# Patient Record
Sex: FEMALE | Race: BLACK OR AFRICAN AMERICAN | NOT HISPANIC OR LATINO | Employment: FULL TIME | URBAN - METROPOLITAN AREA
[De-identification: names, ages, dates, MRNs, and addresses within clinical notes are randomized per-mention and may not be internally consistent; named-entity substitution may affect disease eponyms.]

---

## 2017-01-10 ENCOUNTER — TELEPHONE (OUTPATIENT)
Dept: OBSTETRICS AND GYNECOLOGY | Facility: CLINIC | Age: 32
End: 2017-01-10

## 2017-01-10 NOTE — TELEPHONE ENCOUNTER
----- Message from Shavon Veronica sent at 1/10/2017  3:38 PM CST -----  Contact: pt  x_  1st Request  _  2nd Request  _  3rd Request      Who:PORTER MCCURDY [[id number]]    Why: returning call back     What Number to Call Back: [phone]    When to Expect a call back: (Before the end of the day)   -- if call after 3:00 call back will be tomorrow.

## 2017-01-10 NOTE — TELEPHONE ENCOUNTER
Patient was informed that I will start the benefit verification process the Mirena IUD and call her back once I receive a process with the coverage,verbilazed understanding.

## 2017-01-10 NOTE — TELEPHONE ENCOUNTER
----- Message from Fidel Aguilera sent at 1/10/2017 12:35 PM CST -----  Contact: Patient  x_ 1st Request  _ 2nd Request  _ 3rd Request    Who: PORTER MCCURDY [[id number]]    Why: Patient is calling in regards to confirming if her Merana (birth control) is still available in the office and if so, she would like to be schedule for the procedure. Patient is needing a call back from staff.    What Number to Call Back: Patient can be reached at [phone].    When to Expect a call back: (Before the end of the day)  -- if call after 3:00 call back will be tomorrow.

## 2017-03-02 ENCOUNTER — TELEPHONE (OUTPATIENT)
Dept: OBSTETRICS AND GYNECOLOGY | Facility: CLINIC | Age: 32
End: 2017-03-02

## 2017-03-02 NOTE — TELEPHONE ENCOUNTER
----- Message from Fidel Aguilera sent at 3/1/2017  1:14 PM CST -----  Contact: Patient  x_ 1st Request  _ 2nd Request  _ 3rd Request    Who: PORTER MCCURDY [[id number]]    Why: Patient is calling in regards to being scheduled for her Mirena. Patient is needing a call back from staff.    What Number to Call Back: Patient can be reached at [phone].    When to Expect a call back: (Before the end of the day)  -- if call after 3:00 call back will be tomorrow.

## 2017-03-15 ENCOUNTER — OFFICE VISIT (OUTPATIENT)
Dept: OBSTETRICS AND GYNECOLOGY | Facility: CLINIC | Age: 32
End: 2017-03-15
Attending: OBSTETRICS & GYNECOLOGY
Payer: COMMERCIAL

## 2017-03-15 VITALS
HEIGHT: 67 IN | SYSTOLIC BLOOD PRESSURE: 120 MMHG | DIASTOLIC BLOOD PRESSURE: 80 MMHG | WEIGHT: 232.38 LBS | BODY MASS INDEX: 36.47 KG/M2

## 2017-03-15 DIAGNOSIS — N89.8 VAGINAL DISCHARGE: Primary | ICD-10-CM

## 2017-03-15 PROCEDURE — 99213 OFFICE O/P EST LOW 20 MIN: CPT | Mod: S$GLB,,, | Performed by: OBSTETRICS & GYNECOLOGY

## 2017-03-15 PROCEDURE — 1160F RVW MEDS BY RX/DR IN RCRD: CPT | Mod: S$GLB,,, | Performed by: OBSTETRICS & GYNECOLOGY

## 2017-03-15 PROCEDURE — 99999 PR PBB SHADOW E&M-EST. PATIENT-LVL III: CPT | Mod: PBBFAC,,, | Performed by: OBSTETRICS & GYNECOLOGY

## 2017-03-15 PROCEDURE — 87480 CANDIDA DNA DIR PROBE: CPT

## 2017-03-15 RX ORDER — BUPROPION HYDROCHLORIDE 150 MG/1
TABLET ORAL
Refills: 1 | COMMUNITY
Start: 2017-01-18 | End: 2017-03-15

## 2017-03-15 RX ORDER — FLUCONAZOLE 100 MG/1
100 TABLET ORAL DAILY
Qty: 1 TABLET | Refills: 0 | Status: SHIPPED | OUTPATIENT
Start: 2017-03-15 | End: 2017-03-17 | Stop reason: SDUPTHER

## 2017-03-15 NOTE — MR AVS SNAPSHOT
"    [facility] [address]  [address] [facility] [address]  Phone: [phone]                  Johanna Bullock   3/15/2017 4:00 PM   Office Visit    Description:  Female : 1985   Provider:  Louis Zhao MD   Department:  [facility] [address]           Reason for Visit     Vaginal Discharge                To Do List           Goals (5 Years of Data)     None      [facility] On Call     [facility] On Call Nurse Care Line -  Assistance  Registered nurses in the [facility] On Call Center provide clinical advisement, health education, appointment booking, and other advisory services.  Call for this free service at [phone].             Medications           Message regarding Medications     Verify the changes and/or additions to your medication regime listed below are the same as discussed with your clinician today.  If any of these changes or additions are incorrect, please notify your healthcare provider.        STOP taking these medications     buPROPion (WELLBUTRIN XL) 150 MG TB24 tablet     albuterol 90 mcg/actuation inhaler Inhale 1-2 puffs into the lungs every 6 (six) hours as needed for Wheezing.           Verify that the below list of medications is an accurate representation of the medications you are currently taking.  If none reported, the list may be blank. If incorrect, please contact your healthcare provider. Carry this list with you in case of emergency.           Current Medications     b complex vitamins (B COMPLEX 1) tablet Take 1 tablet by mouth once daily.    cyanocobalamin 1,000 mcg/mL injection ONE INJECTION AS DIRECTED EVERY 28 DAYS    levonorgestrel (MIRENA) 20 mcg/24 hour (5 years) IUD 1 each by Intrauterine route once.           Clinical Reference Information           Your Vitals Were     BP Height Weight Last Period BMI    120/80 5' 7" (1.702 m) 105.4 kg (232 lb 5.8 oz) 02/15/2017 (Approximate) 36.39 kg/m2      Blood Pressure          Most Recent " Value    BP  120/80      Allergies as of 3/15/2017     Zofran [Ondansetron Hcl (Pf)]      Immunizations Administered on Date of Encounter - 3/15/2017     None      Language Assistance Services     ATTENTION: Language assistance services are available, free of charge. Please call [phone].      ATENCIÓN: Si habla jatin, tiene a boogie disposición servicios gratuitos de asistencia lingüística. Llame al [phone].     CHÚ Ý: N?u b?n nói Ti?ng Vi?t, có các d?ch v? h? tr? ngôn ng? mi?n phí dành cho b?n. G?i s? [phone].         [facility] [address] complies with applicable Federal civil rights laws and does not discriminate on the basis of race, color, national origin, age, disability, or sex.

## 2017-03-16 LAB
CANDIDA RRNA VAG QL PROBE: NEGATIVE
G VAGINALIS RRNA GENITAL QL PROBE: POSITIVE
T VAGINALIS RRNA GENITAL QL PROBE: NEGATIVE

## 2017-03-17 DIAGNOSIS — N89.8 VAGINAL DISCHARGE: ICD-10-CM

## 2017-03-17 RX ORDER — FLUCONAZOLE 100 MG/1
100 TABLET ORAL DAILY
Qty: 1 TABLET | Refills: 0 | Status: SHIPPED | OUTPATIENT
Start: 2017-03-17 | End: 2017-04-16

## 2017-03-17 RX ORDER — METRONIDAZOLE 500 MG/1
500 TABLET ORAL 2 TIMES DAILY
Qty: 14 TABLET | Refills: 0 | Status: SHIPPED | OUTPATIENT
Start: 2017-03-17 | End: 2017-03-24

## 2017-03-17 NOTE — TELEPHONE ENCOUNTER
Called pt and informed pt that vaginal cultural showed that she have BV and will be treated with Flagyl. Pt verbalized understanding.

## 2017-05-10 ENCOUNTER — HOSPITAL ENCOUNTER (EMERGENCY)
Facility: OTHER | Age: 32
Discharge: HOME | End: 2017-05-10
Attending: EMERGENCY MEDICINE
Payer: COMMERCIAL

## 2017-05-10 VITALS
DIASTOLIC BLOOD PRESSURE: 56 MMHG | TEMPERATURE: 98 F | BODY MASS INDEX: 34.53 KG/M2 | OXYGEN SATURATION: 100 % | HEART RATE: 64 BPM | RESPIRATION RATE: 16 BRPM | SYSTOLIC BLOOD PRESSURE: 104 MMHG | HEIGHT: 67 IN | WEIGHT: 220 LBS

## 2017-05-10 DIAGNOSIS — M25.539 WRIST PAIN: ICD-10-CM
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
## 2017-05-10 LAB
B-HCG UR QL: NEGATIVE
CTP QC/QA: YES

## 2017-05-10 PROCEDURE — 25000003 PHARM REV CODE 250: Performed by: EMERGENCY MEDICINE

## 2017-05-10 PROCEDURE — 81025 URINE PREGNANCY TEST: CPT | Performed by: EMERGENCY MEDICINE

## 2017-05-10 PROCEDURE — 99284 EMERGENCY DEPT VISIT MOD MDM: CPT | Mod: 25

## 2017-05-10 RX ORDER — TRAMADOL HYDROCHLORIDE 50 MG/1
50 TABLET ORAL EVERY 6 HOURS PRN
Qty: 12 TABLET | Refills: 0 | Status: SHIPPED | OUTPATIENT
Start: 2017-05-10 | End: 2017-05-20

## 2017-05-10 RX ORDER — ACETAMINOPHEN 325 MG/1
650 TABLET ORAL
Status: COMPLETED | OUTPATIENT
Start: 2017-05-10 | End: 2017-05-10

## 2017-05-10 RX ORDER — DIAZEPAM 5 MG/1
5 TABLET ORAL EVERY 6 HOURS PRN
Qty: 6 TABLET | Refills: 0 | Status: SHIPPED | OUTPATIENT
Start: 2017-05-10 | End: 2017-07-06

## 2017-05-10 RX ADMIN — ACETAMINOPHEN 650 MG: 325 TABLET ORAL at 10:05
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
## 2017-05-12 ENCOUNTER — LAB VISIT (OUTPATIENT)
Dept: LAB | Facility: HOSPITAL | Age: 32
End: 2017-05-12
Attending: INTERNAL MEDICINE
Payer: COMMERCIAL

## 2017-05-12 ENCOUNTER — OFFICE VISIT (OUTPATIENT)
Dept: FAMILY MEDICINE | Facility: CLINIC | Age: 32
End: 2017-05-12
Payer: COMMERCIAL

## 2017-05-12 VITALS
DIASTOLIC BLOOD PRESSURE: 60 MMHG | WEIGHT: 234.56 LBS | TEMPERATURE: 99 F | RESPIRATION RATE: 18 BRPM | SYSTOLIC BLOOD PRESSURE: 110 MMHG | OXYGEN SATURATION: 98 % | BODY MASS INDEX: 36.74 KG/M2 | HEART RATE: 76 BPM

## 2017-05-12 DIAGNOSIS — E61.1 IRON DEFICIENCY: ICD-10-CM

## 2017-05-12 DIAGNOSIS — E53.8 VITAMIN B12 DEFICIENCY: ICD-10-CM

## 2017-05-12 DIAGNOSIS — S39.012A LUMBAR STRAIN, INITIAL ENCOUNTER: ICD-10-CM

## 2017-05-12 DIAGNOSIS — Z98.84 HISTORY OF GASTRIC BYPASS: ICD-10-CM

## 2017-05-12 DIAGNOSIS — E66.01 SEVERE OBESITY (BMI 35.0-39.9): ICD-10-CM

## 2017-05-12 DIAGNOSIS — M25.531 RIGHT WRIST PAIN: Primary | ICD-10-CM

## 2017-05-12 DIAGNOSIS — M62.830 MUSCLE SPASM OF BACK: ICD-10-CM

## 2017-05-12 LAB
ALBUMIN SERPL BCP-MCNC: 3.5 G/DL
ALP SERPL-CCNC: 60 U/L
ALT SERPL W/O P-5'-P-CCNC: 20 U/L
ANION GAP SERPL CALC-SCNC: 8 MMOL/L
AST SERPL-CCNC: 21 U/L
BASOPHILS # BLD AUTO: 0.04 K/UL
BASOPHILS NFR BLD: 0.8 %
BILIRUB SERPL-MCNC: 0.4 MG/DL
BUN SERPL-MCNC: 11 MG/DL
CALCIUM SERPL-MCNC: 8.9 MG/DL
CHLORIDE SERPL-SCNC: 108 MMOL/L
CO2 SERPL-SCNC: 23 MMOL/L
CREAT SERPL-MCNC: 0.7 MG/DL
DIFFERENTIAL METHOD: ABNORMAL
EOSINOPHIL # BLD AUTO: 0.2 K/UL
EOSINOPHIL NFR BLD: 3.7 %
ERYTHROCYTE [DISTWIDTH] IN BLOOD BY AUTOMATED COUNT: 14.5 %
EST. GFR  (AFRICAN AMERICAN): >60 ML/MIN/1.73 M^2
EST. GFR  (NON AFRICAN AMERICAN): >60 ML/MIN/1.73 M^2
FERRITIN SERPL-MCNC: 11 NG/ML
FOLATE SERPL-MCNC: 9.7 NG/ML
GLUCOSE SERPL-MCNC: 89 MG/DL
HCT VFR BLD AUTO: 36.3 %
HGB BLD-MCNC: 12 G/DL
IRON SERPL-MCNC: 74 UG/DL
LYMPHOCYTES # BLD AUTO: 1.9 K/UL
LYMPHOCYTES NFR BLD: 39 %
MCH RBC QN AUTO: 27.4 PG
MCHC RBC AUTO-ENTMCNC: 33.1 %
MCV RBC AUTO: 83 FL
MONOCYTES # BLD AUTO: 0.5 K/UL
MONOCYTES NFR BLD: 9.7 %
NEUTROPHILS # BLD AUTO: 2.3 K/UL
NEUTROPHILS NFR BLD: 46.6 %
PLATELET # BLD AUTO: 329 K/UL
PMV BLD AUTO: 9.7 FL
POTASSIUM SERPL-SCNC: 4.4 MMOL/L
PROT SERPL-MCNC: 7.2 G/DL
RBC # BLD AUTO: 4.38 M/UL
SATURATED IRON: 15 %
SODIUM SERPL-SCNC: 139 MMOL/L
TOTAL IRON BINDING CAPACITY: 481 UG/DL
TRANSFERRIN SERPL-MCNC: 325 MG/DL
TSH SERPL DL<=0.005 MIU/L-ACNC: 1.68 UIU/ML
VIT B12 SERPL-MCNC: 680 PG/ML
WBC # BLD AUTO: 4.85 K/UL

## 2017-05-12 PROCEDURE — 84466 ASSAY OF TRANSFERRIN: CPT

## 2017-05-12 PROCEDURE — 99999 PR PBB SHADOW E&M-EST. PATIENT-LVL III: CPT | Mod: PBBFAC,,, | Performed by: INTERNAL MEDICINE

## 2017-05-12 PROCEDURE — 80053 COMPREHEN METABOLIC PANEL: CPT

## 2017-05-12 PROCEDURE — 82746 ASSAY OF FOLIC ACID SERUM: CPT

## 2017-05-12 PROCEDURE — 82728 ASSAY OF FERRITIN: CPT

## 2017-05-12 PROCEDURE — 84443 ASSAY THYROID STIM HORMONE: CPT

## 2017-05-12 PROCEDURE — 83540 ASSAY OF IRON: CPT

## 2017-05-12 PROCEDURE — 83036 HEMOGLOBIN GLYCOSYLATED A1C: CPT

## 2017-05-12 PROCEDURE — 85025 COMPLETE CBC W/AUTO DIFF WBC: CPT

## 2017-05-12 PROCEDURE — 1160F RVW MEDS BY RX/DR IN RCRD: CPT | Mod: S$GLB,,, | Performed by: INTERNAL MEDICINE

## 2017-05-12 PROCEDURE — 96372 THER/PROPH/DIAG INJ SC/IM: CPT | Mod: S$GLB,,, | Performed by: INTERNAL MEDICINE

## 2017-05-12 PROCEDURE — 82607 VITAMIN B-12: CPT

## 2017-05-12 PROCEDURE — 36415 COLL VENOUS BLD VENIPUNCTURE: CPT | Mod: PO

## 2017-05-12 PROCEDURE — 99214 OFFICE O/P EST MOD 30 MIN: CPT | Mod: 25,S$GLB,, | Performed by: INTERNAL MEDICINE

## 2017-05-12 RX ORDER — KETOROLAC TROMETHAMINE 30 MG/ML
30 INJECTION, SOLUTION INTRAMUSCULAR; INTRAVENOUS
Status: COMPLETED | OUTPATIENT
Start: 2017-05-12 | End: 2017-05-12

## 2017-05-12 RX ORDER — IBUPROFEN 800 MG/1
800 TABLET ORAL 3 TIMES DAILY
Qty: 90 TABLET | Refills: 0 | Status: SHIPPED | OUTPATIENT
Start: 2017-05-12 | End: 2017-07-06

## 2017-05-12 RX ORDER — METHOCARBAMOL 500 MG/1
500 TABLET, FILM COATED ORAL 4 TIMES DAILY PRN
Qty: 40 TABLET | Refills: 0 | Status: SHIPPED | OUTPATIENT
Start: 2017-05-12 | End: 2017-05-22

## 2017-05-12 RX ADMIN — KETOROLAC TROMETHAMINE 30 MG: 30 INJECTION, SOLUTION INTRAMUSCULAR; INTRAVENOUS at 03:05

## 2017-05-12 NOTE — LETTER
May 12, 2017      [facility]  [address]  [address]  [place] [address]  Phone: [phone]  Fax: [phone]       Patient: Johanna Bullock   YOB: 1985  Date of Visit: 05/12/2017    To Whom It May Concern:    Johanna Bullock was at [facility] on 05/12/2017. She may return to work/school on 5/15/17 with no restrictions. If you have any questions or concerns, or if I can be of further assistance, please do not hesitate to contact me.    Sincerely,    Kim Robins MD
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
## 2017-05-13 LAB
ESTIMATED AVG GLUCOSE: 111 MG/DL
HBA1C MFR BLD HPLC: 5.5 %

## 2017-05-15 ENCOUNTER — TELEPHONE (OUTPATIENT)
Dept: FAMILY MEDICINE | Facility: CLINIC | Age: 32
End: 2017-05-15

## 2017-05-15 NOTE — TELEPHONE ENCOUNTER
Patient advised of results that were sent via my [employer].  Patient states that she can't take the iron pills, she don't think that her body breaks it down properly.  Would like another form of iron supplement.  Also advised that you don't prescribe adipex, would like to know if there is any other diet pill you can give her.  Please advise.

## 2017-05-15 NOTE — TELEPHONE ENCOUNTER
----- Message from Clemencia Gould sent at 5/15/2017 10:23 AM CDT -----  Contact: self  Pt calling to see if she can be prescribed Adipex. Please call [phone]. Pt would also like lab results.

## 2017-05-16 ENCOUNTER — TELEPHONE (OUTPATIENT)
Dept: FAMILY MEDICINE | Facility: CLINIC | Age: 32
End: 2017-05-16

## 2017-05-16 NOTE — TELEPHONE ENCOUNTER
----- Message from Galina Leung sent at 5/16/2017 12:36 PM CDT -----  Contact: SELF  Patient is requesting a note for employer to state she can only work an 8 hour shift instead of 12 .  She is taking muscle relaxers and has a long commute . She is scheduled to return to work tomorrow . She is still having a lot of pain .      [phone]    Please fax to - # [phone]   ATTN -- [employer]    LL

## 2017-05-16 NOTE — TELEPHONE ENCOUNTER
Pt's company physician is uncomfortable with her being on a muscle relaxant and driving, so pt is calling to have a letter sent to them stating she can only work an 8 hour shift instead of a 12 hour shift. I informed the pt that I need to speak/communicate directly with the physician or nurse. Will not provide the letter above as requested. Pt voiced understanding.

## 2017-05-17 NOTE — TELEPHONE ENCOUNTER
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  Thank you.

## 2017-05-31 ENCOUNTER — TELEPHONE (OUTPATIENT)
Dept: OBSTETRICS AND GYNECOLOGY | Facility: CLINIC | Age: 32
End: 2017-05-31

## 2017-05-31 NOTE — TELEPHONE ENCOUNTER
----- Message from Keira Montalvo sent at 5/31/2017 12:16 PM CDT -----  Contact: PORTER MCCURDY [[id number]]  x_  1st Request  _  2nd Request  _  3rd Request        Who: PORTER MCCURDY [[id number]]    Why: Patient states she would like to schedule a procedure for the Mirena IUD.     What Number to Call Back: [phone]    When to Expect a call back: (Before the end of the day)   -- if call after 3:00 call back will be tomorrow.

## 2017-05-31 NOTE — TELEPHONE ENCOUNTER
Patient was informed that I will start the verification process for Mirena IUD,verbikazed understanding.

## 2017-06-14 ENCOUNTER — TELEPHONE (OUTPATIENT)
Dept: OBSTETRICS AND GYNECOLOGY | Facility: CLINIC | Age: 32
End: 2017-06-14

## 2017-06-30 ENCOUNTER — TELEPHONE (OUTPATIENT)
Dept: OBSTETRICS AND GYNECOLOGY | Facility: CLINIC | Age: 32
End: 2017-06-30

## 2017-06-30 NOTE — TELEPHONE ENCOUNTER
----- Message from Fidel Aguilera sent at 6/30/2017  2:00 PM CDT -----  Contact: Patient  x_ 1st Request  _ 2nd Request  _ 3rd Request    Who: Pt    Why: Patient is calling in regards to check the status of her Mirena arriving in the office. Patient is needing a call back.    What Number to Call Back: [phone]    When to Expect a call back: (Before the end of the day)  -- if call after 3:00 call back will be tomorrow.

## 2017-06-30 NOTE — TELEPHONE ENCOUNTER
Patient was informed that Mirena IUD is covered at 100% and she is responsible for $30 co-pay,patient was scheduled for insertion on Thurs 7/6 at 9:00,verbilazed understanding.

## 2017-07-06 ENCOUNTER — PROCEDURE VISIT (OUTPATIENT)
Dept: OBSTETRICS AND GYNECOLOGY | Facility: CLINIC | Age: 32
End: 2017-07-06
Attending: OBSTETRICS & GYNECOLOGY
Payer: COMMERCIAL

## 2017-07-06 VITALS
BODY MASS INDEX: 37.34 KG/M2 | WEIGHT: 237.88 LBS | DIASTOLIC BLOOD PRESSURE: 70 MMHG | SYSTOLIC BLOOD PRESSURE: 104 MMHG | HEIGHT: 67 IN

## 2017-07-06 DIAGNOSIS — N89.8 VAGINAL DISCHARGE: ICD-10-CM

## 2017-07-06 DIAGNOSIS — Z30.433 ENCOUNTER FOR REMOVAL AND REINSERTION OF INTRAUTERINE CONTRACEPTIVE DEVICE (IUD): Primary | ICD-10-CM

## 2017-07-06 DIAGNOSIS — Z11.3 SCREEN FOR STD (SEXUALLY TRANSMITTED DISEASE): ICD-10-CM

## 2017-07-06 DIAGNOSIS — Z30.430 ENCOUNTER FOR INSERTION OF MIRENA IUD: ICD-10-CM

## 2017-07-06 LAB
B-HCG UR QL: NEGATIVE
C TRACH DNA SPEC QL NAA+PROBE: NOT DETECTED
CTP QC/QA: YES
N GONORRHOEA DNA SPEC QL NAA+PROBE: NOT DETECTED

## 2017-07-06 PROCEDURE — 58301 REMOVE INTRAUTERINE DEVICE: CPT | Mod: 51,S$GLB,, | Performed by: OBSTETRICS & GYNECOLOGY

## 2017-07-06 PROCEDURE — 58300 INSERT INTRAUTERINE DEVICE: CPT | Mod: S$GLB,,, | Performed by: OBSTETRICS & GYNECOLOGY

## 2017-07-06 PROCEDURE — 87660 TRICHOMONAS VAGIN DIR PROBE: CPT

## 2017-07-06 PROCEDURE — 81025 URINE PREGNANCY TEST: CPT | Mod: QW,S$GLB,, | Performed by: OBSTETRICS & GYNECOLOGY

## 2017-07-06 PROCEDURE — 87480 CANDIDA DNA DIR PROBE: CPT

## 2017-07-06 PROCEDURE — 87591 N.GONORRHOEAE DNA AMP PROB: CPT

## 2017-07-06 NOTE — PROCEDURES
INSERTION OF INTRAUTERINE DEVICE  Date/Time: 2017 9:28 AM  Performed by: COLTON DOMINGO.  Authorized by: COLTON DOMINGO   Preparation: Patient was prepped and draped in the usual sterile fashion.  Local anesthesia used: no    Anesthesia:  Local anesthesia used: no    Sedation:  Patient sedated: no  Patient tolerance: Patient tolerated the procedure well with no immediate complications  Comments: Johanna Bullock is a 32 y.o. female  presents for IUD placement.  Patient's last menstrual period was 2017 (approximate)..  She desires Mirena.  UPT is negative.      She was counseled on the risks, benefits, indications, and alternatives to IUD use.  She understands that with insertion there is a risk of bleeding, infection, and uterine perforation.  All questions are answered.  Consents signed.  Cervical cultures were performed.    Procedure:  Time out performed.  The cervix was visualized with a speculum.  A single tooth tenaculum was placed on the anterior lip of the cervix.  The uterus sounds to 8cm using sterile technique.  A Mirena was loaded and placed high in the uterine fundus without difficulty using sterile technique.  The string was was then cut.  The tenaculum and speculum were removed.  The patient tolerated the procedure well.    Assessment:  1.  Contraceptive management/IUD insertion    Post IUD placement counseling:  Manage post IUD placement pain with NSAIDS, Tylenol or Rx per Medcard.  IUD danger signs and how to check for strings were discussed.  The IUD needs to be removed in 5 years for Mirena and 10 years for Copper IUD.    Counseling lasted approximately 15 minutes and all her questions were answered.    Follow up:  2 weeks.

## 2017-07-06 NOTE — PROCEDURES
Removal of Intrauterine Device-Today  Date/Time: 7/6/2017 9:28 AM  Performed by: COLTON DOMINGO.  Authorized by: COLTON DOMINGO   Preparation: Patient was prepped and draped in the usual sterile fashion.  Local anesthesia used: no    Anesthesia:  Local anesthesia used: no    Sedation:  Patient sedated: no  Patient tolerance: Patient tolerated the procedure well with no immediate complications  Comments: PROCEDURE:     PRE-IUD REMOVAL COUNSELING:  The patient was advised of minimal risks of bleeding and pain and she agrees to proceed.    PROCEDURE:  TIME OUT PERFORMED.  IUD strings were visualized at the os and grasped. IUD removed with gentle traction.  The patient tolerated the procedure well      POST IUD REMOVAL COUNSELING:  Expect period-like flow to occur after Mirena IUD removal and periods to return to pre-IUD pattern.  Manage post IUD removal cramping with NSAIDs, Tylenol or Rx per [facility].    POST IUD REMOVAL CONTRACEPTION:(Post IUD removal contraception)    Counseling lasted approximately 15 minutes and all her questions were answered.    FOLLOW-UP: With me for annual gyn exam or prn.

## 2017-07-07 ENCOUNTER — PATIENT MESSAGE (OUTPATIENT)
Dept: OBSTETRICS AND GYNECOLOGY | Facility: CLINIC | Age: 32
End: 2017-07-07

## 2017-07-07 DIAGNOSIS — B96.89 BV (BACTERIAL VAGINOSIS): Primary | ICD-10-CM

## 2017-07-07 DIAGNOSIS — N76.0 BV (BACTERIAL VAGINOSIS): Primary | ICD-10-CM

## 2017-07-07 RX ORDER — METRONIDAZOLE 7.5 MG/G
1 GEL VAGINAL DAILY
Qty: 70 G | Refills: 0 | Status: SHIPPED | OUTPATIENT
Start: 2017-07-07 | End: 2017-07-14

## 2017-07-27 ENCOUNTER — LAB VISIT (OUTPATIENT)
Dept: LAB | Facility: OTHER | Age: 32
End: 2017-07-27
Attending: OBSTETRICS & GYNECOLOGY
Payer: COMMERCIAL

## 2017-07-27 ENCOUNTER — OFFICE VISIT (OUTPATIENT)
Dept: OBSTETRICS AND GYNECOLOGY | Facility: CLINIC | Age: 32
End: 2017-07-27
Attending: OBSTETRICS & GYNECOLOGY
Payer: COMMERCIAL

## 2017-07-27 VITALS
WEIGHT: 239.19 LBS | BODY MASS INDEX: 37.54 KG/M2 | SYSTOLIC BLOOD PRESSURE: 120 MMHG | DIASTOLIC BLOOD PRESSURE: 80 MMHG | HEIGHT: 67 IN

## 2017-07-27 DIAGNOSIS — Z01.411 ENCOUNTER FOR GYNECOLOGICAL EXAMINATION WITH ABNORMAL FINDING: Primary | ICD-10-CM

## 2017-07-27 DIAGNOSIS — Z11.3 SCREEN FOR STD (SEXUALLY TRANSMITTED DISEASE): ICD-10-CM

## 2017-07-27 DIAGNOSIS — Z30.431 ENCOUNTER FOR ROUTINE CHECKING OF INTRAUTERINE CONTRACEPTIVE DEVICE (IUD): ICD-10-CM

## 2017-07-27 DIAGNOSIS — Z30.430 ENCOUNTER FOR INSERTION OF MIRENA IUD: ICD-10-CM

## 2017-07-27 PROCEDURE — 86703 HIV-1/HIV-2 1 RESULT ANTBDY: CPT

## 2017-07-27 PROCEDURE — 36415 COLL VENOUS BLD VENIPUNCTURE: CPT

## 2017-07-27 PROCEDURE — 86592 SYPHILIS TEST NON-TREP QUAL: CPT

## 2017-07-27 PROCEDURE — 99395 PREV VISIT EST AGE 18-39: CPT | Mod: S$GLB,,, | Performed by: OBSTETRICS & GYNECOLOGY

## 2017-07-27 PROCEDURE — 99999 PR PBB SHADOW E&M-EST. PATIENT-LVL III: CPT | Mod: PBBFAC,,, | Performed by: OBSTETRICS & GYNECOLOGY

## 2017-07-27 NOTE — PROGRESS NOTES
Subjective:       Patient ID: Johanna Bullock is a 32 y.o. female.    Chief Complaint:  Well Woman and IUD Check      History of Present Illness  HPI  Annual Exam-Premenopausal  Patient presents for annual exam. The patient has no complaints today. The patient is sexually active. GYN screening history: last pap: was normal. The patient wears seatbelts: yes. The patient participates in regular exercise: yes. Has the patient ever been transfused or tattooed?: no. The patient reports that there is not domestic violence in her life.    Here for IUD check as well.  Happy with IUD.    GYN & OB HistoryPatient's last menstrual period was 2017 (exact date).   Date of Last Pap: 2015    OB History    Para Term  AB Living   3 1 1   2 1   SAB TAB Ectopic Multiple Live Births           1      # Outcome Date GA Lbr Brandon/2nd Weight Sex Delivery Anes PTL Lv   3 Term 12 38w0d  2.892 kg (6 lb 6 oz) M Vag-Spont None N RIAZ   2 AB            1 AB                   Review of Systems  Review of Systems   Constitutional: Negative for activity change, appetite change and fatigue.   HENT: Negative.  Negative for tinnitus.    Eyes: Negative.    Respiratory: Negative for cough and shortness of breath.    Cardiovascular: Negative for chest pain and palpitations.   Gastrointestinal: Negative.  Negative for abdominal pain, blood in stool, constipation, diarrhea and nausea.   Endocrine: Negative.  Negative for hot flashes.   Genitourinary: Negative for dyspareunia, dysuria, frequency, menstrual problem, pelvic pain, vaginal discharge, dysmenorrhea, urinary incontinence and postcoital bleeding.   Musculoskeletal: Negative for back pain and joint swelling.   Skin:  Negative for rash.   Neurological: Negative.  Negative for headaches.   Hematological: Negative.  Does not bruise/bleed easily.   Psychiatric/Behavioral: The patient is not nervous/anxious.    Breast: negative.  Negative for breast mass, nipple  discharge and skin changes          Objective:    Physical Exam:   Constitutional: Vital signs are normal. She appears well-developed and well-nourished. She is active and cooperative. She does not appear ill. No distress.    HENT:   Head: Normocephalic and atraumatic.   Nose: Nose normal.   Mouth/Throat: Oropharynx is clear and moist and mucous membranes are normal.    Eyes: Conjunctivae, EOM and lids are normal. Pupils are equal, round, and reactive to light.    Neck: Full passive range of motion without pain. No muscular tenderness present. No neck rigidity. No thyroid mass and no thyromegaly present.    Cardiovascular: Normal rate, regular rhythm, S1 normal, S2 normal, normal heart sounds and normal pulses.     Pulmonary/Chest: Effort normal and breath sounds normal. No accessory muscle usage. Right breast exhibits no inverted nipple, no mass, no nipple discharge, no skin change, no tenderness and no swelling. Left breast exhibits no inverted nipple, no mass, no nipple discharge, no skin change, no tenderness and no swelling.        Abdominal: Soft. Normal appearance and bowel sounds are normal. She exhibits no distension, no ascites and no mass. There is no hepatosplenomegaly. There is no tenderness. There is no rigidity, no rebound and no guarding.     Genitourinary: Vagina normal and uterus normal. Pelvic exam was performed with patient supine. There is no tenderness or injury on the right labia. There is no tenderness or injury on the left labia. Uterus is not deviated, not hosting fibroids and not experiencing uterine prolapse. Cervix is normal. Right adnexum displays no mass and no fullness. Left adnexum displays no mass and no fullness. No erythema, rectocele or cystocele in the vagina. No vaginal discharge found. Labial bartholins normal.Cervix exhibits no motion tenderness and no discharge. Additional cervical findings: IUD strings visualized             Lymphadenopathy:        Right: No inguinal  adenopathy present.        Left: No inguinal adenopathy present.    Neurological: She is alert. She has normal strength.    Skin: Skin is warm, dry and intact.    Psychiatric: She has a normal mood and affect. Her behavior is normal. Thought content normal. Her mood appears not anxious. Her affect is not inappropriate. Her speech is not slurred. She is not agitated and not aggressive. Thought content is not paranoid. Cognition and memory are normal. She expresses no suicidal plans and no homicidal plans.          Assessment:        1. Encounter for gynecological examination with abnormal finding    2. Encounter for routine checking of intrauterine contraceptive device (IUD)    3. Screen for STD (sexually transmitted disease)                Plan:      Johanna PEREZ was seen today for well woman and iud check.    Diagnoses and all orders for this visit:    Encounter for gynecological examination with abnormal finding  Health Maintenance   Topic Date Due    Influenza Vaccine  08/01/2017    Pap Smear with HPV Cotest  06/09/2018    TETANUS VACCINE  12/29/2019    Lipid Panel  Completed       Encounter for routine checking of intrauterine contraceptive device (IUD)    Screen for STD (sexually transmitted disease)  -     RPR; Future  -     HIV-1 and HIV-2 antibodies; Future

## 2017-07-28 LAB
HIV 1+2 AB+HIV1 P24 AG SERPL QL IA: NEGATIVE
HIV 1+2 AB+HIV1 P24 AG SERPL QL IA: NEGATIVE
RPR SER QL: NORMAL
RPR SER QL: NORMAL

## 2017-08-03 ENCOUNTER — OFFICE VISIT (OUTPATIENT)
Dept: INTERNAL MEDICINE | Facility: CLINIC | Age: 32
End: 2017-08-03
Payer: COMMERCIAL

## 2017-08-03 VITALS
SYSTOLIC BLOOD PRESSURE: 126 MMHG | DIASTOLIC BLOOD PRESSURE: 79 MMHG | HEIGHT: 67 IN | WEIGHT: 230 LBS | BODY MASS INDEX: 36.1 KG/M2 | TEMPERATURE: 98 F | HEART RATE: 80 BPM | OXYGEN SATURATION: 98 %

## 2017-08-03 DIAGNOSIS — J02.9 PHARYNGITIS, UNSPECIFIED ETIOLOGY: Primary | ICD-10-CM

## 2017-08-03 PROCEDURE — 99213 OFFICE O/P EST LOW 20 MIN: CPT | Mod: S$GLB,,, | Performed by: INTERNAL MEDICINE

## 2017-08-03 PROCEDURE — 99999 PR PBB SHADOW E&M-EST. PATIENT-LVL III: CPT | Mod: PBBFAC,,, | Performed by: INTERNAL MEDICINE

## 2017-08-03 PROCEDURE — 3008F BODY MASS INDEX DOCD: CPT | Mod: S$GLB,,, | Performed by: INTERNAL MEDICINE

## 2017-08-03 NOTE — PROGRESS NOTES
Subjective:       Patient ID: Johanna Bullock is a 32 y.o. female.    Chief Complaint: Sore Throat    Sore Throat    This is a new problem. The current episode started in the past 7 days. The problem has been unchanged. The pain is worse on the right side. There has been no fever. The pain is at a severity of 3/10. The pain is mild. Associated symptoms include congestion. Pertinent negatives include no abdominal pain, coughing, diarrhea, ear discharge, ear pain, headaches, shortness of breath, trouble swallowing or vomiting. She has had no exposure to strep or mono. She has tried nothing for the symptoms. The treatment provided no relief.     Review of Systems   Constitutional: Negative for activity change, chills, fatigue and fever.   HENT: Positive for congestion and sore throat. Negative for ear discharge, ear pain, nosebleeds, postnasal drip, sinus pressure and trouble swallowing.    Eyes: Negative.  Negative for visual disturbance.   Respiratory: Negative for cough, chest tightness, shortness of breath and wheezing.    Cardiovascular: Negative for chest pain.   Gastrointestinal: Negative for abdominal pain, diarrhea, nausea and vomiting.   Genitourinary: Negative for difficulty urinating, dysuria, frequency and urgency.   Musculoskeletal: Negative for arthralgias and neck stiffness.   Skin: Negative for rash.   Neurological: Negative for dizziness, weakness and headaches.   Psychiatric/Behavioral: Negative for sleep disturbance. The patient is not nervous/anxious.        Objective:      Physical Exam   Constitutional: She is oriented to person, place, and time. She appears well-developed and well-nourished.  Non-toxic appearance. No distress.   HENT:   Head: Normocephalic and atraumatic.   Right Ear: Tympanic membrane, external ear and ear canal normal.   Left Ear: Tympanic membrane, external ear and ear canal normal.   Eyes: EOM are normal. Pupils are equal, round, and reactive to light. No scleral  icterus.   Neck: Normal range of motion. Neck supple. No thyromegaly present.   Cardiovascular: Normal rate, regular rhythm and normal heart sounds.    Pulmonary/Chest: Effort normal and breath sounds normal.   Abdominal: Soft. Bowel sounds are normal. She exhibits no mass. There is no tenderness. There is no rebound.   Musculoskeletal: Normal range of motion.   Lymphadenopathy:     She has no cervical adenopathy.   Neurological: She is alert and oriented to person, place, and time. She has normal reflexes. She displays normal reflexes. No cranial nerve deficit. She exhibits normal muscle tone. Coordination normal.   Skin: Skin is warm and dry.   Psychiatric: She has a normal mood and affect. Her behavior is normal.       Assessment:       1. Pharyngitis, unspecified etiology        Plan:   Johanna PEREZ was seen today for sore throat.    Diagnoses and all orders for this visit:    Pharyngitis, unspecified etiology

## 2017-09-05 ENCOUNTER — TELEPHONE (OUTPATIENT)
Dept: INTERNAL MEDICINE | Facility: CLINIC | Age: 32
End: 2017-09-05

## 2017-09-05 ENCOUNTER — LAB VISIT (OUTPATIENT)
Dept: LAB | Facility: HOSPITAL | Age: 32
End: 2017-09-05
Attending: INTERNAL MEDICINE

## 2017-09-05 ENCOUNTER — OFFICE VISIT (OUTPATIENT)
Dept: INTERNAL MEDICINE | Facility: CLINIC | Age: 32
End: 2017-09-05
Payer: COMMERCIAL

## 2017-09-05 VITALS
WEIGHT: 237 LBS | BODY MASS INDEX: 37.2 KG/M2 | OXYGEN SATURATION: 98 % | SYSTOLIC BLOOD PRESSURE: 122 MMHG | TEMPERATURE: 99 F | HEART RATE: 79 BPM | HEIGHT: 67 IN | DIASTOLIC BLOOD PRESSURE: 80 MMHG

## 2017-09-05 DIAGNOSIS — R53.83 FATIGUE, UNSPECIFIED TYPE: ICD-10-CM

## 2017-09-05 DIAGNOSIS — E86.0 DEHYDRATION: Primary | ICD-10-CM

## 2017-09-05 DIAGNOSIS — E86.0 DEHYDRATION: ICD-10-CM

## 2017-09-05 LAB
ALBUMIN SERPL BCP-MCNC: 3.8 G/DL
ALP SERPL-CCNC: 69 U/L
ALT SERPL W/O P-5'-P-CCNC: 18 U/L
ANION GAP SERPL CALC-SCNC: 10 MMOL/L
AST SERPL-CCNC: 13 U/L
BASOPHILS # BLD AUTO: 0.05 K/UL
BASOPHILS NFR BLD: 0.9 %
BILIRUB SERPL-MCNC: 0.4 MG/DL
BILIRUB SERPL-MCNC: NORMAL MG/DL
BLOOD URINE, POC: NORMAL
BUN SERPL-MCNC: 8 MG/DL
CALCIUM SERPL-MCNC: 9 MG/DL
CHLORIDE SERPL-SCNC: 108 MMOL/L
CO2 SERPL-SCNC: 24 MMOL/L
COLOR, POC UA: YELLOW
CREAT SERPL-MCNC: 0.7 MG/DL
DIFFERENTIAL METHOD: ABNORMAL
EOSINOPHIL # BLD AUTO: 0.2 K/UL
EOSINOPHIL NFR BLD: 2.9 %
ERYTHROCYTE [DISTWIDTH] IN BLOOD BY AUTOMATED COUNT: 14.8 %
EST. GFR  (AFRICAN AMERICAN): >60 ML/MIN/1.73 M^2
EST. GFR  (NON AFRICAN AMERICAN): >60 ML/MIN/1.73 M^2
GLUCOSE SERPL-MCNC: 85 MG/DL
GLUCOSE UR QL STRIP: NORMAL
HCT VFR BLD AUTO: 39.9 %
HGB BLD-MCNC: 13.1 G/DL
KETONES UR QL STRIP: NORMAL
LEUKOCYTE ESTERASE URINE, POC: NORMAL
LYMPHOCYTES # BLD AUTO: 2.3 K/UL
LYMPHOCYTES NFR BLD: 41.6 %
MCH RBC QN AUTO: 27.3 PG
MCHC RBC AUTO-ENTMCNC: 32.8 G/DL
MCV RBC AUTO: 83 FL
MONOCYTES # BLD AUTO: 0.5 K/UL
MONOCYTES NFR BLD: 8.3 %
NEUTROPHILS # BLD AUTO: 2.6 K/UL
NEUTROPHILS NFR BLD: 46.1 %
NITRITE, POC UA: NORMAL
PH, POC UA: 6
PLATELET # BLD AUTO: 362 K/UL
PMV BLD AUTO: 9.7 FL
POTASSIUM SERPL-SCNC: 3.8 MMOL/L
PROT SERPL-MCNC: 7.7 G/DL
PROTEIN, POC: NORMAL
RBC # BLD AUTO: 4.8 M/UL
SODIUM SERPL-SCNC: 142 MMOL/L
SPECIFIC GRAVITY, POC UA: 1.01
UROBILINOGEN, POC UA: NORMAL
WBC # BLD AUTO: 5.53 K/UL

## 2017-09-05 PROCEDURE — 36415 COLL VENOUS BLD VENIPUNCTURE: CPT

## 2017-09-05 PROCEDURE — 81002 URINALYSIS NONAUTO W/O SCOPE: CPT | Mod: S$GLB,,, | Performed by: INTERNAL MEDICINE

## 2017-09-05 PROCEDURE — 85025 COMPLETE CBC W/AUTO DIFF WBC: CPT

## 2017-09-05 PROCEDURE — 3008F BODY MASS INDEX DOCD: CPT | Mod: S$GLB,,, | Performed by: INTERNAL MEDICINE

## 2017-09-05 PROCEDURE — 99213 OFFICE O/P EST LOW 20 MIN: CPT | Mod: 25,S$GLB,, | Performed by: INTERNAL MEDICINE

## 2017-09-05 PROCEDURE — 80053 COMPREHEN METABOLIC PANEL: CPT

## 2017-09-05 PROCEDURE — 99999 PR PBB SHADOW E&M-EST. PATIENT-LVL III: CPT | Mod: PBBFAC,,, | Performed by: INTERNAL MEDICINE

## 2017-09-05 RX ORDER — FERROUS GLUCONATE 324(38)MG
324 TABLET ORAL
COMMUNITY
End: 2018-07-17 | Stop reason: SDUPTHER

## 2017-09-05 NOTE — LETTER
September 5, 2017      [facility]  [address]  [facility] [address]  Phone: [phone]  Fax: [phone]       Patient: Johanna Bullock   YOB: 1985  Date of Visit: 09/05/2017    To Whom It May Concern:    Johanna Bullock  was at [facility] on 09/05/2017. She may return to work on 09-. Mrs Bullock  needs to take breaks every 2 Hours for 15 minutes to hydrate. Mrs. Bullock is severely dehydrated. If you have any questions or concerns, or if I can be of further assistance, please do not hesitate to contact me.    Sincerely,          Eliane Cabezas

## 2017-09-05 NOTE — TELEPHONE ENCOUNTER
----- Message from Jana Wolf sent at 9/5/2017  8:38 AM CDT -----  Contact: Self/[phone] cell   Pt said that she is calling in regards to needing to know if she was seen in [facility] if the doctor could give her Iv fluids because she is dehydrated , she also said that she is not absorbing her Iron or B-12 and is not feeling well. Please call and advise              Thanks!!!

## 2017-09-05 NOTE — PROGRESS NOTES
Subjective:       Patient ID: Johanna Bullock is a 32 y.o. female.    Chief Complaint: Dizziness and Fatigue    Patient feels she is very dehydrated and is always fatigued.  She has pressured job and works 12 hour shifts, some days and some nights.  Not sleeping well and has difficulty drinking water..  No N, V, D, fever or chills.  Has history of gastric bypass      Review of Systems   Constitutional: Negative for activity change, chills, fatigue and fever.   HENT: Negative for congestion, ear pain, nosebleeds, postnasal drip, sinus pressure and sore throat.    Eyes: Negative.  Negative for visual disturbance.   Respiratory: Negative for cough, chest tightness, shortness of breath and wheezing.    Cardiovascular: Negative for chest pain.   Gastrointestinal: Negative for abdominal pain, diarrhea, nausea and vomiting.   Genitourinary: Negative for difficulty urinating, dysuria, frequency and urgency.   Musculoskeletal: Negative for arthralgias and neck stiffness.   Skin: Negative for rash.   Neurological: Negative for dizziness, weakness and headaches.   Psychiatric/Behavioral: Negative for sleep disturbance. The patient is not nervous/anxious.        Objective:      Physical Exam   Constitutional: She is oriented to person, place, and time. She appears well-developed and well-nourished.  Non-toxic appearance. No distress.   HENT:   Head: Normocephalic and atraumatic.   Right Ear: Tympanic membrane, external ear and ear canal normal.   Left Ear: Tympanic membrane, external ear and ear canal normal.   Eyes: EOM are normal. Pupils are equal, round, and reactive to light. No scleral icterus.   Neck: Normal range of motion. Neck supple. No thyromegaly present.   Cardiovascular: Normal rate, regular rhythm and normal heart sounds.    Pulmonary/Chest: Effort normal and breath sounds normal.   Abdominal: Soft. Bowel sounds are normal. She exhibits no mass. There is no tenderness. There is no rebound.   Musculoskeletal:  Normal range of motion.   Lymphadenopathy:     She has no cervical adenopathy.   Neurological: She is alert and oriented to person, place, and time. She has normal reflexes. She displays normal reflexes. No cranial nerve deficit. She exhibits normal muscle tone. Coordination normal.   Skin: Skin is warm and dry.   Psychiatric: She has a normal mood and affect. Her behavior is normal.       Assessment:       1. Dehydration        Plan:   Johanna PEREZ was seen today for dizziness and fatigue.    Diagnoses and all orders for this visit:    Dehydration  -     CBC auto differential; Future  -     Comprehensive metabolic panel; Future  -     POCT urine dipstick without microscope    Other orders  -     sodium chloride 0.9% bolus 1,000 mL; Inject 1,000 mLs into the vein once.

## 2018-03-19 ENCOUNTER — OFFICE VISIT (OUTPATIENT)
Dept: FAMILY MEDICINE | Facility: CLINIC | Age: 33
End: 2018-03-19
Payer: COMMERCIAL

## 2018-03-19 VITALS
HEART RATE: 62 BPM | HEIGHT: 66 IN | SYSTOLIC BLOOD PRESSURE: 100 MMHG | WEIGHT: 247.38 LBS | BODY MASS INDEX: 39.76 KG/M2 | OXYGEN SATURATION: 97 % | TEMPERATURE: 98 F | DIASTOLIC BLOOD PRESSURE: 60 MMHG

## 2018-03-19 DIAGNOSIS — G44.209 TENSION HEADACHE: Primary | ICD-10-CM

## 2018-03-19 DIAGNOSIS — J30.89 CHRONIC NON-SEASONAL ALLERGIC RHINITIS, UNSPECIFIED TRIGGER: ICD-10-CM

## 2018-03-19 PROCEDURE — 99214 OFFICE O/P EST MOD 30 MIN: CPT | Mod: S$GLB,,, | Performed by: INTERNAL MEDICINE

## 2018-03-19 PROCEDURE — 99999 PR PBB SHADOW E&M-EST. PATIENT-LVL III: CPT | Mod: PBBFAC,,, | Performed by: INTERNAL MEDICINE

## 2018-03-19 RX ORDER — MONTELUKAST SODIUM 10 MG/1
10 TABLET ORAL NIGHTLY
Qty: 90 TABLET | Refills: 1 | Status: SHIPPED | OUTPATIENT
Start: 2018-03-19 | End: 2018-04-18

## 2018-03-19 RX ORDER — METHOCARBAMOL 500 MG/1
500 TABLET, FILM COATED ORAL 4 TIMES DAILY PRN
Qty: 40 TABLET | Refills: 0 | Status: SHIPPED | OUTPATIENT
Start: 2018-03-19 | End: 2018-03-29

## 2018-03-19 RX ORDER — IBUPROFEN 800 MG/1
800 TABLET ORAL
Qty: 30 TABLET | Refills: 0 | Status: SHIPPED | OUTPATIENT
Start: 2018-03-19 | End: 2018-07-17

## 2018-03-19 NOTE — PROGRESS NOTES
SUBJECTIVE     Chief Complaint   Patient presents with    Migraine     nausea. Been taking Excedrin    Neck Pain     stiffness and shoulder stiffness    Allergies       HPI  Johanna Bullock is a 32 y.o. female with multiple medical diagnoses as listed in the medical history and problem list that presents for evaluation of headaches x 2-3 days. Pt reports the pain is band-like around her head and throbbing/pounding at an 8-9/10 and constant in nature. Pt has been taking Excedrin with improvement of pain to a 5/10. Pt has also been having some neck pain with the headache that started after a kick boxing. Pain is achy in the B/L neck radiating down the B/L shoulder area at a 6-7/10. She also has been fighting with her allergies with cough, sneeze, and itchy/runny watery eyes. +subjective fever, chills, and night sweats. Of note, pt has been taking Flonase, Tylenol, and Nyquil. Denies any trauma, falls, or heavy lifting.    PAST MEDICAL HISTORY:  Past Medical History:   Diagnosis Date    Adjustment disorder with mixed anxiety and depressed mood 2/6/2013    Anemia, B12 deficiency     Anxiety     Major depression, single episode 2/6/2013    Major depression, single episode 2/6/2013    Post partum depression        PAST SURGICAL HISTORY:  Past Surgical History:   Procedure Laterality Date    CHOLECYSTECTOMY  10/2012    DILATION AND CURETTAGE OF UTERUS      GASTRIC BYPASS      Liver scope  10/2012       SOCIAL HISTORY:  Social History     Social History    Marital status: Single     Spouse name: N/A    Number of children: N/A    Years of education: N/A     Occupational History    works as  [employer]     Tech 2000     Social History Main Topics    Smoking status: Never Smoker    Smokeless tobacco: Never Used    Alcohol use Yes      Comment: Alcohol use rarely    Drug use: No      Comment: Mirena    Sexual activity: Yes     Partners: Male     Birth control/ protection: IUD      Other Topics Concern    Not on file     Social History Narrative    Born and raised in [facility] [facility]    Went to [employer] and got BS in bio with minor in chem    1 child    Has boyfriend    A fewnot close friends    Likes to read and go to movies       FAMILY HISTORY:  Family History   Problem Relation Age of Onset    Breast cancer Neg Hx     Colon cancer Neg Hx     Ovarian cancer Neg Hx        ALLERGIES AND MEDICATIONS: updated and reviewed.  Review of patient's allergies indicates:   Allergen Reactions    Zofran [ondansetron hcl (pf)] Rash     Current Outpatient Prescriptions   Medication Sig Dispense Refill    b complex vitamins (B COMPLEX 1) tablet Take 1 tablet by mouth once daily. 30 tablet 5    cyanocobalamin 1,000 mcg/mL injection ONE INJECTION AS DIRECTED EVERY 28 DAYS 4 mL 11    ferrous gluconate (FERGON) 324 MG tablet Take 324 mg by mouth daily with breakfast.      ibuprofen (ADVIL,MOTRIN) 800 MG tablet Take 1 tablet (800 mg total) by mouth before meals as needed for Pain (TID WITH MEALS PRN PAIN). 30 tablet 0    methocarbamol (ROBAXIN) 500 MG Tab Take 1 tablet (500 mg total) by mouth 4 (four) times daily as needed (MAY CAUSE DROWSINESS). 40 tablet 0    montelukast (SINGULAIR) 10 mg tablet Take 1 tablet (10 mg total) by mouth every evening. 90 tablet 1     No current facility-administered medications for this visit.        ROS  Review of Systems   Constitutional: Positive for chills and fever (subjective).   HENT: Positive for sneezing. Negative for hearing loss and sore throat.    Eyes: Positive for photophobia, discharge and itching. Negative for visual disturbance.   Respiratory: Positive for cough. Negative for shortness of breath.    Cardiovascular: Negative for chest pain, palpitations and leg swelling.   Gastrointestinal: Negative for abdominal pain, constipation, diarrhea, nausea and vomiting.   Genitourinary: Negative for dysuria, frequency and urgency.   Musculoskeletal: Positive for neck  "pain and neck stiffness. Negative for arthralgias, joint swelling and myalgias.   Skin: Negative for rash and wound.   Neurological: Positive for headaches.   Psychiatric/Behavioral: Negative for agitation and confusion. The patient is not nervous/anxious.          OBJECTIVE     Physical Exam  Vitals:    03/19/18 1043   BP: 100/60   Pulse: 62   Temp: 98.3 °F (36.8 °C)    Body mass index is 39.92 kg/m².  Weight: 112.2 kg (247 lb 5.7 oz)   Height: 5' 6" (167.6 cm)     Physical Exam   Constitutional: She is oriented to person, place, and time. She appears well-developed and well-nourished. No distress.   HENT:   Head: Normocephalic and atraumatic.   Right Ear: External ear normal.   Left Ear: External ear normal.   Nose: Nose normal.   Mouth/Throat: Oropharynx is clear and moist.   Eyes: Conjunctivae and EOM are normal. Right eye exhibits no discharge. Left eye exhibits no discharge. No scleral icterus.   Neck: Normal range of motion. Neck supple. No JVD present. No tracheal deviation present.   Cardiovascular: Normal rate, regular rhythm and intact distal pulses.  Exam reveals no gallop and no friction rub.    No murmur heard.  Pulmonary/Chest: Effort normal and breath sounds normal. No respiratory distress. She has no wheezes.   Abdominal: Soft. Bowel sounds are normal. She exhibits no distension and no mass. There is no tenderness. There is no rebound and no guarding.   Musculoskeletal: Normal range of motion. She exhibits no edema, tenderness or deformity.   Neurological: She is alert and oriented to person, place, and time. She exhibits normal muscle tone. Coordination normal.   CN III, IV, VI- EOMI, CN V intact with muscles of mastication, CN VII intact with symmetric smile, CN XI intact with good shoulder shrug to resistance, CN XII intact with midline tongue on protrusion    Cerebellum intact with no dysdiadochokinesia; good heel-shin testing    Muscle strength 5/5 in 4/4 ext    Sensation intact throughout to " light touch    Gait Normal       Skin: Skin is warm and dry. No rash noted. No erythema.   Psychiatric: She has a normal mood and affect. Her behavior is normal. Judgment and thought content normal.         Health Maintenance       Date Due Completion Date    Pap Smear with HPV Cotest 06/09/2018 6/9/2015    TETANUS VACCINE 12/29/2019 12/29/2009            ASSESSMENT     32 y.o. female with     1. Tension headache    2. Chronic non-seasonal allergic rhinitis, unspecified trigger        PLAN:     1. Tension headache  - Discussed importance of de-stressing techniques  - ibuprofen (ADVIL,MOTRIN) 800 MG tablet; Take 1 tablet (800 mg total) by mouth before meals as needed for Pain (TID WITH MEALS PRN PAIN).  Dispense: 30 tablet; Refill: 0  - methocarbamol (ROBAXIN) 500 MG Tab; Take 1 tablet (500 mg total) by mouth 4 (four) times daily as needed (MAY CAUSE DROWSINESS).  Dispense: 40 tablet; Refill: 0    2. Chronic non-seasonal allergic rhinitis, unspecified trigger  - Pt has failed therapy with antihistamines, so will start Singulair as below  - montelukast (SINGULAIR) 10 mg tablet; Take 1 tablet (10 mg total) by mouth every evening.  Dispense: 90 tablet; Refill: 1        RTC in 1-2 weeks as needed for any acute worsening of current condition or failure to improve     Kim Robins MD  03/19/2018 11:09 AM        No Follow-up on file.

## 2018-05-25 ENCOUNTER — TELEPHONE (OUTPATIENT)
Dept: FAMILY MEDICINE | Facility: CLINIC | Age: 33
End: 2018-05-25

## 2018-05-25 DIAGNOSIS — R51.9 FREQUENT HEADACHES: Primary | ICD-10-CM

## 2018-05-25 NOTE — TELEPHONE ENCOUNTER
Patient called in requesting an order for a referral CT Scan of the Head due to headaches. LOV 03/19/2018

## 2018-05-25 NOTE — TELEPHONE ENCOUNTER
----- Message from Donald Walker sent at 5/25/2018  8:53 AM CDT -----  Contact: Self  Pt states she's still having sever headaches at least 5 X a week. Would felicity to get referral for CT scan. Pt can reached @ [phone].

## 2018-05-29 ENCOUNTER — TELEPHONE (OUTPATIENT)
Dept: OBSTETRICS AND GYNECOLOGY | Facility: CLINIC | Age: 33
End: 2018-05-29

## 2018-05-29 NOTE — TELEPHONE ENCOUNTER
Called pt and LVM:    Pt is requesting an appt for a IUD removal, but her account is on a financial hold.  I left pt billing telephone number [phone] to contact to see what her options are.

## 2018-05-29 NOTE — TELEPHONE ENCOUNTER
----- Message from Efrain Robins sent at 5/29/2018  1:32 PM CDT -----  Contact: patient            Name of Who is Calling: Johanna      What is the request in detail: patient is requesting a call back in reference to discussing options to have her Mirena removed.  Patient stated she can not schedule due to outstanding balance placed by .  Please call the patient and see how you can help her.      Can the clinic reply by [facility]: no      What Number to Call Back if not in [facility]: [phone]

## 2018-05-31 ENCOUNTER — PROCEDURE VISIT (OUTPATIENT)
Dept: OBSTETRICS AND GYNECOLOGY | Facility: CLINIC | Age: 33
End: 2018-05-31
Payer: COMMERCIAL

## 2018-05-31 VITALS
SYSTOLIC BLOOD PRESSURE: 102 MMHG | WEIGHT: 246.94 LBS | HEIGHT: 66 IN | BODY MASS INDEX: 39.69 KG/M2 | DIASTOLIC BLOOD PRESSURE: 60 MMHG

## 2018-05-31 DIAGNOSIS — Z30.432 ENCOUNTER FOR REMOVAL OF INTRAUTERINE CONTRACEPTIVE DEVICE (IUD): Primary | ICD-10-CM

## 2018-05-31 DIAGNOSIS — G43.009 ATYPICAL MIGRAINE: ICD-10-CM

## 2018-05-31 PROCEDURE — 58301 REMOVE INTRAUTERINE DEVICE: CPT | Mod: S$GLB,,, | Performed by: OBSTETRICS & GYNECOLOGY

## 2018-06-04 NOTE — PROCEDURES
Removal of Intrauterine Device  Date/Time: 5/31/2018 4:40 PM  Performed by: COLTON DOMINGO.  Authorized by: COLTON DOMINGO   Preparation: Patient was prepped and draped in the usual sterile fashion.  Local anesthesia used: no    Anesthesia:  Local anesthesia used: no    Sedation:  Patient sedated: no  Patient tolerance: Patient tolerated the procedure well with no immediate complications  Comments: PROCEDURE:     PRE-IUD REMOVAL COUNSELING:  The patient was advised of minimal risks of bleeding and pain and she agrees to proceed.    PROCEDURE:  TIME OUT PERFORMED.  IUD strings were visualized at the os and grasped. IUD removed with gentle traction.  The patient tolerated the procedure well      POST IUD REMOVAL COUNSELING:  Expect period-like flow to occur after Mirena IUD removal and periods to return to pre-IUD pattern.  Manage post IUD removal cramping with NSAIDs, Tylenol or Rx per [employer].    POST IUD REMOVAL CONTRACEPTION:(Post IUD removal contraception)    Counseling lasted approximately 15 minutes and all her questions were answered.    FOLLOW-UP: With me for annual gyn exam or prn.

## 2018-07-16 ENCOUNTER — TELEPHONE (OUTPATIENT)
Dept: BARIATRICS | Facility: CLINIC | Age: 33
End: 2018-07-16

## 2018-07-16 NOTE — TELEPHONE ENCOUNTER
S/w pt c/o LLQ pain after eating. Denies any n&v. Pain has been continuous for months. Normal BM. appt made

## 2018-07-16 NOTE — TELEPHONE ENCOUNTER
----- Message from Gina Carias sent at 7/16/2018  2:00 PM CDT -----  Contact: self  Pt stated that Clemencia jennifer for years.  She has had bariatric surgery in [place] with another facility several years ago and needs to follow up with someone here now that she is living in [place].  Pt is experiencing fatigue, digestive problems, and sharp pains on her left side.    Pt would like to be seen this week. She can be reached at [phone]

## 2018-07-17 ENCOUNTER — OFFICE VISIT (OUTPATIENT)
Dept: BARIATRICS | Facility: CLINIC | Age: 33
End: 2018-07-17
Payer: COMMERCIAL

## 2018-07-17 ENCOUNTER — LAB VISIT (OUTPATIENT)
Dept: LAB | Facility: HOSPITAL | Age: 33
End: 2018-07-17
Attending: INTERNAL MEDICINE
Payer: COMMERCIAL

## 2018-07-17 VITALS
HEART RATE: 68 BPM | WEIGHT: 249.81 LBS | SYSTOLIC BLOOD PRESSURE: 110 MMHG | BODY MASS INDEX: 40.15 KG/M2 | HEIGHT: 66 IN | DIASTOLIC BLOOD PRESSURE: 60 MMHG

## 2018-07-17 DIAGNOSIS — R53.83 FATIGUE, UNSPECIFIED TYPE: ICD-10-CM

## 2018-07-17 DIAGNOSIS — D50.8 OTHER IRON DEFICIENCY ANEMIA: ICD-10-CM

## 2018-07-17 DIAGNOSIS — E66.01 OBESITY, CLASS III, BMI 40-49.9 (MORBID OBESITY): ICD-10-CM

## 2018-07-17 DIAGNOSIS — K27.9 PEPTIC ULCER: Primary | ICD-10-CM

## 2018-07-17 DIAGNOSIS — Z98.84 HISTORY OF GASTRIC BYPASS: ICD-10-CM

## 2018-07-17 DIAGNOSIS — E53.8 VITAMIN B12 DEFICIENCY: ICD-10-CM

## 2018-07-17 LAB
25(OH)D3+25(OH)D2 SERPL-MCNC: 17 NG/ML
ALBUMIN SERPL BCP-MCNC: 3.5 G/DL
ALP SERPL-CCNC: 94 U/L
ALT SERPL W/O P-5'-P-CCNC: 59 U/L
ANION GAP SERPL CALC-SCNC: 11 MMOL/L
AST SERPL-CCNC: 18 U/L
BASOPHILS # BLD AUTO: 0.04 K/UL
BASOPHILS NFR BLD: 0.9 %
BILIRUB SERPL-MCNC: 0.6 MG/DL
BUN SERPL-MCNC: 6 MG/DL
CALCIUM SERPL-MCNC: 9.6 MG/DL
CHLORIDE SERPL-SCNC: 105 MMOL/L
CO2 SERPL-SCNC: 23 MMOL/L
CREAT SERPL-MCNC: 0.8 MG/DL
DIFFERENTIAL METHOD: ABNORMAL
EOSINOPHIL # BLD AUTO: 0.1 K/UL
EOSINOPHIL NFR BLD: 1.7 %
ERYTHROCYTE [DISTWIDTH] IN BLOOD BY AUTOMATED COUNT: 13.9 %
EST. GFR  (AFRICAN AMERICAN): >60 ML/MIN/1.73 M^2
EST. GFR  (NON AFRICAN AMERICAN): >60 ML/MIN/1.73 M^2
GLUCOSE SERPL-MCNC: 76 MG/DL
HCT VFR BLD AUTO: 41.3 %
HGB BLD-MCNC: 13.2 G/DL
IMM GRANULOCYTES # BLD AUTO: 0.03 K/UL
IMM GRANULOCYTES NFR BLD AUTO: 0.7 %
IRON SERPL-MCNC: 83 UG/DL
LYMPHOCYTES # BLD AUTO: 2 K/UL
LYMPHOCYTES NFR BLD: 42.5 %
MCH RBC QN AUTO: 27.2 PG
MCHC RBC AUTO-ENTMCNC: 32 G/DL
MCV RBC AUTO: 85 FL
MONOCYTES # BLD AUTO: 0.4 K/UL
MONOCYTES NFR BLD: 8.1 %
NEUTROPHILS # BLD AUTO: 2.1 K/UL
NEUTROPHILS NFR BLD: 46.1 %
NRBC BLD-RTO: 0 /100 WBC
PLATELET # BLD AUTO: 487 K/UL
PMV BLD AUTO: 9.8 FL
POTASSIUM SERPL-SCNC: 3.9 MMOL/L
PROT SERPL-MCNC: 8 G/DL
RBC # BLD AUTO: 4.86 M/UL
SATURATED IRON: 18 %
SODIUM SERPL-SCNC: 139 MMOL/L
T4 FREE SERPL-MCNC: 0.76 NG/DL
TOTAL IRON BINDING CAPACITY: 460 UG/DL
TRANSFERRIN SERPL-MCNC: 311 MG/DL
TSH SERPL DL<=0.005 MIU/L-ACNC: 1.41 UIU/ML
VIT B12 SERPL-MCNC: 312 PG/ML
WBC # BLD AUTO: 4.59 K/UL

## 2018-07-17 PROCEDURE — 80053 COMPREHEN METABOLIC PANEL: CPT

## 2018-07-17 PROCEDURE — 3008F BODY MASS INDEX DOCD: CPT | Mod: CPTII,S$GLB,, | Performed by: INTERNAL MEDICINE

## 2018-07-17 PROCEDURE — 84425 ASSAY OF VITAMIN B-1: CPT

## 2018-07-17 PROCEDURE — 84207 ASSAY OF VITAMIN B-6: CPT

## 2018-07-17 PROCEDURE — 82607 VITAMIN B-12: CPT

## 2018-07-17 PROCEDURE — 84443 ASSAY THYROID STIM HORMONE: CPT

## 2018-07-17 PROCEDURE — 85025 COMPLETE CBC W/AUTO DIFF WBC: CPT

## 2018-07-17 PROCEDURE — 84439 ASSAY OF FREE THYROXINE: CPT

## 2018-07-17 PROCEDURE — 82306 VITAMIN D 25 HYDROXY: CPT

## 2018-07-17 PROCEDURE — 99214 OFFICE O/P EST MOD 30 MIN: CPT | Mod: S$GLB,,, | Performed by: INTERNAL MEDICINE

## 2018-07-17 PROCEDURE — 36415 COLL VENOUS BLD VENIPUNCTURE: CPT

## 2018-07-17 PROCEDURE — 99999 PR PBB SHADOW E&M-EST. PATIENT-LVL III: CPT | Mod: PBBFAC,,, | Performed by: INTERNAL MEDICINE

## 2018-07-17 PROCEDURE — 83540 ASSAY OF IRON: CPT

## 2018-07-17 RX ORDER — PANTOPRAZOLE SODIUM 40 MG/1
40 TABLET, DELAYED RELEASE ORAL DAILY
Qty: 90 TABLET | Refills: 1 | Status: SHIPPED | OUTPATIENT
Start: 2018-07-17 | End: 2018-10-24

## 2018-07-17 RX ORDER — CYANOCOBALAMIN 1000 UG/ML
INJECTION, SOLUTION INTRAMUSCULAR; SUBCUTANEOUS
Qty: 4 ML | Refills: 11 | Status: SHIPPED | OUTPATIENT
Start: 2018-07-17 | End: 2019-09-26 | Stop reason: SDUPTHER

## 2018-07-17 RX ORDER — SUCRALFATE 1 G/1
1 TABLET ORAL 4 TIMES DAILY
Qty: 120 TABLET | Refills: 1 | Status: SHIPPED | OUTPATIENT
Start: 2018-07-17 | End: 2018-08-16

## 2018-07-17 RX ORDER — MULTIVIT WITH MINERALS/HERBS
1 TABLET ORAL DAILY
Qty: 100 TABLET | Refills: 4 | Status: SHIPPED | OUTPATIENT
Start: 2018-07-17 | End: 2020-02-28

## 2018-07-17 RX ORDER — FERROUS GLUCONATE 324(38)MG
324 TABLET ORAL
Qty: 100 TABLET | Refills: 4 | Status: SHIPPED | OUTPATIENT
Start: 2018-07-17 | End: 2020-04-28

## 2018-07-17 NOTE — PROGRESS NOTES
"Subjective:       Patient ID: Johanna Bullock is a 33 y.o. female.    Chief Complaint: Follow-up    Pt presents today c/o LUQ pain for 8-9 months. She states does take ibuprofen up to 800 mg daily. She was not aware she was not take it. She was taking it for HA initially, or alternating it with Execedrin for migraines. She does say the HAs are better. She has been taking the ibuprofen daily for the past 4 weeks for abd pain as well. Also takes in advil cold and sinus for sinus pressure. Has also been on IM and po steroids and ABx. BM are hard, light colored. Sometimes more frequent. Denies black or bloody stools recently. Did have some blood mixed in stool last month. Out of iron. Taking gummy multivitamin. Rx running out for B12 inj.     I have not seen her since 2016. Due for labs.       Review of Systems    Objective:     /60   Pulse 68   Ht 5' 6" (1.676 m)   Wt 113.3 kg (249 lb 12.5 oz)   LMP 06/28/2018 (LMP Unknown)   BMI 40.32 kg/m²     Physical Exam   Constitutional: She is oriented to person, place, and time. She appears well-developed. No distress.   Morbidly obese     HENT:   Head: Normocephalic and atraumatic.   Eyes: EOM are normal. Pupils are equal, round, and reactive to light. No scleral icterus.   Neck: Normal range of motion. Neck supple. No thyromegaly present.   Cardiovascular: Normal rate and normal heart sounds.  Exam reveals no gallop and no friction rub.    No murmur heard.  Pulmonary/Chest: Effort normal and breath sounds normal. No respiratory distress. She has no wheezes.   Abdominal: Soft. Bowel sounds are normal. She exhibits no distension. There is no splenomegaly or hepatomegaly. There is tenderness in the epigastric area and left upper quadrant.   Musculoskeletal: Normal range of motion. She exhibits no edema.   Neurological: She is alert and oriented to person, place, and time. No cranial nerve deficit.   Skin: Skin is warm and dry. No erythema.   Psychiatric: She has " a normal mood and affect. Her behavior is normal. Judgment normal.   Vitals reviewed.      Assessment:       1. Peptic ulcer    2. History of gastric bypass    3. Vitamin B12 deficiency    4. Fatigue, unspecified type    5. Other iron deficiency anemia    6. Obesity, Class III, BMI 40-49.9 (morbid obesity)        Plan:         1. Peptic ulcer  Pt advised that she is not to take NSAIDs for life 2/2 gasric bypass status. Additional risks with recent steroid use. Triple therapy and schedule EGD.   For pain or headache over the counter you can use tylenol 500 mg 1 tablet every 8 hours as needed.     For Allergies/ sinus pain you can take Allegra, zyrtec or xyzal and nasonex or flonase.     - pantoprazole (PROTONIX) 40 MG tablet; Take 1 tablet (40 mg total) by mouth once daily. On empty stomach, wait 20 min to eat  Dispense: 90 tablet; Refill: 1  - sucralfate (CARAFATE) 1 gram tablet; Take 1 tablet (1 g total) by mouth 4 (four) times daily.  Dispense: 120 tablet; Refill: 1  - ranitidine (ZANTAC) 150 MG tablet; Take 1 tablet (150 mg total) by mouth 2 (two) times daily.  Dispense: 180 tablet; Refill: 1  - Case request GI: ESOPHAGOGASTRODUODENOSCOPY (EGD)    2. History of gastric bypass  Vitamin list given. Will get measurements on EGD as she has had signifigant regain.   - Comprehensive metabolic panel; Future  - Vitamin B1; Future  - Vitamin D; Future  - Vitamin B6; Future  - b complex vitamins (B COMPLEX 1) tablet; Take 1 tablet by mouth once daily.  Dispense: 100 tablet; Refill: 4  - Case request GI: ESOPHAGOGASTRODUODENOSCOPY (EGD)    3. Vitamin B12 deficiency  Resume monthly B12 inj.   - Vitamin B12; Future  - cyanocobalamin 1,000 mcg/mL injection; ONE INJECTION AS DIRECTED EVERY 28 DAYS  Dispense: 4 mL; Refill: 11    4. Fatigue, unspecified type     - CBC auto differential; Future  - Iron and TIBC; Future  - TSH; Future  - T4, free; Future    5. Other iron deficiency anemia  Resume iron.   - CBC auto differential;  Future  - Iron and TIBC; Future  - ferrous gluconate (FERGON) 324 MG tablet; Take 1 tablet (324 mg total) by mouth daily with breakfast.  Dispense: 100 tablet; Refill: 4    6. Obesity, Class III, BMI 40-49.9 (morbid obesity)  Pt did inquire about resuming phentermine. I would like to get all of her labs back first. Continue bariatric diet for life.  1000 ayaka menu and meal ideas given.

## 2018-07-17 NOTE — PATIENT INSTRUCTIONS
For pain or headache over the counter you can use tylenol 500 mg 1 tablet every 8 hours as needed.     For Allergies/ sinus pain you can take Allegra, zyrtec or xyzal and nasonex or flonase.         Required Vitamin/Mineral Supplements:  You will need to take the following vitamin/mineral supplements for the rest of your life:    1. Multivitamins   2. Iron  3. B-complex with at least 15 mg Thiamine (Vitamin B1)  4. Calcium Citrate + Vitamin D  5. Vitamin B12     Sleeve Gastrectomy: No swallowing pills for 3 months after surgery  Gastric Bypass: No swallowing pills for 6 months after surgery  Gastric Band: No swallowing pills lifelong    **No GUMMY Multivitamins    Suggested Vitamin/Mineral Regimens for crushed/chewables forms:    [facility]:   o Flintstones Complete or Centrum Adult chewables  o  B-Complex tablet  (crush tablet w/ at least 15 mg Thiamine)  o Calcium Citrate tablets to crush (NOT CALTRATE brand)  o Sublingual Vitamin B12    Order from www.bariatricadvantage.RippleFunction  o Chewables-Complete (orange and berry flavored)  o Iron chewable  o Calcium Citrate Chewy bites-500 (chocolate, peanut butter and caramel flavored)  o Sublingual Vitamin B12 (black cherry or peppermint flavored)    Order from www.celebratevitamins.com  o Multi-complete chewable  o Calcium citrate: Celebrate soft chews OR Calcet creamy bites  o B-12 sublingual quick melt    Order from [employer]:  o Twinlabs animal friends chewable wafers (orange or berry flavored)  o KELLY coenzyme b-complex chewable tablets (cocoa mint flavored)  o Calcium Citrate: Liquid Wellesse OR Calcet Creamy Bites OR Bariatric Advantage chewy bites OR Celebrate soft chews  o Sublingual B12    [facility]:  o Flintstones complete   o Wellesse Liquid calcium Citrate  o Wellesse Liquid B-complex  o Sublingual B12      *Vitamin/Mineral checklist for if/when you can NOT swallow whole pills:    Vitamin/minerals Name Dose ?    Multivitamin          Iron   18 mg         Thiamine (Vit B1)   15 mg      Calcium Citrate + Vit D  1500 mg total calcium citrate        Vitamin B12   500 mcg daily            *Do NOT take calcium and iron within 2 hours of each other!            - To lose weight you want to cut 100% starchy carbohydrates out of your diet (bread, rice, pasta, potatoes, granola, flour, corn, peas, oatmeal, grits, tortillas, crackers, chips) and get  grams of protein.  Aim for 100 grams of protein daily.    - No soda, sweet tea, juices, sports drinks or lemonade     -Exercise daily    - Premier Protein (Chocolate, Bananas & Cream, Strawberries & Cream, Vanilla) [employer] or [employer]    - Syntrax Tingley from [employer], www.bariatricadvantage.com, www.bariatricchoice.com. (LACTOSE FREE)    - GT Solar - Amazon.com (LACTOSE FREE)    - Veggetti Pro from [employer], [employer], [employer]    - www.pinterest.com (cauliflower, cloud bread, quest bar cookies, eggplant, zucchini, zucchini noodles, crustless quiche, no carb meals, taco lettuce boats)    - http://themiriam.Altocom/\  5-Day Menu Plan: 800-1000 Calories    DAY 1     Breakfast  4 slices deli turkey  Small apple    Snack  ¼ cup almonds    Lunch  Lean hamburger gagandeep  1 cup green beans    Dinner  2 skinless chicken thighs  1 cup cooked carrots    Snack  SF jello    DAY 2    Breakfast  2 eggs with 2 tbsp salsa    Snack  2 slices deli turkey  ½ cup Peaches canned (in its own juice)    Lunch  [place]: Deli chicken and mustard   Pear cup (no sugar added)    Dinner  Baked fish  1 cup cooked yellow squash    Snack  SF popsicle            DAY 3    Breakfast   Protein drink: 1 scoop whey powder + 6oz soy milk    Snack  ¼ cup almonds    Lunch  Tuna Salad: 3oz canned tuna in water, 1 egg, and 1 tsp light oliva)  Pineapple cup (no sugar added)    Dinner  Baked chicken breast  1 cup grilled eggplant    Snack  8oz Chocolate Soy Milk      DAY 4    Breakfast  2 eggs, turkey sausage gagandeep    Snack  4 slices deli  turkey    Lunch    Snack  1/4 cup almonds  Peach cup (no sugar added)    Dinner  3oz baked pork chop  1 cup cooked green beans                    DAY 5    Breakfast  Protein drink: 1 scoop whey powder + 6oz soy milk    Snack   ¼ cup almonds  1 apple    Lunch  1 cup Chicken salad: (3oz canned chicken in water, 1 egg, 1 tsp light oliva)    Snack  4 slices deli turkey  Fruit cup (no sugar added)    Dinner  Omelet: 2 eggs, grilled shrimp, bell pepper and onion    Protein Content of Foods     Food Name Portion Calories Protein (gms)   Almonds (unsalted) 1/4 cup 160 6   Custar milk, unsweetened 1 cup 30  1   Beef, Roast 1 oz 46 8   Beef, Steak, sirloin, trimmed 1 oz 55 9   Catfish, broiled or baked 1 oz 30 5   Cheese, American FF 1 oz 40 6   Cheese, Cottage 1% fat ¼ cup 41 7   Cheese, Parmesan, grated ¼ cup 128 12   Cheese, Mozzarella, part skim 1 oz 78 8   Cheese, part skim Ricotta ¼ cup 90 8   Chicken, white breast w/o skin 1 oz 46 9   Chicken, leg w/o skin 1 oz 54 7   Crab, steamed ¼ cup  40 9   Crawfish tails, boiled ¼ cup 35 8   [place], shelled ¼ cup 50 4   Egg 1 78 6   Ham, lean 5% 1 oz 44 7   Hamburger, lean 1 oz 56 7   Hummus ¼ cup 100 5   Lobster, steamed 1 oz 26 5   Milk, skim or 1%, soy  1 cup 90 8   Pork Tenderloin 1 oz 46 7   Pudding, SF 1 serv 60 2   Red beans ¼ cup 56 4   Refried beans, fat free ¼ cup 65 4   [place], baked 1 oz 52 7   Shrimp, steamed 1 oz 28 6   Soymilk, plain ½ cup 40 3   Tilapia, white fish, cooked 1 oz 36 8   Tofu ¼ cup 47 5   Trout 1 oz 48 7   Tuna, canned in water 1 oz 37 8   Turkey, white meat 1 oz 35 7   Veal Loin 1 oz 50 7   Yogurt, SF, frozen vanilla 3 oz 72 3.5   Yogurt, Fruit, FF, light 3 oz 40 2.5   Yogurt, Greek 3 oz 70 8     *Abbreviations: SF=sugar free, LF=low fat, FF= fat free, gms=grams  *3oz of cooked meat/protein = size of deck of cards or ladies palm   *1oz cheese = 1inch cube or 1 slice American cheese            Eating well to be healthy and lose weight does not have  to be hard. It also does not have to be time consuming or expensive. There a lots of ways you can work in healthy choices into your day. Many of these are easy, quick and even family friendly!    Homemade hazelnut au lait  Brew your favorite brand of hazelnut flavored coffee (Community makes a good one). Microwave 1/2 cup of milk that fits your eating plan (whole, skim or sugar-free almond milk can all work). Add half to 1 oz sugar free hazelnut syrup.     Quick and easy breakfast  1-2 boiled eggs or mini-frittatas with a tangerine. The boiled eggs and mini-frittatas can both be made ahead and last for up to 4 days in the refrigerator. Bonus if you portion them out in ready to go containers or zipper bags.     Breakfast Egg Muffins with Long and Spinach  Makes 12 muffins  Ingredients    6 eggs  ¼ cup milk  ¼ teaspoon salt  2 cups grated cheddar cheese  3/4 cup spinach, cooked and drained (about 8 oz fresh spinach)  6 long slices, cooked, drained of fat, and chopped  1/2 cup grated Parmesan cheese (optional)    Instructions      Preheat oven to 350 degrees. Use a regular 12-cup muffin pan. Spray the muffin pan with non-stick cooking spray.  In a large bowl, beat eggs until smooth. Add milk, salt, Cheddar cheese and mix. Stir spinach, cooked long into the egg mixture. Ladle the egg mixture into greased muffin cups ¾ full.  Top each muffin cup with grated Parmesan cheese.  Bake for 25 minutes. Remove from the oven, let the muffins cool for 30 minutes before removing them from the pan.      Be a brown bagger! When you make dinner, plan for an extra helping. When you serve your plate for dinner, serve an additional helping into a container that you can take with you the next day. If you don't have a refrigerator available during the day, an insulated lunch bag and ice packs will help you safely store you lunch.     Cold Brewed Iced tea. Fill a pitcher with 64 oz filtered water. Add either 4 regular tea bags of your  choice or a large iced tea bag. Refrigerate over night then remove the tea bags. The tea will not be bitter and is super flavorful. Get creative! Try combinations like green tea and hibiscus tea or black tea with lemon zinger. Add orange or lemon slices for even more flavor.     Snack wisely. Protein filled snacks will fill you up, allowing you to get by with fewer calories. String cheese, pork skins (chicharrones), turkey pepperoni, or celery with cream cheese will all fit the bill.       Ditch the take out. Turkey tacos (with or without a low carb tortilla), burgers (without the bun), or fun stir fries are all quick and easy. The whole family will be happy, and you can save calories and money.      Orange Chicken Stir barrios with asparagus   Makes 6 servings  Ingredients:    1.5 lbs boneless skinless chicken breast/tenders, diced into 1-inch pieces  1 Tbsp extra virgin olive or avocado oil, divided  2 lb asparagus, end portions trimmed and remainder diced into 1 1/2-inch pieces  1 small yellow onion, sliced into thin strips  8 oz button mushrooms, sliced  1 Tbsp peeled and finely grated fresh zoraida  4 cloves garlic, minced  1/2 cup low-sodium chicken broth  Juice of 2 fresh oranges  2 Tbsp low sodium soy sauce  2 Tbsp cornstarch  Sea salt and freshly ground black pepper    Directions:    In a 12-inch non-stick wok, heat 1/2 oil over moderately high heat. Once oil is hot, add diced chicken and season lightly with salt and pepper. Sauté until cooked through, tossing occasionally, about 5-6 minutes.  Place chicken on a large plate and set aside. Return wok, reduce to medium-high heat, add remaining oil.  Once oil is hot, add asparagus, yellow onion and mushrooms, and sauté until tender-crisp, about 4 - 5 minutes, adding in garlic and zoraida during the last 1 minute of sautéing.  Meanwhile, in a mixing bowl whisk together chicken broth, orange juice, soy sauce and cornstarch until well blended.  Pour chicken broth  mixture into skillet with veggies, season with salt and pepper to taste, and bring mixture to a light boil, stirring constantly. Allow mixture to gently boil, stirring constantly, until thickened, about 1 minute.  Toss chicken into mixture and serve immediately over cauliflower rice or Shirataki noodles.      Skinny Chicken Tortilla Soup  Makes 7 servings    2 teaspoons olive oil  1 cup onion, chopped (about 1 small)  2 cups celery, sliced (about 4 medium stalks)  4 garlic cloves, minced  4 medium tomatoes, chopped  2 cups water  4 cups low-sodium organic chicken broth  3 cups chopped and/or shredded rotisserie chicken, skinless  2 cups sliced carrots (about 3 medium)  1 teaspoon dried oregano leaves  2 teaspoons chili powder  1 teaspoon garlic powder  2 teaspoons cumin  ½ teaspoon cayenne pepper (add less or omit, if you don't want a spicy soup)  ½ teaspoon sea salt + more to taste  ½ teaspoon pepper + more to taste    Directions:   Put all ingredients into a large crock pot. Cook on low for 5-6 hours.     Optional garnish with chopped avocado, chopped fresh cilantro, crumbled Cotija cheese, sour cream, Greek yogurt, your favorite hot sauce.           Vegan Avocado Banana Chocolate Pudding  Makes 4 servings  Ingredients    1 1/2 ripe avocados  2 ripe bananas  6 tbsp raw cacao powder or unsweetened cocoa powder  2-3 tbsp maple syrup (or calorie free sweetener)  1/4 cup almond milk  Instructions    Blend everything together in a  until the consistency is smooth and velvety. Taste and see if more sweetener is needed and stir to make sure everything is evenly mixed. Blend a second time if needed.  Top with banana slices, raw cacao nibs, almond butter, or any other toppings and enjoy!

## 2018-07-19 ENCOUNTER — TELEPHONE (OUTPATIENT)
Dept: BARIATRICS | Facility: CLINIC | Age: 33
End: 2018-07-19

## 2018-07-19 ENCOUNTER — TELEPHONE (OUTPATIENT)
Dept: ENDOSCOPY | Facility: HOSPITAL | Age: 33
End: 2018-07-19

## 2018-07-19 ENCOUNTER — PATIENT MESSAGE (OUTPATIENT)
Dept: BARIATRICS | Facility: CLINIC | Age: 33
End: 2018-07-19

## 2018-07-19 DIAGNOSIS — E55.9 VITAMIN D DEFICIENCY: Primary | ICD-10-CM

## 2018-07-19 LAB
PYRIDOXAL SERPL-MCNC: 16 UG/L (ref 5–50)
VIT B1 SERPL-MCNC: 29 UG/L (ref 38–122)

## 2018-07-19 RX ORDER — PHENTERMINE HYDROCHLORIDE 37.5 MG/1
TABLET ORAL
Qty: 30 TABLET | Refills: 2 | Status: SHIPPED | OUTPATIENT
Start: 2018-07-19 | End: 2020-03-31 | Stop reason: SDUPTHER

## 2018-07-19 RX ORDER — ERGOCALCIFEROL 1.25 MG/1
50000 CAPSULE ORAL WEEKLY
Qty: 12 CAPSULE | Refills: 3 | Status: SHIPPED | OUTPATIENT
Start: 2018-07-19 | End: 2018-10-17

## 2018-07-19 NOTE — TELEPHONE ENCOUNTER
----- Message from Gonzalez Mcdonald sent at 7/19/2018 12:00 PM CDT -----  Pt called stating she need to speak with nurse in regards to EGD Scope.     Please call [phone] regarding this.     Thanks

## 2018-07-20 ENCOUNTER — ANESTHESIA EVENT (OUTPATIENT)
Dept: ENDOSCOPY | Facility: HOSPITAL | Age: 33
End: 2018-07-20
Payer: COMMERCIAL

## 2018-07-20 ENCOUNTER — HOSPITAL ENCOUNTER (OUTPATIENT)
Facility: HOSPITAL | Age: 33
Discharge: HOME | End: 2018-07-20
Attending: INTERNAL MEDICINE | Admitting: INTERNAL MEDICINE
Payer: COMMERCIAL

## 2018-07-20 ENCOUNTER — ANESTHESIA (OUTPATIENT)
Dept: ENDOSCOPY | Facility: HOSPITAL | Age: 33
End: 2018-07-20
Payer: COMMERCIAL

## 2018-07-20 ENCOUNTER — SURGERY (OUTPATIENT)
Age: 33
End: 2018-07-20

## 2018-07-20 VITALS
SYSTOLIC BLOOD PRESSURE: 105 MMHG | TEMPERATURE: 98 F | RESPIRATION RATE: 18 BRPM | HEIGHT: 67 IN | DIASTOLIC BLOOD PRESSURE: 61 MMHG | OXYGEN SATURATION: 100 % | WEIGHT: 250 LBS | HEART RATE: 63 BPM | BODY MASS INDEX: 39.24 KG/M2

## 2018-07-20 DIAGNOSIS — Z87.11 HISTORY OF PEPTIC ULCER: Primary | ICD-10-CM

## 2018-07-20 DIAGNOSIS — Z98.84 HISTORY OF GASTRIC BYPASS: ICD-10-CM

## 2018-07-20 LAB
B-HCG UR QL: NEGATIVE
CTP QC/QA: YES

## 2018-07-20 PROCEDURE — 25000003 PHARM REV CODE 250: Performed by: INTERNAL MEDICINE

## 2018-07-20 PROCEDURE — 81025 URINE PREGNANCY TEST: CPT | Performed by: INTERNAL MEDICINE

## 2018-07-20 PROCEDURE — 43235 EGD DIAGNOSTIC BRUSH WASH: CPT | Mod: ,,, | Performed by: INTERNAL MEDICINE

## 2018-07-20 PROCEDURE — 43235 EGD DIAGNOSTIC BRUSH WASH: CPT | Performed by: INTERNAL MEDICINE

## 2018-07-20 PROCEDURE — 37000009 HC ANESTHESIA EA ADD 15 MINS: Performed by: INTERNAL MEDICINE

## 2018-07-20 PROCEDURE — 37000008 HC ANESTHESIA 1ST 15 MINUTES: Performed by: INTERNAL MEDICINE

## 2018-07-20 PROCEDURE — E9220 PRA ENDO ANESTHESIA: HCPCS | Mod: ,,, | Performed by: NURSE ANESTHETIST, CERTIFIED REGISTERED

## 2018-07-20 PROCEDURE — 63600175 PHARM REV CODE 636 W HCPCS: Performed by: NURSE ANESTHETIST, CERTIFIED REGISTERED

## 2018-07-20 RX ORDER — SODIUM CHLORIDE 0.9 % (FLUSH) 0.9 %
3 SYRINGE (ML) INJECTION
Status: DISCONTINUED | OUTPATIENT
Start: 2018-07-20 | End: 2018-07-20 | Stop reason: HOSPADM

## 2018-07-20 RX ORDER — PROPOFOL 10 MG/ML
VIAL (ML) INTRAVENOUS
Status: DISCONTINUED | OUTPATIENT
Start: 2018-07-20 | End: 2018-07-20

## 2018-07-20 RX ORDER — LIDOCAINE HCL/PF 100 MG/5ML
SYRINGE (ML) INTRAVENOUS
Status: DISCONTINUED | OUTPATIENT
Start: 2018-07-20 | End: 2018-07-20

## 2018-07-20 RX ORDER — SODIUM CHLORIDE 9 MG/ML
INJECTION, SOLUTION INTRAVENOUS CONTINUOUS
Status: DISCONTINUED | OUTPATIENT
Start: 2018-07-20 | End: 2018-07-20 | Stop reason: HOSPADM

## 2018-07-20 RX ORDER — PROPOFOL 10 MG/ML
VIAL (ML) INTRAVENOUS CONTINUOUS PRN
Status: DISCONTINUED | OUTPATIENT
Start: 2018-07-20 | End: 2018-07-20

## 2018-07-20 RX ADMIN — PROPOFOL 50 MG: 10 INJECTION, EMULSION INTRAVENOUS at 03:07

## 2018-07-20 RX ADMIN — LIDOCAINE HYDROCHLORIDE 40 MG: 20 INJECTION, SOLUTION INTRAVENOUS at 03:07

## 2018-07-20 RX ADMIN — PROPOFOL 200 MCG/KG/MIN: 10 INJECTION, EMULSION INTRAVENOUS at 03:07

## 2018-07-20 RX ADMIN — SODIUM CHLORIDE: 0.9 INJECTION, SOLUTION INTRAVENOUS at 02:07

## 2018-07-20 RX ADMIN — PROPOFOL 100 MG: 10 INJECTION, EMULSION INTRAVENOUS at 03:07

## 2018-07-20 NOTE — ANESTHESIA PREPROCEDURE EVALUATION
07/20/2018  Johanna Bullock is a 33 y.o., female     Patient Active Problem List   Diagnosis    History of gastric bypass    Vitamin B12 deficiency    Iron deficiency    Severe obesity (BMI 35.0-39.9)    History of peptic ulcer         Anesthesia Evaluation    I have reviewed the Patient Summary Reports.    I have reviewed the Nursing Notes.   I have reviewed the Medications.     Review of Systems  Anesthesia Hx:  No problems with previous Anesthesia    Cardiovascular:  Cardiovascular Normal     Pulmonary:  Pulmonary Normal    Renal/:  Renal/ Normal     Hepatic/GI:  Hepatic/GI Normal    Endocrine:  Endocrine Normal        Physical Exam  General:  Morbid Obesity    Airway/Jaw/Neck:  Airway Findings: Mouth Opening: Normal Tongue: Normal  General Airway Assessment: Adult  Mallampati: II  TM Distance: Normal, at least 6 cm       Chest/Lungs:  Chest/Lungs Findings: Clear to auscultation, Normal Respiratory Rate     Heart/Vascular:  Heart Findings: Rate: Normal  Rhythm: Regular Rhythm             Anesthesia Plan  Type of Anesthesia, risks & benefits discussed:  Anesthesia Type:  general, MAC  Patient's Preference:   Intra-op Monitoring Plan:   Intra-op Monitoring Plan Comments:   Post Op Pain Control Plan:   Post Op Pain Control Plan Comments:   Induction:   IV  Beta Blocker:  Patient is not currently on a Beta-Blocker (No further documentation required).       Informed Consent: Patient understands risks and agrees with Anesthesia plan.  Questions answered. Anesthesia consent signed with patient.  ASA Score: 2     Day of Surgery Review of History & Physical:            Ready For Surgery From Anesthesia Perspective.

## 2018-07-20 NOTE — ANESTHESIA POSTPROCEDURE EVALUATION
"Anesthesia Post Evaluation    Patient: Johanna Bullock    Procedure(s) Performed: Procedure(s) (LRB):  ESOPHAGOGASTRODUODENOSCOPY (EGD) (N/A)    Final Anesthesia Type: general  Patient location during evaluation: PACU  Patient participation: Yes- Able to Participate  Level of consciousness: awake and alert  Post-procedure vital signs: reviewed and stable  Pain management: adequate  Airway patency: patent  PONV status at discharge: No PONV  Anesthetic complications: no      Cardiovascular status: blood pressure returned to baseline  Respiratory status: unassisted  Hydration status: euvolemic  Follow-up not needed.        Visit Vitals  BP (!) 112/58 (BP Location: Left arm, Patient Position: Sitting)   Pulse 90   Temp 36.8 °C (98.2 °F) (Temporal)   Resp 18   Ht 5' 7" (1.702 m)   Wt 113.4 kg (250 lb)   LMP 06/28/2018 (LMP Unknown)   SpO2 96%   Breastfeeding? No   BMI 39.16 kg/m²       Pain/Vickie Score: Pain Assessment Performed: Yes (7/20/2018  3:28 PM)  Presence of Pain: non-verbal indicators absent (7/20/2018  3:28 PM)  Vickie Score: 9 (7/20/2018  3:28 PM)      "

## 2018-07-20 NOTE — DISCHARGE INSTRUCTIONS

## 2018-07-20 NOTE — [employer]OVATION PATIENT INSTRUCTIONS
Discharge Summary/Instructions after an Endoscopic Procedure  Patient Name: Johanna Bullock  Patient MRN: [id number]  Patient YOB: 1985 Friday, July 20, 2018  Darwin Hansen MD  RESTRICTIONS:  During your procedure today, you received medications for sedation.  These   medications may affect your judgment, balance and coordination.  Therefore,   for 24 hours, you have the following restrictions:   - DO NOT drive a car, operate machinery, make legal/financial decisions,   sign important papers or drink alcohol.    ACTIVITY:  Today: no heavy lifting, straining or running due to procedural   sedation/anesthesia.  The following day: return to full activity including work.  DIET:  Eat and drink normally unless instructed otherwise.     TREATMENT FOR COMMON SIDE EFFECTS:  - Mild abdominal pain, nausea, belching, bloating or excessive gas:  rest,   eat lightly and use a heating pad.  - Sore Throat: treat with throat lozenges and/or gargle with warm salt   water.  - Because air was used during the procedure, expelling large amounts of air   from your rectum or belching is normal.  - If a bowel prep was taken, you may not have a bowel movement for 1-3 days.    This is normal.  SYMPTOMS TO WATCH FOR AND REPORT TO YOUR PHYSICIAN:  1. Abdominal pain or bloating, other than gas cramps.  2. Chest pain.  3. Back pain.  4. Signs of infection such as: chills or fever occurring within 24 hours   after the procedure.  5. Rectal bleeding, which would show as bright red, maroon, or black stools.   (A tablespoon of blood from the rectum is not serious, especially if   hemorrhoids are present.)  6. Vomiting.  7. Weakness or dizziness.  GO DIRECTLY TO THE NEAREST EMERGENCY ROOM IF YOU HAVE ANY OF THE FOLLOWING:      Difficulty breathing              Chills and/or fever over 101 F   Persistent vomiting and/or vomiting blood   Severe abdominal pain   Severe chest pain   Black, tarry stools   Bleeding- more than one  tablespoon   Any other symptom or condition that you feel may need urgent attention  Your doctor recommends these additional instructions:  If any biopsies were taken, your doctors clinic will contact you in 1 to 2   weeks with any results.  - Discharge patient to home.   - Patient has a contact number available for emergencies.  The signs and   symptoms of potential delayed complications were discussed with the   patient.  Return to normal activities tomorrow.  Written discharge   instructions were provided to the patient.   - Resume previous diet.   - Continue present medications.   - Return to referring physician.   For questions, problems or results please call your physician - Darwin Hansen MD at Work:  [phone].  [facility], EMERGENCY ROOM PHONE NUMBER: [phone]  IF A COMPLICATION OR EMERGENCY SITUATION ARISES AND YOU ARE UNABLE TO REACH   YOUR PHYSICIAN - GO DIRECTLY TO THE EMERGENCY ROOM.  Darwin Hansen MD  7/20/2018 3:40:08 PM  This report has been verified and signed electronically.  [employer]

## 2018-07-20 NOTE — INTERVAL H&P NOTE
Pre-Procedure H and P Addendum    Patient seen and examined.  History and exam unchanged from prior history and physical.      Procedure: EGD  Indication: LUQ abdominal pain  ASA Class: per anesthesiology  Airway: normal  Neck Mobility: full range of motion  Mallampatti score: per anesthesia  History of anesthesia problems: no  Family history of anesthesia problems: no  Anesthesia Plan: MAC    Anesthesia/Surgery risks, benefits and alternative options discussed and understood by patient/family.          Active Hospital Problems    Diagnosis  POA    History of peptic ulcer [Z87.11]  Not Applicable      Resolved Hospital Problems    Diagnosis Date Resolved POA   No resolved problems to display.

## 2018-07-20 NOTE — TRANSFER OF CARE
"Anesthesia Transfer of Care Note    Patient: Johanna Bullock    Procedure(s) Performed: Procedure(s) (LRB):  ESOPHAGOGASTRODUODENOSCOPY (EGD) (N/A)    Patient location: [facility]    Anesthesia Type: general    Transport from OR: Transported from OR on room air with adequate spontaneous ventilation    Post pain: adequate analgesia    Post assessment: no apparent anesthetic complications and tolerated procedure well    Post vital signs: stable    Level of consciousness: sedated and responds to stimulation    Nausea/Vomiting: no nausea/vomiting    Complications: none    Transfer of care protocol was followed      Last vitals:   Visit Vitals  BP (!) 110/59 (BP Location: Left arm, Patient Position: Lying)   Pulse 63   Temp 37.2 °C (99 °F) (Temporal)   Resp 18   Ht 5' 7" (1.702 m)   Wt 113.4 kg (250 lb)   LMP 06/28/2018 (LMP Unknown)   SpO2 99%   Breastfeeding? No   BMI 39.16 kg/m²     "

## 2018-07-27 ENCOUNTER — TELEPHONE (OUTPATIENT)
Dept: ENDOSCOPY | Facility: HOSPITAL | Age: 33
End: 2018-07-27

## 2018-09-04 ENCOUNTER — TELEPHONE (OUTPATIENT)
Dept: BARIATRICS | Facility: CLINIC | Age: 33
End: 2018-09-04

## 2018-09-04 NOTE — TELEPHONE ENCOUNTER
----- Message from Raghav Holden sent at 9/4/2018 10:03 AM CDT -----  Pt said she didn't fill her prescription when she got it.  Pt said she had it at [facility] and they placed it on hold. Pt said she is trying to have it filled at [facility] [employer] [facility] [phone] but they won't accept the transfer. Please call pt regarding this at [phone]

## 2018-10-22 ENCOUNTER — NURSE TRIAGE (OUTPATIENT)
Dept: ADMINISTRATIVE | Facility: CLINIC | Age: 33
End: 2018-10-22

## 2018-10-22 ENCOUNTER — OFFICE VISIT (OUTPATIENT)
Dept: URGENT CARE | Facility: CLINIC | Age: 33
End: 2018-10-22
Payer: COMMERCIAL

## 2018-10-22 VITALS
BODY MASS INDEX: 37.67 KG/M2 | DIASTOLIC BLOOD PRESSURE: 68 MMHG | OXYGEN SATURATION: 99 % | RESPIRATION RATE: 16 BRPM | SYSTOLIC BLOOD PRESSURE: 108 MMHG | HEART RATE: 99 BPM | WEIGHT: 240 LBS | HEIGHT: 67 IN | TEMPERATURE: 98 F

## 2018-10-22 DIAGNOSIS — R42 DIZZINESS: Primary | ICD-10-CM

## 2018-10-22 LAB
B-HCG UR QL: NEGATIVE
CTP QC/QA: YES

## 2018-10-22 PROCEDURE — 99214 OFFICE O/P EST MOD 30 MIN: CPT | Mod: S$GLB,,, | Performed by: NURSE PRACTITIONER

## 2018-10-22 PROCEDURE — 81025 URINE PREGNANCY TEST: CPT | Mod: S$GLB,,, | Performed by: NURSE PRACTITIONER

## 2018-10-22 PROCEDURE — 3008F BODY MASS INDEX DOCD: CPT | Mod: CPTII,S$GLB,, | Performed by: NURSE PRACTITIONER

## 2018-10-22 NOTE — PROGRESS NOTES
"Subjective:       Patient ID: Johanna Bullock is a 33 y.o. female.    Vitals:  height is 5' 7" (1.702 m) and weight is 108.9 kg (240 lb). Her temperature is 97.7 °F (36.5 °C). Her blood pressure is 108/68 and her pulse is 99. Her respiration is 16 and oxygen saturation is 99%.     Chief Complaint: Dizziness    Patient here for dizziness/lightheaded, fatigue since Wednesday. Patient has vomited 1x, has normal bowel movements. Has been drinking fluids regularly. Has taken ibuprofen for symptoms, last dose taken last night. No LOC. Sensativity to light      Dizziness:   Chronicity:  New  Onset:  In the past 7 days  Progression since onset:  Gradually worsening  Frequency:  Constantly (dizziness intermitten)  Severity:  Mild  Duration:  Off/on all day  Dizziness characteristics:  Sensation of movement, spinning inside head only and trouble focusing eyes  Frequency of Spells:  Daily   Associated symptoms: headaches, weakness and light-headedness.no hearing loss, no ear congestion, no ear pain, no fever, no tinnitus, no nausea, no vomiting, no diaphoresis, no aural fullness, no visual disturbances, no syncope, no palpitations, no panic, no facial weakness, no slurred speech, no numbness in extremities and no chest pain.  Exacerbated by: movement.  Treatments tried: ibuprofen.  Improvements on treatment:  No relief    Review of Systems   Constitution: Positive for weakness. Negative for chills, diaphoresis and fever.   HENT: Negative for ear pain, hearing loss, sore throat and tinnitus.    Eyes: Positive for visual disturbance. Negative for blurred vision.   Cardiovascular: Negative for chest pain, palpitations and syncope.   Respiratory: Negative for shortness of breath.    Skin: Negative for rash.   Musculoskeletal: Negative for back pain and joint pain.   Gastrointestinal: Negative for abdominal pain, diarrhea, nausea and vomiting.   Neurological: Positive for dizziness, headaches and light-headedness. "   Psychiatric/Behavioral: The patient is not nervous/anxious.    All other systems reviewed and are negative.      Objective:      Physical Exam   Constitutional: She is oriented to person, place, and time. Vital signs are normal. She appears well-developed and well-nourished. She is cooperative.  Non-toxic appearance. She does not have a sickly appearance. She does not appear ill. No distress.   HENT:   Head: Normocephalic and atraumatic.   Right Ear: Hearing, tympanic membrane, external ear and ear canal normal.   Left Ear: Hearing, tympanic membrane, external ear and ear canal normal.   Nose: Nose normal. No mucosal edema, rhinorrhea or nasal deformity. No epistaxis. Right sinus exhibits no maxillary sinus tenderness and no frontal sinus tenderness. Left sinus exhibits no maxillary sinus tenderness and no frontal sinus tenderness.   Mouth/Throat: Uvula is midline, oropharynx is clear and moist and mucous membranes are normal. No trismus in the jaw. Normal dentition. No uvula swelling. No posterior oropharyngeal erythema.   Eyes: Conjunctivae, EOM and lids are normal. Pupils are equal, round, and reactive to light. Right eye exhibits no discharge. Left eye exhibits no discharge. No scleral icterus.   Sclera clear bilat   Neck: Trachea normal, normal range of motion, full passive range of motion without pain and phonation normal. Neck supple.   Cardiovascular: Normal rate, regular rhythm, S1 normal, S2 normal, normal heart sounds, intact distal pulses and normal pulses.   Pulmonary/Chest: Effort normal and breath sounds normal. No respiratory distress. She has no decreased breath sounds. She has no wheezes. She has no rhonchi. She has no rales.   Abdominal: Soft. Normal appearance and bowel sounds are normal. She exhibits no distension, no pulsatile midline mass and no mass. There is no hepatosplenomegaly. There is no tenderness. There is no rigidity, no rebound, no guarding, no CVA tenderness, no tenderness at  McBurney's point and negative Woodward's sign.   Musculoskeletal: Normal range of motion. She exhibits no edema or deformity.   Lymphadenopathy:     She has no cervical adenopathy.   Neurological: She is alert and oriented to person, place, and time. She has normal strength. No cranial nerve deficit or sensory deficit. She exhibits normal muscle tone. She displays a negative Romberg sign. Coordination normal.   Skin: Skin is warm, dry and intact. No rash noted. She is not diaphoretic. No pallor.   Psychiatric: She has a normal mood and affect. Her speech is normal and behavior is normal. Judgment and thought content normal. Cognition and memory are normal.   Nursing note and vitals reviewed.      Results for orders placed or performed in visit on 10/22/18   POCT urine pregnancy   Result Value Ref Range    POC Preg Test, Ur Negative Negative     Acceptable Yes       Assessment:       1. Dizziness        Plan:       33 year old with recent complaint of dizziness. Denies any recent alcohol or drug use.  Does report recent medication add of Adipex two weeks ago by Dr. Reynoso.   States she has been having intermittent bleeding and also noticed blood in stool about 3 weeks ago with no follow-up. Patient states she does not have a PCP but sees Dr. Reynoso for weight management.   Discussed with patient that she needs to have a Primary care provider to follow-up on this present concern and other symptoms from the past several weeks.  Patient verbalizes understanding and agrees with plan of care.  Exits urgent care in [place].    Dizziness  -     Ambulatory referral to Internal Medicine  -     POCT urine pregnancy  -     Cancel: POCT Urinalysis, Dipstick, Automated, W/O Scope  -     Orthostatic vital signs          Patient Instructions     General Referral to [facility]  You were referred to [facility] to Establish Care.  Please call [phone] to schedule your appointment.    Please  return here or go to the Emergency Department for any concerns or worsening of condition.  Please follow up with your primary care doctor or specialist in the next 48-72hrs as needed.    If you  smoke, please stop smoking.

## 2018-10-22 NOTE — TELEPHONE ENCOUNTER
Reason for Disposition   Lightheadedness (dizziness) present now, after 2 hours of rest and fluids    Protocols used: ST DIZZINESS-A-OH

## 2018-10-22 NOTE — PATIENT INSTRUCTIONS
General Referral to [facility]  You were referred to [facility] to Establish Care.  Please call [phone] to schedule your appointment.    Please return here or go to the Emergency Department for any concerns or worsening of condition.  Please follow up with your primary care doctor or specialist in the next 48-72hrs as needed.    If you  smoke, please stop smoking.

## 2018-10-22 NOTE — LETTER
October 22, 2018      [facility] [place]  [facility] [address]  Phone: [phone]  Fax: [phone]       Patient: Johanna Bullock   YOB: 1985  Date of Visit: 10/22/2018    To Whom It May Concern:    Christian Bullock  was at [facility] on 10/22/2018. She may return to work/school on 10/24/18 with no restrictions. If you have any questions or concerns, or if I can be of further assistance, please do not hesitate to contact me.    Sincerely,    Valerie Lobato NP

## 2018-10-23 ENCOUNTER — OFFICE VISIT (OUTPATIENT)
Dept: INTERNAL MEDICINE | Facility: CLINIC | Age: 33
End: 2018-10-23
Payer: COMMERCIAL

## 2018-10-23 ENCOUNTER — LAB VISIT (OUTPATIENT)
Dept: LAB | Facility: HOSPITAL | Age: 33
End: 2018-10-23
Attending: INTERNAL MEDICINE
Payer: COMMERCIAL

## 2018-10-23 DIAGNOSIS — R53.83 FATIGUE, UNSPECIFIED TYPE: ICD-10-CM

## 2018-10-23 DIAGNOSIS — K62.5 RECTAL BLEED: ICD-10-CM

## 2018-10-23 DIAGNOSIS — R53.83 FATIGUE, UNSPECIFIED TYPE: Primary | ICD-10-CM

## 2018-10-23 LAB
ALBUMIN SERPL BCP-MCNC: 3.6 G/DL
ALP SERPL-CCNC: 74 U/L
ALT SERPL W/O P-5'-P-CCNC: 15 U/L
ANION GAP SERPL CALC-SCNC: 8 MMOL/L
AST SERPL-CCNC: 15 U/L
BASOPHILS # BLD AUTO: 0.05 K/UL
BASOPHILS NFR BLD: 1.1 %
BILIRUB SERPL-MCNC: 0.5 MG/DL
BUN SERPL-MCNC: 6 MG/DL
CALCIUM SERPL-MCNC: 9.1 MG/DL
CHLORIDE SERPL-SCNC: 109 MMOL/L
CO2 SERPL-SCNC: 24 MMOL/L
CREAT SERPL-MCNC: 0.7 MG/DL
DIFFERENTIAL METHOD: ABNORMAL
EOSINOPHIL # BLD AUTO: 0.1 K/UL
EOSINOPHIL NFR BLD: 2.5 %
ERYTHROCYTE [DISTWIDTH] IN BLOOD BY AUTOMATED COUNT: 15.4 %
EST. GFR  (AFRICAN AMERICAN): >60 ML/MIN/1.73 M^2
EST. GFR  (NON AFRICAN AMERICAN): >60 ML/MIN/1.73 M^2
FOLATE SERPL-MCNC: 11.6 NG/ML
GLUCOSE SERPL-MCNC: 75 MG/DL
HCT VFR BLD AUTO: 39.7 %
HGB BLD-MCNC: 12.5 G/DL
LYMPHOCYTES # BLD AUTO: 1.9 K/UL
LYMPHOCYTES NFR BLD: 44.4 %
MCH RBC QN AUTO: 26.9 PG
MCHC RBC AUTO-ENTMCNC: 31.5 G/DL
MCV RBC AUTO: 85 FL
MONOCYTES # BLD AUTO: 0.4 K/UL
MONOCYTES NFR BLD: 8.5 %
NEUTROPHILS # BLD AUTO: 1.9 K/UL
NEUTROPHILS NFR BLD: 43.3 %
PLATELET # BLD AUTO: 348 K/UL
PMV BLD AUTO: 9.7 FL
POTASSIUM SERPL-SCNC: 3.6 MMOL/L
PROT SERPL-MCNC: 7.3 G/DL
RBC # BLD AUTO: 4.65 M/UL
SODIUM SERPL-SCNC: 141 MMOL/L
TSH SERPL DL<=0.005 MIU/L-ACNC: 1.7 UIU/ML
VIT B12 SERPL-MCNC: 401 PG/ML
WBC # BLD AUTO: 4.37 K/UL

## 2018-10-23 PROCEDURE — 3008F BODY MASS INDEX DOCD: CPT | Mod: CPTII,S$GLB,, | Performed by: INTERNAL MEDICINE

## 2018-10-23 PROCEDURE — 80053 COMPREHEN METABOLIC PANEL: CPT

## 2018-10-23 PROCEDURE — 99999 PR PBB SHADOW E&M-EST. PATIENT-LVL IV: CPT | Mod: PBBFAC,,, | Performed by: INTERNAL MEDICINE

## 2018-10-23 PROCEDURE — 84443 ASSAY THYROID STIM HORMONE: CPT

## 2018-10-23 PROCEDURE — 82746 ASSAY OF FOLIC ACID SERUM: CPT

## 2018-10-23 PROCEDURE — 86038 ANTINUCLEAR ANTIBODIES: CPT

## 2018-10-23 PROCEDURE — 99213 OFFICE O/P EST LOW 20 MIN: CPT | Mod: S$GLB,,, | Performed by: INTERNAL MEDICINE

## 2018-10-23 PROCEDURE — 85025 COMPLETE CBC W/AUTO DIFF WBC: CPT

## 2018-10-23 PROCEDURE — 82607 VITAMIN B-12: CPT

## 2018-10-23 PROCEDURE — 36415 COLL VENOUS BLD VENIPUNCTURE: CPT

## 2018-10-23 NOTE — LETTER
October 28, 2018      Valerie Lobato, NP  [address] [facility] [address]           [facility]  [address]  [place] [address]  Phone: [phone]  Fax: [phone]          Patient: Johanna Bullock   MR Number: [id number]   YOB: 1985   Date of Visit: 10/23/2018       Dear Valerie Lobato:    Thank you for referring Johanna Bullock to me for evaluation. Attached you will find relevant portions of my assessment and plan of care.    If you have questions, please do not hesitate to call me. I look forward to following Johanna Bullock along with you.    Sincerely,    Annabel Phan MD    Enclosure  CC:  No Recipients    If you would like to receive this communication electronically, please contact [email or web address] or [phone] to request more information on [employer] Link access.    For providers and/or their staff who would like to refer a patient to [facility], please contact us through our one-stop-shop provider referral line, [facility], at [phone].    If you feel you have received this communication in error or would no longer like to receive these types of communications, please e-mail [email or web address]

## 2018-10-24 ENCOUNTER — HOSPITAL ENCOUNTER (OUTPATIENT)
Dept: CARDIOLOGY | Facility: CLINIC | Age: 33
Discharge: HOME | End: 2018-10-24
Attending: INTERNAL MEDICINE
Payer: COMMERCIAL

## 2018-10-24 ENCOUNTER — TELEPHONE (OUTPATIENT)
Dept: ENDOSCOPY | Facility: HOSPITAL | Age: 33
End: 2018-10-24

## 2018-10-24 ENCOUNTER — TELEPHONE (OUTPATIENT)
Dept: INTERNAL MEDICINE | Facility: CLINIC | Age: 33
End: 2018-10-24

## 2018-10-24 ENCOUNTER — OFFICE VISIT (OUTPATIENT)
Dept: INTERNAL MEDICINE | Facility: CLINIC | Age: 33
End: 2018-10-24
Payer: COMMERCIAL

## 2018-10-24 ENCOUNTER — OFFICE VISIT (OUTPATIENT)
Dept: SURGERY | Facility: CLINIC | Age: 33
End: 2018-10-24
Payer: COMMERCIAL

## 2018-10-24 VITALS
DIASTOLIC BLOOD PRESSURE: 70 MMHG | HEART RATE: 54 BPM | SYSTOLIC BLOOD PRESSURE: 122 MMHG | BODY MASS INDEX: 38.75 KG/M2 | HEIGHT: 67 IN | WEIGHT: 246.88 LBS

## 2018-10-24 VITALS
BODY MASS INDEX: 38.92 KG/M2 | WEIGHT: 248 LBS | OXYGEN SATURATION: 99 % | TEMPERATURE: 99 F | SYSTOLIC BLOOD PRESSURE: 100 MMHG | HEIGHT: 67 IN | HEART RATE: 72 BPM | DIASTOLIC BLOOD PRESSURE: 60 MMHG

## 2018-10-24 DIAGNOSIS — R42 VERTIGO: Primary | ICD-10-CM

## 2018-10-24 DIAGNOSIS — R53.83 FATIGUE, UNSPECIFIED TYPE: ICD-10-CM

## 2018-10-24 DIAGNOSIS — N39.0 URINARY TRACT INFECTION WITHOUT HEMATURIA, SITE UNSPECIFIED: ICD-10-CM

## 2018-10-24 DIAGNOSIS — D64.9 ANEMIA, UNSPECIFIED TYPE: ICD-10-CM

## 2018-10-24 DIAGNOSIS — K62.5 RECTAL BLEEDING: Primary | ICD-10-CM

## 2018-10-24 LAB
ANA SER QL IF: NORMAL
DIASTOLIC DYSFUNCTION: NO
RETIRED EF AND QEF - SEE NOTES: 55 (ref 55–65)

## 2018-10-24 PROCEDURE — 93321 DOPPLER ECHO F-UP/LMTD STD: CPT | Mod: S$GLB,,, | Performed by: INTERNAL MEDICINE

## 2018-10-24 PROCEDURE — 3008F BODY MASS INDEX DOCD: CPT | Mod: CPTII,S$GLB,, | Performed by: COLON & RECTAL SURGERY

## 2018-10-24 PROCEDURE — 93351 STRESS TTE COMPLETE: CPT | Mod: S$GLB,,, | Performed by: INTERNAL MEDICINE

## 2018-10-24 PROCEDURE — 99214 OFFICE O/P EST MOD 30 MIN: CPT | Mod: S$GLB,,, | Performed by: NURSE PRACTITIONER

## 2018-10-24 PROCEDURE — 99999 PR PBB SHADOW E&M-EST. PATIENT-LVL III: CPT | Mod: PBBFAC,,, | Performed by: NURSE PRACTITIONER

## 2018-10-24 PROCEDURE — 3008F BODY MASS INDEX DOCD: CPT | Mod: CPTII,S$GLB,, | Performed by: NURSE PRACTITIONER

## 2018-10-24 PROCEDURE — 99999 PR PBB SHADOW E&M-EST. PATIENT-LVL III: CPT | Mod: PBBFAC,,, | Performed by: COLON & RECTAL SURGERY

## 2018-10-24 PROCEDURE — 99203 OFFICE O/P NEW LOW 30 MIN: CPT | Mod: S$GLB,,, | Performed by: COLON & RECTAL SURGERY

## 2018-10-24 RX ORDER — MECLIZINE HYDROCHLORIDE 25 MG/1
25 TABLET ORAL 3 TIMES DAILY PRN
Qty: 30 TABLET | Refills: 0 | Status: SHIPPED | OUTPATIENT
Start: 2018-10-24 | End: 2019-12-19

## 2018-10-24 RX ORDER — SULFAMETHOXAZOLE AND TRIMETHOPRIM 800; 160 MG/1; MG/1
1 TABLET ORAL 2 TIMES DAILY
Qty: 14 TABLET | Refills: 0 | Status: SHIPPED | OUTPATIENT
Start: 2018-10-24 | End: 2018-10-31

## 2018-10-24 NOTE — LETTER
October 24, 2018      Annabel Phan MD  [address]  [facility] [address]           [facility]  [address]  [facility] [address]  Phone: [phone]          Patient: Johanna Bullock   MR Number: [id number]   YOB: 1985   Date of Visit: 10/24/2018       Dear Dr. Annabel Phan:    Thank you for referring Johanna Bullock to me for evaluation. Attached you will find relevant portions of my assessment and plan of care.    If you have questions, please do not hesitate to call me. I look forward to following Johanna Bullock along with you.    Sincerely,    STEVE Valadez MD    Enclosure  CC:  No Recipients    If you would like to receive this communication electronically, please contact [email or web address] or [phone] to request more information on [employer] Link access.    For providers and/or their staff who would like to refer a patient to [facility], please contact us through our one-stop-shop provider referral line, [facility], at [phone].    If you feel you have received this communication in error or would no longer like to receive these types of communications, please e-mail [email or web address]

## 2018-10-24 NOTE — TELEPHONE ENCOUNTER
Spoke with pt. She refused to go to the ER. Stated she sitting at the check out desk by gurpreet. Offered pt appt with Geronimo Alves NP to be evaluated. Pt accepted.

## 2018-10-24 NOTE — LETTER
October 24, 2018      [facility]  [address]  [facility] [address]  Phone: [phone]  Fax: [phone]       Patient: Johanna Bullock   YOB: 1985  Date of Visit: 10/24/2018    To Whom It May Concern:    Christian Bullock  was at [facility] on 10/24/2018.  She may return to work on 10/29/18 with no restrictions. If you have any questions or concerns, or if I can be of further assistance, please do not hesitate to contact me.    Sincerely,        Geronimo Alves NP

## 2018-10-24 NOTE — PROGRESS NOTES
CRS Office Visit History and Physical    Referring Md:   Annabel Phan Md  [address] [place] [address]    SUBJECTIVE:     Chief Complaint:  Rectal bleeding    History of Present Illness:  Patient is a 33 y.o. female presents with rectal bleeding on 1 occasion approximately 2 weeks ago.  She denied any anal pain or problems moving her bowels.  She generally moves her bowels 1-2 times per day.  They are formed.  She denies straining.  She denies any anal pain. She denies any prolapse.  She has had a bright red blood into the toilet bowl perhaps on 1 other occasion.  She denies any abdominal pain, nausea or vomiting or hematemesis.      She recently has experienced since last Thursday vertigo and lightheadedness.  She denies any loss of consciousness.  She has seen her primary care doctor for this.          Past Medical History:   Diagnosis Date    Adjustment disorder with mixed anxiety and depressed mood 2/6/2013    Anemia, B12 deficiency     Anxiety     Major depression, single episode 2/6/2013    Major depression, single episode 2/6/2013    Post partum depression        Past Surgical History:   Procedure Laterality Date    CHOLECYSTECTOMY  10/2012    DILATION AND CURETTAGE OF UTERUS      ESOPHAGOGASTRODUODENOSCOPY N/A 7/20/2018    Procedure: ESOPHAGOGASTRODUODENOSCOPY (EGD);  Surgeon: Darwin Hansen MD;  Location: [facility] ([address] [place] [address]);  Service: Endoscopy;  Laterality: N/A;  Please measure pouch and limbs    ESOPHAGOGASTRODUODENOSCOPY (EGD) N/A 7/20/2018    Performed by Darwin Hansen MD at [facility] ([address] [place] [address])    GASTRIC BYPASS      Liver scope  10/2012       Review of patient's allergies indicates:   Allergen Reactions    Zofran [ondansetron hcl (pf)] Rash       Current Outpatient Medications on File Prior to Visit   Medication Sig Dispense Refill    b complex vitamins (B COMPLEX 1) tablet Take 1 tablet by mouth once daily. 100 tablet 4    cyanocobalamin 1,000 mcg/mL injection  "ONE INJECTION AS DIRECTED EVERY 28 DAYS 4 mL 11    ferrous gluconate (FERGON) 324 MG tablet Take 1 tablet (324 mg total) by mouth daily with breakfast. 100 tablet 4    phentermine (ADIPEX-P) 37.5 mg tablet 1/2 tab po bid prn appetite supression 30 tablet 2    [DISCONTINUED] pantoprazole (PROTONIX) 40 MG tablet Take 1 tablet (40 mg total) by mouth once daily. On empty stomach, wait 20 min to eat 90 tablet 1    [DISCONTINUED] ranitidine (ZANTAC) 150 MG tablet Take 1 tablet (150 mg total) by mouth 2 (two) times daily. 180 tablet 1     No current facility-administered medications on file prior to visit.        Family History   Problem Relation Age of Onset    Breast cancer Neg Hx     Colon cancer Neg Hx     Ovarian cancer Neg Hx        Social History     Tobacco Use    Smoking status: Never Smoker    Smokeless tobacco: Never Used   Substance Use Topics    Alcohol use: Yes     Comment: Alcohol use rarely    Drug use: No     Comment: Mirena        Review of Systems:  ROS:  GENERAL: No fever, chills, fatigability or weight loss.  Integument:  No rashes, redness, icterus  CHEST: Denies NOGUERA, cyanosis, wheezing, cough and sputum production.  CARDIOVASCULAR: Denies chest pain, PND, orthopnea or reduced exercise tolerance.  GI:  Denies abd pain, dysphagia, nausea, vomiting, no hematemesis, no rectal pain  : Denies burning on urination, no hematuria, no bacteriuria  MSK:  No deformities, swelling, joint pain swelling  Neurologic:  No HAs, seizures, weakness, paresthesias, gait problems      OBJECTIVE:     Vital Signs (Most Recent)  /70 (BP Location: Left arm, Patient Position: Sitting, BP Method: Large (Automatic))   Pulse (!) 54   Ht 5' 7" (1.702 m)   Wt 112 kg (246 lb 14.4 oz)   BMI 38.67 kg/m²     Physical Exam:  General: Black or  female in no distress   Skin/ Sclera anicteric  HEENT: anicteric, normocephalic, extraocular movements intact   Neck trachea midline  Chest symmetric, nl " excursions, no retractions  Respiratory: respirations are even and unlabored     Anorectal:   Inspection   KELVIN:  Normal tone, no masses, good squeeze, normal relaxation with Valsalva  Extremities: Warm dry and intact.  NO CCE  Neuro: alert and oriented x 4.  Moves all extremities.         ASSESSMENT/PLAN:   History of rectal bleeding      Recommend  High fiber diet  Anoscopy  Colonoscopy      Anoscopy Procedure Note:   Verbal consent obtained    Indications:  Rectal bleeding    Post procedure diagnosis:  Same, mild internal hemorrhoids    Procedure: anoscopy    Surgeon GAVIOTA    Assistant: Ramirez    Specimen none    Findings:  See below    Right posterior hemorrhoid grade 1  Right anterior hemorrhoid grade 2  Left lateral hemorrhoid grade 2    Pt tolerated procedure well.      No complications.  NO

## 2018-10-24 NOTE — TELEPHONE ENCOUNTER
Pt notified of results, verbalized understanding. Pt stated she is still feeling dizzy and lightheaded. Stated her BP was 102/51 30 mins ago and HR has been 50-51.    Please Advise

## 2018-10-24 NOTE — TELEPHONE ENCOUNTER
Patient in office to schedule colonoscopy.  While walking to office, patient stated she was dizzy and light-headed.  I asked her if she would like a wheelchair, she declined.  She informed me that she has an appointment to have a stress test today and follow up with her doctor.  Explained to her that she should contact me after she has had those tests to be sure she is cleared from her physician to proceed with procedure.  Stated understanding.  Direct line phone number provided to return call.

## 2018-10-24 NOTE — TELEPHONE ENCOUNTER
Please contact patient and inform her that her urine specimen reveals she has a UTI. I sent a prescription for an antibiotic to her pharmacy

## 2018-10-25 NOTE — PROGRESS NOTES
Subjective:       Patient ID: Johanna Bullock is a 33 y.o. female.    Chief Complaint: Dizziness    HPI:  34 yo female that presents to clinic today with complaint of dizziness.    States that symptoms started about six days ago.  States that she felt it might be food poisoning as she was nauseated at first and having diarrhea.  States that the symptoms improved but she has continued to feel light headed.  Was seen in  on 10/22/18 and was referred to IM.      Was seen in clinic yesterday for dizziness and blood work and echocardiogram were done.  Blood work showed some mild anemia but was otherwise unremarkable.  Stress echocardiogram done today was unremarkable.    Urine sample given in clinic yesterday was suggestive of UTI.  Culture is still pending but prescription for bactrim was sent in which patient has not yet started.    States that she was taking adipex for weight loss and is followed by bariatric surgery.  Reported that she was having intermittent episodes of bleeding with bowel movements.  Was seen bu Colon and rectal surgery who told her that she has hemorrhoids but wanted to go ahead and do colonoscopy.    States that she still feels dizzy today in clinic.  States that she has increased her water intake and is still eating.  States that nothing seems to make the dizziness better or worse.    Denies any fever, SOB or chest pain associated with the dizziness.    Review of Systems   Constitutional: Positive for fatigue. Negative for activity change, appetite change and fever.   HENT: Negative for congestion, ear pain, postnasal drip, rhinorrhea, sinus pain and sore throat.    Eyes: Negative for photophobia and visual disturbance.   Respiratory: Negative for apnea, cough, shortness of breath and wheezing.    Cardiovascular: Negative for chest pain, palpitations and leg swelling.   Gastrointestinal: Negative for abdominal distention, abdominal pain, constipation, diarrhea, nausea and vomiting.    Genitourinary: Negative for difficulty urinating, dysuria, flank pain, frequency, hematuria and urgency.   Skin: Negative for color change and rash.   Neurological: Negative for dizziness, facial asymmetry, weakness, light-headedness, numbness and headaches.   Psychiatric/Behavioral: Negative for behavioral problems.       Objective:      Physical Exam   Constitutional: She is oriented to person, place, and time. She appears well-developed and well-nourished. No distress.   HENT:   Head: Normocephalic and atraumatic.   Right Ear: External ear normal.   Left Ear: External ear normal.   Mouth/Throat: Oropharynx is clear and moist.   Eyes: Conjunctivae and EOM are normal. Pupils are equal, round, and reactive to light.   Neck: Normal range of motion. Neck supple. No thyromegaly present.   Cardiovascular: Normal rate, regular rhythm, normal heart sounds and intact distal pulses.   No murmur heard.  Pulmonary/Chest: Effort normal and breath sounds normal. No stridor. No respiratory distress. She has no wheezes.   Abdominal: Soft. Bowel sounds are normal. She exhibits no distension and no mass. There is no tenderness.   Lymphadenopathy:     She has no cervical adenopathy.   Neurological: She is alert and oriented to person, place, and time. She has normal strength. No cranial nerve deficit or sensory deficit. GCS eye subscore is 4. GCS verbal subscore is 5. GCS motor subscore is 6.   Skin: Skin is warm and dry. No erythema.   Psychiatric: Her behavior is normal.       Assessment:       1. Vertigo    2. Anemia, unspecified type    3. Urinary tract infection without hematuria, site unspecified        Plan:     1.  Vertigo  -Vitals are stable in clinic.  -Will prescribe course of meclizine tid prn for dizziness.  -Neurological exam is unremarkable in clinic today.  -Normal cardiac stress echo.  -Blood work suggestive of mild anemia but otherwise unremarkable.  -Encouraged to increase water intake.  -Reviewed patient blood  work and echo results in clinic.    2.  Anemia  -Suggested by recent cbc.  -Will have colonoscopy done by colon rectal surgery.  -May need to start oral iron therapy.    3.  UTI  -Culture results pending but urinalysis is suggestive of UTI  -Bactrim already ordered.  Encouraged patient to start bactrim.

## 2018-10-28 VITALS
DIASTOLIC BLOOD PRESSURE: 66 MMHG | TEMPERATURE: 99 F | OXYGEN SATURATION: 99 % | SYSTOLIC BLOOD PRESSURE: 104 MMHG | HEART RATE: 62 BPM | WEIGHT: 248.44 LBS | HEIGHT: 67 IN | BODY MASS INDEX: 38.99 KG/M2

## 2018-10-28 NOTE — PROGRESS NOTES
Subjective:       Patient ID: Johanna Bullock is a 33 y.o. female.    Chief Complaint: Fatigue    HPI  She complains of fatigue.  She denies chest pain, no shortness of breath.  She does report an episode of rectal bleeding over a week ago.  None since.    Past medical history:  B12 deficiency, anemia, peptic ulcer disease, status post cholecystectomy, status post gastric bypass    Medications:  B12 injection once a month    Allergies:  Zofran      Review of Systems   Constitutional: Negative for chills, fatigue, fever and unexpected weight change.   Respiratory: Negative for chest tightness and shortness of breath.    Cardiovascular: Negative for chest pain and palpitations.   Gastrointestinal: Negative for abdominal pain and blood in stool.   Neurological: Negative for dizziness, syncope, numbness and headaches.       Objective:      Physical Exam   HENT:   Right Ear: External ear normal.   Left Ear: External ear normal.   Nose: Nose normal.   Mouth/Throat: Oropharynx is clear and moist.   Eyes: Pupils are equal, round, and reactive to light.   Neck: Normal range of motion.   Cardiovascular: Normal rate and regular rhythm.   No murmur heard.  Pulmonary/Chest: Breath sounds normal.   Abdominal: She exhibits no distension. There is no hepatosplenomegaly. There is no tenderness.   Lymphadenopathy:     She has no cervical adenopathy.     She has no axillary adenopathy.   Neurological: She has normal strength and normal reflexes. No cranial nerve deficit or sensory deficit.       Assessment/Plan     assessment and plan:  1.  Fatigue:  Check CMP, CBC, TSH, B12, folate, GERA.  Schedule stress echo.  Urine sent for urinalysis and culture  2.  Rectal bleed:  Schedule appointment with:  Rectal.  Advised her to call immediately if symptom recurs

## 2019-06-25 ENCOUNTER — TELEPHONE (OUTPATIENT)
Dept: GASTROENTEROLOGY | Facility: CLINIC | Age: 34
End: 2019-06-25

## 2019-06-25 NOTE — TELEPHONE ENCOUNTER
----- Message from Gina Carias sent at 6/25/2019  3:48 PM CDT -----  Contact: self  Pt would like to schedule to be seen asap.  She has been vomiting and in pain.  She is also unable to eat.      Pt can be reached at [phone]

## 2019-06-26 ENCOUNTER — LAB VISIT (OUTPATIENT)
Dept: LAB | Facility: HOSPITAL | Age: 34
End: 2019-06-26
Attending: INTERNAL MEDICINE
Payer: COMMERCIAL

## 2019-06-26 ENCOUNTER — TELEPHONE (OUTPATIENT)
Dept: BARIATRICS | Facility: CLINIC | Age: 34
End: 2019-06-26

## 2019-06-26 ENCOUNTER — OFFICE VISIT (OUTPATIENT)
Dept: GASTROENTEROLOGY | Facility: CLINIC | Age: 34
End: 2019-06-26
Payer: COMMERCIAL

## 2019-06-26 VITALS
DIASTOLIC BLOOD PRESSURE: 70 MMHG | HEIGHT: 67 IN | SYSTOLIC BLOOD PRESSURE: 113 MMHG | HEART RATE: 71 BPM | WEIGHT: 253.31 LBS | BODY MASS INDEX: 39.76 KG/M2

## 2019-06-26 DIAGNOSIS — R10.84 GENERALIZED ABDOMINAL PAIN: Primary | ICD-10-CM

## 2019-06-26 DIAGNOSIS — Z98.84 HISTORY OF GASTRIC BYPASS: ICD-10-CM

## 2019-06-26 DIAGNOSIS — R06.00 DYSPNEA, UNSPECIFIED TYPE: ICD-10-CM

## 2019-06-26 DIAGNOSIS — R10.84 GENERALIZED ABDOMINAL PAIN: ICD-10-CM

## 2019-06-26 PROBLEM — K62.5 RECTAL BLEEDING: Status: RESOLVED | Noted: 2018-10-24 | Resolved: 2019-06-26

## 2019-06-26 LAB
ALBUMIN SERPL BCP-MCNC: 3.6 G/DL (ref 3.5–5.2)
ALP SERPL-CCNC: 73 U/L (ref 55–135)
ALT SERPL W/O P-5'-P-CCNC: 31 U/L (ref 10–44)
ANION GAP SERPL CALC-SCNC: 5 MMOL/L (ref 8–16)
AST SERPL-CCNC: 18 U/L (ref 10–40)
BASOPHILS # BLD AUTO: 0.06 K/UL (ref 0–0.2)
BASOPHILS NFR BLD: 1.2 % (ref 0–1.9)
BILIRUB SERPL-MCNC: 0.5 MG/DL (ref 0.1–1)
BUN SERPL-MCNC: 9 MG/DL (ref 6–20)
CALCIUM SERPL-MCNC: 9.3 MG/DL (ref 8.7–10.5)
CHLORIDE SERPL-SCNC: 106 MMOL/L (ref 95–110)
CO2 SERPL-SCNC: 27 MMOL/L (ref 23–29)
CREAT SERPL-MCNC: 0.8 MG/DL (ref 0.5–1.4)
DIFFERENTIAL METHOD: ABNORMAL
EOSINOPHIL # BLD AUTO: 0.2 K/UL (ref 0–0.5)
EOSINOPHIL NFR BLD: 2.9 % (ref 0–8)
ERYTHROCYTE [DISTWIDTH] IN BLOOD BY AUTOMATED COUNT: 15.1 % (ref 11.5–14.5)
EST. GFR  (AFRICAN AMERICAN): >60 ML/MIN/1.73 M^2
EST. GFR  (NON AFRICAN AMERICAN): >60 ML/MIN/1.73 M^2
GLUCOSE SERPL-MCNC: 66 MG/DL (ref 70–110)
HCT VFR BLD AUTO: 39.4 % (ref 37–48.5)
HGB BLD-MCNC: 12.6 G/DL (ref 12–16)
IMM GRANULOCYTES # BLD AUTO: 0.01 K/UL (ref 0–0.04)
IMM GRANULOCYTES NFR BLD AUTO: 0.2 % (ref 0–0.5)
LIPASE SERPL-CCNC: 36 U/L (ref 4–60)
LYMPHOCYTES # BLD AUTO: 2 K/UL (ref 1–4.8)
LYMPHOCYTES NFR BLD: 39 % (ref 18–48)
MCH RBC QN AUTO: 27.5 PG (ref 27–31)
MCHC RBC AUTO-ENTMCNC: 32 G/DL (ref 32–36)
MCV RBC AUTO: 86 FL (ref 82–98)
MONOCYTES # BLD AUTO: 0.6 K/UL (ref 0.3–1)
MONOCYTES NFR BLD: 11.3 % (ref 4–15)
NEUTROPHILS # BLD AUTO: 2.3 K/UL (ref 1.8–7.7)
NEUTROPHILS NFR BLD: 45.4 % (ref 38–73)
NRBC BLD-RTO: 0 /100 WBC
PLATELET # BLD AUTO: 365 K/UL (ref 150–350)
PMV BLD AUTO: 9.2 FL (ref 9.2–12.9)
POTASSIUM SERPL-SCNC: 4.1 MMOL/L (ref 3.5–5.1)
PROT SERPL-MCNC: 7.5 G/DL (ref 6–8.4)
RBC # BLD AUTO: 4.59 M/UL (ref 4–5.4)
SODIUM SERPL-SCNC: 138 MMOL/L (ref 136–145)
WBC # BLD AUTO: 5.15 K/UL (ref 3.9–12.7)

## 2019-06-26 PROCEDURE — 99999 PR PBB SHADOW E&M-EST. PATIENT-LVL V: CPT | Mod: PBBFAC,,, | Performed by: INTERNAL MEDICINE

## 2019-06-26 PROCEDURE — 99999 PR PBB SHADOW E&M-EST. PATIENT-LVL V: ICD-10-PCS | Mod: PBBFAC,,, | Performed by: INTERNAL MEDICINE

## 2019-06-26 PROCEDURE — 83690 ASSAY OF LIPASE: CPT

## 2019-06-26 PROCEDURE — 3008F BODY MASS INDEX DOCD: CPT | Mod: CPTII,S$GLB,, | Performed by: INTERNAL MEDICINE

## 2019-06-26 PROCEDURE — 80053 COMPREHEN METABOLIC PANEL: CPT

## 2019-06-26 PROCEDURE — 99215 PR OFFICE/OUTPT VISIT, EST, LEVL V, 40-54 MIN: ICD-10-PCS | Mod: S$GLB,,, | Performed by: INTERNAL MEDICINE

## 2019-06-26 PROCEDURE — 85025 COMPLETE CBC W/AUTO DIFF WBC: CPT

## 2019-06-26 PROCEDURE — 36415 COLL VENOUS BLD VENIPUNCTURE: CPT

## 2019-06-26 PROCEDURE — 3008F PR BODY MASS INDEX (BMI) DOCUMENTED: ICD-10-PCS | Mod: CPTII,S$GLB,, | Performed by: INTERNAL MEDICINE

## 2019-06-26 PROCEDURE — 99215 OFFICE O/P EST HI 40 MIN: CPT | Mod: S$GLB,,, | Performed by: INTERNAL MEDICINE

## 2019-06-26 NOTE — TELEPHONE ENCOUNTER
Called Ms. Bullock about a her coming to the office wanting a refill on her rx phentermine(Adipex)37.5mg     informed patient that she will need to schedule a F/U appointment with Dr. Reynoso before she can refill any prescriptions.     Patient stated once she has seen her GI Doctor she will give me a call back to schedule her F/U appointment with Dr. Reynoso

## 2019-06-26 NOTE — PROGRESS NOTES
[facility] Established Patient Visit    Reason for Visit:    Chief Complaint   Patient presents with    Abdominal Pain     burning sensation, lower abdomen x 1 1/2 week    Emesis    Fatigue       PCP: Primary Doctor No    HPI:  Johanna Bullock is a 34 y.o. female here for evaluation of abdominal pain. This is her 1st visit with me in the clinic, but I am familiar with her from a prior upper endoscopy done a year ago.  The endoscopy was done at that time to evaluate for left upper quadrant abdominal pain. The EGD was done 07/20/2018 with findings of a 4 cm gastric pouch, normal gastro jejunal anastomosis, and no other significant findings.  The jejuno jejunal anastomosis was not reached.  Her left upper quadrant pain eventually resolved.  Today she reports a new pain in the lower abdomen that has been present for about 2 weeks.  This is different than her prior pain. It is associated with decreased appetite and p.o. intake.  She has no troubles with swallowing.  After eating, she feels that when the food gets further into her intestinal tract that she starts to have a burning sensation that radiates up the abdomen.  This burning sensation lasts for several hours.  She has tried Tums and acid reducers which have not really helped very much.  She did start taking Prilosec a few days ago and believes that this may be helping some.  She denies heartburn or reflux, but did have an episode of vomiting 2 days ago that felt like fire in her chest.  Her stomach has been making a lot of noise is and she has belching when this happens.  She denies changes in bowel habits.  She is having regular bowel movements without blood.  Prior to the pain developing 2 weeks ago she had 3 days of diarrhea.  She thought it might have been a food poisoning.  The symptoms resolved.  If she does not eat she generally will have symptoms.  She has been using anti-inflammatory medications formed pains related to her  menstrual cycles.  She is feeling more weak and fatigued and has been a little out of breath lately as well.          ROS:  Constitutional: No fevers, chills, No weight loss, positive for decreased appetite  ENT: No congestion, rhinorrhea, or chronic sinus problems  CV: No chest pain, but positive for palpitations.  Pulm: No cough, but positive for dyspnea and shortness of breath.  Ophtho: No vision changes or pain  GI: see HPI  Derm: No rash or lesions  MSK: No arthritis or joint swelling  : No dysuria, No frequent urination  Endo: No hot or cold intolerance            PMHX:  has a past medical history of Adjustment disorder with mixed anxiety and depressed mood (2/6/2013), Anemia, B12 deficiency, Anxiety, Major depression, single episode (2/6/2013), Major depression, single episode (2/6/2013), and Post partum depression.    PSHX:  has a past surgical history that includes Dilation and curettage of uterus; Gastric bypass; Cholecystectomy (10/2012); Liver scope (10/2012); and Esophagogastroduodenoscopy (N/A, 7/20/2018).    The patient's social and family histories were reviewed by me and updated in the appropriate section of the electronic medical record.    Review of patient's allergies indicates:   Allergen Reactions    Zofran [ondansetron hcl (pf)] Rash       Prior to Admission medications    Medication Sig Start Date End Date Taking? Authorizing Provider   b complex vitamins (B COMPLEX 1) tablet Take 1 tablet by mouth once daily. 7/17/18  Yes Clemencia Reynoso MD   cyanocobalamin 1,000 mcg/mL injection ONE INJECTION AS DIRECTED EVERY 28 DAYS 7/17/18   Clemencia Reynoso MD   ferrous gluconate (FERGON) 324 MG tablet Take 1 tablet (324 mg total) by mouth daily with breakfast. 7/17/18   Clemencia Reynoso MD   meclizine (ANTIVERT) 25 mg tablet Take 1 tablet (25 mg total) by mouth 3 (three) times daily as needed for Dizziness. 10/24/18 11/3/18  Geronimo Alves, ARSENIO   phentermine (ADIPEX-P) 37.5 mg tablet 1/2  "tab po bid prn appetite supression 7/19/18   Clemencia Reynoso MD         Objective Findings:  Vital Signs:  /70   Pulse 71   Ht 5' 7" (1.702 m)   Wt 114.9 kg (253 lb 4.9 oz)   BMI 39.67 kg/m²  Body mass index is 39.67 kg/m².    Physical Exam:  General Appearance:  Well appearing in no acute distress, appears stated age  Head:  Normocephalic, atraumatic  Eyes:  No scleral icterus or pallor, EOMI  ENT:  Neck supple, moist mucosa; OP clear  Lungs:  CTA bilaterally in anterior and posterior fields, no wheezes, no crackles; no dullness  Heart:  Regular rate and rhythm, S1, S2 normal, no murmurs heard  Abdomen:  Soft, mild tenderness in the mid abdomen, non distended with positive bowel sounds in all four quadrants. No hepatosplenomegaly, ascites, or mass  Extremities:  No clubbing, cyanosis, or edema  Skin:  No rash        Labs:  Lab Results   Component Value Date    WBC 4.37 10/23/2018    HGB 12.5 10/23/2018    HCT 39.7 10/23/2018    MCV 85 10/23/2018    RDW 15.4 (H) 10/23/2018     10/23/2018    GRAN 1.9 10/23/2018    GRAN 43.3 10/23/2018    LYMPH 1.9 10/23/2018    LYMPH 44.4 10/23/2018    MONO 0.4 10/23/2018    MONO 8.5 10/23/2018    EOS 0.1 10/23/2018    BASO 0.05 10/23/2018     Lab Results   Component Value Date     10/23/2018    K 3.6 10/23/2018     10/23/2018    CO2 24 10/23/2018    GLU 75 10/23/2018    BUN 6 10/23/2018    CREATININE 0.7 10/23/2018    CALCIUM 9.1 10/23/2018    PROT 7.3 10/23/2018    ALBUMIN 3.6 10/23/2018    BILITOT 0.5 10/23/2018    ALKPHOS 74 10/23/2018    AST 15 10/23/2018    ALT 15 10/23/2018                 Assessment:  Johanna Bullock is a 34 y.o. female here with:  1. Generalized abdominal pain    2. Dyspnea, unspecified type    3. History of gastric bypass      Postprandial generalized abdominal pain. She has recent NSAID use.  With her prior surgical history, she is at risk for gastrointestinal ulcers especially in the setting of NSAID use.  No " significant reflux symptoms.  Consideration could be ulceration at anastomotic site or even within her remnant stomach. H pylori is also a consideration.  Mild improvement with PPI, but she has not been on this for very long.        Recommendations:  1.  I will get labs that include CBC, CMP, and lipase  2.  We will check an H pylori stool antigen test  3.  Continue Prilosec daily  4.  Avoid NSAIDs  5.  Schedule for CT of the abdomen and pelvis with contrast  6.  I will arrange for her to have a upper balloon assisted enteroscopy to evaluate the jejuno jejunal anastomosis as well as the duodenum and remnant stomach    Follow-up pending the above    Order summary:  Orders Placed This Encounter   Procedures    CT Abdomen Pelvis With Contrast    CBC auto differential    Comprehensive metabolic panel    Lipase    H. pylori antigen, stool         Darwin Hansen MD

## 2019-06-27 ENCOUNTER — LAB VISIT (OUTPATIENT)
Dept: LAB | Facility: HOSPITAL | Age: 34
End: 2019-06-27
Attending: INTERNAL MEDICINE
Payer: COMMERCIAL

## 2019-06-27 DIAGNOSIS — Z98.84 HISTORY OF GASTRIC BYPASS: ICD-10-CM

## 2019-06-27 DIAGNOSIS — R10.84 GENERALIZED ABDOMINAL PAIN: ICD-10-CM

## 2019-06-27 DIAGNOSIS — R06.00 DYSPNEA, UNSPECIFIED TYPE: ICD-10-CM

## 2019-06-27 PROCEDURE — 87338 HPYLORI STOOL AG IA: CPT

## 2019-06-28 ENCOUNTER — TELEPHONE (OUTPATIENT)
Dept: GASTROENTEROLOGY | Facility: CLINIC | Age: 34
End: 2019-06-28

## 2019-06-28 NOTE — TELEPHONE ENCOUNTER
----- Message from Elizabeth Rush sent at 6/28/2019  3:44 PM CDT -----  Contact: PT  Needs Advice    Reason for call: calling to cancel procedure on tomorrow due to no funds available (PT says it's $1,000 but she would like to get the scope done.        Communication Preference: [phone]    Additional Information:

## 2019-06-28 NOTE — TELEPHONE ENCOUNTER
Spoke with patient. Ms. Bullock is calling to cancel her CT scan due to copay being $1000.00.    Message routed to Dr. Hansen.

## 2019-07-01 ENCOUNTER — TELEPHONE (OUTPATIENT)
Dept: ENDOSCOPY | Facility: HOSPITAL | Age: 34
End: 2019-07-01

## 2019-07-02 ENCOUNTER — TELEPHONE (OUTPATIENT)
Dept: GASTROENTEROLOGY | Facility: CLINIC | Age: 34
End: 2019-07-02

## 2019-07-02 NOTE — TELEPHONE ENCOUNTER
----- Message from Darwin Hansen MD sent at 6/26/2019  4:48 PM CDT -----  The blood work looks okay.  Normal electrolytes, kidney function, and liver function.  Her blood counts are normal as well.    MA to call patient with results.

## 2019-07-02 NOTE — TELEPHONE ENCOUNTER
Results given to patient per Dr. Hansen. Patient verbalized understanding.     She would to have a barium swallow test done before scheduling a procedure.

## 2019-07-05 ENCOUNTER — TELEPHONE (OUTPATIENT)
Dept: ENDOSCOPY | Facility: HOSPITAL | Age: 34
End: 2019-07-05

## 2019-07-06 LAB — H PYLORI AG STL QL IA: NOT DETECTED

## 2019-07-09 ENCOUNTER — TELEPHONE (OUTPATIENT)
Dept: ENDOSCOPY | Facility: HOSPITAL | Age: 34
End: 2019-07-09

## 2019-07-09 NOTE — TELEPHONE ENCOUNTER
Spoke with patient. Balloon EGD scheduled for 7/18 at 9:30a. Reviewed prep instructions. Ms Bullock verbalized understanding

## 2019-07-09 NOTE — TELEPHONE ENCOUNTER
----- Message from Dana Bernard MA sent at 7/9/2019  1:17 PM CDT -----  Contact: Patient      ----- Message -----  From: Kiersten Judd  Sent: 7/9/2019  11:58 AM  To: Tyrone Granados Staff    Patient Returning Call from [facility]    Who Left Message for Patient: Haleigh    Communication Preference: [phone]    Additional Information: Any time after 12 is a good time to call

## 2019-07-10 ENCOUNTER — TELEPHONE (OUTPATIENT)
Dept: ENDOSCOPY | Facility: HOSPITAL | Age: 34
End: 2019-07-10

## 2019-07-10 NOTE — TELEPHONE ENCOUNTER
Contacted patient to confirm appointment for Enteroscopy on 7/18/19 at 930 am. Reviewed medical history, medications and instructed on procedure and prep. She voiced an understanding and is familiar with the process. Sent a copy of instructions to e-mail address listed in chart per her request.

## 2019-07-26 ENCOUNTER — TELEPHONE (OUTPATIENT)
Dept: ENDOSCOPY | Facility: HOSPITAL | Age: 34
End: 2019-07-26

## 2019-07-26 NOTE — TELEPHONE ENCOUNTER
I spoke with patient in regards to scheduling Balloon EGD. She consulted with a surgeon. She is scheduled for surgery to repair a hernia and adhesions.

## 2019-07-26 NOTE — TELEPHONE ENCOUNTER
----- Message from Alma Barreto MA sent at 7/26/2019 11:30 AM CDT -----  Contact: [phone]   Patient Returning Call from [facility]    Who Left Message for Patient: Haleigh    Communication Preference: [phone]     Additional Information: Returning a call to schedule procedure

## 2019-09-26 ENCOUNTER — OFFICE VISIT (OUTPATIENT)
Dept: FAMILY MEDICINE | Facility: CLINIC | Age: 34
End: 2019-09-26
Payer: COMMERCIAL

## 2019-09-26 VITALS
DIASTOLIC BLOOD PRESSURE: 81 MMHG | HEIGHT: 67 IN | SYSTOLIC BLOOD PRESSURE: 120 MMHG | WEIGHT: 229.25 LBS | OXYGEN SATURATION: 99 % | TEMPERATURE: 98 F | HEART RATE: 64 BPM | BODY MASS INDEX: 35.98 KG/M2

## 2019-09-26 DIAGNOSIS — E66.01 SEVERE OBESITY (BMI 35.0-35.9 WITH COMORBIDITY): ICD-10-CM

## 2019-09-26 DIAGNOSIS — Z00.00 ANNUAL PHYSICAL EXAM: Primary | ICD-10-CM

## 2019-09-26 DIAGNOSIS — F32.A DEPRESSION, UNSPECIFIED DEPRESSION TYPE: ICD-10-CM

## 2019-09-26 DIAGNOSIS — Z98.84 HISTORY OF BARIATRIC SURGERY: ICD-10-CM

## 2019-09-26 DIAGNOSIS — E53.8 VITAMIN B12 DEFICIENCY: ICD-10-CM

## 2019-09-26 PROCEDURE — 99395 PREV VISIT EST AGE 18-39: CPT | Mod: S$GLB,,, | Performed by: FAMILY MEDICINE

## 2019-09-26 PROCEDURE — 99999 PR PBB SHADOW E&M-EST. PATIENT-LVL III: ICD-10-PCS | Mod: PBBFAC,,, | Performed by: FAMILY MEDICINE

## 2019-09-26 PROCEDURE — 99395 PR PREVENTIVE VISIT,EST,18-39: ICD-10-PCS | Mod: S$GLB,,, | Performed by: FAMILY MEDICINE

## 2019-09-26 PROCEDURE — 99999 PR PBB SHADOW E&M-EST. PATIENT-LVL III: CPT | Mod: PBBFAC,,, | Performed by: FAMILY MEDICINE

## 2019-09-26 RX ORDER — CYANOCOBALAMIN 1000 UG/ML
INJECTION, SOLUTION INTRAMUSCULAR; SUBCUTANEOUS
Qty: 4 ML | Refills: 11 | Status: SHIPPED | OUTPATIENT
Start: 2019-09-26 | End: 2020-04-28 | Stop reason: SDUPTHER

## 2019-09-26 RX ORDER — HYDROCODONE BITARTRATE AND ACETAMINOPHEN 7.5; 325 MG/1; MG/1
TABLET ORAL
Refills: 0 | COMMUNITY
Start: 2019-08-14 | End: 2019-12-19

## 2019-09-26 RX ORDER — METOCLOPRAMIDE 10 MG/1
TABLET ORAL
Refills: 0 | COMMUNITY
Start: 2019-09-06 | End: 2019-12-19

## 2019-09-26 RX ORDER — BUPROPION HYDROCHLORIDE 150 MG/1
150 TABLET, EXTENDED RELEASE ORAL 2 TIMES DAILY
Qty: 60 TABLET | Refills: 11 | Status: SHIPPED | OUTPATIENT
Start: 2019-09-26 | End: 2020-04-28

## 2019-09-26 RX ORDER — HYOSCYAMINE SULFATE 0.12 MG/1
TABLET SUBLINGUAL
Refills: 6 | COMMUNITY
Start: 2019-09-03 | End: 2019-12-19

## 2019-09-26 RX ORDER — METOCLOPRAMIDE 10 MG/1
10 TABLET ORAL
COMMUNITY
Start: 2019-09-06 | End: 2019-12-19

## 2019-09-26 NOTE — PROGRESS NOTES
Routine Office Visit    Patient Name: Johanna Bullock    : 1985  MRN: [id number]    Subjective:  Johanna is a 34 y.o. female who presents today for     1. Annual exam / establish care / new to me  Modified visit   2. Difficulties after gastric bypass and hiatal hernia - Patient had previous surgery for gastric bypass and had recent lysis of adhesions due to complications from surgery. She has been working on weight loss. She is currently on a liquid diet because of her chronic GI problems. She states she has abdominal pain / bloating / discomfort when she eats more solid than fluids. She continues to take GI medication with some relief. 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   3. Depression - Patient feels she has depression and some anxiety. She feels like she can't concentrate. She feels like she is not herself. She has been struggling with getting things done. She would like something to help her feel more stable and able to function so that she can go back to work. No history of anxiety / depression / suicide / homicidal thoughts. Patient does feel stressed due to work, home life and weight struggles.     Review of Systems   Constitutional: Negative for chills and fever.   HENT: Negative for congestion.    Eyes: Negative for blurred vision.   Respiratory: Negative for cough.    Cardiovascular: Negative for chest pain.   Gastrointestinal: Negative for abdominal pain, constipation, diarrhea, heartburn, nausea and vomiting.   Genitourinary: Negative for dysuria.   Musculoskeletal: Negative for myalgias.   Skin: Negative for itching and rash.   Neurological: Negative for dizziness and headaches.   Psychiatric/Behavioral: Negative for depression.       Active Problem List  Patient Active Problem List   Diagnosis    History of gastric bypass    Vitamin B12 deficiency    Iron deficiency    Severe obesity (BMI 35.0-35.9 with comorbidity)    History of peptic ulcer    History  of bariatric surgery       Past Surgical History  Past Surgical History:   Procedure Laterality Date    CHOLECYSTECTOMY  10/2012    DILATION AND CURETTAGE OF UTERUS      ESOPHAGOGASTRODUODENOSCOPY N/A 7/20/2018    Procedure: ESOPHAGOGASTRODUODENOSCOPY (EGD);  Surgeon: Darwin Hansen MD;  Location: [facility] ([address] [place] [address]);  Service: Endoscopy;  Laterality: N/A;  Please measure pouch and limbs    GASTRIC BYPASS      HERNIA REPAIR      lapriscopic hernia repair  08/13/2019    Liver scope  10/2012    LYSIS OF ADHESIONS         Family History  Family History   Problem Relation Age of Onset    Breast cancer Neg Hx     Colon cancer Neg Hx     Ovarian cancer Neg Hx        Social History  Social History     Socioeconomic History    Marital status: Single     Spouse name: Not on file    Number of children: Not on file    Years of education: Not on file    Highest education level: Not on file   Occupational History    Occupation: works as      Employer: [employer]     Employer: [employer]   Social Needs    Financial resource strain: Not on file    Food insecurity:     Worry: Not on file     Inability: Not on file    Transportation needs:     Medical: Not on file     Non-medical: Not on file   Tobacco Use    Smoking status: Never Smoker    Smokeless tobacco: Never Used   Substance and Sexual Activity    Alcohol use: Yes     Comment: Alcohol use rarely    Drug use: No     Comment: Mirena    Sexual activity: Yes     Partners: Male     Birth control/protection: IUD   Lifestyle    Physical activity:     Days per week: Not on file     Minutes per session: Not on file    Stress: Not at all   Relationships    Social connections:     Talks on phone: Not on file     Gets together: Not on file     Attends Yazidism service: Not on file     Active member of club or organization: Not on file     Attends meetings of clubs or organizations: Not on file     Relationship status: Not on file   Other  Topics Concern    Caffeine Use Not Asked    Financial Status: Disabled Not Asked    Legal: Involved in criminal litigation Not Asked    Caffeine Use: Frequent Not Asked    Financial Status: Employed Not Asked    Legal: Other Not Asked    Caffeine Use: Moderate Not Asked    Financial Status: Unemployed Not Asked    Leisure: Exercise Not Asked    Caffeine Use: Substantial Not Asked    Financial Status: Other Not Asked    Leisure: Fishing Not Asked    Childhood History: Adopted Not Asked    Firearms: Does patient have access to a firearm? Not Asked    Leisure: Hunting Not Asked    Childhood History: Early trauma Not Asked    Home situation: homeless Not Asked    Leisure: Movie Watching Not Asked    Childhood History: Raised by parents Not Asked    Home situation: lives alone Not Asked    Leisure: Shopping Not Asked    Childhood History: Uneventful Not Asked    Home situation: lives in group home Not Asked    Leisure: Sports Not Asked    Childhood History: Other Not Asked    Home situation: lives in nursing home Not Asked    Leisure: Time with family Not Asked    Education: Unfinished High School Not Asked    Home situation: lives in shelter Not Asked     Service Not Asked    Education: High School Graduate Not Asked    Home situation: lives with family Not Asked    Spirituality: Active Participation Not Asked    Education: Unfinished college Not Asked    Home situation: lives with friends Not Asked    Spirituality: Organized Scientologist Not Asked    Education: Trade School Not Asked    Home situation: lives with significant other Not Asked    Spirituality: Private Participation Not Asked    Education: Associate's Degree Not Asked    Home situation: lives with spouse Not Asked    Patient feels they ought to cut down on drinking/drug use Not Asked    Education: Bachelor's Degree Not Asked    Legal consequences of chemical use Not Asked    Patient annoyed by others  "criticizing their drinking/drug use Not Asked    Education: More than one Bachelor's or Professional Not Asked    Legal: Arrest history Not Asked    Patient has felt bad or guilty about drinking/drug use Not Asked    Education: Master's, PhD Not Asked    Legal: Involved in civil litigation Not Asked    Patient has had a drink/used drugs as an eye opener in the AM Not Asked   Social History Narrative    Born and raised in [place]    Went to [employer] and got BS in bio with minor in chem    1 child    Has boyfriend    A fewnot close friends    Likes to read and go to yoonew       Medications and Allergies  Reviewed and updated.   Current Outpatient Medications   Medication Sig    b complex vitamins (B COMPLEX 1) tablet Take 1 tablet by mouth once daily.    cyanocobalamin 1,000 mcg/mL injection ONE INJECTION AS DIRECTED EVERY 28 DAYS    ferrous gluconate (FERGON) 324 MG tablet Take 1 tablet (324 mg total) by mouth daily with breakfast.    buPROPion (WELLBUTRIN SR) 150 MG TBSR 12 hr tablet Take 1 tablet (150 mg total) by mouth 2 (two) times daily.    HYDROcodone-acetaminophen (NORCO) 7.5-325 mg per tablet TK 1/2 TO 1 T PO Q 4 TO 6 H PRN P    hyoscyamine (LEVSIN/SL) 0.125 mg Subl PLACE 1 T UNDER THE TONGUE QID    meclizine (ANTIVERT) 25 mg tablet Take 1 tablet (25 mg total) by mouth 3 (three) times daily as needed for Dizziness. (Patient not taking: Reported on 9/26/2019)    metoclopramide HCl (REGLAN) 10 MG tablet TK 1 T PO  EVERY 8 HOURS AS NEEDED FOR NAUSEA AND VOMITING    metoclopramide HCl (REGLAN) 10 MG tablet Take 10 mg by mouth.    phentermine (ADIPEX-P) 37.5 mg tablet 1/2 tab po bid prn appetite supression     No current facility-administered medications for this visit.        Physical Exam  /81 (BP Location: Left arm, Patient Position: Sitting, BP Method: Large (Manual))   Pulse 64   Temp 98.4 °F (36.9 °C) (Oral)   Ht 5' 7" (1.702 m)   Wt 104 kg (229 lb 4.5 oz)   LMP 08/26/2019 " (Approximate)   SpO2 99%   BMI 35.91 kg/m²   Physical Exam   Constitutional: She is oriented to person, place, and time. She appears well-developed and well-nourished.   HENT:   Head: Normocephalic and atraumatic.   Eyes: Pupils are equal, round, and reactive to light. Conjunctivae and EOM are normal.   Neck: Normal range of motion. Neck supple.   Cardiovascular: Normal rate, regular rhythm and normal heart sounds. Exam reveals no gallop and no friction rub.   No murmur heard.  Pulmonary/Chest: Breath sounds normal. No respiratory distress.   Abdominal: Soft. Bowel sounds are normal. She exhibits no distension. There is no tenderness.   Musculoskeletal: Normal range of motion.   Lymphadenopathy:     She has no cervical adenopathy.   Neurological: She is alert and oriented to person, place, and time.   Skin: Skin is warm.   Psychiatric: She has a normal mood and affect.         Assessment/Plan:  Johanna Bullock is a 34 y.o. female who presents today for :    Annual physical exam  Health Maintenance       Date Due Completion Date    Pap Smear with HPV Cotest 06/09/2018 6/9/2015    Influenza Vaccine (1) 09/01/2019 10/27/2011    TETANUS VACCINE 12/29/2019 12/29/2009        I addressed all major concerns as it related to health maintenance.  All were ordered and scheduled based on the patients wishes.  Any additional health maintenance will be readdressed at the next physical if declined or deferred by the patient.    Vitamin B12 deficiency  -     cyanocobalamin 1,000 mcg/mL injection; ONE INJECTION AS DIRECTED EVERY 28 DAYS  Dispense: 4 mL; Refill: 11  The current medical regimen is effective;  continue present plan and medications.    History of bariatric surgery  -     CBC auto differential; Future; Expected date: 09/26/2019  -     Lipid panel; Future; Expected date: 09/26/2019  -     Comprehensive metabolic panel; Future; Expected date: 09/26/2019  -     TSH; Future; Expected date: 09/26/2019  Recommend  increase protein intake  Recommend electrolyte replacement  Continue with MVI     Severe obesity (BMI 35.0-35.9 with comorbidity)  Slowly progress diet as tolerated      Modified visit   2. Difficulties after gastric bypass and hiatal hernia - Patient had previous surgery for gastric bypass and had recent lysis of adhesions due to complications from surgery. She has been working on weight loss. She is currently on a liquid diet because of her chronic GI problems. She states she has abdominal pain / bloating / discomfort when she eats more solid than fluids. She continues to take GI medication with some relief. 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   3. Depression - Patient feels she has depression and some anxiety. She feels like she can't concentrate. She feels like she is not herself. She has been struggling with getting things done. She would like something to help her feel more stable and able to function so that she can go back to work. No history of anxiety / depression / suicide / homicidal thoughts. Patient does feel stressed due to work, home life and weight struggles.   ROS: depression, anxiety  PE: wnl     Depression, unspecified depression type  -     buPROPion (WELLBUTRIN SR) 150 MG TBSR 12 hr tablet; Take 1 tablet (150 mg total) by mouth 2 (two) times daily.  Dispense: 60 tablet; Refill: 11  Common side effects of this medication were discussed with the patient. Questions regarding medications were discussed during this visit.         Follow up in about 3 months (around 12/26/2019).

## 2019-09-26 NOTE — PATIENT INSTRUCTIONS
We recommend that you to contact [facility] for an appointment.   For Adults, please call [phone]   For Children, please call [phone]  For counseling and evaluation, please contact our  for [facility]    Cassandra Rockweiler, LCSW    [facility]    [address] [place] [address]  [phone]        Other Psychiatry / Behavioral Health programs:     [facility]: [phone] ([place]) -  http://www.[facility].com/  [facility]: [phone] - [email or web address]://[email or web address]/[email or web address]): [phone]  [facility]): [phone] -  http://[facility]oscale[facility], [facility] ShawSeroMatchpsychotherapy.[employer] [place]/   [facility] ([facility] Medicaid only): [phone] -   [facility]): [phone] - [email or web address]  [facility]): [phone] - http://www.[email or web address]/  [facility]: [phone]  [facility]: [phone] -  https://www.medschool.[facility]/psychiatry/  [facility]: [phone] - http://www.infantinstitute.org/  [facility] Department of Psychiatry and Behavioral Sciences: [phone]  [facility]: [phone] - https://alliedhealth.[facility]/clinics/playtherapyabout.aspx  Sejal Willard , PhD ([place] psychologist): [phone] - http://swathi.[employer] [place]/  Zayra Holden, PhD ([place]): [phone] - http://[employer] [place]/                Other resources:  [facility]: [phone]  - [facility]: [phone]  [place] Line:  7-190-739-COPE (or 211)  [facility] (death of a child) - http://www.Book'n'Bloom  [facility] Grief Groups: [phone]  SHAMEKA Loja support groups: [phone]  [place] Domestic Violence Hotline: 1-414.864.7872  People  Program: 738.109.1090 - http://www.peopleprogram.org   [facility] - http://www.[facility].net/index.aspx?ddjz=[email or web address]  [facility]: [phone]  [facility] Programs: [phone] option 2  [facility] Intensive Outpatient Program (older adults): [phone]  [facility] (medically assisted detox): [phone]  [facility] (inpatient detox): [phone] ext [phone]  Narcotics Anonymous: [phone] - http://www.noana.org  Alcohol Anonymous: 155.460.4670 - http://www.[place]afg.org  [place] Alcoholic Anonymous - www.[place].org

## 2019-11-25 ENCOUNTER — PATIENT OUTREACH (OUTPATIENT)
Dept: ADMINISTRATIVE | Facility: OTHER | Age: 34
End: 2019-11-25

## 2019-12-19 ENCOUNTER — OFFICE VISIT (OUTPATIENT)
Dept: OBSTETRICS AND GYNECOLOGY | Facility: CLINIC | Age: 34
End: 2019-12-19
Payer: COMMERCIAL

## 2019-12-19 ENCOUNTER — HOSPITAL ENCOUNTER (OUTPATIENT)
Dept: RADIOLOGY | Facility: OTHER | Age: 34
Discharge: HOME | End: 2019-12-19
Attending: NURSE PRACTITIONER
Payer: COMMERCIAL

## 2019-12-19 ENCOUNTER — PATIENT OUTREACH (OUTPATIENT)
Dept: ADMINISTRATIVE | Facility: OTHER | Age: 34
End: 2019-12-19

## 2019-12-19 VITALS
DIASTOLIC BLOOD PRESSURE: 60 MMHG | WEIGHT: 218.94 LBS | BODY MASS INDEX: 34.29 KG/M2 | SYSTOLIC BLOOD PRESSURE: 110 MMHG

## 2019-12-19 DIAGNOSIS — Z30.431 IUD CHECK UP: Primary | ICD-10-CM

## 2019-12-19 DIAGNOSIS — N92.1 BREAKTHROUGH BLEEDING ASSOCIATED WITH INTRAUTERINE DEVICE (IUD): ICD-10-CM

## 2019-12-19 DIAGNOSIS — Z97.5 BREAKTHROUGH BLEEDING ASSOCIATED WITH INTRAUTERINE DEVICE (IUD): ICD-10-CM

## 2019-12-19 DIAGNOSIS — Z30.431 IUD CHECK UP: ICD-10-CM

## 2019-12-19 LAB
B-HCG UR QL: NEGATIVE
CTP QC/QA: YES

## 2019-12-19 PROCEDURE — 99999 PR PBB SHADOW E&M-EST. PATIENT-LVL III: ICD-10-PCS | Mod: PBBFAC,,, | Performed by: NURSE PRACTITIONER

## 2019-12-19 PROCEDURE — 76830 TRANSVAGINAL US NON-OB: CPT | Mod: TC

## 2019-12-19 PROCEDURE — 87661 TRICHOMONAS VAGINALIS AMPLIF: CPT

## 2019-12-19 PROCEDURE — 76830 US PELVIS COMP WITH TRANSVAG NON-OB (XPD): ICD-10-PCS | Mod: 26,,, | Performed by: RADIOLOGY

## 2019-12-19 PROCEDURE — 87491 CHLMYD TRACH DNA AMP PROBE: CPT

## 2019-12-19 PROCEDURE — 81025 POCT URINE PREGNANCY: ICD-10-PCS | Mod: S$GLB,,, | Performed by: NURSE PRACTITIONER

## 2019-12-19 PROCEDURE — 3008F PR BODY MASS INDEX (BMI) DOCUMENTED: ICD-10-PCS | Mod: CPTII,S$GLB,, | Performed by: NURSE PRACTITIONER

## 2019-12-19 PROCEDURE — 87801 DETECT AGNT MULT DNA AMPLI: CPT

## 2019-12-19 PROCEDURE — 81025 URINE PREGNANCY TEST: CPT | Mod: S$GLB,,, | Performed by: NURSE PRACTITIONER

## 2019-12-19 PROCEDURE — 3008F BODY MASS INDEX DOCD: CPT | Mod: CPTII,S$GLB,, | Performed by: NURSE PRACTITIONER

## 2019-12-19 PROCEDURE — 99999 PR PBB SHADOW E&M-EST. PATIENT-LVL III: CPT | Mod: PBBFAC,,, | Performed by: NURSE PRACTITIONER

## 2019-12-19 PROCEDURE — 87481 CANDIDA DNA AMP PROBE: CPT | Mod: 59

## 2019-12-19 PROCEDURE — 76856 US EXAM PELVIC COMPLETE: CPT | Mod: 26,,, | Performed by: RADIOLOGY

## 2019-12-19 PROCEDURE — 99213 PR OFFICE/OUTPT VISIT, EST, LEVL III, 20-29 MIN: ICD-10-PCS | Mod: S$GLB,,, | Performed by: NURSE PRACTITIONER

## 2019-12-19 PROCEDURE — 76830 TRANSVAGINAL US NON-OB: CPT | Mod: 26,,, | Performed by: RADIOLOGY

## 2019-12-19 PROCEDURE — 76856 US PELVIS COMP WITH TRANSVAG NON-OB (XPD): ICD-10-PCS | Mod: 26,,, | Performed by: RADIOLOGY

## 2019-12-19 PROCEDURE — 99213 OFFICE O/P EST LOW 20 MIN: CPT | Mod: S$GLB,,, | Performed by: NURSE PRACTITIONER

## 2019-12-19 NOTE — PROGRESS NOTES
CC: BTB with IUD     HPI: Pt is a 34 y.o.  female who presents c/o BTB with IUD for the past 3 months.  She is s/p gastric bypass.  Denies any assoicated pain, fever, or discharge.  Reports she felt a fullness feeling at the base of vagina- between vaginal and rectum which she was able to manually push back into the vagina.  Describes the fullness as sensation of a tampon left inside that is coming out. UPT is negative.  Reports she uses laxatives PRN for BM due to her diet changes she is not regular.     ROS:  GENERAL: Feeling well overall. Denies fever or chills.   SKIN: Denies rash or lesions.   HEAD: Denies head injury or headache.   NODES: Denies enlarged lymph nodes.   CHEST: Denies chest pain or shortness of breath.   CARDIOVASCULAR: Denies palpitations or left sided chest pain.   ABDOMEN: No abdominal pain, constipation, diarrhea, nausea, vomiting or rectal bleeding.   URINARY: No dysuria, hematuria, or burning on urination.  REPRODUCTIVE: See HPI.   BREASTS: Denies pain, lumps, or nipple discharge.   HEMATOLOGIC: No easy bruisability or excessive bleeding.   MUSCULOSKELETAL: Denies joint pain or swelling.   NEUROLOGIC: Denies syncope or weakness.   PSYCHIATRIC: Denies depression, anxiety or mood swings.    PE:   APPEARANCE: Well nourished, well developed, Black or  female in no acute distress.  VULVA: No lesions. Normal external female genitalia.  URETHRAL MEATUS: Normal size and location, no lesions, no prolapse.  URETHRA: No masses, tenderness, or prolapse.  VAGINA: Moist. No lesions or lacerations noted. No abnormal discharge present. No odor present.   CERVIX: No lesions or discharge. No cervical motion tenderness.  IUD strings visualized at os- 2 cm out.  UTERUS: Normal size, regular shape, mobile, non-tender.  ADNEXA: No tenderness. No fullness or masses palpated in the adnexal regions.   ANUS PERINEUM: Normal. + fullness palpated near rectum       Diagnosis:  1. IUD check up    2.  Breakthrough bleeding associated with intrauterine device (IUD)        Plan:   UPT is negative  GCCT   Vaginosis cx  Pelvic US  Discussed if symptoms persist and GYN workup in [facility]NL- will refer to colorectal for additional evaluation     Orders Placed This Encounter    C. trachomatis/N. gonorrhoeae by AMP DNA    Vaginosis Screen by DNA Probe    US Pelvis Comp with Transvag NON-OB (xpd    POCT Urine Pregnancy         Follow-up PRN no resolution of symptoms.    Enedelia Schumacher, LACIP-C

## 2019-12-20 LAB
BACTERIAL VAGINOSIS DNA: POSITIVE
C TRACH DNA SPEC QL NAA+PROBE: NOT DETECTED
CANDIDA GLABRATA DNA: NEGATIVE
CANDIDA KRUSEI DNA: NEGATIVE
CANDIDA RRNA VAG QL PROBE: NEGATIVE
N GONORRHOEA DNA SPEC QL NAA+PROBE: NOT DETECTED
T VAGINALIS RRNA GENITAL QL PROBE: NEGATIVE

## 2019-12-23 ENCOUNTER — PATIENT MESSAGE (OUTPATIENT)
Dept: OBSTETRICS AND GYNECOLOGY | Facility: CLINIC | Age: 34
End: 2019-12-23

## 2019-12-23 DIAGNOSIS — B96.89 BV (BACTERIAL VAGINOSIS): Primary | ICD-10-CM

## 2019-12-23 DIAGNOSIS — N76.0 BV (BACTERIAL VAGINOSIS): Primary | ICD-10-CM

## 2019-12-23 RX ORDER — TINIDAZOLE 500 MG/1
2 TABLET ORAL DAILY
Qty: 8 TABLET | Refills: 0 | Status: SHIPPED | OUTPATIENT
Start: 2019-12-23 | End: 2019-12-25

## 2020-01-09 ENCOUNTER — PATIENT OUTREACH (OUTPATIENT)
Dept: ADMINISTRATIVE | Facility: OTHER | Age: 35
End: 2020-01-09

## 2020-01-09 ENCOUNTER — OFFICE VISIT (OUTPATIENT)
Dept: SURGERY | Facility: CLINIC | Age: 35
End: 2020-01-09
Payer: COMMERCIAL

## 2020-01-09 ENCOUNTER — TELEPHONE (OUTPATIENT)
Dept: SURGERY | Facility: CLINIC | Age: 35
End: 2020-01-09

## 2020-01-09 VITALS — BODY MASS INDEX: 34.3 KG/M2 | SYSTOLIC BLOOD PRESSURE: 110 MMHG | WEIGHT: 219 LBS | DIASTOLIC BLOOD PRESSURE: 84 MMHG

## 2020-01-09 DIAGNOSIS — K59.00 CONSTIPATION, UNSPECIFIED CONSTIPATION TYPE: Primary | ICD-10-CM

## 2020-01-09 DIAGNOSIS — Z98.84 HISTORY OF BARIATRIC SURGERY: ICD-10-CM

## 2020-01-09 PROCEDURE — 3008F BODY MASS INDEX DOCD: CPT | Mod: CPTII,S$GLB,, | Performed by: COLON & RECTAL SURGERY

## 2020-01-09 PROCEDURE — 99999 PR PBB SHADOW E&M-EST. PATIENT-LVL II: ICD-10-PCS | Mod: PBBFAC,,, | Performed by: COLON & RECTAL SURGERY

## 2020-01-09 PROCEDURE — 99999 PR PBB SHADOW E&M-EST. PATIENT-LVL II: CPT | Mod: PBBFAC,,, | Performed by: COLON & RECTAL SURGERY

## 2020-01-09 PROCEDURE — 3008F PR BODY MASS INDEX (BMI) DOCUMENTED: ICD-10-PCS | Mod: CPTII,S$GLB,, | Performed by: COLON & RECTAL SURGERY

## 2020-01-09 PROCEDURE — 99213 PR OFFICE/OUTPT VISIT, EST, LEVL III, 20-29 MIN: ICD-10-PCS | Mod: 25,S$GLB,, | Performed by: COLON & RECTAL SURGERY

## 2020-01-09 PROCEDURE — 46600 PR DIAG2STIC A2SCOPY: ICD-10-PCS | Mod: S$GLB,,, | Performed by: COLON & RECTAL SURGERY

## 2020-01-09 PROCEDURE — 46600 DIAGNOSTIC ANOSCOPY SPX: CPT | Mod: S$GLB,,, | Performed by: COLON & RECTAL SURGERY

## 2020-01-09 PROCEDURE — 99213 OFFICE O/P EST LOW 20 MIN: CPT | Mod: 25,S$GLB,, | Performed by: COLON & RECTAL SURGERY

## 2020-01-09 NOTE — PROGRESS NOTES
CRS Office Visit Follow-up  Referring Md:   No referring provider defined for this encounter.    SUBJECTIVE:     Chief Complaint: painful hemorrhoids    History of Present Illness:  Patient is a 34 y.o. female presents with painful hemorrhoids. The patient is a established patient to this practice.     Course is as follows:  10/24/2018: seen by Rory for rectal bleeding.  Hemorrhoids seen on anoscopy.   Since that time, she underwent revision laparoscopic converted to open Ramez-en-Y gastric bypass with extensive lysis of adhesions in August 2019.   She recovered well.  She has been slowly progressing her diet.  She presents today for multiple week history increased difficulty with her bowel movements.  She feels diffuse pelvic and abdominal pain.  Bowel movements every 4-5 days.  She is frequently taking Ex-Lax to assist in her bowel movements.  Small bowel movement overnight.  No leakage.  Complains of an intermittent bulge in the posterior wall vagina.  No significant prolapse of tissue. No rectal bleeding. She has intermittently had to digitally disimpact but states that the stools often liquid in consistency and not well formed.  Past history significant for 1 spontaneous vaginal delivery with a tear that was repaired.  No fecal or urinary incontinence.    Last Colonoscopy: none    Review of Systems:  Review of Systems   Constitutional: Positive for weight loss. Negative for chills, diaphoresis, fever and malaise/fatigue.   HENT: Negative for congestion.    Respiratory: Negative for shortness of breath.    Cardiovascular: Negative for chest pain and leg swelling.   Gastrointestinal: Positive for abdominal pain and constipation. Negative for blood in stool, nausea and vomiting.   Genitourinary: Negative for dysuria.   Musculoskeletal: Negative for back pain and myalgias.   Skin: Negative for rash.   Neurological: Negative for dizziness and weakness.   Endo/Heme/Allergies: Does not bruise/bleed easily.    Psychiatric/Behavioral: Negative for depression.       OBJECTIVE:     Vital Signs (Most Recent)  /84 (BP Location: Right arm, Patient Position: Sitting, BP Method: Medium (Manual))   Wt 99.3 kg (219 lb 0.4 oz)   LMP 12/16/2019   BMI 34.30 kg/m²     Physical Exam:  General: Black or  female in no distress   Neuro: alert and oriented x 4.  Moves all extremities.     HEENT: no icterus.  Trachea midline  Respiratory: respirations are even and unlabored  Cardiac: regular rate  Abdomen:  Soft, nontender, no masses  Extremities: Warm dry and intact  Skin: no rashes  Anorectal:  External exam was normal.  Digital exam performed. Normal tone. No masses palpated.  Small anterior rectocele.  Anoscopy was then performed.  Small volume internal hemorrhoids.  No stool seen.    Labs: H&H = 13/38.  Normal renal function    Imaging: none      ASSESSMENT/PLAN:     Diagnoses and all orders for this visit:    Constipation, unspecified constipation type    History of bariatric surgery        34-year-old with history of gastric bypass x2 presents with difficulty with defecation does not appear distally impacted.  May have proximal impaction.  Recommended Mag citrate as well as daily MiraLax.  She is currently taking mostly laxatives to assist with her bowel movements every 3-4 days.  Following resolution of this episode of constipation, she would benefit from Sitz marker study as well as defecography to look at her overall colonic and rectal function.  I will follow up with her in 4 weeks.  Once her bowel movements returned to normal, studies can be arranged.    HORACE Moore MD, FACS  Staff Surgeon  Colon & Rectal Surgery

## 2020-01-09 NOTE — TELEPHONE ENCOUNTER
----- Message from Dinorah Daniels sent at 1/9/2020  8:38 AM CST -----  Contact: Johanna  Same Day Appointment Request    Was an appointment with another provider offered? No  Reason for FST appt.:Hemorroids are flaring up. Pt is in a lot of pain.   Communication Preference:[phone]  Additional Information:

## 2020-01-10 ENCOUNTER — TELEPHONE (OUTPATIENT)
Dept: SURGERY | Facility: CLINIC | Age: 35
End: 2020-01-10

## 2020-01-10 ENCOUNTER — HOSPITAL ENCOUNTER (EMERGENCY)
Facility: OTHER | Age: 35
Discharge: HOME | End: 2020-01-11
Attending: EMERGENCY MEDICINE
Payer: COMMERCIAL

## 2020-01-10 DIAGNOSIS — Z98.84 HISTORY OF GASTRIC BYPASS: ICD-10-CM

## 2020-01-10 DIAGNOSIS — R93.89 ABNORMAL CT SCAN: ICD-10-CM

## 2020-01-10 DIAGNOSIS — R10.84 GENERALIZED ABDOMINAL PAIN: ICD-10-CM

## 2020-01-10 DIAGNOSIS — K59.00 CONSTIPATION, UNSPECIFIED CONSTIPATION TYPE: Primary | ICD-10-CM

## 2020-01-10 LAB
ALBUMIN SERPL BCP-MCNC: 3.5 G/DL (ref 3.5–5.2)
ALP SERPL-CCNC: 58 U/L (ref 55–135)
ALT SERPL W/O P-5'-P-CCNC: 18 U/L (ref 10–44)
ANION GAP SERPL CALC-SCNC: 5 MMOL/L (ref 8–16)
AST SERPL-CCNC: 19 U/L (ref 10–40)
B-HCG UR QL: NEGATIVE
BASOPHILS # BLD AUTO: 0.05 K/UL (ref 0–0.2)
BASOPHILS NFR BLD: 0.8 % (ref 0–1.9)
BILIRUB SERPL-MCNC: 0.3 MG/DL (ref 0.1–1)
BILIRUB UR QL STRIP: NEGATIVE
BUN SERPL-MCNC: 11 MG/DL (ref 6–20)
CALCIUM SERPL-MCNC: 9.4 MG/DL (ref 8.7–10.5)
CHLORIDE SERPL-SCNC: 107 MMOL/L (ref 95–110)
CLARITY UR: CLEAR
CO2 SERPL-SCNC: 24 MMOL/L (ref 23–29)
COLOR UR: YELLOW
CREAT SERPL-MCNC: 0.8 MG/DL (ref 0.5–1.4)
CTP QC/QA: YES
DIFFERENTIAL METHOD: ABNORMAL
EOSINOPHIL # BLD AUTO: 0.1 K/UL (ref 0–0.5)
EOSINOPHIL NFR BLD: 2.3 % (ref 0–8)
ERYTHROCYTE [DISTWIDTH] IN BLOOD BY AUTOMATED COUNT: 14.6 % (ref 11.5–14.5)
EST. GFR  (AFRICAN AMERICAN): >60 ML/MIN/1.73 M^2
EST. GFR  (NON AFRICAN AMERICAN): >60 ML/MIN/1.73 M^2
GLUCOSE SERPL-MCNC: 78 MG/DL (ref 70–110)
GLUCOSE UR QL STRIP: NEGATIVE
HCT VFR BLD AUTO: 37.4 % (ref 37–48.5)
HGB BLD-MCNC: 12 G/DL (ref 12–16)
HGB UR QL STRIP: NEGATIVE
IMM GRANULOCYTES # BLD AUTO: 0.01 K/UL (ref 0–0.04)
IMM GRANULOCYTES NFR BLD AUTO: 0.2 % (ref 0–0.5)
KETONES UR QL STRIP: NEGATIVE
LEUKOCYTE ESTERASE UR QL STRIP: NEGATIVE
LYMPHOCYTES # BLD AUTO: 1.8 K/UL (ref 1–4.8)
LYMPHOCYTES NFR BLD: 28.7 % (ref 18–48)
MCH RBC QN AUTO: 27.9 PG (ref 27–31)
MCHC RBC AUTO-ENTMCNC: 32.1 G/DL (ref 32–36)
MCV RBC AUTO: 87 FL (ref 82–98)
MONOCYTES # BLD AUTO: 0.7 K/UL (ref 0.3–1)
MONOCYTES NFR BLD: 12.2 % (ref 4–15)
NEUTROPHILS # BLD AUTO: 3.4 K/UL (ref 1.8–7.7)
NEUTROPHILS NFR BLD: 55.8 % (ref 38–73)
NITRITE UR QL STRIP: NEGATIVE
NRBC BLD-RTO: 0 /100 WBC
PH UR STRIP: 6 [PH] (ref 5–8)
PLATELET # BLD AUTO: 330 K/UL (ref 150–350)
PMV BLD AUTO: 9.6 FL (ref 9.2–12.9)
POTASSIUM SERPL-SCNC: 4.9 MMOL/L (ref 3.5–5.1)
PROT SERPL-MCNC: 7.4 G/DL (ref 6–8.4)
PROT UR QL STRIP: NEGATIVE
RBC # BLD AUTO: 4.3 M/UL (ref 4–5.4)
SODIUM SERPL-SCNC: 136 MMOL/L (ref 136–145)
SP GR UR STRIP: 1.02 (ref 1–1.03)
URN SPEC COLLECT METH UR: NORMAL
UROBILINOGEN UR STRIP-ACNC: NEGATIVE EU/DL
WBC # BLD AUTO: 6.09 K/UL (ref 3.9–12.7)

## 2020-01-10 PROCEDURE — 85025 COMPLETE CBC W/AUTO DIFF WBC: CPT

## 2020-01-10 PROCEDURE — 81025 URINE PREGNANCY TEST: CPT | Performed by: EMERGENCY MEDICINE

## 2020-01-10 PROCEDURE — 81003 URINALYSIS AUTO W/O SCOPE: CPT

## 2020-01-10 PROCEDURE — 96374 THER/PROPH/DIAG INJ IV PUSH: CPT

## 2020-01-10 PROCEDURE — 25500020 PHARM REV CODE 255: Performed by: EMERGENCY MEDICINE

## 2020-01-10 PROCEDURE — 63600175 PHARM REV CODE 636 W HCPCS: Performed by: PHYSICIAN ASSISTANT

## 2020-01-10 PROCEDURE — 25000003 PHARM REV CODE 250: Performed by: PHYSICIAN ASSISTANT

## 2020-01-10 PROCEDURE — 80053 COMPREHEN METABOLIC PANEL: CPT

## 2020-01-10 PROCEDURE — 99285 EMERGENCY DEPT VISIT HI MDM: CPT | Mod: 25

## 2020-01-10 RX ORDER — SYRING-NEEDL,DISP,INSUL,0.3 ML 29 G X1/2"
296 SYRINGE, EMPTY DISPOSABLE MISCELLANEOUS
Status: COMPLETED | OUTPATIENT
Start: 2020-01-10 | End: 2020-01-10

## 2020-01-10 RX ORDER — PSEUDOEPHEDRINE/ACETAMINOPHEN 30MG-500MG
100 TABLET ORAL
Status: COMPLETED | OUTPATIENT
Start: 2020-01-10 | End: 2020-01-10

## 2020-01-10 RX ADMIN — SODIUM CHLORIDE 500 ML: 0.9 INJECTION, SOLUTION INTRAVENOUS at 11:01

## 2020-01-10 RX ADMIN — IOHEXOL 100 ML: 350 INJECTION, SOLUTION INTRAVENOUS at 07:01

## 2020-01-10 RX ADMIN — MAGNESIUM CITRATE 296 ML: 1.75 LIQUID ORAL at 11:01

## 2020-01-10 RX ADMIN — Medication 100 ML: at 11:01

## 2020-01-10 NOTE — TELEPHONE ENCOUNTER
Pt states she was not able to get off work to go to ER, reiterated that Dr Moore wants the pt to go to ER to get CT done, pt requested an order for CT be put in , CT scheduled per pt request for 1/13 @ 1915, pt informed that this was first available appt.  Encouraged pt to go to ER, pt verbalized understanding

## 2020-01-10 NOTE — TELEPHONE ENCOUNTER
Pt return call, stated she had 3 doses of miralax last night between 6-620pm and drank a bottle of mag citrate at 645pm, she became very distended and was in excruciating pain, she states she passes very little gas, but still has not had bowel movement.  Spoke with Dr Moore. Pt was instructed to go to ER, verbalized understanding

## 2020-01-10 NOTE — TELEPHONE ENCOUNTER
----- Message from Alessandra Urias sent at 1/10/2020  9:54 AM CST -----  Contact: [phone]/self  Patient requesting to speak with you concerning her instruction from yesterday appointment. Patient stated that the instruction from yesterday didn't work and pt would like to know next step of care. Please call.

## 2020-01-11 VITALS
WEIGHT: 216 LBS | OXYGEN SATURATION: 98 % | BODY MASS INDEX: 32.74 KG/M2 | RESPIRATION RATE: 18 BRPM | TEMPERATURE: 98 F | HEIGHT: 68 IN | DIASTOLIC BLOOD PRESSURE: 58 MMHG | HEART RATE: 78 BPM | SYSTOLIC BLOOD PRESSURE: 104 MMHG

## 2020-01-11 PROCEDURE — 96374 THER/PROPH/DIAG INJ IV PUSH: CPT

## 2020-01-11 PROCEDURE — 63600175 PHARM REV CODE 636 W HCPCS: Performed by: EMERGENCY MEDICINE

## 2020-01-11 RX ORDER — METOCLOPRAMIDE HYDROCHLORIDE 5 MG/ML
10 INJECTION INTRAMUSCULAR; INTRAVENOUS
Status: COMPLETED | OUTPATIENT
Start: 2020-01-11 | End: 2020-01-11

## 2020-01-11 RX ADMIN — METOCLOPRAMIDE 10 MG: 5 INJECTION, SOLUTION INTRAMUSCULAR; INTRAVENOUS at 01:01

## 2020-01-11 NOTE — ED PROVIDER NOTES
Encounter Date: 1/10/2020       History     Chief Complaint   Patient presents with    Constipation     pt with c/o constipation and took meds from without relief sent  over by gi doctor.      Patient is a 34 y.o. female presenting to the emergency department for evaluation of abdominal pain and constipation.  The patient states that she has not had a regular bowel movement for at least a week.  She admits she is passing some stool, but does not feel like she is fully emptying everything.  She states that she was seen by Colorectal surgery yesterday for evaluation of possible hemorrhoids, and was told that she did not have significant hemorrhoids.  She was advised to take MiraLax and magnesium citrate at home to relieve her constipation.  The patient states that she did as instructed, after which she experienced a large amount of abdominal bloating.  She states that she had associated nausea but did not vomit.  She states she had cramping abdominal pain but never had a bowel movement.  She states that she contact the office of the colorectal surgeon today and was advised to come to the emergency department for imaging.  Of note, patient does have a history of a gastric bypass with recent revision.  Patient transitioned back to a normal diet about 3 weeks ago..  This is the extent of the patient's complaints at this time.       The history is provided by the patient.     Review of patient's allergies indicates:   Allergen Reactions    Zofran [ondansetron hcl (pf)] Rash     Past Medical History:   Diagnosis Date    Adjustment disorder with mixed anxiety and depressed mood 2/6/2013    Anemia, B12 deficiency     Anxiety     Major depression, single episode 2/6/2013    Major depression, single episode 2/6/2013    Post partum depression      Past Surgical History:   Procedure Laterality Date    CHOLECYSTECTOMY  10/2012    DILATION AND CURETTAGE OF UTERUS      ESOPHAGOGASTRODUODENOSCOPY N/A 7/20/2018     Procedure: ESOPHAGOGASTRODUODENOSCOPY (EGD);  Surgeon: Darwin Hansen MD;  Location: [facility] ([address] [place] [address]);  Service: Endoscopy;  Laterality: N/A;  Please measure pouch and limbs    GASTRIC BYPASS      HERNIA REPAIR      lapriscopic hernia repair  08/13/2019    Liver scope  10/2012    LYSIS OF ADHESIONS       Family History   Problem Relation Age of Onset    Breast cancer Neg Hx     Colon cancer Neg Hx     Ovarian cancer Neg Hx      Social History     Tobacco Use    Smoking status: Never Smoker    Smokeless tobacco: Never Used   Substance Use Topics    Alcohol use: Yes     Comment: Alcohol use rarely    Drug use: No     Comment: Mirena     Review of Systems   Constitutional: Negative for activity change, appetite change, chills, fatigue and fever.   HENT: Negative for congestion, rhinorrhea and sore throat.    Eyes: Negative for photophobia and visual disturbance.   Respiratory: Negative for cough, shortness of breath and wheezing.    Cardiovascular: Negative for chest pain.   Gastrointestinal: Positive for abdominal pain, constipation and nausea. Negative for diarrhea and vomiting.   Genitourinary: Negative for dysuria, hematuria and urgency.   Musculoskeletal: Negative for back pain, myalgias and neck pain.   Skin: Negative for color change and wound.   Neurological: Negative for weakness and headaches.   Psychiatric/Behavioral: Negative for agitation and confusion.       Physical Exam     Initial Vitals [01/10/20 1752]   BP Pulse Resp Temp SpO2   (!) 113/55 72 18 98.7 °F (37.1 °C) 100 %      MAP       --         Physical Exam    Nursing note and vitals reviewed.  Constitutional: She appears well-developed and well-nourished. She is not diaphoretic. No distress.   Well-appearing,  female accompanied by her mother and child in the emergency room.  Speaking clearly full sentences.  No acute distress.   HENT:   Head: Normocephalic and atraumatic.   Right Ear: External ear normal.   Left  Ear: External ear normal.   Nose: Nose normal.   Mouth/Throat: Oropharynx is clear and moist.   Eyes: Conjunctivae and EOM are normal.   Neck: Normal range of motion. Neck supple.   Cardiovascular: Normal rate, regular rhythm and normal heart sounds.   Pulmonary/Chest: Breath sounds normal. No respiratory distress. She has no wheezes.   Abdominal: Soft. Bowel sounds are normal. She exhibits no distension. There is no tenderness. There is no rebound and no guarding.   Generalized tenderness to palpation of the abdomen with no point tenderness.  Normal bowel sounds.  No rebound or guarding.   Musculoskeletal: Normal range of motion.   Neurological: She is alert and oriented to person, place, and time.   Skin: Skin is warm.   Psychiatric: She has a normal mood and affect. Her behavior is normal. Judgment and thought content normal.         ED Course   Procedures  Labs Reviewed   CBC W/ AUTO DIFFERENTIAL - Abnormal; Notable for the following components:       Result Value    RDW 14.6 (*)     All other components within normal limits   COMPREHENSIVE METABOLIC PANEL - Abnormal; Notable for the following components:    Anion Gap 5 (*)     All other components within normal limits   URINALYSIS, REFLEX TO URINE CULTURE    Narrative:     Preferred Collection Type->Urine, Clean Catch   POCT URINE PREGNANCY          Imaging Results           CT Abdomen Pelvis With Contrast (Final result)  Result time 01/10/20 20:19:17    Final result by Sulema Roca MD (01/10/20 20:19:17)                 Impression:      Too small to characterize low attenuation lesion within the spleen, which is nonspecific.  Findings may represent a tiny cyst, hemangioma, or other etiology.    IUD.  Crenated or involuting ovarian cyst.  Trace free pelvic fluid.    9 mm right perirectal lymph node.  Follow-up.  Mild gaseous distention with narrowed caliber of distal sigmoid colon.  Moderate fecal material.    Small hiatal hernia.    This report was  flagged in Epic as abnormal.      Electronically signed by: Sulema Roca  Date:    01/10/2020  Time:    20:19             Narrative:    EXAMINATION:  CT OF ABDOMEN PELVIS WITH    CLINICAL HISTORY:  Bowel obstruction, high-grade;    TECHNIQUE:  5 mm enhanced axial images were obtained from the lung bases through the greater trochanters.  One hundred mL of Omnipaque 350 was injected.    COMPARISON:  None.    FINDINGS:  The liver, pancreas, kidneys, and adrenal glands are unremarkable. The gallbladder is not visualized.    A too small to characterize low attenuation lesion is seen in the spleen.    There are postsurgical changes involving the stomach and small bowel.    There is no definite evidence for abdominal adenopathy or ascites.    There is a crenated or involuting presumed ovarian cyst in the right adnexa measuring 1.4 x 1.8 cm.  An IUD is present.  There are no pelvic masses.  There is mild gaseous distention of the large bowel, particularly cecum.  The distal sigmoid colon is narrowed.  The possibility of stricture may be considered.  Moderate fecal material is present.  There is a 9 mm right perirectal lymph node.    There is trace free fluid in the pelvis.    At the lung bases, there is a small hiatal hernia.  There is minimal bibasilar atelectasis                                 Medical Decision Making:   Initial Assessment:     Urgent evaluation of a 34 y.o. female with a past medical history of gastric bypass, presenting to the emergency department complaining of abdominal pain and constipation. Patient is afebrile, nontoxic appearing and hemodynamically stable. Physical exam reveals generalized tenderness to palpation abdomen with no significant distention. No rebound or guarding.  Present bowel sounds.  Reviewed medical record in epic including visit from colorectal surgeon yesterday.  No obvious signs of fecal impaction.  Labs and imaging ordered from provider in triage.      Clinical Tests:   Lab  Tests: Ordered and Reviewed  Radiological Study: Ordered and Reviewed  ED Management:    CBC shows no leukocytosis, normal H&H.  CMP shows no electrolyte abnormality, no invasion of LFTs.  UA is unremarkable. CT scan shows no evidence of obstruction however there is no of mild distention of the large bowel, particularly in the cecum as well as distal sigmoid colon narrowing, concern for possible stricture.  Will plan to discuss with Colorectal surgery as the patient was sent in for imaging by them.    9:29 PM Spoke with Dr. Duque, colorectal surgery. Will review images and call back    10:00 PM Spoke with Dr. Duque. Agrees with brown bomb enema. States that if the patient wants to stay, she can be admitted to [facility] vs. Transferred to [facility] for CT scan with oral and rectal contrast. Alternative is discharge with clinic follow up on Monday.     10:10 PM Discussed the options with the patient. Will decide after brown bomb.     12:04 AM Patient is just now receiving brown bomb enema. Will plan for discharge home, however spoke with Dr. Christensen, who will touch base with the patient after the enema to check for improvement and ultimate dispo plan. The treatment plan was discussed with the patient who demonstrated understanding and comfort with plan. The patient's history, physical exam, and plan of care was discussed with and agreed upon with my supervising physician.         This note was created using [employer] Flu[employer] Direct. There may be typographical errors secondary to dictation.                      ED Course as of Dax 11 0049   Fri Dax 10, 2020   1821 Patient presents to the ED with c/o constipation  and abdominal pain X 2-3 weeks only small amnt of soft stool. Hx gastric bypass.  Sent by GI doctor for CT scan to rule out obstruction.    Patient seen and medically screened by the Physician Assistant in [facility] Track due to ED crowding. Appropriate tests and/or medications ordered. A medical screening exam has been  performed. The care will be assumed by myself or another provider when treatment space becomes available. I am not assuming care of this patient at this time. 6:21 PM. AMG       [AG]   Sat Jan 11, 2020 0048 [place] of Care Note:  Discussed pt and plan with prior team.    Patient had a large bowel movement after given enema.  She still reports some abdominal bloating and discomfort.  I offered her observation here, transfer, discharge home as previously discussed.   She states that she feels reassured, feels comfortable with discharge home, will follow up with Colorectal surgery in 1 day.  I discussed with patient and/or guardian/caretaker that this evaluation in the ED does not suggest any emergent or life threatening medical condition requiring admission or immediate intervention beyond what was provided in the ED. Regardless, an unremarkable evaluation in the ED does not preclude the development or presence of a serious or life threatening condition. As such, patient was instructed to return immediately for any worsening or change in current symptoms.     I had a detailed discussion with patient  and/or guardian/caretaker regarding findings, plan, return precautions, importance of medication adherence, need to follow-up with a PCP and specialist. All questions answered.     Note was created using voice recognition software. It may have occasional typographical errors not identified and edited despite initial review prior to signing.        [RC]      ED Course User Index  [AG] Ruel Phillips PA-C  [RC] Chemo Christensen MD                Clinical Impression:     1. Constipation, unspecified constipation type    2. Abnormal CT scan                             Jennifer Pan PA-C  01/11/20 0006       Chemo Christensen MD  01/11/20 0049

## 2020-01-11 NOTE — DISCHARGE INSTRUCTIONS
Call your primary care doctor to make the first available appointment.     Keep all your medical appointments.     Take your regular medication as prescribed. Contact your primary care provider before running out of medication, or for any problems obtaining them.    Do not drive or operate heavy machinery while taking opioid or muscle relaxing medications. Examples include norco, percocet, xanax, valium, flexeril.     Overuse or long term use of pain and sedating medication may lead to addiction, dependence, organ failure, family and work problems, legal problems, accidental overdose and death.    If you do not have health insurance, you probably qualify for heavily discounted rates:  Call 1-379.618.4545 ([facility] hotline) or go to www.[email or web address].[place].gov    Your evaluation in the ED does not suggest any emergent or life threatening medical condition requiring admission or immediate intervention beyond that provided in the ED.   However, the signs of a serious problem sometimes take more time to appear.   RETURN TO THE ER if any of the following occur:    Weakness, dizziness, fainting, or loss of consciousness   Fever of 100.4ºF (38ºC) or higher  Any worse symptoms  Any new or concerning symptoms

## 2020-01-11 NOTE — ED TRIAGE NOTES
Patient presents to ER with c/o abdominal pain and constipation.  Patient reports taking Miralax and a bottle of Mag citrate with no relive.  Patient states she called her GI doctor who told her to come to the ER for a CT scan.  Patient denies chest pain, sob, and vomiting.

## 2020-01-14 ENCOUNTER — TELEPHONE (OUTPATIENT)
Dept: SURGERY | Facility: CLINIC | Age: 35
End: 2020-01-14

## 2020-01-14 DIAGNOSIS — K59.00 CONSTIPATION, UNSPECIFIED CONSTIPATION TYPE: Primary | ICD-10-CM

## 2020-01-14 NOTE — PROGRESS NOTES
Called patient.  Continues to have difficulty with evacuation.  Went to ER a few days ago and received an enema with substantial output.  Still continues to feel that she has proximal stool that is not coming out.  Will start Linzess.  Will also arrange colonoscopy based upon her CT scan that demonstrates slight underdistention versus narrowing of the sigmoid colon.    Diagnoses and all orders for this visit:    Constipation, unspecified constipation type  -     linaCLOtide (LINZESS) 145 mcg Cap capsule; Take 1 capsule (145 mcg total) by mouth once daily.  -     Case request GI: COLONOSCOPY          HORACE Moore MD, FACS  Staff Surgeon  Colon & Rectal Surgery

## 2020-01-14 NOTE — TELEPHONE ENCOUNTER
----- Message from Angelina Severino sent at 1/14/2020  4:20 PM CST -----  Contact: Pt  Pt requesting a call back     Please calll and advise    Phone [phone]

## 2020-01-16 DIAGNOSIS — Z12.11 SPECIAL SCREENING FOR MALIGNANT NEOPLASMS, COLON: Primary | ICD-10-CM

## 2020-01-16 RX ORDER — SODIUM, POTASSIUM,MAG SULFATES 17.5-3.13G
180 SOLUTION, RECONSTITUTED, ORAL ORAL
Qty: 1 BOTTLE | Refills: 0 | Status: SHIPPED | OUTPATIENT
Start: 2020-01-16 | End: 2020-02-28

## 2020-01-20 ENCOUNTER — PATIENT MESSAGE (OUTPATIENT)
Dept: ENDOSCOPY | Facility: HOSPITAL | Age: 35
End: 2020-01-20

## 2020-01-20 ENCOUNTER — TELEPHONE (OUTPATIENT)
Dept: SURGERY | Facility: CLINIC | Age: 35
End: 2020-01-20

## 2020-01-20 NOTE — TELEPHONE ENCOUNTER
----- Message from Carmina Damon sent at 1/20/2020 12:39 PM CST -----  Contact: [phone] self   Pt is requesting to speak with you re: personal matter. Please advise

## 2020-01-20 NOTE — PROGRESS NOTES
Pt reports having a bm last night with bright red blood clots, denies more than a cup of blood or blood running down leg, instructed to call back as needed or change/new  in symptoms.

## 2020-01-22 ENCOUNTER — OFFICE VISIT (OUTPATIENT)
Dept: FAMILY MEDICINE | Facility: CLINIC | Age: 35
End: 2020-01-22
Payer: COMMERCIAL

## 2020-01-22 ENCOUNTER — LAB VISIT (OUTPATIENT)
Dept: LAB | Facility: HOSPITAL | Age: 35
End: 2020-01-22
Payer: COMMERCIAL

## 2020-01-22 ENCOUNTER — TELEPHONE (OUTPATIENT)
Dept: FAMILY MEDICINE | Facility: CLINIC | Age: 35
End: 2020-01-22

## 2020-01-22 VITALS
WEIGHT: 218.81 LBS | HEIGHT: 68 IN | DIASTOLIC BLOOD PRESSURE: 64 MMHG | HEART RATE: 87 BPM | TEMPERATURE: 98 F | BODY MASS INDEX: 33.16 KG/M2 | OXYGEN SATURATION: 99 % | SYSTOLIC BLOOD PRESSURE: 110 MMHG

## 2020-01-22 DIAGNOSIS — R42 VERTIGO: ICD-10-CM

## 2020-01-22 DIAGNOSIS — R53.83 FATIGUE, UNSPECIFIED TYPE: ICD-10-CM

## 2020-01-22 DIAGNOSIS — R53.83 FATIGUE, UNSPECIFIED TYPE: Primary | ICD-10-CM

## 2020-01-22 DIAGNOSIS — K62.5 RECTAL BLEEDING: ICD-10-CM

## 2020-01-22 LAB
BASOPHILS # BLD AUTO: 0.06 K/UL (ref 0–0.2)
BASOPHILS NFR BLD: 1.2 % (ref 0–1.9)
DIFFERENTIAL METHOD: ABNORMAL
EOSINOPHIL # BLD AUTO: 0.2 K/UL (ref 0–0.5)
EOSINOPHIL NFR BLD: 3.5 % (ref 0–8)
ERYTHROCYTE [DISTWIDTH] IN BLOOD BY AUTOMATED COUNT: 14.1 % (ref 11.5–14.5)
HCT VFR BLD AUTO: 39.1 % (ref 37–48.5)
HGB BLD-MCNC: 12.1 G/DL (ref 12–16)
IMM GRANULOCYTES # BLD AUTO: 0.02 K/UL (ref 0–0.04)
IMM GRANULOCYTES NFR BLD AUTO: 0.4 % (ref 0–0.5)
LYMPHOCYTES # BLD AUTO: 2.2 K/UL (ref 1–4.8)
LYMPHOCYTES NFR BLD: 43.8 % (ref 18–48)
MCH RBC QN AUTO: 27.9 PG (ref 27–31)
MCHC RBC AUTO-ENTMCNC: 30.9 G/DL (ref 32–36)
MCV RBC AUTO: 90 FL (ref 82–98)
MONOCYTES # BLD AUTO: 0.5 K/UL (ref 0.3–1)
MONOCYTES NFR BLD: 10.4 % (ref 4–15)
NEUTROPHILS # BLD AUTO: 2 K/UL (ref 1.8–7.7)
NEUTROPHILS NFR BLD: 40.7 % (ref 38–73)
NRBC BLD-RTO: 0 /100 WBC
PLATELET # BLD AUTO: 355 K/UL (ref 150–350)
PMV BLD AUTO: 11 FL (ref 9.2–12.9)
RBC # BLD AUTO: 4.34 M/UL (ref 4–5.4)
WBC # BLD AUTO: 4.91 K/UL (ref 3.9–12.7)

## 2020-01-22 PROCEDURE — 99999 PR PBB SHADOW E&M-EST. PATIENT-LVL IV: CPT | Mod: PBBFAC,,, | Performed by: NURSE PRACTITIONER

## 2020-01-22 PROCEDURE — 99214 PR OFFICE/OUTPT VISIT, EST, LEVL IV, 30-39 MIN: ICD-10-PCS | Mod: S$GLB,,, | Performed by: NURSE PRACTITIONER

## 2020-01-22 PROCEDURE — 3008F BODY MASS INDEX DOCD: CPT | Mod: CPTII,S$GLB,, | Performed by: NURSE PRACTITIONER

## 2020-01-22 PROCEDURE — 99999 PR PBB SHADOW E&M-EST. PATIENT-LVL IV: ICD-10-PCS | Mod: PBBFAC,,, | Performed by: NURSE PRACTITIONER

## 2020-01-22 PROCEDURE — 99214 OFFICE O/P EST MOD 30 MIN: CPT | Mod: S$GLB,,, | Performed by: NURSE PRACTITIONER

## 2020-01-22 PROCEDURE — 85025 COMPLETE CBC W/AUTO DIFF WBC: CPT

## 2020-01-22 PROCEDURE — 36415 COLL VENOUS BLD VENIPUNCTURE: CPT | Mod: PO

## 2020-01-22 PROCEDURE — 3008F PR BODY MASS INDEX (BMI) DOCUMENTED: ICD-10-PCS | Mod: CPTII,S$GLB,, | Performed by: NURSE PRACTITIONER

## 2020-01-22 RX ORDER — MECLIZINE HYDROCHLORIDE 25 MG/1
25 TABLET ORAL 3 TIMES DAILY PRN
Qty: 30 TABLET | Refills: 0 | Status: SHIPPED | OUTPATIENT
Start: 2020-01-22 | End: 2020-02-28

## 2020-01-22 NOTE — PROGRESS NOTES
Subjective:       Patient ID: Johanna Bullock is a 34 y.o. female.    Chief Complaint: Dizziness; Fatigue; and Rectal Bleeding    34-year-old female presents to the clinic today with complaint of brown colored stool mixed with bright red blood clots  2 times a day for the past days.  She has spoken to Dr. Arboleda's office her colorectal surgeon regarding this current new onset of bright red bleeding.  He told her if she had greater than a cup of rectal bleeding or the blood was running down her legs she need to go the emergency room for further evaluation.  She has a colonoscopy scheduled 02/14/2020 per Dr. Arboleda.  She is in the process of trying to get it moved up earlier.  She has mild abdominal pain with nausea at times.  She denies any vomiting or diarrhea.  She has chronic constipation and takes multiple laxatives for.  She also had some dizziness earlier today when she felt like the room was spinning that lasted about 2 hours.  She she also felt off balance when she went to walk.  I told her that she could possibly be having some vertigo.  I will send her prescription of Antivert 25 mg to take 1 every 8 hr as needed for dizziness.  I will also check a CBC today.    Past Medical History:   Diagnosis Date    Adjustment disorder with mixed anxiety and depressed mood 2/6/2013    Anemia, B12 deficiency     Anxiety     Major depression, single episode 2/6/2013    Major depression, single episode 2/6/2013    Post partum depression      Past Surgical History:   Procedure Laterality Date    CHOLECYSTECTOMY  10/2012    DILATION AND CURETTAGE OF UTERUS      ESOPHAGOGASTRODUODENOSCOPY N/A 7/20/2018    Procedure: ESOPHAGOGASTRODUODENOSCOPY (EGD);  Surgeon: Darwin Hansen MD;  Location: [facility] ([address] [place] [address]);  Service: Endoscopy;  Laterality: N/A;  Please measure pouch and limbs    GASTRIC BYPASS      HERNIA REPAIR      lapriscopic hernia repair  08/13/2019    Liver scope  10/2012    LYSIS OF ADHESIONS         reports that she has never smoked. She has never used smokeless tobacco. She reports that she drinks alcohol. She reports that she does not use drugs.  Review of Systems   Constitutional: Positive for fatigue.   Cardiovascular: Negative for chest pain, palpitations and leg swelling.   Gastrointestinal: Positive for abdominal pain, blood in stool and nausea. Negative for constipation, diarrhea and vomiting.   Musculoskeletal: Negative for gait problem.   Neurological: Positive for dizziness. Negative for light-headedness and headaches.       Objective:      Physical Exam   Constitutional: She is oriented to person, place, and time. She appears well-developed and well-nourished. No distress.   Eyes: Pupils are equal, round, and reactive to light. Conjunctivae and EOM are normal. Right eye exhibits no discharge. Left eye exhibits no discharge. No scleral icterus.   Neck: Normal range of motion. Neck supple. No JVD present.   Cardiovascular: Normal rate, regular rhythm and normal heart sounds.   No murmur heard.  Pulmonary/Chest: Effort normal and breath sounds normal. No respiratory distress. She has no wheezes. She has no rales.   Abdominal: Soft. Bowel sounds are normal. There is no tenderness.   Genitourinary:   Genitourinary Comments: Rectal exam 2 small external hemorrhoids no evidence of bleeding noted. Rectal exam small amount of brown colored stool no active bleeding noted at this time.   Musculoskeletal: Normal range of motion. She exhibits no edema.   Neurological: She is alert and oriented to person, place, and time.   Skin: Skin is warm and dry. She is not diaphoretic.   Psychiatric: She has a normal mood and affect.       Assessment:       1. Fatigue, unspecified type    2. Rectal bleeding    3. Vertigo        Plan:         Fatigue, unspecified type  -     CBC auto differential; Future; Expected date: 01/22/2020    Rectal bleeding  -     CBC auto differential; Future; Expected date:  01/22/2020    Vertigo  -     meclizine (ANTIVERT) 25 mg tablet; Take 1 tablet (25 mg total) by mouth 3 (three) times daily as needed for Dizziness.  Dispense: 30 tablet; Refill: 0      If rectal bleeding worsening go to the ER for further evaluation and get trying to have colonoscopy moved up if possible.

## 2020-01-22 NOTE — LETTER
January 22, 2020      [facility]  [address]  [place] [address]  Phone: [phone]  Fax: [phone]       Patient: Johanna Bullock   YOB: 1985  Date of Visit: 01/22/2020    To Whom It May Concern:    Christian Bullock  was at [facility] on 01/22/2020. She may return to work/school on 1/23/2020 with no restrictions. If you have any questions or concerns, or if I can be of further assistance, please do not hesitate to contact me.    Sincerely,      Bobbi Jaramillo, ESTELITA-C

## 2020-01-22 NOTE — PATIENT INSTRUCTIONS
Check CBC today  Call and try to schedule colonoscopy sooner  Antivert 25 mg one every 8 hours as needed for dizziness like when the room is spinning   Notify Dr. Moore if any increase in rectal bleeding

## 2020-01-23 NOTE — TELEPHONE ENCOUNTER
I left a message on the patient's voice mail that she was not anemic. If she had any questions to feel free to call me back at [phone].

## 2020-02-14 ENCOUNTER — ANESTHESIA EVENT (OUTPATIENT)
Dept: ENDOSCOPY | Facility: HOSPITAL | Age: 35
End: 2020-02-14
Payer: COMMERCIAL

## 2020-02-14 ENCOUNTER — HOSPITAL ENCOUNTER (OUTPATIENT)
Facility: HOSPITAL | Age: 35
Discharge: HOME | End: 2020-02-14
Attending: COLON & RECTAL SURGERY | Admitting: COLON & RECTAL SURGERY
Payer: COMMERCIAL

## 2020-02-14 ENCOUNTER — ANESTHESIA (OUTPATIENT)
Dept: ENDOSCOPY | Facility: HOSPITAL | Age: 35
End: 2020-02-14
Payer: COMMERCIAL

## 2020-02-14 VITALS
SYSTOLIC BLOOD PRESSURE: 126 MMHG | HEIGHT: 67 IN | OXYGEN SATURATION: 96 % | BODY MASS INDEX: 33.59 KG/M2 | HEART RATE: 59 BPM | DIASTOLIC BLOOD PRESSURE: 80 MMHG | TEMPERATURE: 98 F | WEIGHT: 214 LBS | RESPIRATION RATE: 17 BRPM

## 2020-02-14 DIAGNOSIS — Z12.11 SCREENING FOR MALIGNANT NEOPLASM OF COLON: ICD-10-CM

## 2020-02-14 DIAGNOSIS — K59.01 SLOW TRANSIT CONSTIPATION: Primary | ICD-10-CM

## 2020-02-14 LAB
B-HCG UR QL: NEGATIVE
CTP QC/QA: YES

## 2020-02-14 PROCEDURE — 45385 COLONOSCOPY W/LESION REMOVAL: CPT | Mod: ,,, | Performed by: COLON & RECTAL SURGERY

## 2020-02-14 PROCEDURE — 37000009 HC ANESTHESIA EA ADD 15 MINS: Performed by: COLON & RECTAL SURGERY

## 2020-02-14 PROCEDURE — 88305 TISSUE EXAM BY PATHOLOGIST: ICD-10-PCS | Mod: 26,,, | Performed by: PATHOLOGY

## 2020-02-14 PROCEDURE — 63600175 PHARM REV CODE 636 W HCPCS: Performed by: COLON & RECTAL SURGERY

## 2020-02-14 PROCEDURE — 81025 URINE PREGNANCY TEST: CPT | Performed by: COLON & RECTAL SURGERY

## 2020-02-14 PROCEDURE — E9220 PRA ENDO ANESTHESIA: HCPCS | Mod: ,,, | Performed by: NURSE ANESTHETIST, CERTIFIED REGISTERED

## 2020-02-14 PROCEDURE — 63600175 PHARM REV CODE 636 W HCPCS: Performed by: NURSE ANESTHETIST, CERTIFIED REGISTERED

## 2020-02-14 PROCEDURE — 37000008 HC ANESTHESIA 1ST 15 MINUTES: Performed by: COLON & RECTAL SURGERY

## 2020-02-14 PROCEDURE — E9220 PRA ENDO ANESTHESIA: ICD-10-PCS | Mod: ,,, | Performed by: NURSE ANESTHETIST, CERTIFIED REGISTERED

## 2020-02-14 PROCEDURE — 45385 COLONOSCOPY W/LESION REMOVAL: CPT | Performed by: COLON & RECTAL SURGERY

## 2020-02-14 PROCEDURE — 88305 TISSUE EXAM BY PATHOLOGIST: CPT | Mod: 26,,, | Performed by: PATHOLOGY

## 2020-02-14 PROCEDURE — 27201089 HC SNARE, DISP (ANY): Performed by: COLON & RECTAL SURGERY

## 2020-02-14 PROCEDURE — 45385 PR COLONOSCOPY,REMV LESN,SNARE: ICD-10-PCS | Mod: ,,, | Performed by: COLON & RECTAL SURGERY

## 2020-02-14 PROCEDURE — 88305 TISSUE EXAM BY PATHOLOGIST: CPT | Performed by: PATHOLOGY

## 2020-02-14 RX ORDER — SODIUM CHLORIDE 9 MG/ML
INJECTION, SOLUTION INTRAVENOUS CONTINUOUS
Status: DISCONTINUED | OUTPATIENT
Start: 2020-02-14 | End: 2020-02-14 | Stop reason: HOSPADM

## 2020-02-14 RX ORDER — PROPOFOL 10 MG/ML
VIAL (ML) INTRAVENOUS
Status: DISCONTINUED | OUTPATIENT
Start: 2020-02-14 | End: 2020-02-14

## 2020-02-14 RX ORDER — LIDOCAINE HYDROCHLORIDE 20 MG/ML
INJECTION INTRAVENOUS
Status: DISCONTINUED | OUTPATIENT
Start: 2020-02-14 | End: 2020-02-14

## 2020-02-14 RX ADMIN — PROPOFOL 50 MG: 10 INJECTION, EMULSION INTRAVENOUS at 07:02

## 2020-02-14 RX ADMIN — PROPOFOL 50 MG: 10 INJECTION, EMULSION INTRAVENOUS at 08:02

## 2020-02-14 RX ADMIN — LIDOCAINE HYDROCHLORIDE 100 MG: 20 INJECTION, SOLUTION INTRAVENOUS at 07:02

## 2020-02-14 RX ADMIN — PROPOFOL 100 MG: 10 INJECTION, EMULSION INTRAVENOUS at 07:02

## 2020-02-14 RX ADMIN — SODIUM CHLORIDE: 0.9 INJECTION, SOLUTION INTRAVENOUS at 07:02

## 2020-02-14 RX ADMIN — SODIUM CHLORIDE: 0.9 INJECTION, SOLUTION INTRAVENOUS at 08:02

## 2020-02-14 NOTE — ANESTHESIA POSTPROCEDURE EVALUATION
Anesthesia Post Evaluation    Patient: Johanna Bullock    Procedure(s) Performed: Procedure(s) (LRB):  COLONOSCOPY (N/A)    Final Anesthesia Type: general    Patient location during evaluation: PACU  Patient participation: Yes- Able to Participate  Level of consciousness: awake and alert  Post-procedure vital signs: reviewed and stable  Pain management: adequate  Airway patency: patent    PONV status at discharge: No PONV  Anesthetic complications: no      Cardiovascular status: stable  Respiratory status: unassisted and spontaneous ventilation  Hydration status: euvolemic  Follow-up not needed.          Vitals Value Taken Time   /80 2/14/2020  9:13 AM   Temp 36.7 °C (98.1 °F) 2/14/2020  8:35 AM   Pulse 59 2/14/2020  9:13 AM   Resp 17 2/14/2020  9:13 AM   SpO2 96 % 2/14/2020  8:52 AM         No case tracking events are documented in the log.      Pain/Vickie Score: Vickie Score: 10 (2/14/2020  8:50 AM)

## 2020-02-14 NOTE — DISCHARGE INSTRUCTIONS
Colonoscopy     A camera attached to a flexible tube with a viewing lens is used to take video pictures.     Colonoscopy is a test to view the inside of your lower digestive tract (colon and rectum). Sometimes it can show the last part of the small intestine (ileum). During the test, small pieces of tissue may be removed for testing. This is called a biopsy. Small growths, such as polyps, may also be removed.   Why is colonoscopy done?  The test is done to help look for colon cancer. And it can help find the source of abdominal pain, bleeding, and changes in bowel habits. It may be needed once a year, depending on factors such as your:  · Age  · Health history  · Family health history  · Symptoms  · Results from any prior colonoscopy  Risks and possible complications  These include:  · Bleeding               · A puncture or tear in the colon   · Risks of anesthesia  · A cancer lesion not being seen  Getting ready   To prepare for the test:  · Talk with your healthcare provider about the risks of the test (see below). Also ask your healthcare provider about alternatives to the test.  · Tell your healthcare provider about any medicines you take. Also tell him or her about any health conditions you may have.  · Make sure your rectum and colon are empty for the test. Follow the diet and bowel prep instructions exactly. If you dont, the test may need to be rescheduled.  · Plan for a friend or family member to drive you home after the test.     Colonoscopy provides an inside view of the entire colon.     You may discuss the results with your doctor right away or at a future visit.  During the test   The test is usually done in the hospital on an outpatient basis. This means you go home the same day. The procedure takes about 30 minutes. During that time:  · You are given relaxing (sedating) medicine through an IV line. You may be drowsy, or fully asleep.  · The healthcare provider will first give you a physical exam to  check for anal and rectal problems.  · Then the anus is lubricated and the scope inserted.  · If you are awake, you may have a feeling similar to needing to have a bowel movement. You may also feel pressure as air is pumped into the colon. Its OK to pass gas during the procedure.  · Biopsy, polyp removal, or other treatments may be done during the test.  After the test   You may have gas right after the test. It can help to try to pass it to help prevent later bloating. Your healthcare provider may discuss the results with you right away. Or you may need to schedule a follow-up visit to talk about the results. After the test, you can go back to your normal eating and other activities. You may be tired from the sedation and need to rest for a few hours.  Date Last Reviewed: 11/1/2016 © 2000-2017 [email or web address]. [address] [place] [address], [place] [address]. All rights reserved. This information is not intended as a substitute for professional medical care. Always follow your healthcare professional's instructions.

## 2020-02-14 NOTE — [employer] PATIENT INSTRUCTIONS
Discharge Summary/Instructions after an Endoscopic Procedure  Patient Name: Johanna Bullock  Patient MRN: [id number]  Patient YOB: 1985 Friday, February 14, 2020  Jas Moore MD  RESTRICTIONS:  During your procedure today, you received medications for sedation.  These   medications may affect your judgment, balance and coordination.  Therefore,   for 24 hours, you have the following restrictions:   - DO NOT drive a car, operate machinery, make legal/financial decisions,   sign important papers or drink alcohol.    ACTIVITY:  Today: no heavy lifting, straining or running due to procedural   sedation/anesthesia.  The following day: return to full activity including work.  DIET:  Eat and drink normally unless instructed otherwise.     TREATMENT FOR COMMON SIDE EFFECTS:  - Mild abdominal pain, nausea, belching, bloating or excessive gas:  rest,   eat lightly and use a heating pad.  - Sore Throat: treat with throat lozenges and/or gargle with warm salt   water.  - Because air was used during the procedure, expelling large amounts of air   from your rectum or belching is normal.  - If a bowel prep was taken, you may not have a bowel movement for 1-3 days.    This is normal.  SYMPTOMS TO WATCH FOR AND REPORT TO YOUR PHYSICIAN:  1. Abdominal pain or bloating, other than gas cramps.  2. Chest pain.  3. Back pain.  4. Signs of infection such as: chills or fever occurring within 24 hours   after the procedure.  5. Rectal bleeding, which would show as bright red, maroon, or black stools.   (A tablespoon of blood from the rectum is not serious, especially if   hemorrhoids are present.)  6. Vomiting.  7. Weakness or dizziness.  GO DIRECTLY TO THE NEAREST EMERGENCY ROOM IF YOU HAVE ANY OF THE FOLLOWING:      Difficulty breathing              Chills and/or fever over 101 F   Persistent vomiting and/or vomiting blood   Severe abdominal pain   Severe chest pain   Black, tarry stools   Bleeding- more than one  tablespoon   Any other symptom or condition that you feel may need urgent attention  Your doctor recommends these additional instructions:  If any biopsies were taken, your doctors clinic will contact you in 1 to 2   weeks with any results.  - Discharge patient to home.   - Resume previous diet.   - Continue present medications.   - Await pathology results.   - Perform defecography exam at appointment to be scheduled. Also, will plan   for sitz marker study to be performed to look at colon motility.    Increase linzess to 290mcg in the morning.    - Repeat colonoscopy after studies are complete for retreatment.   - Return to my office after studies are complete.  For questions, problems or results please call your physician - Jas Moore MD at Work:  [phone].  [facility], EMERGENCY ROOM PHONE NUMBER: [phone]  IF A COMPLICATION OR EMERGENCY SITUATION ARISES AND YOU ARE UNABLE TO REACH   YOUR PHYSICIAN - GO DIRECTLY TO THE EMERGENCY ROOM.  Jas Moore MD  2/14/2020 8:35:49 AM  This report has been verified and signed electronically.  [employer]

## 2020-02-14 NOTE — ANESTHESIA PREPROCEDURE EVALUATION
02/14/2020  Johanna Bullock is a 34 y.o., female.  Past Medical History:   Diagnosis Date    Adjustment disorder with mixed anxiety and depressed mood 2/6/2013    Anemia, B12 deficiency     Anxiety     Major depression, single episode 2/6/2013    Major depression, single episode 2/6/2013    Post partum depression      Past Surgical History:   Procedure Laterality Date    CHOLECYSTECTOMY  10/2012    DILATION AND CURETTAGE OF UTERUS      ESOPHAGOGASTRODUODENOSCOPY N/A 7/20/2018    Procedure: ESOPHAGOGASTRODUODENOSCOPY (EGD);  Surgeon: Darwin Hansen MD;  Location: [facility] ([address] [place] [address]);  Service: Endoscopy;  Laterality: N/A;  Please measure pouch and limbs    GASTRIC BYPASS      HERNIA REPAIR      lapriscopic hernia repair  08/13/2019    Liver scope  10/2012    LYSIS OF ADHESIONS           Anesthesia Evaluation    I have reviewed the Patient Summary Reports.     I have reviewed the Medications.     Review of Systems  Anesthesia Hx:  Neg history of prior surgery. Denies Family Hx of Anesthesia complications.   Denies Personal Hx of Anesthesia complications.   Hematology/Oncology:  Hematology Normal   Oncology Normal    -- Anemia: chronic    EENT/Dental:EENT/Dental Normal   Cardiovascular:  Cardiovascular Normal Exercise tolerance: good     Pulmonary:  Pulmonary Normal    Renal/:  Renal/ Normal     Hepatic/GI:  Hepatic/GI Normal    Musculoskeletal:  Musculoskeletal Normal    Neurological:  Neurology Normal    Endocrine:  Endocrine Normal    Dermatological:  Skin Normal    Psych:  Psychiatric Normal   Anxiety Disorder and Chronic Anxiety Disorder.  Depression and Chronic Depression, Treated.          Physical Exam  General:  Well nourished    Airway/Jaw/Neck:  Airway Findings: Mouth Opening: Normal Tongue: Normal  General Airway Assessment: Adult  Mallampati: I  TM Distance: Normal, at least  6 cm  Jaw/Neck Findings:     Eyes/Ears/Nose:  EYES/EARS/NOSE FINDINGS: Normal    Chest/Lungs:  Chest/Lungs Findings: Clear to auscultation     Heart/Vascular:  Heart Findings: Rate: Normal  Rhythm: Regular Rhythm  Sounds: Normal  Heart murmur: negative Vascular Findings: Normal    Abdomen:  Abdomen Findings:  Normal     Musculoskeletal:  Musculoskeletal Findings:     Mental Status:  Mental Status Findings:  Cooperative, Alert and Oriented         Anesthesia Plan  Type of Anesthesia, risks & benefits discussed:  Anesthesia Type:  general  Patient's Preference: GENERAL  Intra-op Monitoring Plan: standard ASA monitors  Intra-op Monitoring Plan Comments:   Post Op Pain Control Plan:   Post Op Pain Control Plan Comments:   Induction:   IV  Beta Blocker:  Patient is not currently on a Beta-Blocker (No further documentation required).       Informed Consent: Patient understands risks and agrees with Anesthesia plan.  Questions answered. Anesthesia consent signed with patient.  ASA Score: 2     Day of Surgery Review of History & Physical: I have interviewed and examined the patient. I have reviewed the patient's H&P dated:  There are no significant changes.  H&P update referred to the surgeon.         Ready For Surgery From Anesthesia Perspective.

## 2020-02-14 NOTE — TRANSFER OF CARE
"Anesthesia Transfer of Care Note    Patient: Johanna Bullock    Procedure(s) Performed: Procedure(s) (LRB):  COLONOSCOPY (N/A)    Patient location: GI    Transport from OR: Transported from OR on room air with adequate spontaneous ventilation    Post pain: adequate analgesia    Post assessment: no apparent anesthetic complications    Post vital signs: stable    Level of consciousness: sedated and responds to stimulation    Nausea/Vomiting: no nausea/vomiting    Complications: none    Transfer of care protocol was followed      Last vitals:   Visit Vitals  /62 (BP Location: Left arm, Patient Position: Lying)   Pulse 64   Temp 36.8 °C (98.2 °F)   Resp 16   Ht 5' 7" (1.702 m)   Wt 97.1 kg (214 lb)   LMP 02/08/2020   SpO2 100%   Breastfeeding? No   BMI 33.52 kg/m²     "

## 2020-02-14 NOTE — H&P
COLONOSCOPY HISTORY AND PHYSICAL EXAM    Procedure : Colonoscopy      INDICATIONS: rectal bleeding and constipation    Family Hx of CRC: None    Last Colonoscopy:  Never  Findings: N/A       Past Medical History:   Diagnosis Date    Adjustment disorder with mixed anxiety and depressed mood 2/6/2013    Anemia, B12 deficiency     Anxiety     Major depression, single episode 2/6/2013    Major depression, single episode 2/6/2013    Post partum depression      Sedation Problems: NO  Family History   Problem Relation Age of Onset    Breast cancer Neg Hx     Colon cancer Neg Hx     Ovarian cancer Neg Hx      Fam Hx of Sedation Problems: NO  Social History     Socioeconomic History    Marital status: Single     Spouse name: Not on file    Number of children: Not on file    Years of education: Not on file    Highest education level: Not on file   Occupational History    Occupation: works as      Employer: [employer]     Employer: [employer]   Social Needs    Financial resource strain: Not on file    Food insecurity:     Worry: Not on file     Inability: Not on file    Transportation needs:     Medical: Not on file     Non-medical: Not on file   Tobacco Use    Smoking status: Never Smoker    Smokeless tobacco: Never Used   Substance and Sexual Activity    Alcohol use: Never     Frequency: Never     Comment: Alcohol use rarely    Drug use: No     Comment: Mirena    Sexual activity: Yes     Partners: Male     Birth control/protection: IUD   Lifestyle    Physical activity:     Days per week: Not on file     Minutes per session: Not on file    Stress: Not at all   Relationships    Social connections:     Talks on phone: Not on file     Gets together: Not on file     Attends Pentecostalism service: Not on file     Active member of club or organization: Not on file     Attends meetings of clubs or organizations: Not on file     Relationship status: Not on file   Other Topics Concern    Caffeine  Use Not Asked    Financial Status: Disabled Not Asked    Legal: Involved in criminal litigation Not Asked    Caffeine Use: Frequent Not Asked    Financial Status: Employed Not Asked    Legal: Other Not Asked    Caffeine Use: Moderate Not Asked    Financial Status: Unemployed Not Asked    Leisure: Exercise Not Asked    Caffeine Use: Substantial Not Asked    Financial Status: Other Not Asked    Leisure: Fishing Not Asked    Childhood History: Adopted Not Asked    Firearms: Does patient have access to a firearm? Not Asked    Leisure: Hunting Not Asked    Childhood History: Early trauma Not Asked    Home situation: homeless Not Asked    Leisure: Movie Watching Not Asked    Childhood History: Raised by parents Not Asked    Home situation: lives alone Not Asked    Leisure: Shopping Not Asked    Childhood History: Uneventful Not Asked    Home situation: lives in group home Not Asked    Leisure: Sports Not Asked    Childhood History: Other Not Asked    Home situation: lives in nursing home Not Asked    Leisure: Time with family Not Asked    Education: Unfinished High School Not Asked    Home situation: lives in shelter Not Asked     Service Not Asked    Education: High School Graduate Not Asked    Home situation: lives with family Not Asked    Spirituality: Active Participation Not Asked    Education: Unfinished college Not Asked    Home situation: lives with friends Not Asked    Spirituality: Organized Scientologist Not Asked    Education: Trade School Not Asked    Home situation: lives with significant other Not Asked    Spirituality: Private Participation Not Asked    Education: Associate's Degree Not Asked    Home situation: lives with spouse Not Asked    Patient feels they ought to cut down on drinking/drug use Not Asked    Education: Bachelor's Degree Not Asked    Legal consequences of chemical use Not Asked    Patient annoyed by others criticizing their drinking/drug use  Not Asked    Education: More than one Bachelor's or Professional Not Asked    Legal: Arrest history Not Asked    Patient has felt bad or guilty about drinking/drug use Not Asked    Education: Master's, PhD Not Asked    Legal: Involved in civil litigation Not Asked    Patient has had a drink/used drugs as an eye opener in the AM Not Asked   Social History Narrative    Born and raised in [facility]    Went to [employer] and got BS in bio with minor in chem    1 child    Has boyfriend    A fewnot close friends    Likes to read and go to movies       Review of Systems - Negative except   Respiratory ROS: no dyspnea  Cardiovascular ROS: no exertional chest pain  Gastrointestinal ROS: NO abdominal discomfort,  NO rectal bleeding  Musculoskeletal ROS: no muscular pain  Neurological ROS: no recent stroke    Physical Exam:  LMP 02/08/2020   Breastfeeding? No   General: no distress  Head: normocephalic  Mallampati Score   Neck: supple, symmetrical, trachea midline  Lungs:  clear to auscultation bilaterally and normal respiratory effort  Heart: regular rate and rhythm and no murmur  Abdomen: soft, non-tender non-distented; bowel sounds normal; no masses,  no organomegaly  Extremities: no cyanosis or edema, or clubbing    ASA:  II    PLAN  COLONOSCOPY.    SedationPlan :MAC    The details of the procedure, the possible need for biopsy or polypectomy and the potential risks including bleeding, perforation, missed polyps were discussed in detail.

## 2020-02-14 NOTE — PLAN OF CARE
Pt had multiple episodes of loose stool in recovery; some complaints of 5/6 abd cramping. No other complaints. . Explained to pt about upcoming appointments. Verbalized understanding of d/c instructions

## 2020-02-19 LAB
FINAL PATHOLOGIC DIAGNOSIS: NORMAL
GROSS: NORMAL

## 2020-02-26 ENCOUNTER — TELEPHONE (OUTPATIENT)
Dept: RADIOLOGY | Facility: HOSPITAL | Age: 35
End: 2020-02-26

## 2020-02-27 ENCOUNTER — PATIENT OUTREACH (OUTPATIENT)
Dept: ADMINISTRATIVE | Facility: OTHER | Age: 35
End: 2020-02-27

## 2020-02-27 ENCOUNTER — HOSPITAL ENCOUNTER (OUTPATIENT)
Dept: RADIOLOGY | Facility: HOSPITAL | Age: 35
Discharge: HOME | End: 2020-02-27
Attending: COLON & RECTAL SURGERY
Payer: COMMERCIAL

## 2020-02-27 DIAGNOSIS — K59.01 SLOW TRANSIT CONSTIPATION: ICD-10-CM

## 2020-02-27 PROCEDURE — 74270 FL DEFECOGRAM: ICD-10-PCS | Mod: 26,,, | Performed by: RADIOLOGY

## 2020-02-27 PROCEDURE — A9698 NON-RAD CONTRAST MATERIALNOC: HCPCS | Performed by: COLON & RECTAL SURGERY

## 2020-02-27 PROCEDURE — 74270 X-RAY XM COLON 1CNTRST STD: CPT | Mod: TC

## 2020-02-27 PROCEDURE — 74270 X-RAY XM COLON 1CNTRST STD: CPT | Mod: 26,,, | Performed by: RADIOLOGY

## 2020-02-27 PROCEDURE — 25500020 PHARM REV CODE 255: Performed by: COLON & RECTAL SURGERY

## 2020-02-27 PROCEDURE — 25000003 PHARM REV CODE 250: Performed by: COLON & RECTAL SURGERY

## 2020-02-27 RX ORDER — CHLORHEXIDINE/GLYCERIN/HE-CELL
JELLY (GRAM) TOPICAL ONCE
Status: COMPLETED | OUTPATIENT
Start: 2020-02-27 | End: 2020-02-27

## 2020-02-27 RX ADMIN — BARIUM SULFATE 340 G: 0.6 CREAM ORAL at 11:02

## 2020-02-27 RX ADMIN — BARIUM SULFATE 30 ML: 0.6 SUSPENSION ORAL at 11:02

## 2020-02-27 RX ADMIN — Medication: at 11:02

## 2020-02-28 ENCOUNTER — TELEPHONE (OUTPATIENT)
Dept: SURGERY | Facility: CLINIC | Age: 35
End: 2020-02-28

## 2020-02-28 ENCOUNTER — OFFICE VISIT (OUTPATIENT)
Dept: OBSTETRICS AND GYNECOLOGY | Facility: CLINIC | Age: 35
End: 2020-02-28
Payer: COMMERCIAL

## 2020-02-28 VITALS
BODY MASS INDEX: 33.95 KG/M2 | HEIGHT: 67 IN | DIASTOLIC BLOOD PRESSURE: 70 MMHG | SYSTOLIC BLOOD PRESSURE: 103 MMHG | WEIGHT: 216.31 LBS

## 2020-02-28 DIAGNOSIS — Z01.419 ENCOUNTER FOR GYNECOLOGICAL EXAMINATION WITHOUT ABNORMAL FINDING: Primary | ICD-10-CM

## 2020-02-28 DIAGNOSIS — N89.8 VAGINAL DISCHARGE: ICD-10-CM

## 2020-02-28 DIAGNOSIS — Z12.4 PAP SMEAR FOR CERVICAL CANCER SCREENING: ICD-10-CM

## 2020-02-28 DIAGNOSIS — Z11.3 VENEREAL DISEASE SCREENING: ICD-10-CM

## 2020-02-28 DIAGNOSIS — Z30.431 INTRAUTERINE CONTRACEPTIVE DEVICE, CHECKING: ICD-10-CM

## 2020-02-28 PROCEDURE — 87624 HPV HI-RISK TYP POOLED RSLT: CPT

## 2020-02-28 PROCEDURE — 87481 CANDIDA DNA AMP PROBE: CPT | Mod: 59

## 2020-02-28 PROCEDURE — 87491 CHLMYD TRACH DNA AMP PROBE: CPT | Mod: 59

## 2020-02-28 PROCEDURE — 87661 TRICHOMONAS VAGINALIS AMPLIF: CPT

## 2020-02-28 PROCEDURE — 99999 PR PBB SHADOW E&M-EST. PATIENT-LVL III: CPT | Mod: PBBFAC,,, | Performed by: OBSTETRICS & GYNECOLOGY

## 2020-02-28 PROCEDURE — 99395 PR PREVENTIVE VISIT,EST,18-39: ICD-10-PCS | Mod: S$GLB,,, | Performed by: OBSTETRICS & GYNECOLOGY

## 2020-02-28 PROCEDURE — 99395 PREV VISIT EST AGE 18-39: CPT | Mod: S$GLB,,, | Performed by: OBSTETRICS & GYNECOLOGY

## 2020-02-28 PROCEDURE — 88175 CYTOPATH C/V AUTO FLUID REDO: CPT

## 2020-02-28 PROCEDURE — 99999 PR PBB SHADOW E&M-EST. PATIENT-LVL III: ICD-10-PCS | Mod: PBBFAC,,, | Performed by: OBSTETRICS & GYNECOLOGY

## 2020-02-28 NOTE — PROGRESS NOTES
Subjective:       Patient ID: Johanna Bullock is a 34 y.o. female.    Chief Complaint:  Well Woman and venereal disease screening      History of Present Illness  HPI    Johanna Bullock is a 34 y.o. female  NEW TO ME here for her annual GYN exam.  She requests STD testing.  She describes her periods as irregular, since most recent IUD placed, light flow, lasting 1-3 days.   reports break through bleeding.   denies vaginal itching or irritation.  Reports vaginal discharge.  She is sexually active. She is not in a mutually monogamous relationship and does not always use condoms.  She uses IUD for contraception.   History of abnormal pap: No  Last Pap: approximate date  and was normal  denies domestic violence. She does feel safe at home.     Past Medical History:   Diagnosis Date    Adjustment disorder with mixed anxiety and depressed mood 2013    Anemia, B12 deficiency     Anxiety     Major depression, single episode 2013    Major depression, single episode 2013    Post partum depression      Past Surgical History:   Procedure Laterality Date    CHOLECYSTECTOMY  10/2012    COLONOSCOPY N/A 2020    Procedure: COLONOSCOPY;  Surgeon: Jas Moore MD;  Location: [facility] ([address] [place] [address]);  Service: Endoscopy;  Laterality: N/A;  Pt procedure time changed to 0800 with 0715 - second half prep completed from [id number]- Pt verbalized understanding- ERW/@1022    DILATION AND CURETTAGE OF UTERUS      ESOPHAGOGASTRODUODENOSCOPY N/A 2018    Procedure: ESOPHAGOGASTRODUODENOSCOPY (EGD);  Surgeon: Darwin Hansen MD;  Location: [facility] ([address] [place] [address]);  Service: Endoscopy;  Laterality: N/A;  Please measure pouch and limbs    GASTRIC BYPASS      Revision 2019    LAPAROSCOPIC REPAIR OF HIATAL HERNIA  2019    Liver scope  10/2012    LYSIS OF ADHESIONS       Social History     Socioeconomic History    Marital status: Single     Spouse name: Not on file    Number  of children: Not on file    Years of education: Not on file    Highest education level: Not on file   Occupational History    Occupation: works as      Employer: [employer]     Employer: [id number]   Social Needs    Financial resource strain: Not on file    Food insecurity:     Worry: Not on file     Inability: Not on file    Transportation needs:     Medical: Not on file     Non-medical: Not on file   Tobacco Use    Smoking status: Never Smoker    Smokeless tobacco: Never Used   Substance and Sexual Activity    Alcohol use: Never     Frequency: Never     Comment: Alcohol use rarely    Drug use: No     Comment: Mirena    Sexual activity: Yes     Partners: Male     Birth control/protection: IUD   Lifestyle    Physical activity:     Days per week: Not on file     Minutes per session: Not on file    Stress: Not at all   Relationships    Social connections:     Talks on phone: Not on file     Gets together: Not on file     Attends Shinto service: Not on file     Active member of club or organization: Not on file     Attends meetings of clubs or organizations: Not on file     Relationship status: Not on file   Other Topics Concern    Caffeine Use Not Asked    Financial Status: Disabled Not Asked    Legal: Involved in criminal litigation Not Asked    Caffeine Use: Frequent Not Asked    Financial Status: Employed Not Asked    Legal: Other Not Asked    Caffeine Use: Moderate Not Asked    Financial Status: Unemployed Not Asked    Leisure: Exercise Not Asked    Caffeine Use: Substantial Not Asked    Financial Status: Other Not Asked    Leisure: Fishing Not Asked    Childhood History: Adopted Not Asked    Firearms: Does patient have access to a firearm? Not Asked    Leisure: Hunting Not Asked    Childhood History: Early trauma Not Asked    Home situation: homeless Not Asked    Leisure: Movie Watching Not Asked    Childhood History: Raised by parents Not Asked    Home  situation: lives alone Not Asked    Leisure: Shopping Not Asked    Childhood History: Uneventful Not Asked    Home situation: lives in group home Not Asked    Leisure: Sports Not Asked    Childhood History: Other Not Asked    Home situation: lives in nursing home Not Asked    Leisure: Time with family Not Asked    Education: Unfinished High School Not Asked    Home situation: lives in shelter Not Asked     Service Not Asked    Education: High School Graduate Not Asked    Home situation: lives with family Not Asked    Spirituality: Active Participation Not Asked    Education: Unfinished college Not Asked    Home situation: lives with friends Not Asked    Spirituality: Organized Orthodoxy Not Asked    Education: Trade School Not Asked    Home situation: lives with significant other Not Asked    Spirituality: Private Participation Not Asked    Education: Associate's Degree Not Asked    Home situation: lives with spouse Not Asked    Patient feels they ought to cut down on drinking/drug use Not Asked    Education: Bachelor's Degree Not Asked    Legal consequences of chemical use Not Asked    Patient annoyed by others criticizing their drinking/drug use Not Asked    Education: More than one Bachelor's or Professional Not Asked    Legal: Arrest history Not Asked    Patient has felt bad or guilty about drinking/drug use Not Asked    Education: Master's, PhD Not Asked    Legal: Involved in civil litigation Not Asked    Patient has had a drink/used drugs as an eye opener in the AM Not Asked   Social History Narrative    Born and raised in [place]    Went to [employer] and got BS in bio with minor in chem    1 child    Has boyfriend    A fewnot close friends    Likes to read and go to movies     Family History   Problem Relation Age of Onset    Breast cancer Neg Hx     Colon cancer Neg Hx     Ovarian cancer Neg Hx     Diabetes Neg Hx     Hypertension Neg Hx     Stroke Neg Hx      OB  "History        3    Para   1    Term   1            AB   2    Living   1       SAB        TAB        Ectopic        Multiple        Live Births   1                 /70   Ht 5' 7" (1.702 m)   Wt 98.1 kg (216 lb 4.8 oz)   LMP 2020   BMI 33.88 kg/m²         GYN & OB History  Patient's last menstrual period was 2020.   Date of Last Pap: 2015    OB History    Para Term  AB Living   3 1 1   2 1   SAB TAB Ectopic Multiple Live Births           1      # Outcome Date GA Lbr Brandon/2nd Weight Sex Delivery Anes PTL Lv   3 Term 12 38w0d  2.892 kg (6 lb 6 oz) M Vag-Spont None N RIAZ   2 AB            1 AB                Review of Systems  Review of Systems   Constitutional: Negative for activity change, appetite change, fatigue and unexpected weight change.   HENT: Negative.    Eyes: Negative for visual disturbance.   Respiratory: Negative for shortness of breath and wheezing.    Cardiovascular: Negative for chest pain, palpitations and leg swelling.   Gastrointestinal: Positive for bloating and constipation. Negative for abdominal pain and blood in stool.   Endocrine: Negative for diabetes and hair loss.   Genitourinary: Positive for vaginal discharge. Negative for decreased libido, dyspareunia, dysuria, genital sores and vaginal odor.   Musculoskeletal: Negative for back pain and joint swelling.   Integumentary:  Negative for acne, hair changes and nipple discharge.   Neurological: Negative for headaches.   Hematological: Does not bruise/bleed easily.   Psychiatric/Behavioral: Negative for depression and sleep disturbance. The patient is not nervous/anxious.    Breast: Negative for mastodynia and nipple discharge          Objective:      Physical Exam:   Constitutional: She is oriented to person, place, and time. She appears well-developed and well-nourished.    HENT:   Head: Normocephalic and atraumatic.    Eyes: Pupils are equal, round, and reactive to light. EOM are " normal.    Neck: Normal range of motion. Neck supple.    Cardiovascular: Normal rate and regular rhythm.     Pulmonary/Chest: Effort normal and breath sounds normal.   BREASTS:  no mass, no tenderness, no deformity and no retraction. Right breast exhibits no inverted nipple, no mass, no nipple discharge, no skin change, no tenderness, no bleeding and no swelling. Left breast exhibits no inverted nipple, no mass, no nipple discharge, no skin change, no tenderness, no bleeding and no swelling. Breasts are symmetrical.              Abdominal: Soft. Bowel sounds are normal.     Genitourinary: Pelvic exam was performed with patient supine.   Genitourinary Comments: PELVIC: Normal external genitalia without lesions.  Normal hair distribution.  Adequate perineal body, normal urethral meatus.  Vagina moist and well rugated without lesions or discharge.  Cervix pink, without lesions, discharge or tenderness. IUD STRINGS IN PLACE.  No significant cystocele or rectocele.  Bimanual exam shows uterus to be normal size, regular, mobile and nontender.  Adnexa without masses or tenderness.               Musculoskeletal: Normal range of motion and moves all extremeties.       Neurological: She is alert and oriented to person, place, and time.    Skin: Skin is warm and dry.    Psychiatric: She has a normal mood and affect.              Assessment:        1. Encounter for gynecological examination without abnormal finding    2. Pap smear for cervical cancer screening    3. Intrauterine contraceptive device, checking    4. Vaginal discharge    5. Venereal disease screening                Plan:        1. Encounter for gynecological examination without abnormal finding      2. Pap smear for cervical cancer screening    - Liquid-Based Pap Smear, Screening  - HPV High Risk Genotypes, PCR    3. Intrauterine contraceptive device, checking      4. Vaginal discharge    - Vaginosis Screen by DNA Probe  - C. trachomatis/N. gonorrhoeae by AMP  DNA    5. Venereal disease screening    - Hepatitis Panel, Acute; Future  - HIV 1/2 Ag/Ab (4th Gen); Future  - RPR; Future       Follow up in about 1 year (around 2/28/2021).

## 2020-02-28 NOTE — TELEPHONE ENCOUNTER
----- Message from Rachel Perez sent at 2/28/2020 11:50 AM CST -----  Contact: patient  Patient called to speak with a nurse concerning biopsy results.    She would like a callback at [phone]    Thanks  KB

## 2020-03-02 ENCOUNTER — HOSPITAL ENCOUNTER (OUTPATIENT)
Dept: RADIOLOGY | Facility: HOSPITAL | Age: 35
Discharge: HOME | End: 2020-03-02
Attending: COLON & RECTAL SURGERY
Payer: COMMERCIAL

## 2020-03-02 DIAGNOSIS — B96.89 BACTERIAL VAGINOSIS: Primary | ICD-10-CM

## 2020-03-02 DIAGNOSIS — N76.0 BACTERIAL VAGINOSIS: Primary | ICD-10-CM

## 2020-03-02 DIAGNOSIS — K59.01 SLOW TRANSIT CONSTIPATION: ICD-10-CM

## 2020-03-02 PROCEDURE — 74018 RADEX ABDOMEN 1 VIEW: CPT | Mod: 26,,, | Performed by: RADIOLOGY

## 2020-03-02 PROCEDURE — 74018 RADEX ABDOMEN 1 VIEW: CPT | Mod: TC

## 2020-03-02 PROCEDURE — 74018 XR ABDOMEN AP 1 VIEW: ICD-10-PCS | Mod: 26,,, | Performed by: RADIOLOGY

## 2020-03-02 RX ORDER — TINIDAZOLE 500 MG/1
2 TABLET ORAL
Qty: 8 TABLET | Refills: 0 | Status: SHIPPED | OUTPATIENT
Start: 2020-03-02 | End: 2020-03-04

## 2020-03-04 ENCOUNTER — PATIENT OUTREACH (OUTPATIENT)
Dept: ADMINISTRATIVE | Facility: OTHER | Age: 35
End: 2020-03-04

## 2020-03-04 ENCOUNTER — HOSPITAL ENCOUNTER (OUTPATIENT)
Dept: RADIOLOGY | Facility: HOSPITAL | Age: 35
Discharge: HOME | End: 2020-03-04
Attending: COLON & RECTAL SURGERY
Payer: COMMERCIAL

## 2020-03-04 DIAGNOSIS — K59.01 SLOW TRANSIT CONSTIPATION: ICD-10-CM

## 2020-03-04 PROCEDURE — 74018 XR ABDOMEN AP 1 VIEW: ICD-10-PCS | Mod: 26,,, | Performed by: RADIOLOGY

## 2020-03-04 PROCEDURE — 74018 RADEX ABDOMEN 1 VIEW: CPT | Mod: TC

## 2020-03-04 PROCEDURE — 74018 RADEX ABDOMEN 1 VIEW: CPT | Mod: 26,,, | Performed by: RADIOLOGY

## 2020-03-06 ENCOUNTER — TELEPHONE (OUTPATIENT)
Dept: SURGERY | Facility: CLINIC | Age: 35
End: 2020-03-06

## 2020-03-06 ENCOUNTER — OFFICE VISIT (OUTPATIENT)
Dept: SURGERY | Facility: CLINIC | Age: 35
End: 2020-03-06
Payer: COMMERCIAL

## 2020-03-06 ENCOUNTER — HOSPITAL ENCOUNTER (OUTPATIENT)
Dept: RADIOLOGY | Facility: HOSPITAL | Age: 35
Discharge: HOME | End: 2020-03-06
Attending: COLON & RECTAL SURGERY
Payer: COMMERCIAL

## 2020-03-06 VITALS
BODY MASS INDEX: 32.01 KG/M2 | DIASTOLIC BLOOD PRESSURE: 73 MMHG | WEIGHT: 211.19 LBS | HEIGHT: 68 IN | SYSTOLIC BLOOD PRESSURE: 100 MMHG | HEART RATE: 60 BPM

## 2020-03-06 DIAGNOSIS — N81.6 RECTOCELE: Primary | ICD-10-CM

## 2020-03-06 DIAGNOSIS — K59.01 SLOW TRANSIT CONSTIPATION: ICD-10-CM

## 2020-03-06 LAB
HPV HR 12 DNA SPEC QL NAA+PROBE: NEGATIVE
HPV16 AG SPEC QL: NEGATIVE
HPV18 DNA SPEC QL NAA+PROBE: NEGATIVE

## 2020-03-06 PROCEDURE — 74018 XR ABDOMEN AP 1 VIEW: ICD-10-PCS | Mod: 26,,, | Performed by: RADIOLOGY

## 2020-03-06 PROCEDURE — 74018 RADEX ABDOMEN 1 VIEW: CPT | Mod: 26,,, | Performed by: RADIOLOGY

## 2020-03-06 PROCEDURE — 99214 PR OFFICE/OUTPT VISIT, EST, LEVL IV, 30-39 MIN: ICD-10-PCS | Mod: S$GLB,,, | Performed by: COLON & RECTAL SURGERY

## 2020-03-06 PROCEDURE — 3008F BODY MASS INDEX DOCD: CPT | Mod: CPTII,S$GLB,, | Performed by: COLON & RECTAL SURGERY

## 2020-03-06 PROCEDURE — 3008F PR BODY MASS INDEX (BMI) DOCUMENTED: ICD-10-PCS | Mod: CPTII,S$GLB,, | Performed by: COLON & RECTAL SURGERY

## 2020-03-06 PROCEDURE — 99214 OFFICE O/P EST MOD 30 MIN: CPT | Mod: S$GLB,,, | Performed by: COLON & RECTAL SURGERY

## 2020-03-06 PROCEDURE — 74018 RADEX ABDOMEN 1 VIEW: CPT | Mod: TC

## 2020-03-06 PROCEDURE — 99999 PR PBB SHADOW E&M-EST. PATIENT-LVL III: CPT | Mod: PBBFAC,,, | Performed by: COLON & RECTAL SURGERY

## 2020-03-06 PROCEDURE — 99999 PR PBB SHADOW E&M-EST. PATIENT-LVL III: ICD-10-PCS | Mod: PBBFAC,,, | Performed by: COLON & RECTAL SURGERY

## 2020-03-06 NOTE — PROGRESS NOTES
CRS Office Visit Follow-up  Referring Md:   No referring provider defined for this encounter.    SUBJECTIVE:     Chief Complaint: painful hemorrhoids    History of Present Illness:  Patient is a 34 y.o. female presents with painful hemorrhoids. The patient is a established patient to this practice.     Course is as follows:  10/24/2018: seen by Rory for rectal bleeding.  Hemorrhoids seen on anoscopy.   Since that time, she underwent revision laparoscopic converted to open Ramez-en-Y gastric bypass with extensive lysis of adhesions in August 2019.   She recovered well.  She has been slowly progressing her diet.  She presents today for multiple week history increased difficulty with her bowel movements.  She feels diffuse pelvic and abdominal pain.  Bowel movements every 4-5 days.  She is frequently taking Ex-Lax to assist in her bowel movements.  Small bowel movement overnight.  No leakage.  Complains of an intermittent bulge in the posterior wall vagina.  No significant prolapse of tissue. No rectal bleeding. She has intermittently had to digitally disimpact but states that the stools often liquid in consistency and not well formed.  Past history significant for 1 spontaneous vaginal delivery with a tear that was repaired.  No fecal or urinary incontinence.    Workup:  Defecogram: There was small volume residual contrast in the rectocele after multiple repeated attempts of straining.  Sitz marker study:   - Day 1 (3/2/20): 15 markers in the cecum.  6 in the proximal transverse colon/hepatic flexure.     - Day 3 (3/4/20): 9 markers rectum, 3 markers cecum, 4 markers transverse colon, 4 markers splenic flexure   - Day 5 (3/6/20):  5 markers in the hepatic flexure.  2 in the rectum.  1 in descending colon   - Impression: slow transit constipation.  Colonoscopy 2/14/20:   Impression:           - Preparation of the colon was poor.                         - Stool in the entire examined colon.                        - One  "5 mm polyp in the sigmoid colon, removed with a cold snare. Resected and retrieved. --> sessile serrated polyp    Current status:  Presents today for evaluation.  Overall improved with the Linzess.  Having bowel movements.  Does not feel that she is able to evacuate completely.  Continues to feel a bulge in the posterior aspect of the vagina with significant straining.    Review of Systems:  Review of Systems   Constitutional: Positive for weight loss. Negative for chills, diaphoresis, fever and malaise/fatigue.   HENT: Negative for congestion.    Respiratory: Negative for shortness of breath.    Cardiovascular: Negative for chest pain and leg swelling.   Gastrointestinal: Positive for abdominal pain and constipation. Negative for blood in stool, nausea and vomiting.   Genitourinary: Negative for dysuria.   Musculoskeletal: Negative for back pain and myalgias.   Skin: Negative for rash.   Neurological: Negative for dizziness and weakness.   Endo/Heme/Allergies: Does not bruise/bleed easily.   Psychiatric/Behavioral: Negative for depression.       OBJECTIVE:     Vital Signs (Most Recent)  /73 (BP Location: Left arm, Patient Position: Sitting, BP Method: Large (Automatic))   Pulse 60   Ht 5' 7.5" (1.715 m)   Wt 95.8 kg (211 lb 3.2 oz)   LMP 02/08/2020   BMI 32.59 kg/m²     Physical Exam:  General: Black or  female in no distress   Neuro: alert and oriented x 4.  Moves all extremities.     HEENT: no icterus.  Trachea midline  Respiratory: respirations are even and unlabored  Cardiac: regular rate  Abdomen:  Soft, nontender, no masses  Extremities: Warm dry and intact  Skin: no rashes  Anorectal:  Deferred today.  From prior: "External exam was normal.  Digital exam performed. Normal tone. No masses palpated.  Small anterior rectocele.  Anoscopy was then performed.  Small volume internal hemorrhoids.  No stool seen."    Labs: H&H = 13/38.  Normal renal function    Imaging: " none      ASSESSMENT/PLAN:     Johanna was seen today for constipation and f/u defogram / sitz marker.    Diagnoses and all orders for this visit:    Rectocele  -     Ambulatory referral/consult to Urology; Future        34-year-old with history of gastric bypass x2 presents with colonic dysmotility and slow transit constipation.  This is coupled with a small anterior rectocele.  In discussion with her, it sounds like her rectocele has been a more prominent issue.  At 1 time, she had significant prolapse of the posterior wall of vagina from significant constipation.  With the Linzess, her constipation has improved and she continues to have more regular bowel movements.  However, she is most concerned regarding the difficulty with evacuation in the presence of the rectocele.  I will therefore refer her over to Dr. Mace for further evaluation and potential treatment.  She will continue on the Linzess.  If surgical correction is further warranted of her constipation, she would need to undergo balloon expulsion to demonstrate that the rectum function properly prior to any colonic resection.  Balloon expulsion was offered today but was declined.    HORACE Moore MD, FACS  Staff Surgeon  Colon & Rectal Surgery

## 2020-03-06 NOTE — TELEPHONE ENCOUNTER
----- Message from Angelina Severino sent at 3/6/2020  7:47 AM CST -----  Contact: pt  Py requesting a earlier appt time on today    Please call and advise    Phone [phone]

## 2020-03-06 NOTE — LETTER
March 6, 2020      [facility]  [address]  [facility] [address]  Phone: [phone]       Patient: Johanna Bullock   YOB: 1985  Date of Visit: 03/06/2020    To Whom It May Concern:    Christian Bullock  was at [facility] on 03/06/2020. If you have any questions or concerns, or if I can be of further assistance, please do not hesitate to contact me.    Sincerely,    TRIPP JETT

## 2020-03-09 ENCOUNTER — PATIENT MESSAGE (OUTPATIENT)
Dept: SURGERY | Facility: CLINIC | Age: 35
End: 2020-03-09

## 2020-03-09 DIAGNOSIS — K59.00 CONSTIPATION, UNSPECIFIED CONSTIPATION TYPE: Primary | ICD-10-CM

## 2020-03-12 ENCOUNTER — PATIENT OUTREACH (OUTPATIENT)
Dept: ADMINISTRATIVE | Facility: OTHER | Age: 35
End: 2020-03-12

## 2020-03-12 ENCOUNTER — OFFICE VISIT (OUTPATIENT)
Dept: URGENT CARE | Facility: CLINIC | Age: 35
End: 2020-03-12
Payer: COMMERCIAL

## 2020-03-12 VITALS
HEART RATE: 72 BPM | DIASTOLIC BLOOD PRESSURE: 78 MMHG | OXYGEN SATURATION: 100 % | BODY MASS INDEX: 33.12 KG/M2 | WEIGHT: 211 LBS | HEIGHT: 67 IN | SYSTOLIC BLOOD PRESSURE: 106 MMHG | TEMPERATURE: 98 F

## 2020-03-12 DIAGNOSIS — J06.9 VIRAL URI WITH COUGH: Primary | ICD-10-CM

## 2020-03-12 LAB
CTP QC/QA: YES
FLUAV AG NPH QL: NEGATIVE
FLUBV AG NPH QL: NEGATIVE

## 2020-03-12 PROCEDURE — 99213 PR OFFICE/OUTPT VISIT, EST, LEVL III, 20-29 MIN: ICD-10-PCS | Mod: 25,S$GLB,, | Performed by: PHYSICIAN ASSISTANT

## 2020-03-12 PROCEDURE — 87804 POCT INFLUENZA A/B: ICD-10-PCS | Mod: QW,S$GLB,, | Performed by: PHYSICIAN ASSISTANT

## 2020-03-12 PROCEDURE — 99213 OFFICE O/P EST LOW 20 MIN: CPT | Mod: 25,S$GLB,, | Performed by: PHYSICIAN ASSISTANT

## 2020-03-12 PROCEDURE — 87804 INFLUENZA ASSAY W/OPTIC: CPT | Mod: QW,S$GLB,, | Performed by: PHYSICIAN ASSISTANT

## 2020-03-12 NOTE — PATIENT INSTRUCTIONS
At this time you do not meet the criteria for covid19 testing.  If you develop new or worsening symptoms, I recommend doing tele health/[facility] care visit.  I recommend that as long dri of symptoms and or fever, staying home to prevent spread of infection.    - Rest.    - Drink plenty of fluids.      - Viral upper respiratory infections typically run their course in 10-14 days.     - Tylenol or Ibuprofen as directed as needed for fever/pain. Avoid tylenol if you have a history of liver disease. Do not take ibuprofen if you have a history of GI bleeding, kidney disease, or if you take blood thinners.     - You can take over-the-counter claritin, zyrtec, allegra, or xyzal as directed. These are antihistamines that can help with runny nose, nasal congestion, sneezing, and helps to dry up post-nasal drip, which usually causes sore throat and cough.   - If you do NOT have high blood pressure, you may use a decongestant form (D)  of this medication or if you do not take the D form, you can take sudafed  (pseudoephedrine) over the counter, which is a decongestant.    - You can use Flonase (fluticasone) nasal spray as directed for sinus congestion and postnasal drip. This is a steroid nasal spray that works locally over time to decrease the inflammation in your nose/sinuses and help with allergic symptoms. This is not an quick- relief spray like afrin, but it works well if used daily.  Discontinue if you develop nose bleed  - use nasal saline prior to Flonase.    - Use Ocean Spray Nasal Saline 1-3 puffs each nostril every 2-3 hours then blow out onto tissue. This is to irrigate the nasal passage way to clear the sinus openings. Use until sinus problem resolved.      - you can take plain Mucinex (guaifenesin) 1200 mg twice a day to help loosen mucous    -warm salt water gargles can help with sore throat    - warm tea with honey can help with cough. Honey is a natural cough suppressant.    - Dextromethorphan (DM) is  a cough suppressant over the counter (ie. mucinex DM, robitussin, delsym; dayquil/nyquil has DM as well.)    - Follow up with your PCP or specialty clinic as directed in the next 1-2 weeks if not improved or as needed.  You can call [phone] to schedule an appointment with the appropriate provider.      - Go to the ER if you develop new or worsening symptoms.     - You must understand that you have received an Urgent Care treatment only and that you may be released before all of your medical problems are known or treated.   - You, the patient, will arrange for follow up care as instructed.   - If your condition worsens or fails to improve we recommend that you receive another evaluation at the ER immediately or contact your PCP to discuss your concerns or return here.             Viral Upper Respiratory Illness (Adult)  You have a viral upper respiratory illness (URI), which is another term for the common cold. This illness is contagious during the first few days. It is spread through the air by coughing and sneezing. It may also be spread by direct contact (touching the sick person and then touching your own eyes, nose, or mouth). Frequent handwashing will decrease risk of spread. Most viral illnesses go away within 7 to 10 days with rest and simple home remedies. Sometimes the illness may last for several weeks. Antibiotics will not kill a virus, and they are generally not prescribed for this condition.    Home care  · If symptoms are severe, rest at home for the first 2 to 3 days. When you resume activity, don't let yourself get too tired.  · Avoid being exposed to cigarette smoke (yours or others).  · You may use acetaminophen or ibuprofen to control pain and fever, unless another medicine was prescribed. (Note: If you have chronic liver or kidney disease, have ever had a stomach ulcer or gastrointestinal bleeding, or are taking blood-thinning medicines, talk with your healthcare provider before using  these medicines.) Aspirin should never be given to anyone under 18 years of age who is ill with a viral infection or fever. It may cause severe liver or brain damage.  · Your appetite may be poor, so a light diet is fine. Avoid dehydration by drinking 6 to 8 glasses of fluids per day (water, soft drinks, juices, tea, or soup). Extra fluids will help loosen secretions in the nose and lungs.  · Over-the-counter cold medicines will not shorten the length of time youre sick, but they may be helpful for the following symptoms: cough, sore throat, and nasal and sinus congestion. (Note: Do not use decongestants if you have high blood pressure.)  Follow-up care  Follow up with your healthcare provider, or as advised.  When to seek medical advice  Call your healthcare provider right away if any of these occur:  · Cough with lots of colored sputum (mucus)  · Severe headache; face, neck, or ear pain  · Difficulty swallowing due to throat pain  · Fever of 100.4°F (38°C)  Call 911, or get immediate medical care  Call emergency services right away if any of these occur:  · Chest pain, shortness of breath, wheezing, or difficulty breathing  · Coughing up blood  · Inability to swallow due to throat pain  Date Last Reviewed: 9/13/2015 © 2000-2017 [employer]. [address] [place] [address], [place] [address]. All rights reserved. This information is not intended as a substitute for professional medical care. Always follow your healthcare professional's instructions.

## 2020-03-12 NOTE — PROGRESS NOTES
"Subjective:       Patient ID: Johanna Bullock is a 34 y.o. female.    Vitals:  height is 5' 7" (1.702 m) and weight is 95.7 kg (211 lb). Her temperature is 98.3 °F (36.8 °C). Her blood pressure is 106/78 and her pulse is 72. Her oxygen saturation is 100%.     Chief Complaint: URI    Patient presents with cough, congestion, sore throat, chills, low-grade fever (max temperature 100.4) for the past 3 days.  She states that it was an e-mail sent from her son school that there was 1 unconfirmed case of possible corona virus.  Patient states that she was in contact with someone who had recent travel outside of the [place] but with no symptoms and no diagnosis of corona virus.  Patient denies chest pain or shortness of breath.  Patient denies any personal recent travel.  No exposure to confirmed positive corona virus patients.     URI    This is a new problem. The current episode started in the past 7 days (4 days ). The problem has been unchanged. The maximum temperature recorded prior to her arrival was 100.4 - 100.9 F. Associated symptoms include congestion, coughing and a sore throat. Pertinent negatives include no chest pain, ear pain, nausea, neck pain, rash, sinus pain, vomiting or wheezing. She has tried nothing for the symptoms.       Constitution: Negative for chills, sweating, fatigue, fever, unexpected weight change and generalized weakness.   HENT: Positive for congestion, postnasal drip and sore throat. Negative for ear pain, ear discharge, dental problem, drooling, mouth sores, tongue pain, tongue lesion, facial swelling, facial trauma, nosebleeds, foreign body in nose, sinus pain, sinus pressure, trouble swallowing and voice change.    Neck: Negative for neck pain, neck stiffness, painful lymph nodes and degenerative disc disease.   Cardiovascular: Negative for chest pain, palpitations and sob on exertion.   Eyes: Negative for eye discharge, eye itching, eye pain, eye redness, photophobia and vision " loss.   Respiratory: Positive for cough. Negative for sleep apnea, chest tightness, sputum production, bloody sputum, COPD, shortness of breath, stridor, wheezing and asthma.    Gastrointestinal: Negative for nausea and vomiting.   Musculoskeletal: Negative for pain and muscle ache.   Skin: Negative for rash.   Allergic/Immunologic: Negative for seasonal allergies and asthma.   Hematologic/Lymphatic: Negative for swollen lymph nodes.       Objective:      Physical Exam   Constitutional: She is oriented to person, place, and time. She appears well-developed and well-nourished. She is cooperative.  Non-toxic appearance. She does not have a sickly appearance. She does not appear ill. No distress.   Patient very pleasant sitting comfortably in no acute distress.  Nontoxic appearing.  No respiratory distress.   HENT:   Head: Normocephalic and atraumatic.   Right Ear: Hearing, tympanic membrane, external ear and ear canal normal.   Left Ear: Hearing, tympanic membrane, external ear and ear canal normal.   Nose: Nose normal. No mucosal edema, rhinorrhea or nasal deformity. No epistaxis. Right sinus exhibits no maxillary sinus tenderness and no frontal sinus tenderness. Left sinus exhibits no maxillary sinus tenderness and no frontal sinus tenderness.   Mouth/Throat: Uvula is midline, oropharynx is clear and moist and mucous membranes are normal. No trismus in the jaw. Normal dentition. No uvula swelling. No oropharyngeal exudate, posterior oropharyngeal edema or posterior oropharyngeal erythema.   Eyes: Conjunctivae and lids are normal. No scleral icterus.   Neck: Trachea normal, full passive range of motion without pain and phonation normal. Neck supple. No neck rigidity. No edema and no erythema present.   Cardiovascular: Normal rate, regular rhythm, normal heart sounds, intact distal pulses and normal pulses.   Pulmonary/Chest: Effort normal and breath sounds normal. No accessory muscle usage or stridor. No tachypnea  and no bradypnea. No respiratory distress. She has no decreased breath sounds. She has no wheezes. She has no rhonchi. She has no rales.   Abdominal: Normal appearance.   Musculoskeletal: Normal range of motion. She exhibits no edema or deformity.   Lymphadenopathy:        Head (right side): No submandibular, no preauricular and no posterior auricular adenopathy present.        Head (left side): No submandibular, no preauricular and no posterior auricular adenopathy present.     She has no cervical adenopathy.   Neurological: She is alert and oriented to person, place, and time. She exhibits normal muscle tone. Coordination normal.   Skin: Skin is warm, dry, intact, not diaphoretic and not pale.   Psychiatric: She has a normal mood and affect. Her speech is normal and behavior is normal. Judgment and thought content normal. Cognition and memory are normal.   Nursing note and vitals reviewed.          Results for orders placed or performed in visit on 03/12/20   POCT Influenza A/B   Result Value Ref Range    Rapid Influenza A Ag Negative Negative    Rapid Influenza B Ag Negative Negative     Acceptable Yes      Called office of Public Health, awaiting a call back.  Discussed case with Dr. Mancini.  Patient does not meet criteria for testing for COVID 19.  Assessment:       1. Viral URI with cough        Plan:         Viral URI with cough  -     POCT Influenza A/B      Patient Instructions   At this time you do not meet the criteria for covid19 testing.  If you develop new or worsening symptoms, I recommend doing [facility] care visit.  I recommend that as long dri of symptoms and or fever, staying home to prevent spread of infection.    - Rest.    - Drink plenty of fluids.      - Viral upper respiratory infections typically run their course in 10-14 days.     - Tylenol or Ibuprofen as directed as needed for fever/pain. Avoid tylenol if you have a history of liver disease. Do not take  ibuprofen if you have a history of GI bleeding, kidney disease, or if you take blood thinners.     - You can take over-the-counter claritin, zyrtec, allegra, or xyzal as directed. These are antihistamines that can help with runny nose, nasal congestion, sneezing, and helps to dry up post-nasal drip, which usually causes sore throat and cough.   - If you do NOT have high blood pressure, you may use a decongestant form (D)  of this medication or if you do not take the D form, you can take sudafed  (pseudoephedrine) over the counter, which is a decongestant.    - You can use Flonase (fluticasone) nasal spray as directed for sinus congestion and postnasal drip. This is a steroid nasal spray that works locally over time to decrease the inflammation in your nose/sinuses and help with allergic symptoms. This is not an quick- relief spray like afrin, but it works well if used daily.  Discontinue if you develop nose bleed  - use nasal saline prior to Flonase.    - Use Ocean Spray Nasal Saline 1-3 puffs each nostril every 2-3 hours then blow out onto tissue. This is to irrigate the nasal passage way to clear the sinus openings. Use until sinus problem resolved.      - you can take plain Mucinex (guaifenesin) 1200 mg twice a day to help loosen mucous    -warm salt water gargles can help with sore throat    - warm tea with honey can help with cough. Honey is a natural cough suppressant.    - Dextromethorphan (DM) is a cough suppressant over the counter (ie. mucinex DM, robitussin, delsym; dayquil/nyquil has DM as well.)    - Follow up with your PCP or specialty clinic as directed in the next 1-2 weeks if not improved or as needed.  You can call [phone] to schedule an appointment with the appropriate provider.      - Go to the ER if you develop new or worsening symptoms.     - You must understand that you have received an Urgent Care treatment only and that you may be released before all of your medical problems are known  or treated.   - You, the patient, will arrange for follow up care as instructed.   - If your condition worsens or fails to improve we recommend that you receive another evaluation at the ER immediately or contact your PCP to discuss your concerns or return here.             Viral Upper Respiratory Illness (Adult)  You have a viral upper respiratory illness (URI), which is another term for the common cold. This illness is contagious during the first few days. It is spread through the air by coughing and sneezing. It may also be spread by direct contact (touching the sick person and then touching your own eyes, nose, or mouth). Frequent handwashing will decrease risk of spread. Most viral illnesses go away within 7 to 10 days with rest and simple home remedies. Sometimes the illness may last for several weeks. Antibiotics will not kill a virus, and they are generally not prescribed for this condition.    Home care  · If symptoms are severe, rest at home for the first 2 to 3 days. When you resume activity, don't let yourself get too tired.  · Avoid being exposed to cigarette smoke (yours or others).  · You may use acetaminophen or ibuprofen to control pain and fever, unless another medicine was prescribed. (Note: If you have chronic liver or kidney disease, have ever had a stomach ulcer or gastrointestinal bleeding, or are taking blood-thinning medicines, talk with your healthcare provider before using these medicines.) Aspirin should never be given to anyone under 18 years of age who is ill with a viral infection or fever. It may cause severe liver or brain damage.  · Your appetite may be poor, so a light diet is fine. Avoid dehydration by drinking 6 to 8 glasses of fluids per day (water, soft drinks, juices, tea, or soup). Extra fluids will help loosen secretions in the nose and lungs.  · Over-the-counter cold medicines will not shorten the length of time youre sick, but they may be helpful for the following  symptoms: cough, sore throat, and nasal and sinus congestion. (Note: Do not use decongestants if you have high blood pressure.)  Follow-up care  Follow up with your healthcare provider, or as advised.  When to seek medical advice  Call your healthcare provider right away if any of these occur:  · Cough with lots of colored sputum (mucus)  · Severe headache; face, neck, or ear pain  · Difficulty swallowing due to throat pain  · Fever of 100.4°F (38°C)  Call 911, or get immediate medical care  Call emergency services right away if any of these occur:  · Chest pain, shortness of breath, wheezing, or difficulty breathing  · Coughing up blood  · Inability to swallow due to throat pain  Date Last Reviewed: 9/13/2015  © 5080-4281 [employer] [address] [place] [address], [place] [address]. All rights reserved. This information is not intended as a substitute for professional medical care. Always follow your healthcare professional's instructions.

## 2020-03-13 ENCOUNTER — OFFICE VISIT (OUTPATIENT)
Dept: UROLOGY | Facility: CLINIC | Age: 35
End: 2020-03-13
Payer: COMMERCIAL

## 2020-03-13 VITALS
BODY MASS INDEX: 31.35 KG/M2 | SYSTOLIC BLOOD PRESSURE: 103 MMHG | DIASTOLIC BLOOD PRESSURE: 70 MMHG | HEART RATE: 97 BPM | TEMPERATURE: 99 F | WEIGHT: 199.75 LBS | HEIGHT: 67 IN

## 2020-03-13 DIAGNOSIS — N81.6 RECTOCELE: ICD-10-CM

## 2020-03-13 PROCEDURE — 99205 PR OFFICE/OUTPT VISIT, NEW, LEVL V, 60-74 MIN: ICD-10-PCS | Mod: 25,S$GLB,, | Performed by: UROLOGY

## 2020-03-13 PROCEDURE — 99999 PR PBB SHADOW E&M-EST. PATIENT-LVL III: ICD-10-PCS | Mod: PBBFAC,,, | Performed by: UROLOGY

## 2020-03-13 PROCEDURE — 81002 PR URINALYSIS NONAUTO W/O SCOPE: ICD-10-PCS | Mod: S$GLB,,, | Performed by: UROLOGY

## 2020-03-13 PROCEDURE — 99999 PR PBB SHADOW E&M-EST. PATIENT-LVL III: CPT | Mod: PBBFAC,,, | Performed by: UROLOGY

## 2020-03-13 PROCEDURE — 99205 OFFICE O/P NEW HI 60 MIN: CPT | Mod: 25,S$GLB,, | Performed by: UROLOGY

## 2020-03-13 PROCEDURE — 81002 URINALYSIS NONAUTO W/O SCOPE: CPT | Mod: S$GLB,,, | Performed by: UROLOGY

## 2020-03-13 PROCEDURE — 3008F PR BODY MASS INDEX (BMI) DOCUMENTED: ICD-10-PCS | Mod: CPTII,S$GLB,, | Performed by: UROLOGY

## 2020-03-13 PROCEDURE — 3008F BODY MASS INDEX DOCD: CPT | Mod: CPTII,S$GLB,, | Performed by: UROLOGY

## 2020-03-13 NOTE — LETTER
March 13, 2020      Jas Moore MD  [address] [facility] [address]           [facility] - Urology 4th Floor  [address]  [place] [address]  Phone: [phone]          Patient: Johanna Bullock   MR Number: [id number]   YOB: 1985   Date of Visit: 3/13/2020       Dear Dr. Jas Moore:    Thank you for referring Johanna Bullock to me for evaluation. Attached you will find relevant portions of my assessment and plan of care.    If you have questions, please do not hesitate to call me. I look forward to following Johanna Bullock along with you.    Sincerely,    Irlanda Mace MD    Enclosure  CC:  No Recipients    If you would like to receive this communication electronically, please contact [email or web address] or [phone] to request more information on [facility] access.    For providers and/or their staff who would like to refer a patient to [facility], please contact us through our one-stop-shop provider referral line, [facility], at [phone].    If you feel you have received this communication in error or would no longer like to receive these types of communications, please e-mail [email or web address]

## 2020-03-13 NOTE — PROGRESS NOTES
CHIEF COMPLAINT:    Mrs. Bullock is a 34 y.o. female presenting for a consultation at the request of Dr. Jas Moore. Patient presents with rectocele.    PRESENTING ILLNESS:    Johanna Bullock is a 34 y.o. female who states that she has a history of having a hiatal hernia repair in 2019.  This was performed by a general surgeon at [place] and she was noted to require a lysis of adhesions.  After that surgery she developed chronic constipation and went from having daily bowel movements to having a bm every 5-6 days.  She then started having stool trapping and hemorrhoids.  She saw Dr. Moore who did a defecography test which demonstrated and jamey left lateral rectocele.  She does not need to splint to have a bm.  She has no problems urinating.  She now has a bm every other day since she has taken Linzess.      , vaginal delivery, uterus in place, no pain with intercourse, has to take Linzess to have a bowel movement    REVIEW OF SYSTEMS:    Review of Systems   Constitutional: Negative.    HENT: Negative.    Eyes: Negative.    Respiratory: Negative.    Cardiovascular: Negative.    Gastrointestinal: Positive for constipation.        Stool trapping   Genitourinary: Negative.    Musculoskeletal: Negative.    Skin: Negative.    Neurological: Negative.    Endo/Heme/Allergies: Negative.    Psychiatric/Behavioral: Negative.        PATIENT HISTORY:    Past Medical History:   Diagnosis Date    Adjustment disorder with mixed anxiety and depressed mood 2013    Anemia, B12 deficiency     Anxiety     Major depression, single episode 2013    Major depression, single episode 2013    Post partum depression        Past Surgical History:   Procedure Laterality Date    CHOLECYSTECTOMY  10/2012    COLONOSCOPY N/A 2020    Procedure: COLONOSCOPY;  Surgeon: Jas Moore MD;  Location: [facility] ([address] [place] [address]);  Service: Endoscopy;  Laterality: N/A;  Pt procedure time changed to  0800 with 0715 - second half prep completed from 1476-1664- Pt verbalized understanding- ERW2/13/20@1022    DILATION AND CURETTAGE OF UTERUS      ESOPHAGOGASTRODUODENOSCOPY N/A 7/20/2018    Procedure: ESOPHAGOGASTRODUODENOSCOPY (EGD);  Surgeon: Darwin Hansen MD;  Location: [facility] ([address] [place] [address]);  Service: Endoscopy;  Laterality: N/A;  Please measure pouch and limbs    GASTRIC BYPASS  2008    Revision August 2019    LAPAROSCOPIC REPAIR OF HIATAL HERNIA  2019    Liver scope  10/2012    LYSIS OF ADHESIONS         Family History Negative   Problem Relation Age of Onset     Socioeconomic History    Marital status: Single   Occupational History    Occupation: works as      Employer: [employer]     Employer: [employer]   Tobacco Use    Smoking status: Never Smoker    Smokeless tobacco: Never Used   Substance and Sexual Activity    Alcohol use: Never     Frequency: Never     Comment: Alcohol use rarely    Drug use: No     Comment: Mirena    Sexual activity: Yes     Partners: Male     Birth control/protection: IUD   Lifestyle    Physical activity:     Days per week: Not on file     Minutes per session: Not on file    Stress: Not at all   Social History Narrative    Born and raised in [place]    Went to [employer] and got BS in bio with minor in chem    1 child    Has boyfriend    A fewnot close friends    Likes to read and go to movies       Allergies:  Zofran [ondansetron hcl (pf)]    Medications:  Outpatient Encounter Medications as of 3/13/2020   Medication Sig Dispense Refill    buPROPion (WELLBUTRIN SR) 150 MG TBSR 12 hr tablet Take 1 tablet (150 mg total) by mouth 2 (two) times daily. 60 tablet 11    cyanocobalamin 1,000 mcg/mL injection ONE INJECTION AS DIRECTED EVERY 28 DAYS 4 mL 11    ferrous gluconate (FERGON) 324 MG tablet Take 1 tablet (324 mg total) by mouth daily with breakfast. 100 tablet 4    linaCLOtide (LINZESS) 145 mcg Cap capsule Take 1 capsule (145 mcg total) by mouth once  daily. 30 capsule 11    linaCLOtide (LINZESS) 290 mcg Cap capsule Take 1 capsule (290 mcg total) by mouth once daily. 30 capsule 11    phentermine (ADIPEX-P) 37.5 mg tablet 1/2 tab po bid prn appetite supression 30 tablet 2     No facility-administered encounter medications on file as of 3/13/2020.          PHYSICAL EXAMINATION:    The patient generally appears in good health, is appropriately interactive, and is in no apparent distress.    Skin: No lesions.    Mental: Cooperative with normal affect.    Neuro: Grossly intact.    HEENT: Normal. No evidence of lymphadenopathy.    Chest:  normal inspiratory effort.    Abdomen:  Soft, non-tender. No masses or organomegaly. Bladder is not palpable. No evidence of flank discomfort. No evidence of inguinal hernia.    Extremities: No clubbing, cyanosis, or edema    Normal external female genitalia  Urethral meatus is normal  Urethra and bladder are nontender to bimanual exam  Well supported anteriorly   Posteriorly, she has a rectocele, best identified by rectal exam.  The defect is low and does veer to the left side  Uterus and cervix are normal  No adnexal masses    LABS:    Lab Results   Component Value Date    BUN 11 01/10/2020    CREATININE 0.8 01/10/2020     UA 1.015, pH 6, + leuk, otherwise, negative    IMPRESSION:    Encounter Diagnoses   Name Primary?    Rectocele        PLAN:    1.  She thinks she is done having children but may want another.  Not presently in a relationship.  Discussed that posterior repair would help with the stool trapping but would not affect the bowel motility.  It is a quality of life issue so we discussed how surgery is performed and the recovery.  2.  She has an anal manometry scheduled next week with Dr. Moore.  My 's card was given should she decide she would like to go forward with the posterior colporrhaphy    Copy to: Dr. Jas Moore

## 2020-03-14 ENCOUNTER — OFFICE VISIT (OUTPATIENT)
Dept: URGENT CARE | Facility: CLINIC | Age: 35
End: 2020-03-14
Payer: COMMERCIAL

## 2020-03-14 VITALS
HEART RATE: 88 BPM | BODY MASS INDEX: 31.49 KG/M2 | HEIGHT: 67 IN | SYSTOLIC BLOOD PRESSURE: 99 MMHG | OXYGEN SATURATION: 100 % | RESPIRATION RATE: 20 BRPM | WEIGHT: 200.63 LBS | TEMPERATURE: 100 F | DIASTOLIC BLOOD PRESSURE: 72 MMHG

## 2020-03-14 DIAGNOSIS — R05.9 COUGH: Primary | ICD-10-CM

## 2020-03-14 LAB
CTP QC/QA: YES
FLUAV AG NPH QL: NEGATIVE
FLUBV AG NPH QL: NEGATIVE

## 2020-03-14 PROCEDURE — 99214 PR OFFICE/OUTPT VISIT, EST, LEVL IV, 30-39 MIN: ICD-10-PCS | Mod: S$GLB,,, | Performed by: NURSE PRACTITIONER

## 2020-03-14 PROCEDURE — 87804 INFLUENZA ASSAY W/OPTIC: CPT | Mod: QW,S$GLB,, | Performed by: NURSE PRACTITIONER

## 2020-03-14 PROCEDURE — 87804 POCT INFLUENZA A/B: ICD-10-PCS | Mod: QW,S$GLB,, | Performed by: NURSE PRACTITIONER

## 2020-03-14 PROCEDURE — 99214 OFFICE O/P EST MOD 30 MIN: CPT | Mod: S$GLB,,, | Performed by: NURSE PRACTITIONER

## 2020-03-14 PROCEDURE — 71046 XR CHEST PA AND LATERAL: ICD-10-PCS | Mod: S$GLB,,, | Performed by: RADIOLOGY

## 2020-03-14 PROCEDURE — 71046 X-RAY EXAM CHEST 2 VIEWS: CPT | Mod: S$GLB,,, | Performed by: RADIOLOGY

## 2020-03-14 NOTE — PATIENT INSTRUCTIONS
At this time you do not meet the criteria for covid19 testing.  If you develop new or worsening symptoms, I recommend doing tele health/[facility] care visit.  I recommend that as long dri of symptoms and or fever, staying home to prevent spread of infection.     - Rest.    - Drink plenty of fluids.      - Viral upper respiratory infections typically run their course in 10-14 days.      - Tylenol or Ibuprofen as directed as needed for fever/pain. Avoid tylenol if you have a history of liver disease. Do not take ibuprofen if you have a history of GI bleeding, kidney disease, or if you take blood thinners.      - You can take over-the-counter claritin, zyrtec, allegra, or xyzal as directed. These are antihistamines that can help with runny nose, nasal congestion, sneezing, and helps to dry up post-nasal drip, which usually causes sore throat and cough.              - If you do NOT have high blood pressure, you may use a decongestant form (D)  of this medication or if you do not take the D form, you can take sudafed       (pseudoephedrine) over the counter, which is a decongestant.     - You can use Flonase (fluticasone) nasal spray as directed for sinus congestion and postnasal drip. This is a steroid nasal spray that works locally over time to decrease the inflammation in your nose/sinuses and help with allergic symptoms. This is not an quick- relief spray like afrin, but it works well if used daily.  Discontinue if you develop nose bleed  - use nasal saline prior to Flonase.     - Use Ocean Spray Nasal Saline 1-3 puffs each nostril every 2-3 hours then blow out onto tissue. This is to irrigate the nasal passage way to clear the sinus openings. Use until sinus problem resolved.        - you can take plain Mucinex (guaifenesin) 1200 mg twice a day to help loosen mucous     -warm salt water gargles can help with sore throat     - warm tea with honey can help with cough. Honey is a natural cough suppressant.     -  Dextromethorphan (DM) is a cough suppressant over the counter (ie. mucinex DM, robitussin, delsym; dayquil/nyquil has DM as well.)     - Follow up with your PCP or specialty clinic as directed in the next 1-2 weeks if not improved or as needed.  You can call [phone] to schedule an appointment with the appropriate provider.       - Go to the ER if you develop new or worsening symptoms.      - You must understand that you have received an Urgent Care treatment only and that you may be released before all of your medical problems are known or treated.   - You, the patient, will arrange for follow up care as instructed.   - If your condition worsens or fails to improve we recommend that you receive another evaluation at the ER immediately or contact your PCP to discuss your concerns or return here.

## 2020-03-14 NOTE — PROGRESS NOTES
"Subjective:       Patient ID: Johanna Bullock is a 34 y.o. female.    Vitals:  height is 5' 7" (1.702 m) and weight is 91 kg (200 lb 9.9 oz). Her temperature is 99.8 °F (37.7 °C). Her blood pressure is 99/72 and her pulse is 88. Her respiration is 20 and oxygen saturation is 100%.     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 She was seen here a couple days ago with similar symptoms.     URI    This is a new problem. The current episode started in the past 7 days. The problem has been unchanged. Associated symptoms include coughing. Pertinent negatives include no chest pain, congestion, diarrhea, dysuria, headaches, nausea, rash, sore throat or vomiting. She has tried nothing for the symptoms. The treatment provided no relief.       Constitution: Negative for chills, fatigue and fever.   HENT: Negative for congestion and sore throat.    Neck: Negative for painful lymph nodes.   Cardiovascular: Negative for chest pain and leg swelling.   Eyes: Negative for double vision and blurred vision.   Respiratory: Positive for chest tightness and cough. Negative for shortness of breath.    Gastrointestinal: Negative for nausea, vomiting and diarrhea.   Genitourinary: Negative for dysuria, frequency, urgency and history of kidney stones.   Musculoskeletal: Negative for joint pain, joint swelling, muscle cramps and muscle ache.   Skin: Negative for color change, pale, rash and bruising.   Allergic/Immunologic: Negative for seasonal allergies.   Neurological: Negative for dizziness, history of vertigo, light-headedness, passing out and headaches.   Hematologic/Lymphatic: Negative for swollen lymph nodes.   Psychiatric/Behavioral: Negative for nervous/anxious, sleep disturbance and depression. The patient is not nervous/anxious.        Objective:      Due to declared State of Emergency regarding COVID-19, this visit was performed remotely with patient's " permission.     Xr Chest Pa And Lateral    Result Date: 3/14/2020  EXAMINATION: XR CHEST PA AND LATERAL CLINICAL HISTORY: Cough TECHNIQUE: PA and lateral views of the chest were performed. COMPARISON: Chest radiograph 8/30/16 FINDINGS: No detrimental change.The lungs are clear, with normal appearance of pulmonary vasculature and no pleural effusion or pneumothorax. The cardiac silhouette is normal in size. The hilar and mediastinal contours are unremarkable. Bones are intact.     Normal radiographic appearance of the chest. Electronically signed by: Nate Richardson MD Date:    03/14/2020 Time:    15:08      02/27/2020 Time:    15:15        Assessment:       1. Cough        Plan:         Cough  -     POCT Influenza A/B  -     XR CHEST PA AND LATERAL; Future; Expected date: 03/14/2020

## 2020-03-16 ENCOUNTER — HOSPITAL ENCOUNTER (EMERGENCY)
Facility: OTHER | Age: 35
Discharge: HOME | End: 2020-03-16
Attending: EMERGENCY MEDICINE
Payer: COMMERCIAL

## 2020-03-16 ENCOUNTER — NURSE TRIAGE (OUTPATIENT)
Dept: ADMINISTRATIVE | Facility: CLINIC | Age: 35
End: 2020-03-16

## 2020-03-16 VITALS
BODY MASS INDEX: 31.08 KG/M2 | WEIGHT: 198 LBS | HEART RATE: 78 BPM | OXYGEN SATURATION: 98 % | SYSTOLIC BLOOD PRESSURE: 132 MMHG | HEIGHT: 67 IN | TEMPERATURE: 99 F | DIASTOLIC BLOOD PRESSURE: 74 MMHG | RESPIRATION RATE: 18 BRPM

## 2020-03-16 DIAGNOSIS — R05.9 COUGH: ICD-10-CM

## 2020-03-16 DIAGNOSIS — B34.9 ACUTE VIRAL SYNDROME: ICD-10-CM

## 2020-03-16 DIAGNOSIS — R06.02 SOB (SHORTNESS OF BREATH): Primary | ICD-10-CM

## 2020-03-16 LAB
B-HCG UR QL: NEGATIVE
CTP QC/QA: YES

## 2020-03-16 PROCEDURE — 99284 EMERGENCY DEPT VISIT MOD MDM: CPT | Mod: 25

## 2020-03-16 PROCEDURE — 63600175 PHARM REV CODE 636 W HCPCS: Performed by: EMERGENCY MEDICINE

## 2020-03-16 PROCEDURE — 96372 THER/PROPH/DIAG INJ SC/IM: CPT

## 2020-03-16 PROCEDURE — U0002 COVID-19 LAB TEST NON-CDC: HCPCS

## 2020-03-16 PROCEDURE — 25000242 PHARM REV CODE 250 ALT 637 W/ HCPCS: Performed by: EMERGENCY MEDICINE

## 2020-03-16 PROCEDURE — 81025 URINE PREGNANCY TEST: CPT | Performed by: EMERGENCY MEDICINE

## 2020-03-16 PROCEDURE — 25000003 PHARM REV CODE 250: Performed by: EMERGENCY MEDICINE

## 2020-03-16 RX ORDER — DEXAMETHASONE SODIUM PHOSPHATE 4 MG/ML
12 INJECTION, SOLUTION INTRA-ARTICULAR; INTRALESIONAL; INTRAMUSCULAR; INTRAVENOUS; SOFT TISSUE
Status: COMPLETED | OUTPATIENT
Start: 2020-03-16 | End: 2020-03-16

## 2020-03-16 RX ORDER — ALBUTEROL SULFATE 90 UG/1
2 AEROSOL, METERED RESPIRATORY (INHALATION) EVERY 8 HOURS
Status: DISCONTINUED | OUTPATIENT
Start: 2020-03-16 | End: 2020-03-16 | Stop reason: HOSPADM

## 2020-03-16 RX ORDER — ACETAMINOPHEN 500 MG
1000 TABLET ORAL
Status: COMPLETED | OUTPATIENT
Start: 2020-03-16 | End: 2020-03-16

## 2020-03-16 RX ORDER — ALBUTEROL SULFATE 90 UG/1
1-2 AEROSOL, METERED RESPIRATORY (INHALATION) EVERY 6 HOURS PRN
Qty: 6.7 G | Refills: 0 | Status: SHIPPED | OUTPATIENT
Start: 2020-03-16 | End: 2024-01-09

## 2020-03-16 RX ORDER — BENZONATATE 100 MG/1
100 CAPSULE ORAL 3 TIMES DAILY PRN
Qty: 20 CAPSULE | Refills: 0 | Status: SHIPPED | OUTPATIENT
Start: 2020-03-16 | End: 2020-03-26

## 2020-03-16 RX ORDER — ALBUTEROL SULFATE 90 UG/1
2 AEROSOL, METERED RESPIRATORY (INHALATION) EVERY 8 HOURS
Status: DISCONTINUED | OUTPATIENT
Start: 2020-03-16 | End: 2020-03-16

## 2020-03-16 RX ORDER — METHYLPREDNISOLONE 4 MG/1
TABLET ORAL
Qty: 1 PACKAGE | Refills: 0 | Status: SHIPPED | OUTPATIENT
Start: 2020-03-16 | End: 2020-04-06

## 2020-03-16 RX ADMIN — DEXAMETHASONE SODIUM PHOSPHATE 12 MG: 4 INJECTION, SOLUTION INTRAMUSCULAR; INTRAVENOUS at 12:03

## 2020-03-16 RX ADMIN — ALBUTEROL SULFATE 2 PUFF: 90 AEROSOL, METERED RESPIRATORY (INHALATION) at 12:03

## 2020-03-16 RX ADMIN — ACETAMINOPHEN 1000 MG: 500 TABLET ORAL at 12:03

## 2020-03-16 NOTE — ED PROVIDER NOTES
Encounter Date: 3/16/2020    SCRIBE #1 NOTE: I, Sharron Sanchez, am scribing for, and in the presence of, Dr. Fernando.       History     Chief Complaint   Patient presents with    Shortness of Breath     w/ flu like symptoms x 6 days. anterior and posterior chest pain w/ deep inspiration. reports negative flu test      This is a 34 y.o. female who presents with complaint of one week of cough. She has had increasing shortness of breath over the last three days. Positive sick contact at her son's school. She took her son to the clinic three days ago and now has shortness of breath. She went to Urgent Care two days ago and was diagnosed with URI. She did not have Covid testing performed and Influenza was negative at that time. This is the extent of the patient's complaints at this time.    The history is provided by the patient.     Review of patient's allergies indicates:   Allergen Reactions    Zofran [ondansetron hcl (pf)] Rash     Past Medical History:   Diagnosis Date    Adjustment disorder with mixed anxiety and depressed mood 2/6/2013    Anemia, B12 deficiency     Anxiety     Major depression, single episode 2/6/2013    Major depression, single episode 2/6/2013    Post partum depression      Past Surgical History:   Procedure Laterality Date    CHOLECYSTECTOMY  10/2012    COLONOSCOPY N/A 2/14/2020    Procedure: COLONOSCOPY;  Surgeon: Jas Moore MD;  Location: [facility] ([address] [place] [address]);  Service: Endoscopy;  Laterality: N/A;  Pt procedure time changed to 0800 with 0715 - second half prep completed from [id number]- Pt verbalized understanding- ERW2/13/20@1022    DILATION AND CURETTAGE OF UTERUS      ESOPHAGOGASTRODUODENOSCOPY N/A 7/20/2018    Procedure: ESOPHAGOGASTRODUODENOSCOPY (EGD);  Surgeon: Darwin Hansen MD;  Location: [facility] ([address] [place] [address]);  Service: Endoscopy;  Laterality: N/A;  Please measure pouch and limbs    GASTRIC BYPASS  2008    Revision August 2019    LAPAROSCOPIC REPAIR OF  HIATAL HERNIA  2019    Liver scope  10/2012    LYSIS OF ADHESIONS       Family History   Problem Relation Age of Onset    Breast cancer Neg Hx     Colon cancer Neg Hx     Ovarian cancer Neg Hx     Diabetes Neg Hx     Hypertension Neg Hx     Stroke Neg Hx      Social History     Tobacco Use    Smoking status: Never Smoker    Smokeless tobacco: Never Used   Substance Use Topics    Alcohol use: Never     Frequency: Never     Comment: Alcohol use rarely    Drug use: No     Comment: Mirena     Review of Systems   Constitutional: Negative for fever.   HENT: Negative for sore throat.    Respiratory: Positive for cough and shortness of breath.    Cardiovascular: Negative for chest pain.   Gastrointestinal: Negative for nausea.   Genitourinary: Negative for dysuria.   Musculoskeletal: Negative for back pain.   Skin: Negative for rash.   Neurological: Negative for weakness.   Hematological: Does not bruise/bleed easily.       Physical Exam     Initial Vitals [03/16/20 1043]   BP Pulse Resp Temp SpO2   109/70 88 18 98.9 °F (37.2 °C) 98 %      MAP       --         Physical Exam    Nursing note and vitals reviewed.  Constitutional: She appears well-developed and well-nourished. She is not diaphoretic. No distress.   HENT:   Head: Normocephalic and atraumatic.   Eyes: EOM are normal. Pupils are equal, round, and reactive to light.   Neck: Normal range of motion. Neck supple.   Cardiovascular: Normal rate, regular rhythm and normal heart sounds.   Pulmonary/Chest: No respiratory distress. She has no wheezes.   Mild conversational dyspnea. Mild increased work of breathing. Diminished breath sounds bilaterally. Prolonged expirations present.   Musculoskeletal: Normal range of motion. She exhibits no edema or tenderness.   Neurological: She is alert and oriented to person, place, and time.   Skin: Skin is warm and dry.   Psychiatric: She has a normal mood and affect.         ED Course   Procedures  Labs Reviewed    SARS-COV-2 (COVID-19) QUALITATIVE PCR   POCT URINE PREGNANCY          Imaging Results          X-Ray Chest 1 View (Final result)  Result time 03/16/20 12:10:00    Final result by Bob Ramachandran MD (03/16/20 12:10:00)                 Impression:      No acute chest disease identified.      Electronically signed by: Bob Ramachandran MD  Date:    03/16/2020  Time:    12:10             Narrative:    EXAMINATION:  XR CHEST 1 VIEW    CLINICAL HISTORY:  Cough    TECHNIQUE:  Single frontal view of the chest was performed.    COMPARISON:  03/14/2020.    FINDINGS:  The heart is not enlarged.  Superior mediastinal structures are unremarkable.  Pulmonary vasculature is within normal limits.  The lungs are free of focal consolidations.  There is no evidence for pneumothorax or large pleural effusions.  Bony structures are grossly intact.                              X-Rays:   Independently Interpreted Readings:   Chest X-Ray: Trachea midline. No cardiomegaly. No pleural effusion. No pneumothorax.     Medical Decision Making:   History:   Old Medical Records: I decided to obtain old medical records.  Initial Assessment:   33 yo F here with cough and sob x 5 days, + likely covid exposures. Pt seen at Urgent Care recently with neg Influenza. Pt denies sputum production. On exam pt has mild conversational dyspnea, VS afebrile.  Chest x-ray reassuring, and patient not hypoxic, though tested for covid given her symptoms and exposures, encouraged home isolation and supportive care education, strict return precautions discussed.  DC home with albuterolNellaon  Independently Interpreted Test(s):   I have ordered and independently interpreted X-rays - see prior notes.  Clinical Tests:   Lab Tests: Ordered and Reviewed  Radiological Study: Ordered and Reviewed            Scribe Attestation:   Scribe #1: I performed the above scribed service and the documentation accurately describes the services I performed. I attest to the accuracy of  the note.    Attending Attestation:           Physician Attestation for Scribe:  Physician Attestation Statement for Scribe #1: I, Dr. Fernando, reviewed documentation, as scribed by Sharron Sanchez in my presence, and it is both accurate and complete.                               Clinical Impression:     1. SOB (shortness of breath)    2. Cough    3. Acute viral syndrome          Disposition:   Disposition: Discharged  Condition: Fair     ED Disposition Condition    Discharge Stable        ED Prescriptions     Medication Sig Dispense Start Date End Date Auth. Provider    albuterol (PROVENTIL/VENTOLIN HFA) 90 mcg/actuation inhaler Inhale 1-2 puffs into the lungs every 6 (six) hours as needed for Wheezing. 6.7 g 3/16/2020 3/16/2021 Yael Fernando MD    benzonatate (TESSALON) 100 MG capsule Take 1 capsule (100 mg total) by mouth 3 (three) times daily as needed for Cough. 20 capsule 3/16/2020 3/26/2020 Yael Fernando MD    methylPREDNISolone (MEDROL DOSEPACK) 4 mg tablet Take as directed 1 Package 3/16/2020 4/6/2020 Yael Fernando MD        Follow-up Information     Follow up With Specialties Details Why Contact Info    Iman Mora MD Family Medicine, Wound Care Schedule an appointment as soon as possible for a visit   [address] [facility]  [place] [id number] [phone]                                       Yael Fernando MD  03/16/20 0091

## 2020-03-16 NOTE — TELEPHONE ENCOUNTER
Temp not > 100 has been taking tylenol  Went to [facility] on Saturday. That was her second time going. On Thursday was diagnosed with URI.     Reason for Disposition   [1] Difficulty breathing (shortness of breath) occurs AND [2] onset > 14 days after CORONAVIRUS EXPOSURE (Close Contact)   [1] Any difficulty breathing occurs AND [2] within 14 days of novel CORONAVIRUS exposure to confirmed case or PUI (person under investigation)    Additional Information   Negative: Severe difficulty breathing (e.g., struggling for each breath, can only speak in single words, bluish lips)   Negative: Sounds like a life-threatening emergency to the triager   Negative: [1] Difficulty breathing (or shortness of breath) occurs AND [2] > 14 days after novel CORONAVIRUS exposure (Close Contact)   Negative: [1] Cough occurs AND [2] > 14 days after novel CORONAVIRUS exposure   Negative: [1] Common cold symptoms AND [2] > 14 days after novel CORONAVIRUS exposure    Protocols used: CORONAVIRUS (2019-NCOV) EXPOSURE-A-, CORONAVIRUS (2019-NCOV) EXPOSURE-P-

## 2020-03-18 ENCOUNTER — NURSE TRIAGE (OUTPATIENT)
Dept: ADMINISTRATIVE | Facility: CLINIC | Age: 35
End: 2020-03-18

## 2020-03-18 NOTE — TELEPHONE ENCOUNTER
Monday-presumptive positive of COVID-19. Patient states that she is feeling worse with weakness, SOB, and patient states little by little she cannot eat- burns when she eats. Abdominal Pain. Advised that if she feels dehydrated she should try to drink plenty of fluids but if she feels SOB then she should go to the ER; inform them she is a presumptive positive and wear a mask. Patient unsure what she is going to do.     Reason for Disposition   Health Information question, no triage required and triager able to answer question    Protocols used: ST INFORMATION ONLY CALL-A-AH

## 2020-03-18 NOTE — TELEPHONE ENCOUNTER
I agree with advice below.  She needs to take tylenol for fever and body aches.   She needs oral hydration. It's really important that she hydrates herself.   She should self quarantine and try to stay at home and self treat.   However, if she becomes short of breath, go to ER and advise them that she has covid testing pending.

## 2020-03-18 NOTE — TELEPHONE ENCOUNTER
States she is presumptive positive for COVID-19, tested on Monday. Was also given a steroid shot and inhaler on Monday. States she started to feel better on Tuesday, but today feels  weak, have aches and pain. Questions if she should take the prescribed Steroids she was given on Monday. Advised per chart orders for Medrol Dose pack to start on 3/16/20. Understanding verbalized.    Reason for Disposition   Health Information question, no triage required and triager able to answer question    Protocols used: ST INFORMATION ONLY CALL-A-AH

## 2020-03-19 ENCOUNTER — TELEPHONE (OUTPATIENT)
Dept: SURGERY | Facility: CLINIC | Age: 35
End: 2020-03-19

## 2020-03-19 NOTE — TELEPHONE ENCOUNTER
Called patient and no answer a message was left for her to call us back at the clinic regarding previous message.     Doctors message:    I agree with advice below.  She needs to take tylenol for fever and body aches.   She needs oral hydration. It's really important that she hydrates herself.   She should self quarantine and try to stay at home and self treat.   However, if she becomes short of breath, go to ER and advise them that she has covid testing pending.

## 2020-03-19 NOTE — TELEPHONE ENCOUNTER
Pt reports presumptive positive for covid 19, experiencing GI issues diarrhea and constipation,reports burning sensation in stomach,  wants to know if there is anything she can take, reports starting linzess    Spoke with DR Moore, instructed pt to take pepto bismal.  Pt verbalized understanding

## 2020-03-19 NOTE — TELEPHONE ENCOUNTER
----- Message from Mckenzie Henriquez sent at 3/19/2020  1:35 PM CDT -----  Contact: [phone]  Calling to speak with nurse regarding plan of care during covid-19 crisis. Please call

## 2020-03-23 ENCOUNTER — NURSE TRIAGE (OUTPATIENT)
Dept: ADMINISTRATIVE | Facility: CLINIC | Age: 35
End: 2020-03-23

## 2020-03-23 NOTE — TELEPHONE ENCOUNTER
Her COVID-19 swab was done last Monday, hasn't gotten a result given to her yet, very frustrated with the situation.  She now has diarrhea and indigestion, nausea, burning sensation in her throat.  Has been taking mylanta.  Advised chloreseptic spray or painting the throat with mylanta to help with burning, bland starchy soft diet and loperamide for diarrhea, increase fluids or drink fluids with electrolytes to stave off dehydration.    Reason for Disposition   MILD-MODERATE diarrhea (e.g., 1-6 times / day more than normal)    Protocols used: DIARRHEA-A-OH

## 2020-03-26 ENCOUNTER — NURSE TRIAGE (OUTPATIENT)
Dept: ADMINISTRATIVE | Facility: CLINIC | Age: 35
End: 2020-03-26

## 2020-03-26 DIAGNOSIS — B34.9 ACUTE VIRAL SYNDROME: Primary | ICD-10-CM

## 2020-03-26 NOTE — TELEPHONE ENCOUNTER
"Pt states that she seen her provider on 03/12/20 and was diagnosed with bronchitis but wasn't tested for COVID-19 because she wasn't experiencing the cardinal symptoms at that time. Pt states that she prescribed steroids and that they were making her feel better and that everyday since then she "continues to decline." Pt states that she went [facility] on Monday and that her provider tested her for COVID-19. Pt states that she "really doesn't have a cough," has lost her sense of smell and taste and is having back pain upon inhalation. Pt denies active distress; negative for dyspnea, stridor, wheezing or chest pain. Pt is wondering if she might need more steroids; informed pt that would be a decision for her provider. Care advice given and instructed pt that the disposition is to call her PCP tomorrow or be seen by an [facility] provider tonight; pt states that she will call her PCP in the AM. Pt states that her toddler is in the home with her and instructed pt to consider self as infectious and to follow social distancing, vigorous handwashing, cover cough and sneezes, wipe down cell phone several times daily with an antibacterial wipe, and quarantine techniques; pt voiced verbal understanding and agrees with information. Instructed pt to call [facility] back for further assistance if needed or if symptoms worsened and call 911 or all emergenices; pt voiced verbal understanding and agrees with goals.  Reason for Disposition   [1] Fever or feeling feverish AND [2] within 14 Days of COVID-19 EXPOSURE (Close Contact)    Additional Information   Negative: Severe difficulty breathing (e.g., struggling for each breath, speak in single words, bluish lips)   Negative: Sounds like a life-threatening emergency to the triager   Negative: [1] Difficulty breathing (shortness of breath) occurs AND [2] onset > 14 days after COVID-19 EXPOSURE (Close Contact)   Negative: [1] Dry cough occurs AND [2] onset > 14 days after COVID-19 " EXPOSURE   Negative: [1] Wet cough (i.e., white-yellow, yellow, green, or nicki colored sputum) AND [2] onset > 14 days after COVID-19 EXPOSURE   Negative: [1] Common cold symptoms AND [2] onset > 14 days after COVID-19 EXPOSURE   Negative: [1] Difficulty breathing occurs AND [2] within 14 days of COVID-19 EXPOSURE (Close Contact)   Negative: Patient sounds very sick or weak to the triager    Protocols used: CORONAVIRUS (COVID-19) EXPOSURE-A-AH

## 2020-03-27 ENCOUNTER — NURSE TRIAGE (OUTPATIENT)
Dept: ADMINISTRATIVE | Facility: CLINIC | Age: 35
End: 2020-03-27

## 2020-03-27 LAB
FINAL PATHOLOGIC DIAGNOSIS: NORMAL
Lab: NORMAL

## 2020-03-27 NOTE — TELEPHONE ENCOUNTER
Spoke with patient and informed her that she was set up for the 14 day monitor system. I also informed her of the doctors message to continue self quarantine, hand hygiene and monitor symptoms.

## 2020-03-27 NOTE — TELEPHONE ENCOUNTER
Reviewed note from RN.   Please contact Patient   She is doing what she needs to do.   Please continue to self quarantine and continue hand hygiene   Continue to monitor for symptoms.   I will order nurse monitoring for covid symptoms.

## 2020-03-29 ENCOUNTER — NURSE TRIAGE (OUTPATIENT)
Dept: ADMINISTRATIVE | Facility: CLINIC | Age: 35
End: 2020-03-29

## 2020-03-29 NOTE — TELEPHONE ENCOUNTER
Tested march 16- still not with results  Tested [facility]   Would like to get retested     Had direct contact 3-   feeling better today   She feels that she has had it for >14 days   SOB still with activity. And from sitting to standing she has dizziness and standing up while she gets dizziness   Completed medrol pack 4 days ago    teacher at school.      Reason for Disposition   Cough occurs and onset > 14 days after COVID-19 EXPOSURE   Cough occurs within 14 days of COVID-19 EXPOSURE    Additional Information   Negative: Severe difficulty breathing (e.g., struggling for each breath, speak in single words, bluish lips)   Negative: Sounds like a life-threatening emergency to the triager    Protocols used: CORONAVIRUS (COVID-19) EXPOSURE-A-OH

## 2020-03-31 ENCOUNTER — NURSE TRIAGE (OUTPATIENT)
Dept: ADMINISTRATIVE | Facility: CLINIC | Age: 35
End: 2020-03-31

## 2020-03-31 ENCOUNTER — OFFICE VISIT (OUTPATIENT)
Dept: FAMILY MEDICINE | Facility: CLINIC | Age: 35
End: 2020-03-31
Payer: COMMERCIAL

## 2020-03-31 DIAGNOSIS — Z98.84 HISTORY OF BARIATRIC SURGERY: Primary | ICD-10-CM

## 2020-03-31 DIAGNOSIS — E66.01 SEVERE OBESITY (BMI 35.0-35.9 WITH COMORBIDITY): ICD-10-CM

## 2020-03-31 DIAGNOSIS — Z20.822 SUSPECTED COVID-19 VIRUS INFECTION: ICD-10-CM

## 2020-03-31 PROCEDURE — 99214 OFFICE O/P EST MOD 30 MIN: CPT | Mod: 95,,, | Performed by: FAMILY MEDICINE

## 2020-03-31 PROCEDURE — 99214 PR OFFICE/OUTPT VISIT, EST, LEVL IV, 30-39 MIN: ICD-10-PCS | Mod: 95,,, | Performed by: FAMILY MEDICINE

## 2020-03-31 RX ORDER — PHENTERMINE HYDROCHLORIDE 37.5 MG/1
TABLET ORAL
Qty: 30 TABLET | Refills: 0 | Status: SHIPPED | OUTPATIENT
Start: 2020-03-31 | End: 2020-04-28

## 2020-03-31 NOTE — PROGRESS NOTES
Telemedicine Office Visit    Patient Name: Johanna Bullock    : 1985  MRN: [id number]    Subjective:  Johanna is a 34 y.o. female who presents through telemedicine for:     The patient location is: [place], [facility]   The chief complaint leading to consultation is: covid 19  Visit type: Virtual visit with synchronous audio and video  Total time spent with patient: 18 minutes   Each patient to whom he or she provides medical services by telemedicine is:  (1) informed of the relationship between the physician and patient and the respective role of any other health care provider with respect to management of the patient; and (2) notified that he or she may decline to receive medical services by telemedicine and may withdraw from such care at any time.    HPI:     Started 1 week ago. Symptoms have been gradually worsening since that time. The cough is nonproductive and is aggravated by reclining position. Associated symptoms include: shortness of breath and body aches. Patient does not have a history of asthma. Patient does not have a history of smoking. Patient does have a history of environmental allergens. Patient has not traveled recently.   She did come in contact with someone with covid 19. 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 Patient has tried albuterol with some relief of symptoms. Sick contacts include: other teachers     She was tested 16th for covid 19. She has not received her results.       Review of Systems   Constitutional: Negative for chills and fever.   HENT: Positive for congestion.    Eyes: Negative for blurred vision.   Respiratory: Positive for cough.    Cardiovascular: Negative for chest pain.   Gastrointestinal: Negative for abdominal pain, constipation, diarrhea, heartburn, nausea and vomiting.   Genitourinary: Negative for dysuria.   Musculoskeletal: Positive for myalgias.   Skin: Negative for itching and rash.   Neurological: Negative for dizziness and  headaches.   Psychiatric/Behavioral: Negative for depression.       Active Problem List  Patient Active Problem List   Diagnosis    History of gastric bypass    Vitamin B12 deficiency    Iron deficiency    Severe obesity (BMI 35.0-35.9 with comorbidity)    History of peptic ulcer    History of bariatric surgery    Screening for malignant neoplasm of colon       Past Surgical History  Past Surgical History:   Procedure Laterality Date    CHOLECYSTECTOMY  10/2012    COLONOSCOPY N/A 2/14/2020    Procedure: COLONOSCOPY;  Surgeon: Jas Moore MD;  Location: [facility] ENDO (4TH FLR);  Service: Endoscopy;  Laterality: N/A;  Pt procedure time changed to 0800 with 0715 - second half prep completed from [id number]- Pt verbalized understanding- ERW2/13/20@1022    DILATION AND CURETTAGE OF UTERUS      ESOPHAGOGASTRODUODENOSCOPY N/A 7/20/2018    Procedure: ESOPHAGOGASTRODUODENOSCOPY (EGD);  Surgeon: Darwin Hansen MD;  Location: [facility] sickweather (4TH FLR);  Service: Endoscopy;  Laterality: N/A;  Please measure pouch and limbs    GASTRIC BYPASS  2008    Revision August 2019    LAPAROSCOPIC REPAIR OF HIATAL HERNIA  2019    Liver scope  10/2012    LYSIS OF ADHESIONS         Family History  Family History   Problem Relation Age of Onset    Breast cancer Neg Hx     Colon cancer Neg Hx     Ovarian cancer Neg Hx     Diabetes Neg Hx     Hypertension Neg Hx     Stroke Neg Hx        Social History  Social History     Socioeconomic History    Marital status: Single     Spouse name: Not on file    Number of children: Not on file    Years of education: Not on file    Highest education level: Not on file   Occupational History    Occupation: works as      Employer: [employer]     Employer: [employer]   Social Needs    Financial resource strain: Not on file    Food insecurity:     Worry: Not on file     Inability: Not on file    Transportation needs:     Medical: Not on file     Non-medical: Not on  file   Tobacco Use    Smoking status: Never Smoker    Smokeless tobacco: Never Used   Substance and Sexual Activity    Alcohol use: Never     Frequency: Never     Comment: Alcohol use rarely    Drug use: No     Comment: Mirena    Sexual activity: Yes     Partners: Male     Birth control/protection: IUD   Lifestyle    Physical activity:     Days per week: Not on file     Minutes per session: Not on file    Stress: Not at all   Relationships    Social connections:     Talks on phone: Not on file     Gets together: Not on file     Attends Mormonism service: Not on file     Active member of club or organization: Not on file     Attends meetings of clubs or organizations: Not on file     Relationship status: Not on file   Other Topics Concern    Caffeine Use Not Asked    Financial Status: Disabled Not Asked    Legal: Involved in criminal litigation Not Asked    Caffeine Use: Frequent Not Asked    Financial Status: Employed Not Asked    Legal: Other Not Asked    Caffeine Use: Moderate Not Asked    Financial Status: Unemployed Not Asked    Leisure: Exercise Not Asked    Caffeine Use: Substantial Not Asked    Financial Status: Other Not Asked    Leisure: Fishing Not Asked    Childhood History: Adopted Not Asked    Firearms: Does patient have access to a firearm? Not Asked    Leisure: Hunting Not Asked    Childhood History: Early trauma Not Asked    Home situation: homeless Not Asked    Leisure: Movie Watching Not Asked    Childhood History: Raised by parents Not Asked    Home situation: lives alone Not Asked    Leisure: Shopping Not Asked    Childhood History: Uneventful Not Asked    Home situation: lives in group home Not Asked    Leisure: Sports Not Asked    Childhood History: Other Not Asked    Home situation: lives in nursing home Not Asked    Leisure: Time with family Not Asked    Education: Unfinished High School Not Asked    Home situation: lives in shelter Not Asked      Service Not Asked    Education: High School Graduate Not Asked    Home situation: lives with family Not Asked    Spirituality: Active Participation Not Asked    Education: Unfinished college Not Asked    Home situation: lives with friends Not Asked    Spirituality: Organized Christian Not Asked    Education: Trade School Not Asked    Home situation: lives with significant other Not Asked    Spirituality: Private Participation Not Asked    Education: Associate's Degree Not Asked    Home situation: lives with spouse Not Asked    Patient feels they ought to cut down on drinking/drug use Not Asked    Education: Bachelor's Degree Not Asked    Legal consequences of chemical use Not Asked    Patient annoyed by others criticizing their drinking/drug use Not Asked    Education: More than one Bachelor's or Professional Not Asked    Legal: Arrest history Not Asked    Patient has felt bad or guilty about drinking/drug use Not Asked    Education: Master's, PhD Not Asked    Legal: Involved in civil litigation Not Asked    Patient has had a drink/used drugs as an eye opener in the AM Not Asked   Social History Narrative    Born [place] raised in [place] [facility]    Went to [employer] and got BS in bio with minor in chem    1 child    Has boyfriend    A fewnot close friends    Likes to read and go to movies       Medications and Allergies  Reviewed and updated.       Physical Exam  LMP 03/02/2020 (Exact Date)   Physical Exam   Constitutional: She is oriented to person, place, and time. She appears well-developed and well-nourished.   HENT:   Head: Normocephalic and atraumatic.   Eyes: Pupils are equal, round, and reactive to light. Conjunctivae and EOM are normal.   Neck: Normal range of motion. Neck supple.   Cardiovascular: Normal rate, regular rhythm and normal heart sounds. Exam reveals no gallop and no friction rub.   No murmur heard.  Pulmonary/Chest: Breath sounds normal. No respiratory distress.   Abdominal: Soft. Bowel  sounds are normal. She exhibits no distension. There is no tenderness.   Musculoskeletal: Normal range of motion.   Lymphadenopathy:     She has no cervical adenopathy.   Neurological: She is alert and oriented to person, place, and time.   Skin: Skin is warm.   Psychiatric: She has a normal mood and affect.         Assessment/Plan:  Johanna Bullock is a 34 y.o. female who presents today for :    History of bariatric surgery / Severe obesity (BMI 35.0-35.9 with comorbidity)  -     phentermine (ADIPEX-P) 37.5 mg tablet; 1/2 tab po bid prn appetite supression  Dispense: 30 tablet; Refill: 0    Suspected Covid-19 Virus Infection  Patient is being monitored for symptoms  Recommend to self quarantine   Recommend conservative care and supportive treatment  Continue tylenol for body aches   Reassurance   Do not visit older family members            Follow up in about 8 weeks (around 5/26/2020), or if symptoms worsen or fail to improve.

## 2020-03-31 NOTE — TELEPHONE ENCOUNTER
Reason for Disposition   Message left on unidentified voice mail.  Phone number verified.    Additional Information   Negative: Caller is angry or rude (e.g., hangs up, verbally abusive, yelling)   Negative: Caller hangs up   Negative: Caller has already spoken with the PCP and has no further questions.   Negative: Caller has already spoken with another triager and has no further questions.   Negative: Caller has already spoken with another triager or PCP AND has further questions AND triager able to answer questions.   Negative: Busy signal.  First attempt to contact caller.  Follow-up call scheduled within 15 minutes.   Negative: No answer.  First attempt to contact caller.  Follow-up call scheduled within 15 minutes.   Negative: Message left on identified voice mail    Protocols used: NO CONTACT OR DUPLICATE CONTACT CALL-A-

## 2020-04-02 ENCOUNTER — PATIENT MESSAGE (OUTPATIENT)
Dept: FAMILY MEDICINE | Facility: CLINIC | Age: 35
End: 2020-04-02

## 2020-04-02 ENCOUNTER — TELEPHONE (OUTPATIENT)
Dept: FAMILY MEDICINE | Facility: CLINIC | Age: 35
End: 2020-04-02

## 2020-04-02 LAB — SARS-COV-2 RNA RESP QL NAA+PROBE: NORMAL

## 2020-04-02 NOTE — TELEPHONE ENCOUNTER
Please contact patient   The lab just contacted us stating that the sample that was taken for her covid19 had leaked. They were unable to complete the test.   At this time, she does not meet criteria for testing. She should monitor her symptoms and continue social distancing.

## 2020-04-02 NOTE — TELEPHONE ENCOUNTER
Called patient and no answer a message was left for her to call us back at the clinic regarding previous message.   Doctors message:  Iman Moar MD 3 minutes ago (2:13 PM)         Please contact patient   The lab just contacted us stating that the sample that was taken for her covid19 had leaked. They were unable to complete the test.   At this time, she does not meet criteria for testing. She should monitor her symptoms and continue social distancing.          Documentation

## 2020-04-03 ENCOUNTER — TELEPHONE (OUTPATIENT)
Dept: EMERGENCY MEDICINE | Facility: OTHER | Age: 35
End: 2020-04-03

## 2020-04-03 ENCOUNTER — HOSPITAL ENCOUNTER (EMERGENCY)
Facility: OTHER | Age: 35
Discharge: HOME | End: 2020-04-03
Attending: EMERGENCY MEDICINE

## 2020-04-03 VITALS
OXYGEN SATURATION: 100 % | SYSTOLIC BLOOD PRESSURE: 106 MMHG | RESPIRATION RATE: 16 BRPM | DIASTOLIC BLOOD PRESSURE: 67 MMHG | TEMPERATURE: 99 F | HEART RATE: 84 BPM

## 2020-04-03 DIAGNOSIS — B34.9 ACUTE VIRAL SYNDROME: Primary | ICD-10-CM

## 2020-04-03 DIAGNOSIS — Z20.822 SUSPECTED COVID-19 VIRUS INFECTION: Primary | ICD-10-CM

## 2020-04-03 LAB — SARS-COV-2 RNA AMPLIFICATION, QUAL: NEGATIVE

## 2020-04-03 PROCEDURE — U0002 COVID-19 LAB TEST NON-CDC: HCPCS

## 2020-04-03 PROCEDURE — 99283 EMERGENCY DEPT VISIT LOW MDM: CPT

## 2020-04-03 NOTE — ED PROVIDER NOTES
CHIEF COMPLAINT:   Chief Complaint   Patient presents with    Shortness of Breath     was covid tested beg. march, requesting to be tested, did not get her results back, + reports int SOB. denies other symptoms.        HISTORY OF PRESENT ILLNESS: Johanna Bullock who is a 34 y.o. female with PMH of B12 deficiency, major depression, anxiety and adjustment disorder presents to the emergency department today with complaint of continued shortness of breath and fatigue.  She states symptoms have been present for approximately 2 weeks.  She was seen in this ED on 03/14 and had a COVID swab completed at that time.  Per chart this test was not completed as in transport the specimen spilt and hence they did not have a appropriate specimen to run the test.  Patient was notified of this by myself via phone and was instructed to come to the ED for testing.  She denies any additional complaints.    REVIEW OF SYSTEMS:  Constitutional: + fatigue, No fever, no chills  Eyes: No discharge. No pain.  HENT: no nasal congestion; No sore throat.   Cardiovascular: No chest pain, no palpitations.  Respiratory:  No cough, +shortness of breath.  Gastrointestinal:  No nausea, no diarrhea; No abdominal pain, no vomiting.   Genitourinary: No hematuria, dysuria, urgency.  Musculoskeletal: No back pain.  Skin: No rashes, no lesions.  Neurological: No headache, no focal weakness.    Otherwise remaining ROS negative     ALLERGIES REVIEWED  MEDICATIONS REVIEWED  PMH/PSH/SOC/FH REVIEWED     The history is provided by the patient.    Nursing/Ancillary staff note reviewed.        PHYSICAL EXAM:  VS reviewed  Vitals:    04/03/20 1243   BP: 106/67   Pulse: 84   Resp: 16   Temp: 99 °F (37.2 °C)       General Appearance: The patient is alert, has no immediate or signs of toxicity. No acute distress. .   HEENT: Eyes: Pupils equal; Extra ocular movements intact. No drainage.   Neck:Neck is supple non-tender. No lymphadenopathy. No stridor.    Respiratory: There are no retractions, lungs are clear to auscultation. No wheezing, no crackles. Chest wall nontender to palpation.   Cardiovascular: Regular rate and rhythm. No murmurs, rubs or gallops.  Gastrointestinal:  Abdomen is soft and non-tender, No guarding, no rebound.  No pulsatile mass.   Neurological: Alert and oriented x 4. No focal weakness. Strength intact 5/5 bilaterally in upper and lower extremities.   Skin: Warm and dry, no rashes.  Musculoskeletal: Extremities are non-tender, non-swollen and have full range of motion.      Past Medical History:   Diagnosis Date    Adjustment disorder with mixed anxiety and depressed mood 2/6/2013    Anemia, B12 deficiency     Anxiety     Major depression, single episode 2/6/2013    Major depression, single episode 2/6/2013    Post partum depression          Past Surgical History:   Procedure Laterality Date    CHOLECYSTECTOMY  10/2012    COLONOSCOPY N/A 2/14/2020    Procedure: COLONOSCOPY;  Surgeon: Jas Moore MD;  Location: [facility] ([address] [place] [address]);  Service: Endoscopy;  Laterality: N/A;  Pt procedure time changed to 0800 with 0715 - second half prep completed from [id number]- Pt verbalized understanding- ERW2/13/20@1022    DILATION AND CURETTAGE OF UTERUS      ESOPHAGOGASTRODUODENOSCOPY N/A 7/20/2018    Procedure: ESOPHAGOGASTRODUODENOSCOPY (EGD);  Surgeon: Darwin Hansen MD;  Location: [facility] ([address] [place] [address]);  Service: Endoscopy;  Laterality: N/A;  Please measure pouch and limbs    GASTRIC BYPASS  2008    Revision August 2019    LAPAROSCOPIC REPAIR OF HIATAL HERNIA  2019    Liver scope  10/2012    LYSIS OF ADHESIONS                  ED COURSE:     Patient presenting with general illness symptoms; appears well and nontoxic. Exam grossly unremarkable at this time.    DIFFERENTIAL DIAGNOSIS: After history and physical exam a differential diagnosis was considered, but was not limited to,   Sepsis, meningitis, otitis media/external, nasal  polyp, bacterial sinusitis, allergic rhinitis, influenza, COVID19, bacterial/viral pharyngitis, bacterial/viral pneumonia.    ED management: Patient seen for a viral-like illness, therefore due to the most recent recommendations from our hospital administrations/infectious disease at this time, the patient will be swabbed for COVID 19.  This test is noted to be negative.  Instructed patient on symptomatic treatment and increase oral hydration. Vital signs did not indicate sepsis and patient was welling appearing, okay for discharge home.    IMPRESSION  The encounter diagnosis was Acute viral syndrome.  Strict instructions to follow up with primary care physician or reference provided for further assessment and evaluation. Given instructions to return for any acute symptoms and verbalized understanding of this medical plan.                                   EULALIO Boyce  04/03/20 6400

## 2020-04-27 ENCOUNTER — PATIENT MESSAGE (OUTPATIENT)
Dept: BARIATRICS | Facility: CLINIC | Age: 35
End: 2020-04-27

## 2020-04-27 ENCOUNTER — PATIENT MESSAGE (OUTPATIENT)
Dept: FAMILY MEDICINE | Facility: CLINIC | Age: 35
End: 2020-04-27

## 2020-04-28 ENCOUNTER — OFFICE VISIT (OUTPATIENT)
Dept: BARIATRICS | Facility: CLINIC | Age: 35
End: 2020-04-28
Payer: COMMERCIAL

## 2020-04-28 DIAGNOSIS — K59.00 CONSTIPATION, UNSPECIFIED CONSTIPATION TYPE: ICD-10-CM

## 2020-04-28 DIAGNOSIS — E66.01 SEVERE OBESITY (BMI 35.0-35.9 WITH COMORBIDITY): Primary | ICD-10-CM

## 2020-04-28 DIAGNOSIS — E53.8 VITAMIN B12 DEFICIENCY: ICD-10-CM

## 2020-04-28 PROCEDURE — 99213 PR OFFICE/OUTPT VISIT, EST, LEVL III, 20-29 MIN: ICD-10-PCS | Mod: 95,,, | Performed by: INTERNAL MEDICINE

## 2020-04-28 PROCEDURE — 99213 OFFICE O/P EST LOW 20 MIN: CPT | Mod: 95,,, | Performed by: INTERNAL MEDICINE

## 2020-04-28 RX ORDER — DIETHYLPROPION HYDROCHLORIDE 75 MG/1
75 TABLET, EXTENDED RELEASE ORAL DAILY
Qty: 30 TABLET | Refills: 0 | Status: SHIPPED | OUTPATIENT
Start: 2020-04-28 | End: 2020-05-26

## 2020-04-28 RX ORDER — CYANOCOBALAMIN 1000 UG/ML
INJECTION, SOLUTION INTRAMUSCULAR; SUBCUTANEOUS
Qty: 4 ML | Refills: 3 | Status: SHIPPED | OUTPATIENT
Start: 2020-04-28 | End: 2022-04-07

## 2020-04-28 NOTE — PATIENT INSTRUCTIONS
Patient warned of common side effects of diethylpropion including anxiety, insomnia, palpitations and increased blood pressure. It was also explained that it is for short-term usage along with diet and exercise, and that stopping the medication without making lifestyle changes will result in regain of weight. Patient states understanding.    Weight loss medications are controlled substances.  They require routine follow up. Prescription or pills that are lost or destroyed will not be replaced.       Please message or call us in a month, which one you prefer, phentermine or diethylpropion.            [facility]  Tips to Stay on Track During COVID-19      DO DON'T   Keep up with your food log  Maintaining your caloric intake and diet quality is critical for achieving your health and weight loss goals.  Download the [employer] and use [facility] Code 63550 to track food and fluid intake Feel Discouraged - You can do this!  There are a lot of changes happening in our world but don't let them discourage you. Focus on the future and remind yourself of all the work and effort you've put in so far.     Keep protein-rich foods stocked up  buy chicken and turkey in bulk and freeze them, keep dry or canned beans on hand, get the largest quantity of eggs available. Make sure you are getting your required protein intake (between  grams EACH DAY).   Drink fluid during meals or 30 minutes before/after eating  Your regular routine may have changed which may have caused some of your meal or snack times to change.  keep track of when you consume any liquid and time your meals accordingly. This will also help you make sure you are staying hydrated throughout the day   Continue with your protein drinks or bars  Order online or use grocery delivery service. Always make sure you order more WELL BEFORE you are running low, especially now when deliveries may take longer to arrive.  Remember  to check to make sure your protein shakes or bars have 4 gms of sugar or less.     Keep table sugar around  You may be more tempted to add it to your drinks or food if it is visible and easily accessible.   Try new recipes  Use this time to experiment in the kitchen and find some different healthy dishes you enjoy - you can use the nutrition booklet to help guide you.  If you cannot find your copy, please download it from our website @ [email or web address]  Click here to download [facility]'s Nutrition Binder.   Add tough or crunchy foods back into your diet too quickly  Raw veggies are great snack foods but adding them in too quickly after surgery will cause pain and discomfort   Eat your meals slowly and intentionally  You shouldn't have to rush out to be anywhere so really take your time and aim for each meal to last around 20-30 minutes.   Drink sugary/bubbly drinks or alcohol    It may be tempting especially if you are surrounded by others without these limitations, but these beverages will prevent weight loss and cause gastric  pain/discomfort     Listen to your body - try to recognize when you feel full.  Learning your body's signals can be difficult but it is a key step in your weight loss journey. While you are is this process of working on this step, be sure portion out the recommended quantities of foods and meals/snacks to prevent overeating.   Eat your meals using electronics  This is especially difficult at home where your use of computers, phones, and TVs are pretty much unregulated. Designate a meal spot or spots where there is limited distractions and you can focus on your food - maybe your kitchen table or on an outside porch or deck.   Continue taking vitamins and minerals  Take them at the same time each day and keep a log of when you take your supplements to make sure you don't miss any. These supplements are essential for preventing  malnutrition and other health problems that will deter your progress.   'Save' your appetite   You may feel like holding out from food as long as possible so you can eat a large meal later on, but your body needs energy throughout the day in order to properly fuel itself and keep you alert.  Try eating small meals throughout the day - use these meals as mini breaks from work or projects you are doing at home.   Stay active!  It is so important for both your physical and mental health that you get regular physical activity. Even if you can no longer physically go to the gym or to workout classes there are tons of online resources to keep you moving. Incorporate a specific exercise time into your daily home routine and keep a journal of your activity.   Order food delivery or take out   Most places are now offering delivery services, but it can still be difficult to find and choose healthy options. Choose to do a grocery store  or delivery instead- cooking food at home is less expensive then eating out!   Review the resources you've been provided and keep in touch with your healthcare team.  Let them know if you are struggling or experiencing any problems - they are here to help!  Call us to schedule a telehealth visit at [phone] Isolate yourself   It may be called 'social distancing' but that does not mean we can't still connect with one another. Phone calls or group video chats are great ways to keep in touch while staying at home. Any method of getting regular social time with friends and family will help to remind you that you're not in this alone.     Grocery List    Items to Keep Stocked in Your Kitchen  PROTEINS (Lean)    Eggs  Beans (canned and/or dried)  Skinless chicken/turkey   Tuna/Sheridan (canned and/or pouch)  Tofu or Tempeh  Aviva Veggie Burgers  Fish or shrimp (fresh or frozen)  Ground Beef (90% lean)  Steaks  Chobani Greek Yogurt  Cabot Cottage Cheese  Hummus  Low-fat cheeses  (Laughing Cow, Baby Amato, mozzarella string cheese)  Fairlife Non-fat Milk    Vegetables (non-starchy)  *veggies can be fresh or frozen    Broccoli   Cauliflower   Carrots   Onions   Cabbage   Radishes   Zucchini   Okra   Greens   Peppers   Spinach   Turnips   Mushrooms   Tomatoes   Celery   Lettuce   Asparagus  Eggplant   Green Onions   Kale    Fruits  *Fruits can be fresh or frozen    Apples  Oranges  Pears  Kiwi  Melon  Berries  Peaches  Unsweetened Applesauce    *Avoid fruits canned In syrup  *Stick to 1-2 servings of fruit/day   Vitamins    Flintstones Complete  Chewables    Super B-Complex tablets with 50 mg Thiamine    Nature's Way liquid Calcium Citrate + Vit D     Sublingual Vitamin B12   Other    Sugar-free Popsicles    Sugar-free Jello    Crystal Lite    Low Fat Condiments     Decaf Coffee/Tea           Foods to Avoid Having Around the House  Butter/Margarine Cookies Candy Chips  Pretzels Grits  Granola Popcorn Chavira Corn  Bread Alcohol Soda  Pasta  Rice  Cake/Pie Sausage Potatoes Ice Cream                 Tricks to Prevent Emotional Eating During Quarantine      Keep 'trigger foods' out of the house   Keep yourself distracted with work, games, music, or whatever hobby you enjoy. This may be the time to try a new activity!   Try fighting stress with breathing techniques, yoga, meditation, or prayer   Mix up your meals with a variety of dishes   Keep up with your food diary   Call or video chat with a friend or family   Plan your meals for specific time and try for smaller meals throughout the day   Pre-portion all meals and snacks    Step outside for some fresh air or do a quick exercise activity to reset yourself    *Avoid negative thoughts about yourself - if you have a slip-up, you are not a failure. Forgive yourself and focus on learning from it so you can prevent it for the future              Ways to Stay Active While Staying Inside      Plays[employer] Videos - free exercise and gym classes at  any fitness level   Apps -tons of fitness apps are currently offering free trial periods and offer workouts that don't require equipment   Chores around the house, such as cleaning or gardening   Walk up and down the stairs   Video-chat with your regular workout christina and do an online class together   Make a playlist of your favorite fun songs and dance around - no need to worry about knowing any serious dance moves, just jump around get your heart rate up!    While you are limited to working out at home, you may find it easier to do short, mini workouts multiple times a day instead of all at once. Try to still get at least 30 minutes of physical activity in each day!   Physical Health = Mental Health    The health of both your mind and body are equally important -be sure you are taking the time to care for both. It is likely that the current health concerns and quarantine mandates caused significant changes to your normal routine. Although this can feel overwhelming and seem difficult to manage, there are ways you can take to manage these feelings and keep your mental and physical health journey on track. Here are some tips for self-care during quarantine:     Meditate, take deep breaths - find any practice that will help you center yourself.   Move around your house throughout the day. Avoid staying in the same seat or room for too long and try to work in an area of your house that get lots of sunlight if possible.   Get fresh air for a bit every day - being outside is a great way to improve your mood! You don't necessarily have to go far from your house. You could even just hang out on your porch for a while or take a walk around the block.    Get good sleep and maintain a regular sleep schedule   Connect with others. While we aren't able to physically be with others right now it is still so important to socialize and interact with other people, even if it is being done remotely.    Keep yourself busy.  Make a list of tasks that you want to complete around the house, start a new book, do some art projects, try journaling.

## 2020-04-28 NOTE — PROGRESS NOTES
Subjective:       Patient ID: Johanna Bullock is a 34 y.o. female.    Chief Complaint: No chief complaint on file.    The patient location is: [place], [place], [place]  The chief complaint leading to consultation is: follow up  Visit type: audiovisual  Total time spent with patient: 14 min  Each patient to whom he or she provides medical services by telemedicine is:  (1) informed of the relationship between the physician and patient and the respective role of any other health care provider with respect to management of the patient; and (2) notified that he or she may decline to receive medical services by telemedicine and may withdraw from such care at any time.    Notes:   Pt presents today for follow up. Had HH in August with Dr. Espinosa. States she has cut out red meat and more recently chicken 2/2 to constipation. Is eating fish and seafood. She is doing premiere.  Taking vitamins off and on. Has bariatric brand . Rx running out for B12 inj. Has been taking vitamins on and off. She is riding bike for exercise.     I have not seen her since 2016. Due for labs.     3.21.2020- Phentermine for 30 pills Dr. Mora. Dr. Espinosa in October. 10.31.2020    Prev 249#. Last chart weight 198#.Current home weight 202#    Review of Systems    Objective:     There were no vitals taken for this visit.    Physical Exam   Constitutional: She is oriented to person, place, and time. She appears well-developed. No distress.   Morbidly obese     HENT:   Head: Normocephalic and atraumatic.   Pulmonary/Chest: Effort normal.   Abdominal: There is no splenomegaly or hepatomegaly.   Neurological: She is alert and oriented to person, place, and time.   Psychiatric: She has a normal mood and affect. Her behavior is normal. Judgment normal.   Vitals reviewed.      Assessment:       1. Severe obesity (BMI 35.0-35.9 with comorbidity)    2. Vitamin B12 deficiency    3. Constipation, unspecified constipation type        Plan:             Diagnoses  and all orders for this visit:    Severe obesity (BMI 35.0-35.9 with comorbidity)  -     diethylpropion (TENUATE) 75 mg SR tablet; Take 1 tablet (75 mg total) by mouth once daily.    Vitamin B12 deficiency  -     cyanocobalamin 1,000 mcg/mL injection; ONE INJECTION AS DIRECTED EVERY 28 DAYS    Constipation, unspecified constipation type  -     linaCLOtide (LINZESS) 145 mcg Cap capsule; Take 1 capsule (145 mcg total) by mouth once daily.    Patient warned of common side effects of diethylpropion including anxiety, insomnia, palpitations and increased blood pressure. It was also explained that it is for short-term usage along with diet and exercise, and that stopping the medication without making lifestyle changes will result in regain of weight. Patient states understanding.    Weight loss medications are controlled substances.  They require routine follow up. Prescription or pills that are lost or destroyed will not be replaced.       Please message or call us in a month, which one you prefer, phentermine or diethylpropion.

## 2020-05-22 ENCOUNTER — PATIENT MESSAGE (OUTPATIENT)
Dept: BARIATRICS | Facility: CLINIC | Age: 35
End: 2020-05-22

## 2020-05-22 DIAGNOSIS — E66.01 SEVERE OBESITY (BMI 35.0-35.9 WITH COMORBIDITY): Primary | ICD-10-CM

## 2020-05-25 ENCOUNTER — PATIENT MESSAGE (OUTPATIENT)
Dept: FAMILY MEDICINE | Facility: CLINIC | Age: 35
End: 2020-05-25

## 2020-05-25 NOTE — TELEPHONE ENCOUNTER
Called patient and no answer a message was left for her to call us back at the clinic to schedule a appointment.

## 2020-05-26 ENCOUNTER — TELEPHONE (OUTPATIENT)
Dept: BARIATRICS | Facility: CLINIC | Age: 35
End: 2020-05-26

## 2020-05-26 ENCOUNTER — PATIENT MESSAGE (OUTPATIENT)
Dept: BARIATRICS | Facility: CLINIC | Age: 35
End: 2020-05-26

## 2020-05-26 ENCOUNTER — PATIENT MESSAGE (OUTPATIENT)
Dept: FAMILY MEDICINE | Facility: CLINIC | Age: 35
End: 2020-05-26

## 2020-05-26 RX ORDER — PHENTERMINE HYDROCHLORIDE 37.5 MG/1
37.5 TABLET ORAL
Qty: 30 TABLET | Refills: 1 | Status: SHIPPED | OUTPATIENT
Start: 2020-05-26 | End: 2020-06-26

## 2020-05-26 NOTE — TELEPHONE ENCOUNTER
Called pharmacy in regards to rx (Phentermine) status. Pharmacist confirmed that pharmacy has received rx phentermine and pt has not picked up RX.

## 2020-05-26 NOTE — TELEPHONE ENCOUNTER
Called [facility] in regards to pulling Rx. phentermine from [place] location. to patient preferred pharmacy [facility].

## 2020-06-20 ENCOUNTER — HOSPITAL ENCOUNTER (EMERGENCY)
Facility: OTHER | Age: 35
Discharge: HOME | End: 2020-06-20
Attending: EMERGENCY MEDICINE
Payer: COMMERCIAL

## 2020-06-20 VITALS
TEMPERATURE: 98 F | HEART RATE: 60 BPM | SYSTOLIC BLOOD PRESSURE: 108 MMHG | RESPIRATION RATE: 16 BRPM | OXYGEN SATURATION: 99 % | HEIGHT: 67 IN | BODY MASS INDEX: 31.39 KG/M2 | WEIGHT: 200 LBS | DIASTOLIC BLOOD PRESSURE: 55 MMHG

## 2020-06-20 DIAGNOSIS — R53.83 FATIGUE, UNSPECIFIED TYPE: Primary | ICD-10-CM

## 2020-06-20 DIAGNOSIS — R06.02 SOB (SHORTNESS OF BREATH): ICD-10-CM

## 2020-06-20 LAB
ALBUMIN SERPL BCP-MCNC: 4 G/DL (ref 3.5–5.2)
ALP SERPL-CCNC: 71 U/L (ref 55–135)
ALT SERPL W/O P-5'-P-CCNC: 15 U/L (ref 10–44)
ANION GAP SERPL CALC-SCNC: 11 MMOL/L (ref 8–16)
AST SERPL-CCNC: 14 U/L (ref 10–40)
B-HCG UR QL: NEGATIVE
BASOPHILS # BLD AUTO: 0.1 K/UL (ref 0–0.2)
BASOPHILS NFR BLD: 1.7 % (ref 0–1.9)
BILIRUB SERPL-MCNC: 0.5 MG/DL (ref 0.1–1)
BILIRUB UR QL STRIP: NEGATIVE
BUN SERPL-MCNC: 9 MG/DL (ref 6–20)
CALCIUM SERPL-MCNC: 9.5 MG/DL (ref 8.7–10.5)
CHLORIDE SERPL-SCNC: 107 MMOL/L (ref 95–110)
CLARITY UR: CLEAR
CO2 SERPL-SCNC: 22 MMOL/L (ref 23–29)
COLOR UR: YELLOW
CREAT SERPL-MCNC: 0.7 MG/DL (ref 0.5–1.4)
CTP QC/QA: YES
DIFFERENTIAL METHOD: ABNORMAL
EOSINOPHIL # BLD AUTO: 0.3 K/UL (ref 0–0.5)
EOSINOPHIL NFR BLD: 4.5 % (ref 0–8)
ERYTHROCYTE [DISTWIDTH] IN BLOOD BY AUTOMATED COUNT: 14.9 % (ref 11.5–14.5)
EST. GFR  (AFRICAN AMERICAN): >60 ML/MIN/1.73 M^2
EST. GFR  (NON AFRICAN AMERICAN): >60 ML/MIN/1.73 M^2
GLUCOSE SERPL-MCNC: 56 MG/DL (ref 70–110)
GLUCOSE UR QL STRIP: NEGATIVE
HCT VFR BLD AUTO: 38.8 % (ref 37–48.5)
HGB BLD-MCNC: 12.4 G/DL (ref 12–16)
HGB UR QL STRIP: NEGATIVE
IMM GRANULOCYTES # BLD AUTO: 0.01 K/UL (ref 0–0.04)
IMM GRANULOCYTES NFR BLD AUTO: 0.2 % (ref 0–0.5)
KETONES UR QL STRIP: NEGATIVE
LEUKOCYTE ESTERASE UR QL STRIP: NEGATIVE
LYMPHOCYTES # BLD AUTO: 2.2 K/UL (ref 1–4.8)
LYMPHOCYTES NFR BLD: 38.5 % (ref 18–48)
MCH RBC QN AUTO: 27.9 PG (ref 27–31)
MCHC RBC AUTO-ENTMCNC: 32 G/DL (ref 32–36)
MCV RBC AUTO: 87 FL (ref 82–98)
MONOCYTES # BLD AUTO: 0.6 K/UL (ref 0.3–1)
MONOCYTES NFR BLD: 10.5 % (ref 4–15)
NEUTROPHILS # BLD AUTO: 2.6 K/UL (ref 1.8–7.7)
NEUTROPHILS NFR BLD: 44.6 % (ref 38–73)
NITRITE UR QL STRIP: NEGATIVE
NRBC BLD-RTO: 0 /100 WBC
PH UR STRIP: 6 [PH] (ref 5–8)
PLATELET # BLD AUTO: 374 K/UL (ref 150–350)
PMV BLD AUTO: 9.5 FL (ref 9.2–12.9)
POTASSIUM SERPL-SCNC: 3.7 MMOL/L (ref 3.5–5.1)
PROT SERPL-MCNC: 7.8 G/DL (ref 6–8.4)
PROT UR QL STRIP: NEGATIVE
RBC # BLD AUTO: 4.45 M/UL (ref 4–5.4)
SARS-COV-2 RDRP RESP QL NAA+PROBE: NEGATIVE
SODIUM SERPL-SCNC: 140 MMOL/L (ref 136–145)
SP GR UR STRIP: >=1.03 (ref 1–1.03)
URN SPEC COLLECT METH UR: ABNORMAL
UROBILINOGEN UR STRIP-ACNC: NEGATIVE EU/DL
WBC # BLD AUTO: 5.79 K/UL (ref 3.9–12.7)

## 2020-06-20 PROCEDURE — 96360 HYDRATION IV INFUSION INIT: CPT

## 2020-06-20 PROCEDURE — 81025 URINE PREGNANCY TEST: CPT | Performed by: NURSE PRACTITIONER

## 2020-06-20 PROCEDURE — 25000003 PHARM REV CODE 250: Performed by: NURSE PRACTITIONER

## 2020-06-20 PROCEDURE — U0002 COVID-19 LAB TEST NON-CDC: HCPCS

## 2020-06-20 PROCEDURE — 80053 COMPREHEN METABOLIC PANEL: CPT

## 2020-06-20 PROCEDURE — 99284 EMERGENCY DEPT VISIT MOD MDM: CPT | Mod: 25

## 2020-06-20 PROCEDURE — 81003 URINALYSIS AUTO W/O SCOPE: CPT

## 2020-06-20 PROCEDURE — 85025 COMPLETE CBC W/AUTO DIFF WBC: CPT

## 2020-06-20 RX ADMIN — SODIUM CHLORIDE 1000 ML: 0.9 INJECTION, SOLUTION INTRAVENOUS at 07:06

## 2020-06-20 NOTE — ED PROVIDER NOTES
Encounter Date: 6/20/2020       History     Chief Complaint   Patient presents with    COVID-19 Concerns     patient had COVID in March and is beginning to feel the same way      Patient is a 35-year-old female with medical history of adjustment disorder, anxiety, depression, obesity presenting to the ED for generalized body aches, shortness of breath, fatigue for the past 2 weeks.  Patient states she had similar symptoms when she was diagnosed with COVID in March.  Patient also endorses mild diarrhea and decreased appetite for the past 2 days.  Patient denies any fever, cough or loss of taste or smell.    The history is provided by the patient and medical records.     Review of patient's allergies indicates:   Allergen Reactions    Zofran [ondansetron hcl (pf)] Rash     Past Medical History:   Diagnosis Date    Adjustment disorder with mixed anxiety and depressed mood 2/6/2013    Anemia, B12 deficiency     Anxiety     Major depression, single episode 2/6/2013    Major depression, single episode 2/6/2013    Post partum depression      Past Surgical History:   Procedure Laterality Date    CHOLECYSTECTOMY  10/2012    COLONOSCOPY N/A 2/14/2020    Procedure: COLONOSCOPY;  Surgeon: Jas Moore MD;  Location: [facility] ([address] [place] [address]);  Service: Endoscopy;  Laterality: N/A;  Pt procedure time changed to 0800 with 0715 - second half prep completed from [id number]- Pt verbalized understanding- ERW2/13/20@1022    DILATION AND CURETTAGE OF UTERUS      ESOPHAGOGASTRODUODENOSCOPY N/A 7/20/2018    Procedure: ESOPHAGOGASTRODUODENOSCOPY (EGD);  Surgeon: Darwin Hansen MD;  [facility] ([facility]  Service: Endoscopy;  Laterality: N/A;  Please measure pouch and limbs    GASTRIC BYPASS  2008    Revision August 2019    LAPAROSCOPIC REPAIR OF HIATAL HERNIA  2019    Liver scope  10/2012    LYSIS OF ADHESIONS       Family History   Problem Relation Age of Onset    Breast cancer Neg Hx     Colon cancer Neg Hx      Ovarian cancer Neg Hx     Diabetes Neg Hx     Hypertension Neg Hx     Stroke Neg Hx      Social History     Tobacco Use    Smoking status: Never Smoker    Smokeless tobacco: Never Used   Substance Use Topics    Alcohol use: Never     Frequency: Never     Comment: Alcohol use rarely    Drug use: No     Comment: Mirena     Review of Systems   Constitutional: Positive for activity change, appetite change and fatigue. Negative for chills and fever.   HENT: Negative for congestion, rhinorrhea, sinus pressure and sinus pain.    Respiratory: Positive for chest tightness and shortness of breath. Negative for cough and wheezing.    Cardiovascular: Negative for chest pain and palpitations.   Gastrointestinal: Positive for diarrhea and nausea. Negative for abdominal pain, constipation and vomiting.   Genitourinary: Negative for difficulty urinating, dysuria and urgency.   Musculoskeletal: Positive for myalgias ( generalized). Negative for arthralgias.   Allergic/Immunologic: Negative for immunocompromised state.   Neurological: Positive for weakness ( generalized). Negative for dizziness, syncope, numbness and headaches.   All other systems reviewed and are negative.      Physical Exam     Initial Vitals [06/20/20 1733]   BP Pulse Resp Temp SpO2   121/66 72 16 98.6 °F (37 °C) 98 %      MAP       --         Physical Exam    Nursing note and vitals reviewed.  Constitutional: Vital signs are normal. She appears well-developed and well-nourished. She is cooperative. Face mask in place.   Slightly febrile but niontoxic appearing.     HENT:   Head: Normocephalic and atraumatic.   Nose: Nose normal.   Eyes: Pupils are equal, round, and reactive to light.   Neck: Trachea normal, normal range of motion and phonation normal. Neck supple. Muscular tenderness present. No spinous process tenderness present. No JVD present.   Cardiovascular: Normal rate and regular rhythm.   Pulses:       Radial pulses are 2+ on the right side  and 2+ on the left side.   Pulmonary/Chest: Effort normal and breath sounds normal.   Abdominal: Soft. Normal appearance and bowel sounds are normal. There is no abdominal tenderness. There is no rigidity, no rebound and no guarding.   Neurological: She is alert and oriented to person, place, and time. She has normal strength. She displays a negative Romberg sign. Gait normal. GCS eye subscore is 4. GCS verbal subscore is 5. GCS motor subscore is 6.   Skin: Skin is warm, dry and intact. Capillary refill takes 2 to 3 seconds.         ED Course   Procedures  Labs Reviewed   CBC W/ AUTO DIFFERENTIAL - Abnormal; Notable for the following components:       Result Value    RDW 14.9 (*)     Platelets 374 (*)     All other components within normal limits   COMPREHENSIVE METABOLIC PANEL - Abnormal; Notable for the following components:    CO2 22 (*)     Glucose 56 (*)     All other components within normal limits   URINALYSIS, REFLEX TO URINE CULTURE - Abnormal; Notable for the following components:    Specific Gravity, UA >=1.030 (*)     All other components within normal limits    Narrative:     Preferred Collection Type->Urine, Clean Catch  Specimen Source->Urine   SARS-COV-2 RNA AMPLIFICATION, QUAL   POCT URINE PREGNANCY          Imaging Results          X-Ray Chest AP Portable (Final result)  Result time 06/20/20 18:54:49    Final result by Luis Fernando Anglin MD (06/20/20 18:54:49)                 Impression:      No acute abnormality.      Electronically signed by: Luis Fernando Anglin  Date:    06/20/2020  Time:    18:54             Narrative:    EXAMINATION:  XR CHEST AP PORTABLE    CLINICAL HISTORY:  Shortness of breath    TECHNIQUE:  Single frontal view of the chest was performed.    COMPARISON:  03/16/2020    FINDINGS:  The lungs are clear, with normal appearance of pulmonary vasculature and no pleural effusion or pneumothorax.    The cardiac silhouette is normal in size. The hilar and mediastinal contours are  unremarkable.    Bones are intact.                              X-Rays:   Independently Interpreted Readings:   Other Readings:  Reviewed by me, read by Radiology    Medical Decision Making:   Initial Assessment:   Emergent evaluation of a 35-year-old female presenting to the ED for increased fatigue, body aches and intermittent shortness of breath for the past 2 weeks.  Patient states symptoms are similar to when she had COVID in March.  Patient also endorses intermittent diarrhea and nausea for the past 2 days.  On exam patient is A&O x3.  Patient is speaking in full sentences.  Breath sounds clear bilaterally.  Abdomen soft and nontender.  Bowel sounds within normal limits.  Cap refill less than 3 sec. Strength 5/5 in all extremities.  Differential Diagnosis:   Differential diagnoses include but are not limited to viral URI including influenza type illness, viral illness, UTI, pyelonephritis, coronavirus, bronchitis, pneumonia,  or reactive airway disease.    Independently Interpreted Test(s):   I have ordered and independently interpreted X-rays - see prior notes.  Clinical Tests:   Lab Tests: Ordered and Reviewed  The following lab test(s) were unremarkable: CBC, CMP, Urinalysis and UPT  Radiological Study: Ordered and Reviewed  ED Management:  I will get labs, imaging, hydrate and reassess.                   ED Course as of Jun 20 1950   Sat Jun 20, 2020 1930 Patient's CBC unremarkable.  No leukocytosis noted.  H&H stable at 12.4 and 38.8.  CMP unremarkable.  Urine preg negative.  UA negative for any acute infectious process.  COVID negative.  Chest x-ray negative for any acute abnormalities.  Patient updated on labs and imaging results.      [RZ]   1945 Patient given 1 L of IV fluids.  Patient advised to follow up with her PCP in the next 7-10 days if symptoms not improving.  Patient verbalized understanding of this plan of care.  All questions and concerns addressed.    [RZ]   1949 Patient is  hemodynamically stable, vital signs are normal. Discharge instructions given. Return to ED precautions discussed. Follow up as directed. Pt verbalized understanding of this plan.  Pt is stable for discharge.     [RZ]      ED Course User Index  [RZ] Holley Delaney NP                Clinical Impression:       ICD-10-CM ICD-9-CM   1. Fatigue, unspecified type  R53.83 780.79   2. SOB (shortness of breath)  R06.02 786.05         Disposition:   Disposition: Discharged  Condition: Stable     ED Disposition Condition    Discharge Stable        ED Prescriptions     None        Follow-up Information     Follow up With Specialties Details Why Contact Info    Iman Mora MD Family Medicine, Wound Care Schedule an appointment as soon as possible for a visit in 1 week if not improving [id number] [facility] [id number]  [phone]                                       Holley Delaney NP  06/20/20 1950

## 2020-06-21 NOTE — DISCHARGE INSTRUCTIONS
Your labs and imaging are unremarkable.  We have given you IV fluids in the ED. Increase fluids and rest.  Please follow up with her PCP if symptoms not resolving in the next 7-10 days.      Our goal in the emergency department is to always give you outstanding care and exceptional service. You may receive a survey by mail or e-mail in the next week regarding your experience in our ED. We would greatly appreciate your completing and returning the survey. Your feedback provides us with a way to recognize our staff who give very good care and it helps us learn how to improve when your experience was below our aspiration of excellence.

## 2020-06-30 DIAGNOSIS — Z98.84 HISTORY OF BARIATRIC SURGERY: ICD-10-CM

## 2020-06-30 RX ORDER — PHENTERMINE HYDROCHLORIDE 37.5 MG/1
TABLET ORAL
Qty: 30 TABLET | Refills: 0 | OUTPATIENT
Start: 2020-06-30

## 2020-07-02 ENCOUNTER — TELEPHONE (OUTPATIENT)
Dept: FAMILY MEDICINE | Facility: CLINIC | Age: 35
End: 2020-07-02

## 2020-07-02 DIAGNOSIS — Z02.0 SCHOOL PHYSICAL EXAM: Primary | ICD-10-CM

## 2020-07-02 NOTE — TELEPHONE ENCOUNTER
Patient just had blood work done to check kidney function   She needs to follow-up with provider who checked it - Holley Delaney     TB skin test ordered

## 2020-07-02 NOTE — TELEPHONE ENCOUNTER
----- Message from Elisabeth Irene sent at 7/2/2020 12:33 PM CDT -----  Regarding: TB Test  Contact: PT  Patient is in need of a TB to test for work.   She also would like to have a kidney function test done.   Please reach out to patient.

## 2020-08-25 ENCOUNTER — TELEPHONE (OUTPATIENT)
Dept: FAMILY MEDICINE | Facility: CLINIC | Age: 35
End: 2020-08-25

## 2020-08-25 NOTE — TELEPHONE ENCOUNTER
----- Message from Denis Brush sent at 8/25/2020  4:03 PM CDT -----  Contact: Patient  Pt called and would like to schedule an appt but there is nothing available until 11/12/20    She has a large number of white blood cells in her urine    Pt can be reached at [phone]

## 2020-09-01 ENCOUNTER — OFFICE VISIT (OUTPATIENT)
Dept: INTERNAL MEDICINE | Facility: CLINIC | Age: 35
End: 2020-09-01
Payer: MEDICAID

## 2020-09-01 VITALS
WEIGHT: 211 LBS | SYSTOLIC BLOOD PRESSURE: 128 MMHG | BODY MASS INDEX: 33.12 KG/M2 | OXYGEN SATURATION: 99 % | DIASTOLIC BLOOD PRESSURE: 70 MMHG | HEIGHT: 67 IN | HEART RATE: 60 BPM

## 2020-09-01 DIAGNOSIS — R82.81 PYURIA: Primary | ICD-10-CM

## 2020-09-01 DIAGNOSIS — E86.0 DEHYDRATION, MILD: ICD-10-CM

## 2020-09-01 LAB
BILIRUB SERPL-MCNC: NORMAL MG/DL
BLOOD URINE, POC: NORMAL
CLARITY, POC UA: NORMAL
COLOR, POC UA: NORMAL
GLUCOSE UR QL STRIP: NORMAL
KETONES UR QL STRIP: NORMAL
LEUKOCYTE ESTERASE URINE, POC: NORMAL
NITRITE, POC UA: NORMAL
PH, POC UA: 5
PROTEIN, POC: NORMAL
SPECIFIC GRAVITY, POC UA: 1.02
UROBILINOGEN, POC UA: NORMAL

## 2020-09-01 PROCEDURE — 99213 OFFICE O/P EST LOW 20 MIN: CPT | Mod: PBBFAC | Performed by: STUDENT IN AN ORGANIZED HEALTH CARE EDUCATION/TRAINING PROGRAM

## 2020-09-01 PROCEDURE — 99999 PR PBB SHADOW E&M-EST. PATIENT-LVL III: ICD-10-PCS | Mod: PBBFAC,,, | Performed by: STUDENT IN AN ORGANIZED HEALTH CARE EDUCATION/TRAINING PROGRAM

## 2020-09-01 PROCEDURE — 99999 PR PBB SHADOW E&M-EST. PATIENT-LVL III: CPT | Mod: PBBFAC,,, | Performed by: STUDENT IN AN ORGANIZED HEALTH CARE EDUCATION/TRAINING PROGRAM

## 2020-09-01 PROCEDURE — 81002 URINALYSIS NONAUTO W/O SCOPE: CPT | Mod: PBBFAC | Performed by: STUDENT IN AN ORGANIZED HEALTH CARE EDUCATION/TRAINING PROGRAM

## 2020-09-01 PROCEDURE — 99213 PR OFFICE/OUTPT VISIT, EST, LEVL III, 20-29 MIN: ICD-10-PCS | Mod: S$PBB,,, | Performed by: STUDENT IN AN ORGANIZED HEALTH CARE EDUCATION/TRAINING PROGRAM

## 2020-09-01 PROCEDURE — 99213 OFFICE O/P EST LOW 20 MIN: CPT | Mod: S$PBB,,, | Performed by: STUDENT IN AN ORGANIZED HEALTH CARE EDUCATION/TRAINING PROGRAM

## 2020-09-01 NOTE — PROGRESS NOTES
"Clinic Note  09/01/2020      Subjective:       Patient ID: Johanna Bullock is a 35 y.o. female being seen for an established visit.    Chief Complaint: Urinary Tract Infection      35 year old F with history of Ramez en Y procedure (2018) with a known rectocele, slow gut mobility on PRN linzess presents after an occupational health screening performed a urine dipstick which she reports has "white blood cells, red blood cells, and epithelial cells", which concerned her. She reports that in 2008, she has a UTI without any symptoms and it progressed to a kidney infection. She was concerned about her kidney function, but it has been normal since 2018. Patient denies all urinary symptoms, flank pain, fevers, chills, myalgias.     Patient also reports chronic issues with dehydration related to her constipation/diarrhea related to her gastric procedures as well as slow gut motility. Patient reports that she only intakes approximately 2L of water daily. This is a chronic issue. She was told to increase her fluid intake, but she finds it difficult to increase her oral intake and has resorted to using [facility] for [facility] hydration, which she found to be very helpful.       Review of Systems   Constitutional: Negative for diaphoresis, fatigue, fever and unexpected weight change.   HENT: Negative.    Eyes: Negative.    Respiratory: Negative for cough, choking and shortness of breath.    Cardiovascular: Negative for chest pain, palpitations and leg swelling.   Gastrointestinal: Positive for constipation and diarrhea. Negative for abdominal pain and vomiting.   Endocrine: Negative.    Genitourinary: Negative.  Negative for difficulty urinating, dyspareunia, dysuria, enuresis and flank pain.   Musculoskeletal: Negative.  Negative for joint swelling.   Skin: Negative.  Negative for color change and pallor.   Allergic/Immunologic: Negative.    Neurological: Negative.  Negative for dizziness, facial asymmetry, numbness and " How Severe Is Your Skin Lesion?: moderate "headaches.   Psychiatric/Behavioral: Negative.        Patient's Medications   New Prescriptions    No medications on file   Previous Medications    ALBUTEROL (PROVENTIL/VENTOLIN HFA) 90 MCG/ACTUATION INHALER    Inhale 1-2 puffs into the lungs every 6 (six) hours as needed for Wheezing.    CYANOCOBALAMIN 1,000 MCG/ML INJECTION    ONE INJECTION AS DIRECTED EVERY 28 DAYS    LINACLOTIDE (LINZESS) 145 MCG CAP CAPSULE    Take 1 capsule (145 mcg total) by mouth once daily.   Modified Medications    No medications on file   Discontinued Medications    No medications on file       Patient Active Problem List    Diagnosis Date Noted    Screening for malignant neoplasm of colon 02/14/2020    History of bariatric surgery 09/26/2019    History of peptic ulcer 07/20/2018    Severe obesity (BMI 35.0-35.9 with comorbidity) 05/12/2017    Iron deficiency 03/12/2015    Vitamin B12 deficiency 10/22/2012    History of gastric bypass 08/23/2012           Objective:      /70 (BP Location: Right arm, Patient Position: Sitting, BP Method: Large (Manual))   Pulse 60   Ht 5' 7" (1.702 m)   Wt 95.7 kg (210 lb 15.7 oz)   SpO2 99%   BMI 33.04 kg/m²   Estimated body mass index is 33.04 kg/m² as calculated from the following:    Height as of this encounter: 5' 7" (1.702 m).    Weight as of this encounter: 95.7 kg (210 lb 15.7 oz).    Physical Exam  Vitals signs and nursing note reviewed.   Constitutional:       General: She is not in acute distress.     Appearance: She is well-developed.   HENT:      Head: Normocephalic and atraumatic.   Eyes:      Conjunctiva/sclera: Conjunctivae normal.      Pupils: Pupils are equal, round, and reactive to light.   Cardiovascular:      Rate and Rhythm: Normal rate and regular rhythm.      Heart sounds: Normal heart sounds.   Pulmonary:      Effort: Pulmonary effort is normal.      Breath sounds: Normal breath sounds.   Abdominal:      General: Bowel sounds are normal. There is no distension.    " Has Your Skin Lesion Been Treated?: not been treated   Palpations: Abdomen is soft. There is no mass.      Tenderness: There is no abdominal tenderness. There is no right CVA tenderness, left CVA tenderness, guarding or rebound.      Hernia: No hernia is present.   Musculoskeletal:         General: No swelling or tenderness (nil flank tenderness).      Right lower leg: No edema.      Left lower leg: No edema.   Neurological:      Mental Status: She is alert and oriented to person, place, and time.   Psychiatric:         Behavior: Behavior normal.         Thought Content: Thought content normal.         Judgment: Judgment normal.         Assessment and Plan:   Johanna was seen today for urinary tract infection.    Diagnoses and all orders for this visit:    Pyuria  -     POCT URINE DIPSTICK WITHOUT MICROSCOPE   - negative for WBC, RBC; low suspicion for UTI    Dehydration, mild   - patient instructed to keep a journal of water intake for good and bad days and to increase water intake using metered gallon bottle.    - return to colorectal clinic with Dr Moore for conservative management of GI/dehydration problems    - patient adamant about not proceeding to further procedures with rectocele    Patient seen and plan of care discussed with Dr. Coral Fletcher MD   Is This A New Presentation, Or A Follow-Up?: Skin Lesion

## 2020-09-08 NOTE — PROGRESS NOTES
I have reviewed the notes, assessments, and/or procedures performed by Dr. Fletcher, I concur with his documentation of Johanna Bullock.

## 2020-12-08 ENCOUNTER — HOSPITAL ENCOUNTER (EMERGENCY)
Facility: OTHER | Age: 35
Discharge: HOME | End: 2020-12-08
Attending: EMERGENCY MEDICINE
Payer: MEDICAID

## 2020-12-08 VITALS
RESPIRATION RATE: 18 BRPM | SYSTOLIC BLOOD PRESSURE: 126 MMHG | TEMPERATURE: 99 F | DIASTOLIC BLOOD PRESSURE: 69 MMHG | HEART RATE: 60 BPM | BODY MASS INDEX: 33.67 KG/M2 | WEIGHT: 215 LBS | OXYGEN SATURATION: 99 %
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
## 2020-12-08 DIAGNOSIS — S16.1XXA CERVICAL STRAIN, ACUTE, INITIAL ENCOUNTER: ICD-10-CM

## 2020-12-08 DIAGNOSIS — T14.8XXA TRAUMATIC MYALGIA: ICD-10-CM

## 2020-12-08 LAB
B-HCG UR QL: NEGATIVE
CTP QC/QA: YES
HCV AB SERPL QL IA: NEGATIVE
HIV 1+2 AB+HIV1 P24 AG SERPL QL IA: NEGATIVE

## 2020-12-08 PROCEDURE — 81025 URINE PREGNANCY TEST: CPT | Performed by: EMERGENCY MEDICINE

## 2020-12-08 PROCEDURE — 99284 EMERGENCY DEPT VISIT MOD MDM: CPT | Mod: 25

## 2020-12-08 PROCEDURE — 86803 HEPATITIS C AB TEST: CPT

## 2020-12-08 PROCEDURE — 86703 HIV-1/HIV-2 1 RESULT ANTBDY: CPT

## 2020-12-08 PROCEDURE — 25000003 PHARM REV CODE 250: Performed by: PHYSICIAN ASSISTANT

## 2020-12-08 RX ORDER — METHOCARBAMOL 500 MG/1
1000 TABLET, FILM COATED ORAL 3 TIMES DAILY
Qty: 30 TABLET | Refills: 0 | Status: SHIPPED | OUTPATIENT
Start: 2020-12-08 | End: 2020-12-13

## 2020-12-08 RX ORDER — METHOCARBAMOL 500 MG/1
500 TABLET, FILM COATED ORAL
Status: COMPLETED | OUTPATIENT
Start: 2020-12-08 | End: 2020-12-08

## 2020-12-08 RX ORDER — IBUPROFEN 600 MG/1
600 TABLET ORAL
Status: COMPLETED | OUTPATIENT
Start: 2020-12-08 | End: 2020-12-08

## 2020-12-08 RX ORDER — IBUPROFEN 600 MG/1
600 TABLET ORAL EVERY 6 HOURS PRN
Qty: 20 TABLET | Refills: 0 | Status: SHIPPED | OUTPATIENT
Start: 2020-12-08 | End: 2022-09-12

## 2020-12-08 RX ADMIN — METHOCARBAMOL TABLETS 500 MG: 500 TABLET, COATED ORAL at 01:12

## 2020-12-08 RX ADMIN — IBUPROFEN 600 MG: 600 TABLET, FILM COATED ORAL at 01:12
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
## 2021-05-26 ENCOUNTER — HOSPITAL ENCOUNTER (EMERGENCY)
Facility: HOSPITAL | Age: 36
Discharge: HOME | End: 2021-05-26
Attending: EMERGENCY MEDICINE
Payer: MEDICAID

## 2021-05-26 VITALS
DIASTOLIC BLOOD PRESSURE: 58 MMHG | RESPIRATION RATE: 18 BRPM | HEIGHT: 67 IN | BODY MASS INDEX: 35.47 KG/M2 | SYSTOLIC BLOOD PRESSURE: 104 MMHG | OXYGEN SATURATION: 100 % | WEIGHT: 226 LBS | HEART RATE: 72 BPM | TEMPERATURE: 99 F

## 2021-05-26 DIAGNOSIS — R30.0 DYSURIA: ICD-10-CM

## 2021-05-26 DIAGNOSIS — R31.9 HEMATURIA, UNSPECIFIED TYPE: Primary | ICD-10-CM

## 2021-05-26 LAB
ALBUMIN SERPL BCP-MCNC: 3.6 G/DL (ref 3.5–5.2)
ALP SERPL-CCNC: 53 U/L (ref 55–135)
ALT SERPL W/O P-5'-P-CCNC: 28 U/L (ref 10–44)
ANION GAP SERPL CALC-SCNC: 7 MMOL/L (ref 8–16)
AST SERPL-CCNC: 24 U/L (ref 10–40)
B-HCG UR QL: NEGATIVE
BACTERIA #/AREA URNS HPF: ABNORMAL /HPF
BASOPHILS # BLD AUTO: 0.06 K/UL (ref 0–0.2)
BASOPHILS NFR BLD: 0.7 % (ref 0–1.9)
BILIRUB SERPL-MCNC: 0.3 MG/DL (ref 0.1–1)
BILIRUB UR QL STRIP: NEGATIVE
BUN SERPL-MCNC: 14 MG/DL (ref 6–20)
CALCIUM SERPL-MCNC: 8.4 MG/DL (ref 8.7–10.5)
CHLORIDE SERPL-SCNC: 112 MMOL/L (ref 95–110)
CLARITY UR: CLEAR
CO2 SERPL-SCNC: 21 MMOL/L (ref 23–29)
COLOR UR: YELLOW
CREAT SERPL-MCNC: 0.7 MG/DL (ref 0.5–1.4)
DIFFERENTIAL METHOD: ABNORMAL
EOSINOPHIL # BLD AUTO: 0.2 K/UL (ref 0–0.5)
EOSINOPHIL NFR BLD: 1.9 % (ref 0–8)
ERYTHROCYTE [DISTWIDTH] IN BLOOD BY AUTOMATED COUNT: 14.4 % (ref 11.5–14.5)
EST. GFR  (AFRICAN AMERICAN): >60 ML/MIN/1.73 M^2
EST. GFR  (NON AFRICAN AMERICAN): >60 ML/MIN/1.73 M^2
GLUCOSE SERPL-MCNC: 88 MG/DL (ref 70–110)
GLUCOSE UR QL STRIP: NEGATIVE
HCT VFR BLD AUTO: 37.2 % (ref 37–48.5)
HGB BLD-MCNC: 12 G/DL (ref 12–16)
HGB UR QL STRIP: ABNORMAL
IMM GRANULOCYTES # BLD AUTO: 0.08 K/UL (ref 0–0.04)
IMM GRANULOCYTES NFR BLD AUTO: 1 % (ref 0–0.5)
KETONES UR QL STRIP: ABNORMAL
LEUKOCYTE ESTERASE UR QL STRIP: ABNORMAL
LYMPHOCYTES # BLD AUTO: 2.6 K/UL (ref 1–4.8)
LYMPHOCYTES NFR BLD: 31.8 % (ref 18–48)
MCH RBC QN AUTO: 27.3 PG (ref 27–31)
MCHC RBC AUTO-ENTMCNC: 32.3 G/DL (ref 32–36)
MCV RBC AUTO: 85 FL (ref 82–98)
MICROSCOPIC COMMENT: ABNORMAL
MONOCYTES # BLD AUTO: 0.9 K/UL (ref 0.3–1)
MONOCYTES NFR BLD: 11.5 % (ref 4–15)
NEUTROPHILS # BLD AUTO: 4.3 K/UL (ref 1.8–7.7)
NEUTROPHILS NFR BLD: 53.1 % (ref 38–73)
NITRITE UR QL STRIP: NEGATIVE
NRBC BLD-RTO: 0 /100 WBC
PH UR STRIP: 6 [PH] (ref 5–8)
PLATELET # BLD AUTO: 363 K/UL (ref 150–450)
PMV BLD AUTO: 9.5 FL (ref 9.2–12.9)
POTASSIUM SERPL-SCNC: 4.2 MMOL/L (ref 3.5–5.1)
PROT SERPL-MCNC: 7.2 G/DL (ref 6–8.4)
PROT UR QL STRIP: NEGATIVE
RBC # BLD AUTO: 4.39 M/UL (ref 4–5.4)
RBC #/AREA URNS HPF: 7 /HPF (ref 0–4)
SODIUM SERPL-SCNC: 140 MMOL/L (ref 136–145)
SP GR UR STRIP: >=1.03 (ref 1–1.03)
URN SPEC COLLECT METH UR: ABNORMAL
UROBILINOGEN UR STRIP-ACNC: NEGATIVE EU/DL
WBC # BLD AUTO: 8.08 K/UL (ref 3.9–12.7)
WBC #/AREA URNS HPF: 2 /HPF (ref 0–5)

## 2021-05-26 PROCEDURE — 80053 COMPREHEN METABOLIC PANEL: CPT | Performed by: PHYSICIAN ASSISTANT

## 2021-05-26 PROCEDURE — 99284 EMERGENCY DEPT VISIT MOD MDM: CPT | Mod: 25

## 2021-05-26 PROCEDURE — 81000 URINALYSIS NONAUTO W/SCOPE: CPT | Performed by: PHYSICIAN ASSISTANT

## 2021-05-26 PROCEDURE — 25000003 PHARM REV CODE 250: Performed by: PHYSICIAN ASSISTANT

## 2021-05-26 PROCEDURE — 85025 COMPLETE CBC W/AUTO DIFF WBC: CPT | Performed by: PHYSICIAN ASSISTANT

## 2021-05-26 PROCEDURE — 81025 URINE PREGNANCY TEST: CPT | Performed by: PHYSICIAN ASSISTANT

## 2021-05-26 PROCEDURE — 96361 HYDRATE IV INFUSION ADD-ON: CPT

## 2021-05-26 PROCEDURE — 63600175 PHARM REV CODE 636 W HCPCS: Performed by: EMERGENCY MEDICINE

## 2021-05-26 PROCEDURE — 96374 THER/PROPH/DIAG INJ IV PUSH: CPT

## 2021-05-26 RX ORDER — KETOROLAC TROMETHAMINE 30 MG/ML
30 INJECTION, SOLUTION INTRAMUSCULAR; INTRAVENOUS
Status: COMPLETED | OUTPATIENT
Start: 2021-05-26 | End: 2021-05-26

## 2021-05-26 RX ADMIN — SODIUM CHLORIDE 1000 ML: 0.9 INJECTION, SOLUTION INTRAVENOUS at 07:05

## 2021-05-26 RX ADMIN — KETOROLAC TROMETHAMINE 30 MG: 30 INJECTION, SOLUTION INTRAMUSCULAR; INTRAVENOUS at 07:05

## 2021-08-13 ENCOUNTER — TELEPHONE (OUTPATIENT)
Dept: SURGERY | Facility: CLINIC | Age: 36
End: 2021-08-13

## 2022-03-02 ENCOUNTER — TELEPHONE (OUTPATIENT)
Dept: ORTHOPEDICS | Facility: CLINIC | Age: 37
End: 2022-03-02
Payer: COMMERCIAL

## 2022-03-02 DIAGNOSIS — M25.511 RIGHT SHOULDER PAIN, UNSPECIFIED CHRONICITY: Primary | ICD-10-CM
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
## 2022-03-03 ENCOUNTER — TELEPHONE (OUTPATIENT)
Dept: PAIN MEDICINE | Facility: CLINIC | Age: 37
End: 2022-03-03
Payer: COMMERCIAL

## 2022-03-03 ENCOUNTER — PATIENT OUTREACH (OUTPATIENT)
Dept: ADMINISTRATIVE | Facility: OTHER | Age: 37
End: 2022-03-03
Payer: COMMERCIAL

## 2022-03-03 ENCOUNTER — HOSPITAL ENCOUNTER (OUTPATIENT)
Dept: RADIOLOGY | Facility: HOSPITAL | Age: 37
Discharge: HOME | End: 2022-03-03
Attending: ORTHOPAEDIC SURGERY
Payer: COMMERCIAL

## 2022-03-03 ENCOUNTER — OFFICE VISIT (OUTPATIENT)
Dept: ORTHOPEDICS | Facility: CLINIC | Age: 37
End: 2022-03-03
Payer: COMMERCIAL

## 2022-03-03 VITALS — WEIGHT: 226 LBS | HEIGHT: 67 IN | BODY MASS INDEX: 35.47 KG/M2

## 2022-03-03 DIAGNOSIS — M47.22 OSTEOARTHRITIS OF SPINE WITH RADICULOPATHY, CERVICAL REGION: ICD-10-CM

## 2022-03-03 DIAGNOSIS — M54.2 NECK PAIN: ICD-10-CM

## 2022-03-03 DIAGNOSIS — M75.41 IMPINGEMENT SYNDROME OF RIGHT SHOULDER: ICD-10-CM

## 2022-03-03 DIAGNOSIS — M54.2 NECK PAIN: Primary | ICD-10-CM

## 2022-03-03 DIAGNOSIS — M25.511 RIGHT SHOULDER PAIN, UNSPECIFIED CHRONICITY: ICD-10-CM

## 2022-03-03 PROCEDURE — 72050 X-RAY EXAM NECK SPINE 4/5VWS: CPT | Mod: 26,,, | Performed by: RADIOLOGY

## 2022-03-03 PROCEDURE — 99204 PR OFFICE/OUTPT VISIT, NEW, LEVL IV, 45-59 MIN: ICD-10-PCS | Mod: S$GLB,,, | Performed by: PHYSICIAN ASSISTANT

## 2022-03-03 PROCEDURE — 99999 PR PBB SHADOW E&M-EST. PATIENT-LVL V: CPT | Mod: PBBFAC,,, | Performed by: PHYSICIAN ASSISTANT

## 2022-03-03 PROCEDURE — 73030 X-RAY EXAM OF SHOULDER: CPT | Mod: TC,RT

## 2022-03-03 PROCEDURE — 99999 PR PBB SHADOW E&M-EST. PATIENT-LVL V: ICD-10-PCS | Mod: PBBFAC,,, | Performed by: PHYSICIAN ASSISTANT

## 2022-03-03 PROCEDURE — 1160F PR REVIEW ALL MEDS BY PRESCRIBER/CLIN PHARMACIST DOCUMENTED: ICD-10-PCS | Mod: CPTII,S$GLB,, | Performed by: PHYSICIAN ASSISTANT

## 2022-03-03 PROCEDURE — 1159F MED LIST DOCD IN RCRD: CPT | Mod: CPTII,S$GLB,, | Performed by: PHYSICIAN ASSISTANT

## 2022-03-03 PROCEDURE — 99204 OFFICE O/P NEW MOD 45 MIN: CPT | Mod: S$GLB,,, | Performed by: PHYSICIAN ASSISTANT

## 2022-03-03 PROCEDURE — 72050 XR CERVICAL SPINE COMPLETE 5 VIEW: ICD-10-PCS | Mod: 26,,, | Performed by: RADIOLOGY

## 2022-03-03 PROCEDURE — 1159F PR MEDICATION LIST DOCUMENTED IN MEDICAL RECORD: ICD-10-PCS | Mod: CPTII,S$GLB,, | Performed by: PHYSICIAN ASSISTANT

## 2022-03-03 PROCEDURE — 3008F BODY MASS INDEX DOCD: CPT | Mod: CPTII,S$GLB,, | Performed by: PHYSICIAN ASSISTANT

## 2022-03-03 PROCEDURE — 1160F RVW MEDS BY RX/DR IN RCRD: CPT | Mod: CPTII,S$GLB,, | Performed by: PHYSICIAN ASSISTANT

## 2022-03-03 PROCEDURE — 3008F PR BODY MASS INDEX (BMI) DOCUMENTED: ICD-10-PCS | Mod: CPTII,S$GLB,, | Performed by: PHYSICIAN ASSISTANT

## 2022-03-03 PROCEDURE — 73030 XR SHOULDER COMPLETE 2 OR MORE VIEWS RIGHT: ICD-10-PCS | Mod: 26,RT,, | Performed by: RADIOLOGY

## 2022-03-03 PROCEDURE — 73030 X-RAY EXAM OF SHOULDER: CPT | Mod: 26,RT,, | Performed by: RADIOLOGY

## 2022-03-03 PROCEDURE — 72050 X-RAY EXAM NECK SPINE 4/5VWS: CPT | Mod: TC

## 2022-03-03 RX ORDER — FLUTICASONE PROPIONATE 50 MCG
1 SPRAY, SUSPENSION (ML) NASAL DAILY
COMMUNITY

## 2022-03-03 RX ORDER — CYCLOBENZAPRINE HCL 5 MG
5 TABLET ORAL NIGHTLY
Qty: 20 TABLET | Refills: 0 | Status: SHIPPED | OUTPATIENT
Start: 2022-03-03 | End: 2022-04-07

## 2022-03-03 RX ORDER — METHYLPREDNISOLONE 4 MG/1
TABLET ORAL
Qty: 1 EACH | Refills: 0 | Status: SHIPPED | OUTPATIENT
Start: 2022-03-03 | End: 2022-04-07

## 2022-03-03 RX ORDER — NAPROXEN 500 MG/1
500 TABLET ORAL 2 TIMES DAILY
Qty: 60 TABLET | Refills: 0 | Status: SHIPPED | OUTPATIENT
Start: 2022-03-03 | End: 2022-03-30

## 2022-03-03 NOTE — TELEPHONE ENCOUNTER
LVM for patient to call back [phone] regarding appointment for Back and Spine referral.  Holley Shaw RN
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
## 2022-03-09 ENCOUNTER — CLINICAL SUPPORT (OUTPATIENT)
Dept: REHABILITATION | Facility: HOSPITAL | Age: 37
End: 2022-03-09
Attending: ORTHOPAEDIC SURGERY
Payer: COMMERCIAL

## 2022-03-09 DIAGNOSIS — M47.22 OSTEOARTHRITIS OF SPINE WITH RADICULOPATHY, CERVICAL REGION: ICD-10-CM

## 2022-03-09 DIAGNOSIS — M75.41 IMPINGEMENT SYNDROME OF RIGHT SHOULDER: ICD-10-CM

## 2022-03-09 PROCEDURE — 97161 PT EVAL LOW COMPLEX 20 MIN: CPT

## 2022-03-09 PROCEDURE — 97110 THERAPEUTIC EXERCISES: CPT
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
## 2022-03-14 ENCOUNTER — TELEPHONE (OUTPATIENT)
Dept: REHABILITATION | Facility: HOSPITAL | Age: 37
End: 2022-03-14
Payer: COMMERCIAL

## 2022-03-14 NOTE — TELEPHONE ENCOUNTER
Called patient about missing today's appointment.  Patient states she forgot about the appointment.  I reminded her of her next scheduled appointment and helped her schedule an appointment for later this week.

## 2022-03-21 ENCOUNTER — TELEPHONE (OUTPATIENT)
Dept: REHABILITATION | Facility: HOSPITAL | Age: 37
End: 2022-03-21
Payer: COMMERCIAL

## 2022-03-21 NOTE — TELEPHONE ENCOUNTER
Called patient about not showing up for today's appointment.  Patient did not answer.  Left a voicemail informing the patient we would be removing all of her remaining appts from the schedule due to 3 consecutive No Shows

## 2022-04-07 ENCOUNTER — OFFICE VISIT (OUTPATIENT)
Dept: OBSTETRICS AND GYNECOLOGY | Facility: CLINIC | Age: 37
End: 2022-04-07
Payer: COMMERCIAL

## 2022-04-07 ENCOUNTER — LAB VISIT (OUTPATIENT)
Dept: LAB | Facility: HOSPITAL | Age: 37
End: 2022-04-07
Attending: FAMILY MEDICINE
Payer: COMMERCIAL

## 2022-04-07 VITALS
SYSTOLIC BLOOD PRESSURE: 100 MMHG | HEIGHT: 67 IN | DIASTOLIC BLOOD PRESSURE: 72 MMHG | WEIGHT: 222.69 LBS | BODY MASS INDEX: 34.95 KG/M2

## 2022-04-07 DIAGNOSIS — Z30.431 IUD CHECK UP: ICD-10-CM

## 2022-04-07 DIAGNOSIS — Z11.3 SCREENING FOR STD (SEXUALLY TRANSMITTED DISEASE): ICD-10-CM

## 2022-04-07 DIAGNOSIS — N92.6 IRREGULAR MENSES: ICD-10-CM

## 2022-04-07 DIAGNOSIS — Z30.431 IUD CHECK UP: Primary | ICD-10-CM

## 2022-04-07 LAB — HCG INTACT+B SERPL-ACNC: <1.2 MIU/ML

## 2022-04-07 PROCEDURE — 84702 CHORIONIC GONADOTROPIN TEST: CPT | Performed by: NURSE PRACTITIONER

## 2022-04-07 PROCEDURE — 99213 OFFICE O/P EST LOW 20 MIN: CPT | Mod: S$GLB,,, | Performed by: NURSE PRACTITIONER

## 2022-04-07 PROCEDURE — 87591 N.GONORRHOEAE DNA AMP PROB: CPT | Performed by: NURSE PRACTITIONER

## 2022-04-07 PROCEDURE — 1160F PR REVIEW ALL MEDS BY PRESCRIBER/CLIN PHARMACIST DOCUMENTED: ICD-10-PCS | Mod: CPTII,S$GLB,, | Performed by: NURSE PRACTITIONER

## 2022-04-07 PROCEDURE — 3074F SYST BP LT 130 MM HG: CPT | Mod: CPTII,S$GLB,, | Performed by: NURSE PRACTITIONER

## 2022-04-07 PROCEDURE — 86696 HERPES SIMPLEX TYPE 2 TEST: CPT | Performed by: NURSE PRACTITIONER

## 2022-04-07 PROCEDURE — 3078F DIAST BP <80 MM HG: CPT | Mod: CPTII,S$GLB,, | Performed by: NURSE PRACTITIONER

## 2022-04-07 PROCEDURE — 3074F PR MOST RECENT SYSTOLIC BLOOD PRESSURE < 130 MM HG: ICD-10-PCS | Mod: CPTII,S$GLB,, | Performed by: NURSE PRACTITIONER

## 2022-04-07 PROCEDURE — 80074 ACUTE HEPATITIS PANEL: CPT | Performed by: NURSE PRACTITIONER

## 2022-04-07 PROCEDURE — 3008F PR BODY MASS INDEX (BMI) DOCUMENTED: ICD-10-PCS | Mod: CPTII,S$GLB,, | Performed by: NURSE PRACTITIONER

## 2022-04-07 PROCEDURE — 1159F PR MEDICATION LIST DOCUMENTED IN MEDICAL RECORD: ICD-10-PCS | Mod: CPTII,S$GLB,, | Performed by: NURSE PRACTITIONER

## 2022-04-07 PROCEDURE — 86695 HERPES SIMPLEX TYPE 1 TEST: CPT | Performed by: NURSE PRACTITIONER

## 2022-04-07 PROCEDURE — 1159F MED LIST DOCD IN RCRD: CPT | Mod: CPTII,S$GLB,, | Performed by: NURSE PRACTITIONER

## 2022-04-07 PROCEDURE — 3078F PR MOST RECENT DIASTOLIC BLOOD PRESSURE < 80 MM HG: ICD-10-PCS | Mod: CPTII,S$GLB,, | Performed by: NURSE PRACTITIONER

## 2022-04-07 PROCEDURE — 86592 SYPHILIS TEST NON-TREP QUAL: CPT | Performed by: NURSE PRACTITIONER

## 2022-04-07 PROCEDURE — 3008F BODY MASS INDEX DOCD: CPT | Mod: CPTII,S$GLB,, | Performed by: NURSE PRACTITIONER

## 2022-04-07 PROCEDURE — 99999 PR PBB SHADOW E&M-EST. PATIENT-LVL III: CPT | Mod: PBBFAC,,, | Performed by: NURSE PRACTITIONER

## 2022-04-07 PROCEDURE — 99213 PR OFFICE/OUTPT VISIT, EST, LEVL III, 20-29 MIN: ICD-10-PCS | Mod: S$GLB,,, | Performed by: NURSE PRACTITIONER

## 2022-04-07 PROCEDURE — 99999 PR PBB SHADOW E&M-EST. PATIENT-LVL III: ICD-10-PCS | Mod: PBBFAC,,, | Performed by: NURSE PRACTITIONER

## 2022-04-07 PROCEDURE — 1160F RVW MEDS BY RX/DR IN RCRD: CPT | Mod: CPTII,S$GLB,, | Performed by: NURSE PRACTITIONER

## 2022-04-07 PROCEDURE — 87389 HIV-1 AG W/HIV-1&-2 AB AG IA: CPT | Performed by: NURSE PRACTITIONER

## 2022-04-07 PROCEDURE — 87491 CHLMYD TRACH DNA AMP PROBE: CPT | Performed by: NURSE PRACTITIONER

## 2022-04-07 PROCEDURE — 86694 HERPES SIMPLEX NES ANTBDY: CPT | Performed by: NURSE PRACTITIONER

## 2022-04-07 NOTE — PROGRESS NOTES
Johanna Bullock is a 36 y.o. female  presents for per pt her IUD was placed same day as removed in 2018 just no not showing it was placed.  In 2019  Visit indicates IUD and u/s verifies placement.   Had a cycle every month for 3 days light but over last few months has gone 2 mths at a time with no cycle.  This bothers her not having cycle   She wants full STD testing as she has new partner.     Pt states her IUD was inserted same day was removed end of May 2018   LMP: No LMP recorded. Patient has had an implant..      Past Medical History:   Diagnosis Date    Adjustment disorder with mixed anxiety and depressed mood 2013    Anemia, B12 deficiency     Anxiety     Major depression, single episode 2013    Major depression, single episode 2013    Post partum depression      Past Surgical History:   Procedure Laterality Date    CHOLECYSTECTOMY  10/2012    COLONOSCOPY N/A 2020    Procedure: COLONOSCOPY;  Surgeon: Jas Moore MD;  Location: [facility] (4TH FLR);  Service: Endoscopy;  Laterality: N/A;  Pt procedure time changed to 0800 with 0715 - second half prep completed from 8448-6239- Pt verbalized understanding- ERW20@1022    DILATION AND CURETTAGE OF UTERUS      ESOPHAGOGASTRODUODENOSCOPY N/A 2018    Procedure: ESOPHAGOGASTRODUODENOSCOPY (EGD);  Surgeon: Darwin Hansen MD;  Location: [facility] (4TH FLR);  Service: Endoscopy;  Laterality: N/A;  Please measure pouch and limbs    GASTRIC BYPASS      Revision 2019    LAPAROSCOPIC REPAIR OF HIATAL HERNIA  2019    Liver scope  10/2012    LYSIS OF ADHESIONS       Social History     Socioeconomic History    Marital status: Single   Occupational History    Occupation: works as      Employer: [employer]     Employer: [employer]    Tobacco Use    Smoking status: Never Smoker    Smokeless tobacco: Never Used   Substance and Sexual Activity    Alcohol use: Never     Comment: Alcohol  "use rarely    Drug use: No     Comment: Mirena    Sexual activity: Yes     Partners: Male     Birth control/protection: I.U.D.   Social History Narrative    Born and raised in [place]    Went to [employer] and got BS in bio with minor in chem    1 child    Has boyfriend    A fewnot close friends    Likes to read and go to [employer]     Family History   Problem Relation Age of Onset    Breast cancer Neg Hx     Colon cancer Neg Hx     Ovarian cancer Neg Hx     Diabetes Neg Hx     Hypertension Neg Hx     Stroke Neg Hx      OB History        3    Para   1    Term   1            AB   2    Living   1       SAB        IAB        Ectopic        Multiple        Live Births   1                 /72   Ht 5' 7" (1.702 m)   Wt 101 kg (222 lb 10.6 oz)   BMI 34.87 kg/m²       ROS:  GENERAL: Denies weight gain or weight loss. Feeling well overall.   SKIN: Denies rash or lesions.   HEAD: Denies head injury or headache.   NODES: Denies enlarged lymph nodes.   CHEST: Denies chest pain or shortness of breath.   CARDIOVASCULAR: Denies palpitations or left sided chest pain.   ABDOMEN: No abdominal pain, constipation, diarrhea, nausea, vomiting or rectal bleeding.   URINARY: No frequency, dysuria, hematuria, or burning on urination.  REPRODUCTIVE: See HPI.   BREASTS: The patient performs breast self-examination and denies pain, lumps, or nipple discharge.   HEMATOLOGIC: No easy bruisability or excessive bleeding.   MUSCULOSKELETAL: Denies joint pain or swelling.   NEUROLOGIC: Denies syncope or weakness.   PSYCHIATRIC: Denies depression, anxiety or mood swings.    PHYSICAL EXAM:  APPEARANCE: Well nourished, well developed, in no acute distress.  AFFECT: WNL, alert and oriented x 3  SKIN: No acne or hirsutism  PELVIC: Normal external genitalia without lesions.  Normal hair distribution.  Adequate perineal body, normal urethral meatus.  Vagina moist and well rugated without lesions or discharge.  Cervix pink, without " lesions, discharge or tenderness - IUD strings noted in cervical crease to right side  .  No significant cystocele or rectocele.  Bimanual exam shows uterus to be normal size, regular, mobile and nontender.  Adnexa without masses or tenderness.    EXTREMITIES: No edema.  Physical Exam    1. IUD check up  HCG, Quantitative   2. Screening for STD (sexually transmitted disease)  C. trachomatis/N. gonorrhoeae by AMP DNA    HIV 1/2 Ag/Ab (4th Gen)    Hepatitis Panel, Acute    RPR    HSV 1 & 2, IgM    HSV 1 & 2, IgG   3. Irregular menses  HCG, Quantitative    AND PLAN:    Patient was counseled today on A.C.S. Pap guidelines and recommendations for yearly pelvic exams, mammograms and monthly self breast exams; to see her PCP for other health maintenance.

## 2022-04-08 ENCOUNTER — PATIENT MESSAGE (OUTPATIENT)
Dept: OBSTETRICS AND GYNECOLOGY | Facility: CLINIC | Age: 37
End: 2022-04-08
Payer: COMMERCIAL

## 2022-04-08 LAB
C TRACH DNA SPEC QL NAA+PROBE: NOT DETECTED
HIV 1+2 AB+HIV1 P24 AG SERPL QL IA: NEGATIVE
N GONORRHOEA DNA SPEC QL NAA+PROBE: NOT DETECTED

## 2022-04-09 LAB — RPR SER QL: NORMAL

## 2022-04-10 ENCOUNTER — PATIENT MESSAGE (OUTPATIENT)
Dept: OBSTETRICS AND GYNECOLOGY | Facility: CLINIC | Age: 37
End: 2022-04-10
Payer: COMMERCIAL

## 2022-04-11 ENCOUNTER — PATIENT MESSAGE (OUTPATIENT)
Dept: OBSTETRICS AND GYNECOLOGY | Facility: CLINIC | Age: 37
End: 2022-04-11
Payer: COMMERCIAL

## 2022-04-11 LAB
HAV IGM SERPL QL IA: NEGATIVE
HBV CORE IGM SERPL QL IA: NEGATIVE
HBV SURFACE AG SERPL QL IA: NEGATIVE
HCV AB SERPL QL IA: NEGATIVE
HSV AB, IGM BY EIA: 0.66 INDEX
HSV1 IGG SERPL QL IA: POSITIVE
HSV2 IGG SERPL QL IA: POSITIVE

## 2022-04-12 ENCOUNTER — PATIENT MESSAGE (OUTPATIENT)
Dept: OBSTETRICS AND GYNECOLOGY | Facility: CLINIC | Age: 37
End: 2022-04-12
Payer: COMMERCIAL

## 2022-05-31 ENCOUNTER — PATIENT MESSAGE (OUTPATIENT)
Dept: ADMINISTRATIVE | Facility: HOSPITAL | Age: 37
End: 2022-05-31
Payer: COMMERCIAL

## 2022-09-12 ENCOUNTER — OFFICE VISIT (OUTPATIENT)
Dept: OBSTETRICS AND GYNECOLOGY | Facility: CLINIC | Age: 37
End: 2022-09-12
Payer: COMMERCIAL

## 2022-09-12 VITALS
WEIGHT: 235.44 LBS | BODY MASS INDEX: 36.88 KG/M2 | SYSTOLIC BLOOD PRESSURE: 122 MMHG | DIASTOLIC BLOOD PRESSURE: 74 MMHG

## 2022-09-12 DIAGNOSIS — Z30.431 IUD CHECK UP: Primary | ICD-10-CM

## 2022-09-12 DIAGNOSIS — N92.1 IRREGULAR INTERMENSTRUAL BLEEDING: ICD-10-CM

## 2022-09-12 DIAGNOSIS — R10.2 PELVIC PAIN: ICD-10-CM

## 2022-09-12 PROCEDURE — 87481 CANDIDA DNA AMP PROBE: CPT | Mod: 59

## 2022-09-12 PROCEDURE — 87491 CHLMYD TRACH DNA AMP PROBE: CPT | Mod: 59

## 2022-09-12 PROCEDURE — 3074F PR MOST RECENT SYSTOLIC BLOOD PRESSURE < 130 MM HG: ICD-10-PCS | Mod: CPTII,S$GLB,,

## 2022-09-12 PROCEDURE — 1159F MED LIST DOCD IN RCRD: CPT | Mod: CPTII,S$GLB,,

## 2022-09-12 PROCEDURE — 99999 PR PBB SHADOW E&M-EST. PATIENT-LVL III: ICD-10-PCS | Mod: PBBFAC,,,

## 2022-09-12 PROCEDURE — 87591 N.GONORRHOEAE DNA AMP PROB: CPT

## 2022-09-12 PROCEDURE — 3078F DIAST BP <80 MM HG: CPT | Mod: CPTII,S$GLB,,

## 2022-09-12 PROCEDURE — 3078F PR MOST RECENT DIASTOLIC BLOOD PRESSURE < 80 MM HG: ICD-10-PCS | Mod: CPTII,S$GLB,,

## 2022-09-12 PROCEDURE — 1159F PR MEDICATION LIST DOCUMENTED IN MEDICAL RECORD: ICD-10-PCS | Mod: CPTII,S$GLB,,

## 2022-09-12 PROCEDURE — 99213 OFFICE O/P EST LOW 20 MIN: CPT | Mod: S$GLB,,,

## 2022-09-12 PROCEDURE — 99999 PR PBB SHADOW E&M-EST. PATIENT-LVL III: CPT | Mod: PBBFAC,,,

## 2022-09-12 PROCEDURE — 99213 PR OFFICE/OUTPT VISIT, EST, LEVL III, 20-29 MIN: ICD-10-PCS | Mod: S$GLB,,,

## 2022-09-12 PROCEDURE — 1160F PR REVIEW ALL MEDS BY PRESCRIBER/CLIN PHARMACIST DOCUMENTED: ICD-10-PCS | Mod: CPTII,S$GLB,,

## 2022-09-12 PROCEDURE — 3008F PR BODY MASS INDEX (BMI) DOCUMENTED: ICD-10-PCS | Mod: CPTII,S$GLB,,

## 2022-09-12 PROCEDURE — 1160F RVW MEDS BY RX/DR IN RCRD: CPT | Mod: CPTII,S$GLB,,

## 2022-09-12 PROCEDURE — 87801 DETECT AGNT MULT DNA AMPLI: CPT

## 2022-09-12 PROCEDURE — 3074F SYST BP LT 130 MM HG: CPT | Mod: CPTII,S$GLB,,

## 2022-09-12 PROCEDURE — 3008F BODY MASS INDEX DOCD: CPT | Mod: CPTII,S$GLB,,

## 2022-09-12 NOTE — PROGRESS NOTES
Subjective:       Patient ID: Johanna Bullock is a 37 y.o. female.    Chief Complaint:  IUD Check      History of Present Illness  HPI    Johanna Bullock is a 37y.o. female  presents today for an IUD check. This is her 2nd Mirena, most recently placed 2018 and pt has been pleased with it. Pt states her IUD was inserted same day was removed end of May 2018, as seen in record, though there is no record of reinsertion. Reports regular monthly menses, mild x 3 days, without cramping. Today C/o new BTB onset 22 (x4 weeks). Medium flow, requiring changing tampons every 3 hours for cleanliness, about 3-5 cc's in menstrual cup q12 hours. New complaint of intermittent left lower abdominal cramping a couple times a day, described as sharp, fleeting. LMP reported as 2022. Pt feels that the bleeding may be lightening coming to an end. Recent CBC, TSH wnl. Pt thinks she may need a new device if it is no longer working.          GYN & OB History  No LMP recorded. Patient has had an implant.   Date of Last Pap: 3/27/2020    OB History    Para Term  AB Living   3 1 1   2 1   SAB IAB Ectopic Multiple Live Births           1      # Outcome Date GA Lbr Brandon/2nd Weight Sex Delivery Anes PTL Lv   3 Term 12 38w0d  2.892 kg (6 lb 6 oz) M Vag-Spont None N RIAZ   2 AB            1 AB                Review of Systems  Review of Systems   Constitutional:  Negative for activity change, appetite change, chills, fatigue and fever.   HENT:  Negative for nasal congestion and mouth sores.    Respiratory:  Negative for cough, shortness of breath and wheezing.    Cardiovascular:  Negative for chest pain.   Gastrointestinal:  Negative for abdominal pain, constipation, diarrhea, nausea and vomiting.   Endocrine: Negative for hair loss and hot flashes.   Genitourinary:  Positive for menstrual problem and pelvic pain. Negative for bladder incontinence, decreased libido, dysmenorrhea, dyspareunia, dysuria,  frequency, genital sores, menorrhagia, urgency, vaginal discharge, vaginal pain, urinary incontinence, postcoital bleeding and vaginal odor.   Musculoskeletal:  Negative for back pain.   Integumentary:  Negative for breast mass, nipple discharge, breast skin changes and breast tenderness.   Neurological:  Negative for headaches.   Hematological:  Negative for adenopathy.   Psychiatric/Behavioral:  Negative for depression. The patient is not nervous/anxious.    All other systems reviewed and are negative.  Breast: Negative for breast self exam, lump, mass, mastodynia, nipple discharge, skin changes and tenderness        Objective:    Physical Exam:   Constitutional: She is oriented to person, place, and time. She appears well-developed and well-nourished. No distress.    HENT:   Head: Normocephalic and atraumatic.   Nose: Nose normal.    Eyes: Pupils are equal, round, and reactive to light. Conjunctivae and EOM are normal. Right eye exhibits no discharge. Left eye exhibits no discharge.     Cardiovascular:  Normal rate, regular rhythm and normal heart sounds.      Exam reveals no gallop, no friction rub, no clubbing, no cyanosis and no edema.       No murmur heard.   Pulmonary/Chest: Effort normal and breath sounds normal. No respiratory distress. She has no decreased breath sounds. She has no wheezes. She has no rhonchi. She has no rales.        Abdominal: Soft. Bowel sounds are normal. She exhibits no distension. There is no abdominal tenderness. There is no rebound and no guarding. Hernia confirmed negative in the right inguinal area and confirmed negative in the left inguinal area.     Genitourinary:    Inguinal canal, vagina, uterus, right adnexa and left adnexa normal.   Rectum:      No external hemorrhoid.      Pelvic exam was performed with patient supine.   The external female genitalia was normal.   No external genitalia lesions identified,Genitalia hair distrobution normal .   Labial bartholins  normal.There is no rash, tenderness, lesion or injury on the right labia. There is no rash, tenderness, lesion or injury on the left labia. Cervix is normal. Right adnexum displays no mass, no tenderness and no fullness. Left adnexum displays no mass, no tenderness and no fullness. No erythema,  no vaginal discharge, tenderness or bleeding in the vagina.    No foreign body in the vagina.      No signs of injury in the vagina.   Vagina was moist.Cervix exhibits no motion tenderness, no lesion, no discharge, no friability, no lesion, no tenderness and no polyp. Uerus contour normal  Uterus is not enlarged and not tender. Uterus size: 8 cm.Normal urethral meatus.Urethral Meatus exhibits: urethral lesion and prolapsedUrethra findings: no urethral mass, no tenderness and no urethral scarringBladder findings: no bladder distention and no bladder tendernessIUD strings visualized.          Musculoskeletal: Normal range of motion and moves all extremeties.      Lymphadenopathy: No inguinal adenopathy noted on the right or left side.    Neurological: She is alert and oriented to person, place, and time.    Skin: Skin is warm and dry. No rash noted. She is not diaphoretic. No cyanosis or erythema. No pallor. Nails show no clubbing.    Psychiatric: She has a normal mood and affect. Her speech is normal and behavior is normal. Judgment and thought content normal.        Assessment:        1. IUD check up    2. Irregular intermenstrual bleeding    3. Pelvic pain               Plan:      Visual string check and bimanual exam today. GC and Affirm collected. Pelvic US scheduled. Discussed prescription of Provera to stop bleeding if labs and US are normal. RTC PRN for continued or worsening condition. Pap due 2023

## 2022-09-15 RX ORDER — METRONIDAZOLE 500 MG/1
500 TABLET ORAL 2 TIMES DAILY
Qty: 14 TABLET | Refills: 0 | Status: SHIPPED | OUTPATIENT
Start: 2022-09-15 | End: 2022-09-22

## 2022-10-25 ENCOUNTER — OFFICE VISIT (OUTPATIENT)
Dept: INTERNAL MEDICINE | Facility: CLINIC | Age: 37
End: 2022-10-25
Payer: COMMERCIAL

## 2022-10-25 VITALS
DIASTOLIC BLOOD PRESSURE: 64 MMHG | TEMPERATURE: 97 F | SYSTOLIC BLOOD PRESSURE: 120 MMHG | OXYGEN SATURATION: 98 % | BODY MASS INDEX: 37.16 KG/M2 | HEIGHT: 67 IN | HEART RATE: 68 BPM | WEIGHT: 236.75 LBS

## 2022-10-25 DIAGNOSIS — R53.83 FATIGUE, UNSPECIFIED TYPE: Primary | ICD-10-CM

## 2022-10-25 DIAGNOSIS — E53.8 VITAMIN B12 DEFICIENCY: ICD-10-CM

## 2022-10-25 DIAGNOSIS — E66.01 SEVERE OBESITY (BMI 35.0-35.9 WITH COMORBIDITY): ICD-10-CM

## 2022-10-25 PROCEDURE — 3074F PR MOST RECENT SYSTOLIC BLOOD PRESSURE < 130 MM HG: ICD-10-PCS | Mod: CPTII,S$GLB,, | Performed by: NURSE PRACTITIONER

## 2022-10-25 PROCEDURE — 1159F PR MEDICATION LIST DOCUMENTED IN MEDICAL RECORD: ICD-10-PCS | Mod: CPTII,S$GLB,, | Performed by: NURSE PRACTITIONER

## 2022-10-25 PROCEDURE — 3044F HG A1C LEVEL LT 7.0%: CPT | Mod: CPTII,S$GLB,, | Performed by: NURSE PRACTITIONER

## 2022-10-25 PROCEDURE — 3078F PR MOST RECENT DIASTOLIC BLOOD PRESSURE < 80 MM HG: ICD-10-PCS | Mod: CPTII,S$GLB,, | Performed by: NURSE PRACTITIONER

## 2022-10-25 PROCEDURE — 1159F MED LIST DOCD IN RCRD: CPT | Mod: CPTII,S$GLB,, | Performed by: NURSE PRACTITIONER

## 2022-10-25 PROCEDURE — 99999 PR PBB SHADOW E&M-EST. PATIENT-LVL III: ICD-10-PCS | Mod: PBBFAC,,, | Performed by: NURSE PRACTITIONER

## 2022-10-25 PROCEDURE — 3044F PR MOST RECENT HEMOGLOBIN A1C LEVEL <7.0%: ICD-10-PCS | Mod: CPTII,S$GLB,, | Performed by: NURSE PRACTITIONER

## 2022-10-25 PROCEDURE — 99214 OFFICE O/P EST MOD 30 MIN: CPT | Mod: S$GLB,,, | Performed by: NURSE PRACTITIONER

## 2022-10-25 PROCEDURE — 3074F SYST BP LT 130 MM HG: CPT | Mod: CPTII,S$GLB,, | Performed by: NURSE PRACTITIONER

## 2022-10-25 PROCEDURE — 99214 PR OFFICE/OUTPT VISIT, EST, LEVL IV, 30-39 MIN: ICD-10-PCS | Mod: S$GLB,,, | Performed by: NURSE PRACTITIONER

## 2022-10-25 PROCEDURE — 99999 PR PBB SHADOW E&M-EST. PATIENT-LVL III: CPT | Mod: PBBFAC,,, | Performed by: NURSE PRACTITIONER

## 2022-10-25 PROCEDURE — 3078F DIAST BP <80 MM HG: CPT | Mod: CPTII,S$GLB,, | Performed by: NURSE PRACTITIONER

## 2022-10-25 RX ORDER — CYANOCOBALAMIN 1000 UG/ML
INJECTION, SOLUTION INTRAMUSCULAR; SUBCUTANEOUS
Qty: 4 ML | Refills: 3 | Status: SHIPPED | OUTPATIENT
Start: 2022-10-25

## 2022-10-25 NOTE — PROGRESS NOTES
Subjective:       Patient ID: Johanna Bullock is a 37 y.o. female.    Chief Complaint: adreline insuff syndrome (Fatigue, chest heaviness, low heart rate, dehydration. Patient believes she has AIS /)    HPI    Pt reports that she believes she has adrenal insufficiency  Reports fatigue, sob, feels like heart beats slow x 1 year  Has seen 2 cardiologists and a primary  She is requesting blood work        Past Medical History:   Diagnosis Date    Adjustment disorder with mixed anxiety and depressed mood 2/6/2013    Anemia, B12 deficiency     Anxiety     Major depression, single episode 2/6/2013    Major depression, single episode 2/6/2013    Post partum depression        Past Surgical History:   Procedure Laterality Date    CHOLECYSTECTOMY  10/2012    COLONOSCOPY N/A 2/14/2020    Procedure: COLONOSCOPY;  Surgeon: Jas Moore MD;  Location: [facility] ([address] [place] [address]);  Service: Endoscopy;  Laterality: N/A;  Pt procedure time changed to 0800 with 0715 - second half prep completed from [id number]- Pt verbalized understanding- ERW2/13/20@1022    DILATION AND CURETTAGE OF UTERUS      ESOPHAGOGASTRODUODENOSCOPY N/A 7/20/2018    Procedure: ESOPHAGOGASTRODUODENOSCOPY (EGD);  Surgeon: Darwin Hansen MD;  Location: [facility] ([address] [place] [address]);  Service: Endoscopy;  Laterality: N/A;  Please measure pouch and limbs    GASTRIC BYPASS  2008    Revision August 2019    LAPAROSCOPIC REPAIR OF HIATAL HERNIA  2019    Liver scope  10/2012    LYSIS OF ADHESIONS       Social History     Socioeconomic History    Marital status: Single   Occupational History    Occupation: works as      Employer: [employer]     Employer: [employer]   Tobacco Use    Smoking status: Never    Smokeless tobacco: Never   Substance and Sexual Activity    Alcohol use: Never     Comment: Alcohol use rarely    Drug use: No     Comment: Mirena    Sexual activity: Yes     Partners: Male     Birth control/protection: I.U.D.   Social History Narrative     Born and raised in [place] [facility] [place]    Went to [employer] and got BS in bio with minor in chem    1 child    Has boyfriend    A fewnot close friends    Likes to read and go to movies     Review of patient's allergies indicates:   Allergen Reactions    Zofran [ondansetron hcl (pf)] Rash     Current Outpatient Medications   Medication Sig    fluticasone propionate (FLONASE) 50 mcg/actuation nasal spray 1 spray by Each Nostril route once daily.    LINZESS 290 mcg Cap capsule TAKE ONE CAPSULE BY MOUTH EVERY DAY    albuterol (PROVENTIL/VENTOLIN HFA) 90 mcg/actuation inhaler Inhale 1-2 puffs into the lungs every 6 (six) hours as needed for Wheezing.    cyanocobalamin 1,000 mcg/mL injection ONE INJECTION AS DIRECTED EVERY 28 DAYS     No current facility-administered medications for this visit.           Review of Systems   Constitutional:  Positive for fatigue. Negative for activity change, appetite change, chills, diaphoresis, fever and unexpected weight change.   HENT:  Negative for congestion, ear pain, postnasal drip, rhinorrhea, sinus pressure, sinus pain, sneezing, sore throat, tinnitus, trouble swallowing and voice change.    Eyes:  Negative for photophobia, pain and visual disturbance.   Respiratory:  Positive for shortness of breath. Negative for cough, chest tightness and wheezing.    Cardiovascular:  Negative for chest pain, palpitations and leg swelling.   Gastrointestinal:  Negative for abdominal distention, abdominal pain, constipation, diarrhea, nausea and vomiting.   Genitourinary:  Negative for decreased urine volume, difficulty urinating, dysuria, flank pain, frequency, hematuria and urgency.   Musculoskeletal:  Negative for arthralgias, back pain, joint swelling, neck pain and neck stiffness.   Allergic/Immunologic: Negative for immunocompromised state.   Neurological:  Negative for dizziness, tremors, seizures, syncope, facial asymmetry, speech difficulty, weakness, light-headedness, numbness and headaches.    Hematological:  Negative for adenopathy. Does not bruise/bleed easily.   Psychiatric/Behavioral:  Negative for confusion and sleep disturbance.      Objective:      Physical Exam  Vitals reviewed.   Constitutional:       Appearance: She is obese.   Cardiovascular:      Rate and Rhythm: Normal rate and regular rhythm.      Pulses: Normal pulses.      Heart sounds: Normal heart sounds.   Pulmonary:      Effort: Pulmonary effort is normal.      Breath sounds: Normal breath sounds.   Abdominal:      General: Bowel sounds are normal.      Palpations: Abdomen is soft.   Skin:     General: Skin is warm and dry.   Neurological:      Mental Status: She is alert and oriented to person, place, and time.   Psychiatric:         Mood and Affect: Mood normal.       Assessment:     Vitals:    10/25/22 1351   BP: 120/64   Pulse: 68   Temp: 97.2 °F (36.2 °C)         1. Fatigue, unspecified type    2. Vitamin B12 deficiency    3. Severe obesity (BMI 35.0-35.9 with comorbidity)        Plan:   Fatigue, unspecified type  -     CBC Auto Differential; Future; Expected date: 10/25/2022  -     Comprehensive Metabolic Panel; Future; Expected date: 10/25/2022  -     Hemoglobin A1C; Future; Expected date: 10/25/2022  -     TSH; Future; Expected date: 10/25/2022  -     ACTH; Future; Expected date: 10/25/2022  -     CORTISOL, 8AM; Future; Expected date: 10/25/2022  -     Urinalysis; Future; Expected date: 10/25/2022  -     Lipid Panel; Future; Expected date: 10/25/2022    Vitamin B12 deficiency  -     VITAMIN B12; Future; Expected date: 10/25/2022  -     cyanocobalamin 1,000 mcg/mL injection; ONE INJECTION AS DIRECTED EVERY 28 DAYS  Dispense: 4 mL; Refill: 3    Severe obesity (BMI 35.0-35.9 with comorbidity)      Check labs as above

## 2022-10-27 ENCOUNTER — LAB VISIT (OUTPATIENT)
Dept: LAB | Facility: HOSPITAL | Age: 37
End: 2022-10-27
Attending: NURSE PRACTITIONER
Payer: COMMERCIAL

## 2022-10-27 ENCOUNTER — LAB VISIT (OUTPATIENT)
Dept: LAB | Facility: HOSPITAL | Age: 37
End: 2022-10-27
Payer: COMMERCIAL

## 2022-10-27 DIAGNOSIS — R53.83 FATIGUE, UNSPECIFIED TYPE: ICD-10-CM

## 2022-10-27 DIAGNOSIS — E53.8 VITAMIN B12 DEFICIENCY: ICD-10-CM

## 2022-10-27 LAB
ALBUMIN SERPL BCP-MCNC: 3.5 G/DL (ref 3.5–5.2)
ALP SERPL-CCNC: 57 U/L (ref 55–135)
ALT SERPL W/O P-5'-P-CCNC: 15 U/L (ref 10–44)
ANION GAP SERPL CALC-SCNC: 9 MMOL/L (ref 8–16)
AST SERPL-CCNC: 14 U/L (ref 10–40)
BASOPHILS # BLD AUTO: 0.03 K/UL (ref 0–0.2)
BASOPHILS NFR BLD: 0.6 % (ref 0–1.9)
BILIRUB SERPL-MCNC: 0.5 MG/DL (ref 0.1–1)
BILIRUB UR QL STRIP: NEGATIVE
BUN SERPL-MCNC: 9 MG/DL (ref 6–20)
CALCIUM SERPL-MCNC: 8.8 MG/DL (ref 8.7–10.5)
CHLORIDE SERPL-SCNC: 107 MMOL/L (ref 95–110)
CHOLEST SERPL-MCNC: 181 MG/DL (ref 120–199)
CHOLEST/HDLC SERPL: 3.3 {RATIO} (ref 2–5)
CLARITY UR: CLEAR
CO2 SERPL-SCNC: 22 MMOL/L (ref 23–29)
COLOR UR: YELLOW
CORTIS SERPL-MCNC: 12.4 UG/DL (ref 4.3–22.4)
CREAT SERPL-MCNC: 0.7 MG/DL (ref 0.5–1.4)
DIFFERENTIAL METHOD: ABNORMAL
EOSINOPHIL # BLD AUTO: 0.1 K/UL (ref 0–0.5)
EOSINOPHIL NFR BLD: 1.7 % (ref 0–8)
ERYTHROCYTE [DISTWIDTH] IN BLOOD BY AUTOMATED COUNT: 15.1 % (ref 11.5–14.5)
EST. GFR  (NO RACE VARIABLE): >60 ML/MIN/1.73 M^2
ESTIMATED AVG GLUCOSE: 103 MG/DL (ref 68–131)
GLUCOSE SERPL-MCNC: 82 MG/DL (ref 70–110)
GLUCOSE UR QL STRIP: NEGATIVE
HBA1C MFR BLD: 5.2 % (ref 4–5.6)
HCT VFR BLD AUTO: 37.3 % (ref 37–48.5)
HDLC SERPL-MCNC: 55 MG/DL (ref 40–75)
HDLC SERPL: 30.4 % (ref 20–50)
HGB BLD-MCNC: 11.7 G/DL (ref 12–16)
HGB UR QL STRIP: NEGATIVE
IMM GRANULOCYTES # BLD AUTO: 0.01 K/UL (ref 0–0.04)
IMM GRANULOCYTES NFR BLD AUTO: 0.2 % (ref 0–0.5)
KETONES UR QL STRIP: NEGATIVE
LDLC SERPL CALC-MCNC: 119.8 MG/DL (ref 63–159)
LEUKOCYTE ESTERASE UR QL STRIP: NEGATIVE
LYMPHOCYTES # BLD AUTO: 1.9 K/UL (ref 1–4.8)
LYMPHOCYTES NFR BLD: 39.6 % (ref 18–48)
MCH RBC QN AUTO: 27.5 PG (ref 27–31)
MCHC RBC AUTO-ENTMCNC: 31.4 G/DL (ref 32–36)
MCV RBC AUTO: 88 FL (ref 82–98)
MONOCYTES # BLD AUTO: 0.4 K/UL (ref 0.3–1)
MONOCYTES NFR BLD: 9.2 % (ref 4–15)
NEUTROPHILS # BLD AUTO: 2.3 K/UL (ref 1.8–7.7)
NEUTROPHILS NFR BLD: 48.7 % (ref 38–73)
NITRITE UR QL STRIP: NEGATIVE
NONHDLC SERPL-MCNC: 126 MG/DL
NRBC BLD-RTO: 0 /100 WBC
PH UR STRIP: 7 [PH] (ref 5–8)
PLATELET # BLD AUTO: 350 K/UL (ref 150–450)
PMV BLD AUTO: 10.1 FL (ref 9.2–12.9)
POTASSIUM SERPL-SCNC: 3.9 MMOL/L (ref 3.5–5.1)
PROT SERPL-MCNC: 6.8 G/DL (ref 6–8.4)
PROT UR QL STRIP: NEGATIVE
RBC # BLD AUTO: 4.25 M/UL (ref 4–5.4)
SODIUM SERPL-SCNC: 138 MMOL/L (ref 136–145)
SP GR UR STRIP: 1.02 (ref 1–1.03)
TRIGL SERPL-MCNC: 31 MG/DL (ref 30–150)
TSH SERPL DL<=0.005 MIU/L-ACNC: 1.95 UIU/ML (ref 0.4–4)
URN SPEC COLLECT METH UR: NORMAL
VIT B12 SERPL-MCNC: 219 PG/ML (ref 210–950)
WBC # BLD AUTO: 4.77 K/UL (ref 3.9–12.7)

## 2022-10-27 PROCEDURE — 36415 COLL VENOUS BLD VENIPUNCTURE: CPT | Performed by: NURSE PRACTITIONER

## 2022-10-27 PROCEDURE — 81003 URINALYSIS AUTO W/O SCOPE: CPT | Performed by: NURSE PRACTITIONER

## 2022-10-27 PROCEDURE — 82024 ASSAY OF ACTH: CPT | Performed by: NURSE PRACTITIONER

## 2022-10-27 PROCEDURE — 82533 TOTAL CORTISOL: CPT | Performed by: NURSE PRACTITIONER

## 2022-10-27 PROCEDURE — 82607 VITAMIN B-12: CPT | Performed by: NURSE PRACTITIONER

## 2022-10-27 PROCEDURE — 85025 COMPLETE CBC W/AUTO DIFF WBC: CPT | Performed by: NURSE PRACTITIONER

## 2022-10-27 PROCEDURE — 80061 LIPID PANEL: CPT | Performed by: NURSE PRACTITIONER

## 2022-10-27 PROCEDURE — 80053 COMPREHEN METABOLIC PANEL: CPT | Performed by: NURSE PRACTITIONER

## 2022-10-27 PROCEDURE — 83036 HEMOGLOBIN GLYCOSYLATED A1C: CPT | Performed by: NURSE PRACTITIONER

## 2022-10-27 PROCEDURE — 84443 ASSAY THYROID STIM HORMONE: CPT | Performed by: NURSE PRACTITIONER

## 2022-10-28 LAB — ACTH PLAS-MCNC: 30 PG/ML (ref 0–46)

## 2022-10-30 ENCOUNTER — PATIENT MESSAGE (OUTPATIENT)
Dept: INTERNAL MEDICINE | Facility: CLINIC | Age: 37
End: 2022-10-30
Payer: COMMERCIAL

## 2022-11-01 DIAGNOSIS — R06.02 SOB (SHORTNESS OF BREATH): Primary | ICD-10-CM

## 2022-11-02 ENCOUNTER — PATIENT MESSAGE (OUTPATIENT)
Dept: PULMONOLOGY | Facility: CLINIC | Age: 37
End: 2022-11-02
Payer: COMMERCIAL

## 2022-12-12 ENCOUNTER — TELEPHONE (OUTPATIENT)
Dept: SURGERY | Facility: CLINIC | Age: 37
End: 2022-12-12
Payer: COMMERCIAL

## 2022-12-12 ENCOUNTER — PATIENT MESSAGE (OUTPATIENT)
Dept: SURGERY | Facility: CLINIC | Age: 37
End: 2022-12-12
Payer: COMMERCIAL

## 2022-12-12 DIAGNOSIS — Z86.010 HISTORY OF COLON POLYPS: Primary | ICD-10-CM

## 2022-12-12 DIAGNOSIS — Z12.11 SCREENING FOR MALIGNANT NEOPLASM OF COLON: ICD-10-CM

## 2022-12-12 NOTE — TELEPHONE ENCOUNTER
Spoke with patient regarding symptoms of dizziness, changes in blood pressure. Patient states that she has been seen by cardiology, pulmonology and PCP. Referral for  placed for 3 year colonoscopy.Message sent to Dr. Moore to advise.on plan.Instructed patient that I will let her know what DR. Moore advises. Patient verbalizes understanding.

## 2022-12-12 NOTE — TELEPHONE ENCOUNTER
----- Message from Nichelle Wilson sent at 12/12/2022 10:44 AM CST -----  Regarding: Returning Call  Pt is Requesting a Call back from Denita Ochoa call to discuss further.

## 2022-12-12 NOTE — TELEPHONE ENCOUNTER
Left voicemail with callback number in response to previous call. Instructed to call back or message to discuss.

## 2022-12-12 NOTE — TELEPHONE ENCOUNTER
----- Message from Marimar Peters sent at 12/12/2022  9:11 AM CST -----  Regarding: Question regarding repeat Colonoscopy  Contact: @[phone]  Pt requesting a call back regarding some blood in her stool 2 weeks ago pt been feeling really weak and fatigue with shortness of breath.  Pt states she has been seeing several doctors to see whats wrong and she would just like to find out if its not related to the blood in the stool.  Pt was seen last 03/06/22.

## 2022-12-29 ENCOUNTER — TELEPHONE (OUTPATIENT)
Dept: ENDOSCOPY | Facility: HOSPITAL | Age: 37
End: 2022-12-29
Payer: COMMERCIAL

## 2022-12-29 NOTE — TELEPHONE ENCOUNTER
Patient is calling to see if she can schedule her procedure sooner because have a procedure at [facility] and she will like some something sooner.

## 2023-01-27 ENCOUNTER — CLINICAL SUPPORT (OUTPATIENT)
Dept: ENDOSCOPY | Facility: HOSPITAL | Age: 38
End: 2023-01-27
Attending: COLON & RECTAL SURGERY
Payer: COMMERCIAL

## 2023-01-27 VITALS — HEIGHT: 68 IN | WEIGHT: 228 LBS | BODY MASS INDEX: 34.56 KG/M2

## 2023-01-27 DIAGNOSIS — Z86.010 HISTORY OF COLON POLYPS: ICD-10-CM

## 2023-01-27 DIAGNOSIS — Z12.11 SCREENING FOR MALIGNANT NEOPLASM OF COLON: ICD-10-CM

## 2023-01-27 RX ORDER — SODIUM, POTASSIUM,MAG SULFATES 17.5-3.13G
SOLUTION, RECONSTITUTED, ORAL ORAL
Qty: 1 KIT | Refills: 0 | Status: ON HOLD | OUTPATIENT
Start: 2023-01-27 | End: 2023-03-10 | Stop reason: HOSPADM

## 2023-03-10 ENCOUNTER — HOSPITAL ENCOUNTER (OUTPATIENT)
Facility: HOSPITAL | Age: 38
Discharge: HOME | End: 2023-03-10
Attending: COLON & RECTAL SURGERY | Admitting: COLON & RECTAL SURGERY
Payer: COMMERCIAL

## 2023-03-10 ENCOUNTER — ANESTHESIA (OUTPATIENT)
Dept: ENDOSCOPY | Facility: HOSPITAL | Age: 38
End: 2023-03-10
Payer: COMMERCIAL

## 2023-03-10 ENCOUNTER — ANESTHESIA EVENT (OUTPATIENT)
Dept: ENDOSCOPY | Facility: HOSPITAL | Age: 38
End: 2023-03-10
Payer: COMMERCIAL

## 2023-03-10 VITALS
OXYGEN SATURATION: 100 % | BODY MASS INDEX: 34.56 KG/M2 | HEIGHT: 68 IN | SYSTOLIC BLOOD PRESSURE: 93 MMHG | HEART RATE: 63 BPM | TEMPERATURE: 80 F | RESPIRATION RATE: 18 BRPM | DIASTOLIC BLOOD PRESSURE: 54 MMHG | WEIGHT: 228 LBS

## 2023-03-10 DIAGNOSIS — K59.01 SLOW TRANSIT CONSTIPATION: Primary | ICD-10-CM

## 2023-03-10 DIAGNOSIS — N81.0 FEMALE URETHROCELE: ICD-10-CM

## 2023-03-10 LAB
B-HCG UR QL: NEGATIVE
CTP QC/QA: YES

## 2023-03-10 PROCEDURE — 46221 LIGATION OF HEMORRHOID(S): CPT | Performed by: COLON & RECTAL SURGERY

## 2023-03-10 PROCEDURE — 27201022: Performed by: COLON & RECTAL SURGERY

## 2023-03-10 PROCEDURE — E9220 PRA ENDO ANESTHESIA: HCPCS | Mod: ,,, | Performed by: NURSE ANESTHETIST, CERTIFIED REGISTERED

## 2023-03-10 PROCEDURE — 25000003 PHARM REV CODE 250: Performed by: NURSE ANESTHETIST, CERTIFIED REGISTERED

## 2023-03-10 PROCEDURE — 37000008 HC ANESTHESIA 1ST 15 MINUTES: Performed by: COLON & RECTAL SURGERY

## 2023-03-10 PROCEDURE — 63600175 PHARM REV CODE 636 W HCPCS: Performed by: NURSE ANESTHETIST, CERTIFIED REGISTERED

## 2023-03-10 PROCEDURE — 25000003 PHARM REV CODE 250: Performed by: COLON & RECTAL SURGERY

## 2023-03-10 PROCEDURE — 81025 URINE PREGNANCY TEST: CPT | Performed by: COLON & RECTAL SURGERY

## 2023-03-10 PROCEDURE — 45378 DIAGNOSTIC COLONOSCOPY: CPT | Mod: ,,, | Performed by: COLON & RECTAL SURGERY

## 2023-03-10 PROCEDURE — 46221 PR HEMORRHOIDECTOMY INTERNAL RUBBER BAND LIGATIONS: ICD-10-PCS | Mod: 51,,, | Performed by: COLON & RECTAL SURGERY

## 2023-03-10 PROCEDURE — 45378 DIAGNOSTIC COLONOSCOPY: CPT | Performed by: COLON & RECTAL SURGERY

## 2023-03-10 PROCEDURE — 46221 LIGATION OF HEMORRHOID(S): CPT | Mod: 51,,, | Performed by: COLON & RECTAL SURGERY

## 2023-03-10 PROCEDURE — 45378 PR COLONOSCOPY,DIAGNOSTIC: ICD-10-PCS | Mod: ,,, | Performed by: COLON & RECTAL SURGERY

## 2023-03-10 PROCEDURE — 37000009 HC ANESTHESIA EA ADD 15 MINS: Performed by: COLON & RECTAL SURGERY

## 2023-03-10 PROCEDURE — E9220 PRA ENDO ANESTHESIA: ICD-10-PCS | Mod: ,,, | Performed by: NURSE ANESTHETIST, CERTIFIED REGISTERED

## 2023-03-10 RX ORDER — LIDOCAINE HYDROCHLORIDE 20 MG/ML
INJECTION INTRAVENOUS
Status: DISCONTINUED | OUTPATIENT
Start: 2023-03-10 | End: 2023-03-10

## 2023-03-10 RX ORDER — PROPOFOL 10 MG/ML
VIAL (ML) INTRAVENOUS
Status: DISCONTINUED | OUTPATIENT
Start: 2023-03-10 | End: 2023-03-10

## 2023-03-10 RX ORDER — PROPOFOL 10 MG/ML
VIAL (ML) INTRAVENOUS CONTINUOUS PRN
Status: DISCONTINUED | OUTPATIENT
Start: 2023-03-10 | End: 2023-03-10

## 2023-03-10 RX ORDER — SODIUM CHLORIDE 9 MG/ML
INJECTION, SOLUTION INTRAVENOUS CONTINUOUS
Status: DISCONTINUED | OUTPATIENT
Start: 2023-03-10 | End: 2023-03-10 | Stop reason: HOSPADM

## 2023-03-10 RX ORDER — HALOPERIDOL 5 MG/ML
0.5 INJECTION INTRAMUSCULAR EVERY 10 MIN PRN
Status: DISCONTINUED | OUTPATIENT
Start: 2023-03-10 | End: 2023-03-10 | Stop reason: HOSPADM

## 2023-03-10 RX ORDER — KETOROLAC TROMETHAMINE 15 MG/ML
15 INJECTION, SOLUTION INTRAMUSCULAR; INTRAVENOUS EVERY 8 HOURS PRN
Status: DISCONTINUED | OUTPATIENT
Start: 2023-03-10 | End: 2023-03-10 | Stop reason: HOSPADM

## 2023-03-10 RX ORDER — KETOROLAC TROMETHAMINE 15 MG/ML
30 INJECTION, SOLUTION INTRAMUSCULAR; INTRAVENOUS EVERY 8 HOURS PRN
Status: DISCONTINUED | OUTPATIENT
Start: 2023-03-10 | End: 2023-03-10

## 2023-03-10 RX ORDER — BUPIVACAINE HYDROCHLORIDE 2.5 MG/ML
1.3 INJECTION, SOLUTION EPIDURAL; INFILTRATION; INTRACAUDAL ONCE
Status: COMPLETED | OUTPATIENT
Start: 2023-03-10 | End: 2023-03-10

## 2023-03-10 RX ORDER — FENTANYL CITRATE 50 UG/ML
25 INJECTION, SOLUTION INTRAMUSCULAR; INTRAVENOUS EVERY 5 MIN PRN
Status: DISCONTINUED | OUTPATIENT
Start: 2023-03-10 | End: 2023-03-10 | Stop reason: HOSPADM

## 2023-03-10 RX ADMIN — SODIUM CHLORIDE: 0.9 INJECTION, SOLUTION INTRAVENOUS at 06:03

## 2023-03-10 RX ADMIN — PROPOFOL 30 MG: 10 INJECTION, EMULSION INTRAVENOUS at 07:03

## 2023-03-10 RX ADMIN — PROPOFOL 150 MCG/KG/MIN: 10 INJECTION, EMULSION INTRAVENOUS at 07:03

## 2023-03-10 RX ADMIN — PROPOFOL 70 MG: 10 INJECTION, EMULSION INTRAVENOUS at 07:03

## 2023-03-10 RX ADMIN — LIDOCAINE HYDROCHLORIDE 50 MG: 20 INJECTION INTRAVENOUS at 07:03

## 2023-03-10 RX ADMIN — BUPIVACAINE HYDROCHLORIDE 3.25 MG: 2.5 INJECTION, SOLUTION EPIDURAL; INFILTRATION; INTRACAUDAL; PERINEURAL at 07:03

## 2023-03-10 NOTE — PROVATION PATIENT INSTRUCTIONS
Discharge Summary/Instructions after an Endoscopic Procedure  Patient Name: Johanna Bullock  Patient MRN: [id number]  Patient YOB: 1985  Friday, March 10, 2023  Jas Moore MD  Dear patient,  As a result of recent federal legislation (The Federal Cures Act), you may   receive lab or pathology results from your procedure in your [facility]   account before your physician is able to contact you. Your physician or   their representative will relay the results to you with their   recommendations at their soonest availability.  Thank you,  RESTRICTIONS:  During your procedure today, you received medications for sedation.  These   medications may affect your judgment, balance and coordination.  Therefore,   for 24 hours, you have the following restrictions:   - DO NOT drive a car, operate machinery, make legal/financial decisions,   sign important papers or drink alcohol.    ACTIVITY:  Today: no heavy lifting, straining or running due to procedural   sedation/anesthesia.  The following day: return to full activity including work.  DIET:  Eat and drink normally unless instructed otherwise.     TREATMENT FOR COMMON SIDE EFFECTS:  - Mild abdominal pain, nausea, belching, bloating or excessive gas:  rest,   eat lightly and use a heating pad.  - Sore Throat: treat with throat lozenges and/or gargle with warm salt   water.  - Because air was used during the procedure, expelling large amounts of air   from your rectum or belching is normal.  - If a bowel prep was taken, you may not have a bowel movement for 1-3 days.    This is normal.  SYMPTOMS TO WATCH FOR AND REPORT TO YOUR PHYSICIAN:  1. Abdominal pain or bloating, other than gas cramps.  2. Chest pain.  3. Back pain.  4. Signs of infection such as: chills or fever occurring within 24 hours   after the procedure.  5. Rectal bleeding, which would show as bright red, maroon, or black stools.   (A tablespoon of blood from the rectum is not serious,  especially if   hemorrhoids are present.)  6. Vomiting.  7. Weakness or dizziness.  GO DIRECTLY TO THE NEAREST EMERGENCY ROOM IF YOU HAVE ANY OF THE FOLLOWING:      Difficulty breathing              Chills and/or fever over 101 F   Persistent vomiting and/or vomiting blood   Severe abdominal pain   Severe chest pain   Black, tarry stools   Bleeding- more than one tablespoon   Any other symptom or condition that you feel may need urgent attention  Your doctor recommends these additional instructions:  If any biopsies were taken, your doctors clinic will contact you in 1 to 2   weeks with any results.  - Discharge patient to home (ambulatory).   - Resume previous diet.   - Continue present medications.   - Repeat colonoscopy in 10 years for screening purposes.  For questions, problems or results please call your physician - Jas Moore MD at Work:  [phone].  [facility], EMERGENCY ROOM PHONE NUMBER: [phone]  IF A COMPLICATION OR EMERGENCY SITUATION ARISES AND YOU ARE UNABLE TO REACH   YOUR PHYSICIAN - GO DIRECTLY TO THE EMERGENCY ROOM.  Jas Moore MD  3/10/2023 7:39:53 AM  This report has been verified and signed electronically.  Dear patient,  As a result of recent federal legislation (The Federal Cures Act), you may   receive lab or pathology results from your procedure in your [facility]   account before your physician is able to contact you. Your physician or   their representative will relay the results to you with their   recommendations at their soonest availability.  Thank you,  PROVATION

## 2023-03-10 NOTE — ANESTHESIA PREPROCEDURE EVALUATION
[facility]  Anesthesia Pre-Operative Evaluation       Patient Name: Johanna Bullock  YOB: 1985  MRN: [id number]  [facility]: [id number]      Code Status: No Order   Date of Procedure: 3/10/2023  Anesthesia: Choice Procedure: Procedure(s) (LRB):  COLONOSCOPY (N/A)  Pre-Operative Diagnosis: Screening for malignant neoplasm of colon [Z12.11]  History of colon polyps [Z86.010]  Proceduralist: Surgeon(s) and Role:     * HORACE Moore MD - Primary        SUBJECTIVE:   Johanna Bullock is a 37 y.o. female who  has a past medical history of Adjustment disorder with mixed anxiety and depressed mood (2/6/2013), Anemia, B12 deficiency, Anxiety, Major depression, single episode (2/6/2013), Major depression, single episode (2/6/2013), and Post partum depression. No notes on file    Anticoagulants   Medication Route Frequency       she has a current medication list which includes the following long-term medication(s): albuterol.   ALLERGIES:     Review of patient's allergies indicates:   Allergen Reactions    Zofran [ondansetron hcl (pf)] Rash     LDA:      Lines/Drains/Airways     None               MEDICATIONS:     Antibiotics (From admission, onward)    None        VTE Risk Mitigation (From admission, onward)    None        Current Facility-Administered Medications   Medication Dose Route Frequency Provider Last Rate Last Admin    0.9%  NaCl infusion   Intravenous Continuous HORACE Moore MD              History:   There are no hospital problems to display for this patient.    Surgical History:    has a past surgical history that includes Dilation and curettage of uterus; Gastric bypass (2008); Cholecystectomy (10/2012); Liver scope (10/2012); Esophagogastroduodenoscopy (N/A, 7/20/2018); Lysis of adhesions; Colonoscopy (N/A, 2/14/2020); and Laparoscopic repair of hiatal hernia (2019).   Social History:    reports being sexually active and has had partner(s) who are male. She  reports using the following method of birth control/protection: I.U.D..  reports that she has never smoked. She has never used smokeless tobacco. She reports that she does not drink alcohol and does not use drugs.     OBJECTIVE:     Vital Signs (Most Recent):    Vital Signs Range (Last 24H):          There is no height or weight on file to calculate BMI.   Wt Readings from Last 4 Encounters:   01/27/23 103.4 kg (228 lb)   10/25/22 107.4 kg (236 lb 12.4 oz)   09/12/22 106.8 kg (235 lb 7.2 oz)   04/07/22 101 kg (222 lb 10.6 oz)     Significant Labs:  Lab Results   Component Value Date    WBC 4.77 10/27/2022    HGB 11.7 (L) 10/27/2022    HCT 37.3 10/27/2022     10/27/2022     10/27/2022    K 3.9 10/27/2022     10/27/2022    CREATININE 0.7 10/27/2022    BUN 9 10/27/2022    CO2 22 (L) 10/27/2022    GLU 82 10/27/2022    CALCIUM 8.8 10/27/2022    MG 1.9 10/15/2012    PHOS 3.6 10/15/2012    ALKPHOS 57 10/27/2022    ALT 15 10/27/2022    AST 14 10/27/2022    ALBUMIN 3.5 10/27/2022    INR 1.0 10/15/2012    HGBA1C 5.2 10/27/2022    PREGTESTUR Negative 05/26/2021    HCGQUANT <1.2 04/07/2022     No LMP recorded. Patient has had an implant.  No results found for this or any previous visit (from the past 72 hour(s)).    EKG:   No results found for this or any previous visit.    TTE:  No results found for this or any previous visit.  No results found for: EF   Results for orders placed or performed during the hospital encounter of 10/23/12   2D echo with color flow doppler   Result Value Ref Range    EF + QEF 60      ROMINA:  No results found for this or any previous visit.  Stress Test:  No results found for this or any previous visit.     LHC:  No results found for this or any previous visit.     PFT:  No results found for: FEV1, FVC, CBR1XSU, TLC, DLCO   ASSESSMENT/PLAN:         Pre-op Assessment    I have reviewed the Patient Summary Reports.     I have reviewed the Nursing Notes.    I have reviewed the  Medications.     Review of Systems  Anesthesia Hx:  No problems with previous Anesthesia    Hematology/Oncology:  Hematology Normal   Oncology Normal     EENT/Dental:EENT/Dental Normal   Cardiovascular:  Cardiovascular Normal Exercise tolerance: good     Pulmonary:  Pulmonary Normal    Renal/:  Renal/ Normal     Hepatic/GI:  Hepatic/GI Normal    Musculoskeletal:  Musculoskeletal Normal    Neurological:  Neurology Normal    Endocrine:  Endocrine Normal    Dermatological:  Skin Normal    Psych:  Psychiatric Normal           Physical Exam  General: Well nourished    Airway:  Mallampati: III / II  Mouth Opening: Normal  TM Distance: Normal  Tongue: Normal  Neck ROM: Normal ROM    Dental:  Intact        Anesthesia Plan  Type of Anesthesia, risks & benefits discussed:    Anesthesia Type: Gen ETT, Gen Natural Airway  Intra-op Monitoring Plan: Standard ASA Monitors  Post Op Pain Control Plan: multimodal analgesia and IV/PO Opioids PRN  Induction:  IV  Airway Plan: Direct and Video, Post-Induction  Informed Consent: Informed consent signed with the Patient and all parties understand the risks and agree with anesthesia plan.  All questions answered.   ASA Score: 2  Day of Surgery Review of History & Physical: H&P completed by Anesthesiologist.  Anesthesia Plan Notes: Chart reviewed, patient interviewed and examined.  The anesthetic plan was explained.  Risks, benefits, and alternatives were discussed. Questions were answered and the consent was signed.        CARMINE Lay M.D.         Ready For Surgery From Anesthesia Perspective.     .

## 2023-03-10 NOTE — TRANSFER OF CARE
"Anesthesia Transfer of Care Note    Patient: Johanna Bullock    Procedure(s) Performed: Procedure(s) (LRB):  COLONOSCOPY (N/A)    Patient location: GI    Anesthesia Type: general    Transport from OR: Transported from OR on room air with adequate spontaneous ventilation    Post pain: adequate analgesia    Post assessment: no apparent anesthetic complications and tolerated procedure well    Post vital signs: stable    Level of consciousness: awake, alert and oriented    Nausea/Vomiting: no nausea/vomiting    Complications: none    Transfer of care protocol was followed      Last vitals:   Visit Vitals  BP (!) 116/53   Pulse 73   Temp 36.7 °C (98.1 °F) (Temporal)   Resp 16   Ht 5' 8" (1.727 m)   Wt 103.4 kg (228 lb)   SpO2 99%   Breastfeeding No   BMI 34.67 kg/m²     "

## 2023-03-10 NOTE — H&P
COLONOSCOPY HISTORY AND PHYSICAL EXAM    Procedure : Colonoscopy      INDICATIONS: consitpation    Family Hx of CRC: no    Last Colonoscopy:  2/14/2020  Findings: poor prep       Past Medical History:   Diagnosis Date    Adjustment disorder with mixed anxiety and depressed mood 2/6/2013    Anemia, B12 deficiency     Anxiety     Major depression, single episode 2/6/2013    Major depression, single episode 2/6/2013    Post partum depression      Sedation Problems: NO  Family History   Problem Relation Age of Onset    Breast cancer Neg Hx     Colon cancer Neg Hx     Ovarian cancer Neg Hx     Diabetes Neg Hx     Hypertension Neg Hx     Stroke Neg Hx      Fam Hx of Sedation Problems: NO  Social History     Socioeconomic History    Marital status: Single   Occupational History    Occupation: works as      Employer: [employer]     Employer: [employer]   Tobacco Use    Smoking status: Never    Smokeless tobacco: Never   Substance and Sexual Activity    Alcohol use: Never     Comment: Alcohol use rarely    Drug use: No     Comment: Mirena    Sexual activity: Yes     Partners: Male     Birth control/protection: I.U.D.   Social History Narrative    Born and raised in [place]    Went to [employer] and got BS in bio with minor in chem    1 child    Has boyfriend    A fewnot close friends    Likes to read and go to movies       Review of Systems - Negative except   Respiratory ROS: no dyspnea  Cardiovascular ROS: no exertional chest pain  Gastrointestinal ROS: YES intermittetn abdominal discomfort,  NO rectal bleeding  Musculoskeletal ROS: no muscular pain  Neurological ROS: no recent stroke    Physical Exam:  Breastfeeding No   General: no distress  Head: normocephalic  Mallampati Score   Neck: supple, symmetrical, trachea midline  Lungs:  clear to auscultation bilaterally and normal respiratory effort  Heart: regular rate and rhythm and no murmur  Abdomen: soft, non-tender non-distented; bowel sounds normal; no  masses,  no organomegaly  Extremities: no cyanosis or edema, or clubbing    ASA:  II    PLAN  COLONOSCOPY.    SedationPlan :MAC    The details of the procedure, the possible need for biopsy or polypectomy and the potential risks including bleeding, perforation, missed polyps were discussed in detail.

## 2023-03-10 NOTE — ANESTHESIA POSTPROCEDURE EVALUATION
Anesthesia Post Evaluation    Patient: Johanna Bullock    Procedure(s) Performed: Procedure(s) (LRB):  COLONOSCOPY (N/A)    Final Anesthesia Type: general      Patient location during evaluation: PACU  Patient participation: Yes- Able to Participate  Level of consciousness: awake and alert  Post-procedure vital signs: reviewed and stable  Pain management: adequate  Airway patency: patent    PONV status at discharge: No PONV  Anesthetic complications: no      Cardiovascular status: blood pressure returned to baseline  Respiratory status: unassisted  Hydration status: euvolemic  Follow-up not needed.          Vitals Value Taken Time   BP 93/54 03/10/23 0815   Temp 26.7 °C (80 °F) 03/10/23 0756   Pulse 63 03/10/23 0815   Resp 18 03/10/23 0815   SpO2 100 % 03/10/23 0815         Event Time   Out of Recovery 08:23:41         Pain/Vickie Score: Vickie Score: 10 (3/10/2023  7:47 AM)

## 2023-03-13 ENCOUNTER — PATIENT MESSAGE (OUTPATIENT)
Dept: PAIN MEDICINE | Facility: CLINIC | Age: 38
End: 2023-03-13
Payer: COMMERCIAL

## 2023-06-26 ENCOUNTER — TELEPHONE (OUTPATIENT)
Dept: SPORTS MEDICINE | Facility: CLINIC | Age: 38
End: 2023-06-26
Payer: COMMERCIAL

## 2023-06-26 NOTE — TELEPHONE ENCOUNTER
LVM asking patient to return call to let office know if she is being seen for elbow, forearm, etc so that xray can be ordered prior to her appt.

## 2023-06-26 NOTE — TELEPHONE ENCOUNTER
Called patient regarding visit scheduled with Sally Silverman PA-C today. Explained to patient that Sally would not be able to treat her for her complaint and advised her to f/u with PCP. Patient stated she saw her PCP 2 times for this issue already, who advised her to f/u with an orthopedic provider to eval for left arm dexterity and function vs eval by neuro for nerve damage to left arm tissues. Patient accepted new appointment time tomorrow at 3:20pm with Dr. Chilel. Patient  was grateful for the call.

## 2023-06-27 ENCOUNTER — OFFICE VISIT (OUTPATIENT)
Dept: SPORTS MEDICINE | Facility: CLINIC | Age: 38
End: 2023-06-27
Payer: COMMERCIAL

## 2023-06-27 ENCOUNTER — HOSPITAL ENCOUNTER (OUTPATIENT)
Dept: RADIOLOGY | Facility: HOSPITAL | Age: 38
Discharge: HOME | End: 2023-06-27
Attending: STUDENT IN AN ORGANIZED HEALTH CARE EDUCATION/TRAINING PROGRAM
Payer: COMMERCIAL

## 2023-06-27 DIAGNOSIS — M25.522 ELBOW PAIN, LEFT: Primary | ICD-10-CM

## 2023-06-27 DIAGNOSIS — S46.912A STRAIN OF LEFT ELBOW, INITIAL ENCOUNTER: Primary | ICD-10-CM

## 2023-06-27 DIAGNOSIS — S46.912A STRAIN OF LEFT SHOULDER, INITIAL ENCOUNTER: ICD-10-CM

## 2023-06-27 DIAGNOSIS — M25.522 ELBOW PAIN, LEFT: ICD-10-CM

## 2023-06-27 DIAGNOSIS — T82.898A EXTRAVASATION INJURY OF IV CATHETER SITE WITH OTHER COMPLICATION, INITIAL ENCOUNTER: ICD-10-CM

## 2023-06-27 PROCEDURE — 1160F RVW MEDS BY RX/DR IN RCRD: CPT | Mod: CPTII,S$GLB,, | Performed by: STUDENT IN AN ORGANIZED HEALTH CARE EDUCATION/TRAINING PROGRAM

## 2023-06-27 PROCEDURE — 99999 PR PBB SHADOW E&M-EST. PATIENT-LVL II: ICD-10-PCS | Mod: PBBFAC,,, | Performed by: STUDENT IN AN ORGANIZED HEALTH CARE EDUCATION/TRAINING PROGRAM

## 2023-06-27 PROCEDURE — 1159F PR MEDICATION LIST DOCUMENTED IN MEDICAL RECORD: ICD-10-PCS | Mod: CPTII,S$GLB,, | Performed by: STUDENT IN AN ORGANIZED HEALTH CARE EDUCATION/TRAINING PROGRAM

## 2023-06-27 PROCEDURE — 1159F MED LIST DOCD IN RCRD: CPT | Mod: CPTII,S$GLB,, | Performed by: STUDENT IN AN ORGANIZED HEALTH CARE EDUCATION/TRAINING PROGRAM

## 2023-06-27 PROCEDURE — 73080 X-RAY EXAM OF ELBOW: CPT | Mod: TC,LT

## 2023-06-27 PROCEDURE — 99204 OFFICE O/P NEW MOD 45 MIN: CPT | Mod: S$GLB,,, | Performed by: STUDENT IN AN ORGANIZED HEALTH CARE EDUCATION/TRAINING PROGRAM

## 2023-06-27 PROCEDURE — 73080 X-RAY EXAM OF ELBOW: CPT | Mod: 26,LT,, | Performed by: RADIOLOGY

## 2023-06-27 PROCEDURE — 73080 XR ELBOW COMPLETE 3 VIEW LEFT: ICD-10-PCS | Mod: 26,LT,, | Performed by: RADIOLOGY

## 2023-06-27 PROCEDURE — 1160F PR REVIEW ALL MEDS BY PRESCRIBER/CLIN PHARMACIST DOCUMENTED: ICD-10-PCS | Mod: CPTII,S$GLB,, | Performed by: STUDENT IN AN ORGANIZED HEALTH CARE EDUCATION/TRAINING PROGRAM

## 2023-06-27 PROCEDURE — 99999 PR PBB SHADOW E&M-EST. PATIENT-LVL II: CPT | Mod: PBBFAC,,, | Performed by: STUDENT IN AN ORGANIZED HEALTH CARE EDUCATION/TRAINING PROGRAM

## 2023-06-27 PROCEDURE — 99204 PR OFFICE/OUTPT VISIT, NEW, LEVL IV, 45-59 MIN: ICD-10-PCS | Mod: S$GLB,,, | Performed by: STUDENT IN AN ORGANIZED HEALTH CARE EDUCATION/TRAINING PROGRAM

## 2023-06-27 NOTE — PATIENT INSTRUCTIONS
Assessment:  Johanna Bullock is a 38 y.o. female No chief complaint on file.      Encounter Diagnoses   Name Primary?    Strain of left elbow, initial encounter Yes    Strain of left shoulder, initial encounter     Extravasation injury of IV catheter site with other complication, initial encounter         Plan:  Referral to Physical therapy [facility]  Referral the Neurology  Work excuse - return in 1 week with light duty  Apply topical diclofenac (Voltaren) up to 4 times a day to the affected area.  It can be bought over the counter at any local pharmacy.    Patient can ice every 2 hours for 15 minutes as needed    Follow-up: 8 weeks or sooner if there are any problems between now and then.    Thank you for choosing [facility] and Dr. Familia Chilel for your orthopedic & sports medicine care. It is our goal to provide you with exceptional care that will help keep you healthy, active, and get you back in the game.    Please do not hesitate to reach out to us via email, phone, or MyChart with any questions, concerns, or feedback.    If you felt that you received exemplary care today, please consider leaving us feedback on [employer] at:  [email or web address]    If you are experiencing pain/discomfort ,or have questions after 5pm and would like to be connected to the [facility] on-call team, please call this number and specify which Sports Medicine provider is treating you: [phone]

## 2023-06-27 NOTE — PROGRESS NOTES
Patient ID: Johanna Bullock  YOB: 1985  MRN: [id number]    Chief Complaint: left arm/elbow pain      Referred By:self    History of Present Illness: Johanna Bullock is a right-hand dominant 38 y.o. female who presents today with left arm/ elbow pain, on 6/16/23, she went to [facility] ER for abdominal distention concerned she may have had repeat hernia which was repaired previously, when she was eating she was having some abdominal distention, While there she had a CT of her abdomen pelvis that was ordered with contrast, during IV access there was some difficulty, upon administration of IV contrast dye she had extravasation into her left upper arm, immediately caused severe pain and swelling radiating down to her hand. She has had some persistent issues since that time, she tried ice pack, she has kept it elevated, she has had some decreased swelling however continued pain, numbness down into the hand with some discoloration as well slightly more bluish color than her right hand.       Occupation: lab tech      Past Medical History:   Past Medical History:   Diagnosis Date    Adjustment disorder with mixed anxiety and depressed mood 2/6/2013    Anemia, B12 deficiency     Anxiety     Major depression, single episode 2/6/2013    Major depression, single episode 2/6/2013    Post partum depression      Past Surgical History:   Procedure Laterality Date    CHOLECYSTECTOMY  10/2012    COLONOSCOPY N/A 2/14/2020    Procedure: COLONOSCOPY;  Surgeon: Jas Moore MD;  Location: [address]);  Service: Endoscopy;  Laterality: N/A;  Pt procedure time changed to 0800 with 0715 - second half prep completed from [id number]- Pt verbalized understanding- ERW2/13/20@1022    COLONOSCOPY N/A 3/10/2023    Procedure: COLONOSCOPY;  Surgeon: HORACE Moore MD;  Location: [facility] ([address] [place] [address]);  Service: Endoscopy;  Laterality: N/A;  inst emailed-RB    DILATION AND CURETTAGE OF UTERUS       ESOPHAGOGASTRODUODENOSCOPY N/A 7/20/2018    Procedure: ESOPHAGOGASTRODUODENOSCOPY (EGD);  Surgeon: Darwin Hansen MD;  Location: [facility] ([address] [place] [address]);  Service: Endoscopy;  Laterality: N/A;  Please measure pouch and limbs    GASTRIC BYPASS  2008    Revision August 2019    LAPAROSCOPIC REPAIR OF HIATAL HERNIA  2019    Liver scope  10/2012    LYSIS OF ADHESIONS       Family History   Problem Relation Age of Onset    Breast cancer Neg Hx     Colon cancer Neg Hx     Ovarian cancer Neg Hx     Diabetes Neg Hx     Hypertension Neg Hx     Stroke Neg Hx      Social History     Socioeconomic History    Marital status: Single   Occupational History    Occupation: works as      Employer: [employer]     Employer: [employer]   Tobacco Use    Smoking status: Never    Smokeless tobacco: Never   Substance and Sexual Activity    Alcohol use: Never     Comment: Alcohol use rarely    Drug use: No     Comment: Mirena    Sexual activity: Yes     Partners: Male     Birth control/protection: I.U.D.   Social History Narrative    Born and raised in [place]    Went to [employer] and got BS in bio with minor in chem    1 child    Has boyfriend    A fewnot close friends    Likes to read and go to [employer]     Medication List with Changes/Refills   Current Medications    ALBUTEROL (PROVENTIL/VENTOLIN HFA) 90 MCG/ACTUATION INHALER    Inhale 1-2 puffs into the lungs every 6 (six) hours as needed for Wheezing.    CYANOCOBALAMIN 1,000 MCG/ML INJECTION    ONE INJECTION AS DIRECTED EVERY 28 DAYS    FLUTICASONE PROPIONATE (FLONASE) 50 MCG/ACTUATION NASAL SPRAY    1 spray by Each Nostril route once daily.    LINACLOTIDE (LINZESS) 290 MCG CAP CAPSULE    Take 1 capsule (290 mcg total) by mouth before breakfast.    LINZESS 290 MCG CAP CAPSULE    TAKE ONE CAPSULE BY MOUTH EVERY DAY     Review of patient's allergies indicates:   Allergen Reactions    Zofran [ondansetron hcl (pf)] Rash       Physical Exam:   There is no height or weight on file to  calculate BMI.    GENERAL: Well appearing, in no acute distress.  HEAD: Normocephalic and atraumatic.  ENT: External ears and nose grossly normal.  EYES: EOMI bilaterally  PULMONARY: Respirations are grossly even and non-labored.  NEURO: Awake, alert, and oriented x 3.  SKIN: No obvious rashes appreciated.  PSYCH: Mood & affect are appropriate.    Detailed MSK exam:     Left elbow exam:   -TTP: Antecubital fossa and proximal forearm ventral aspect  -ROM: extension 0, flexion 130  -Resisted wrist extension: negative  -Resisted 3rd finger extension: negative  -Resisted wrist flexion: negative  -Resisted pronation: negative  -Resisted supination: negative  -Sensation intact  -Pulses 2+  - strength 4+/5      Imaging:  X-Ray Elbow Complete Left  Narrative: EXAMINATION:  XR ELBOW COMPLETE 3 VIEW LEFT    CLINICAL HISTORY:  Pain in left elbow    TECHNIQUE:  AP, lateral, and oblique views of the left elbow were performed.    COMPARISON:  None    FINDINGS:  No acute fracture or dislocation.  No joint effusion suggested.  Joint spaces appear to be relatively well maintained.  Impression: As above    Electronically signed by: Aramis Bass DO  Date:    06/27/2023  Time:    15:06        Relevant imaging results were reviewed and interpreted by me and per my read shows no acute abnormalities.  This was discussed with the patient and / or family today.     Assessment:  Johanna Bullock is a 38 y.o. female presenting with left elbow and shoulder pain after IV extravasation event 2 weeks prior while getting a contrast CT abdomen.   History, physical and radiographs are consistent with a likely diagnosis of elbow muscle strain and shoulder strain, possible compressive nerve injury 2/2 increased volume in the elbow region.   Plan: PT referral [facility]. Ice, voltaren gel as needed. Neurology referral. Too early for EMG to show anything right now. Return to work with light duty in 1 week. Continue conservative management for pain.    Follow up 8 weeks. All questions answered.      Strain of left elbow, initial encounter    Strain of left shoulder, initial encounter    Extravasation injury of IV catheter site with other complication, initial encounter             A copy of today's visit note has been sent to the referring provider.     Electronically signed:  Familia Chilel MD, MPH  06/27/2023  3:23 PM

## 2023-06-29 ENCOUNTER — CLINICAL SUPPORT (OUTPATIENT)
Dept: REHABILITATION | Facility: HOSPITAL | Age: 38
End: 2023-06-29
Attending: STUDENT IN AN ORGANIZED HEALTH CARE EDUCATION/TRAINING PROGRAM
Payer: COMMERCIAL

## 2023-06-29 DIAGNOSIS — S46.912A STRAIN OF LEFT SHOULDER, INITIAL ENCOUNTER: ICD-10-CM

## 2023-06-29 DIAGNOSIS — R29.898 WEAKNESS OF LEFT HAND: ICD-10-CM

## 2023-06-29 DIAGNOSIS — S46.912A STRAIN OF LEFT ELBOW, INITIAL ENCOUNTER: ICD-10-CM

## 2023-06-29 DIAGNOSIS — M79.602 LEFT ARM PAIN: Primary | ICD-10-CM

## 2023-06-29 PROCEDURE — 97161 PT EVAL LOW COMPLEX 20 MIN: CPT | Mod: PN

## 2023-06-29 PROCEDURE — 97112 NEUROMUSCULAR REEDUCATION: CPT | Mod: PN

## 2023-06-30 ENCOUNTER — OFFICE VISIT (OUTPATIENT)
Dept: NEUROLOGY | Facility: CLINIC | Age: 38
End: 2023-06-30
Payer: COMMERCIAL

## 2023-06-30 VITALS
SYSTOLIC BLOOD PRESSURE: 94 MMHG | WEIGHT: 228 LBS | HEIGHT: 68 IN | DIASTOLIC BLOOD PRESSURE: 69 MMHG | HEART RATE: 66 BPM | BODY MASS INDEX: 34.56 KG/M2

## 2023-06-30 DIAGNOSIS — T82.898S: Primary | ICD-10-CM

## 2023-06-30 DIAGNOSIS — R25.1 TREMOR: ICD-10-CM

## 2023-06-30 DIAGNOSIS — M79.602 LEFT ARM PAIN: ICD-10-CM

## 2023-06-30 DIAGNOSIS — R29.898 DECREASED GRIP STRENGTH OF LEFT HAND: ICD-10-CM

## 2023-06-30 PROCEDURE — 3074F PR MOST RECENT SYSTOLIC BLOOD PRESSURE < 130 MM HG: ICD-10-PCS | Mod: CPTII,S$GLB,, | Performed by: PSYCHIATRY & NEUROLOGY

## 2023-06-30 PROCEDURE — 3008F PR BODY MASS INDEX (BMI) DOCUMENTED: ICD-10-PCS | Mod: CPTII,S$GLB,, | Performed by: PSYCHIATRY & NEUROLOGY

## 2023-06-30 PROCEDURE — 1159F PR MEDICATION LIST DOCUMENTED IN MEDICAL RECORD: ICD-10-PCS | Mod: CPTII,S$GLB,, | Performed by: PSYCHIATRY & NEUROLOGY

## 2023-06-30 PROCEDURE — 99999 PR PBB SHADOW E&M-EST. PATIENT-LVL III: ICD-10-PCS | Mod: PBBFAC,,, | Performed by: PSYCHIATRY & NEUROLOGY

## 2023-06-30 PROCEDURE — 1160F RVW MEDS BY RX/DR IN RCRD: CPT | Mod: CPTII,S$GLB,, | Performed by: PSYCHIATRY & NEUROLOGY

## 2023-06-30 PROCEDURE — 1159F MED LIST DOCD IN RCRD: CPT | Mod: CPTII,S$GLB,, | Performed by: PSYCHIATRY & NEUROLOGY

## 2023-06-30 PROCEDURE — 3074F SYST BP LT 130 MM HG: CPT | Mod: CPTII,S$GLB,, | Performed by: PSYCHIATRY & NEUROLOGY

## 2023-06-30 PROCEDURE — 3078F DIAST BP <80 MM HG: CPT | Mod: CPTII,S$GLB,, | Performed by: PSYCHIATRY & NEUROLOGY

## 2023-06-30 PROCEDURE — 99205 PR OFFICE/OUTPT VISIT, NEW, LEVL V, 60-74 MIN: ICD-10-PCS | Mod: S$GLB,,, | Performed by: PSYCHIATRY & NEUROLOGY

## 2023-06-30 PROCEDURE — 99205 OFFICE O/P NEW HI 60 MIN: CPT | Mod: S$GLB,,, | Performed by: PSYCHIATRY & NEUROLOGY

## 2023-06-30 PROCEDURE — 99999 PR PBB SHADOW E&M-EST. PATIENT-LVL III: CPT | Mod: PBBFAC,,, | Performed by: PSYCHIATRY & NEUROLOGY

## 2023-06-30 PROCEDURE — 3078F PR MOST RECENT DIASTOLIC BLOOD PRESSURE < 80 MM HG: ICD-10-PCS | Mod: CPTII,S$GLB,, | Performed by: PSYCHIATRY & NEUROLOGY

## 2023-06-30 PROCEDURE — 1160F PR REVIEW ALL MEDS BY PRESCRIBER/CLIN PHARMACIST DOCUMENTED: ICD-10-PCS | Mod: CPTII,S$GLB,, | Performed by: PSYCHIATRY & NEUROLOGY

## 2023-06-30 PROCEDURE — 3008F BODY MASS INDEX DOCD: CPT | Mod: CPTII,S$GLB,, | Performed by: PSYCHIATRY & NEUROLOGY

## 2023-06-30 NOTE — PROGRESS NOTES
[facility] NEUROLOGY  [facility], [place]    Date: 6/30/23  Patient Name: Johanna Bullock   MRN: [id number]   PCP: Iman Mora  Referring Provider: Familia Chilel MD    Chief Complaint: LUE pain and weakness  Subjective:   Patient seen in consultation at the request of Familia Chilel MD for the evaluation of the above chief complaint. A copy of this note will be sent to the referring physician.      HPI:   Ms. Johanna Bullock is a 38 y.o. RH female presenting for evaluation of left upper extremity complaints.      The patient shares that on 06/16/2023, she presented to emergency department [facility]  Had concerns over abdominal pain.  Part of the evaluation was to complete a CT scan of the abdomen.  Patient had IV placed left antecubital region.  She immediately felt as if there was something abnormal about the IV.  She expresses that she is had numerous IVs and surgical procedures in the past and was familiar with how they should feel.  Notes that the person that placed the IV reassured her it was fine and proceed to send her to CT.  In the CT lab, she expressed to a separate person that the IV felt as if it was incorrect.  Saline was pushing the area cause burning in an unusual sensation in the hands.  She was once again reassured that it was normal.  CT scan was started and IV contrast was administered.  As contrast was starting to flow through the IV, patient has suffered immediate excruciating pain, burning, tingling sensations in the lower arm.  Quickly started to notice swelling in the proximal left upper extremity.  No changes in color in the left hand.    Was evaluated soon after in the hospital setting for compartment syndrome.  Was not found to have evidence of this issue.  Was eventually discharged home but had swelling continued symptoms.  Had ultrasound venous and arterial in the left upper extremity on 06/20/2023 without  evidence of vascular abnormalities.    Followed up with PCP then orthopedics.  Orthopedics diagnosed muscle strains in the area and recommended the patient see Neurology.    Today, the patient states that the vast majority of swelling has improved.  She continues to have a dull low-grade aching pain deep in the anterior proximal arm and soreness around the posterior aspects of the shoulder.  Has weakness in the left hand and weakness with wrist flexion extension.  Use of the limb causes sharp pains around the anterior left wrist and anterior forearm near the elbow.  Has no history of tremor but notes that use of muscle groups of the forearm cause a tremulous sensation in the hands.  Decreased endurance of muscle groups in the left upper extremity.  When left arm is left by the side as when walking, patient's hand becomes discolored and cold.  Warmer and color correction occurs when arm is elevated or held against the body.    Fine motor tasks or those requiring  strength are greatly hindered.  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  These types of tasks are very hard of the moments.  As she works in an environment with chemical exposures, unsteadiness can result in exposure or harm to herself or others.    No gross paralysis of any muscle groups in the left upper extremity endorsed by patient.  Outside of left upper extremity complaints, no focal or lateralizing sensory or motor deficits of concern.    PAST MEDICAL HISTORY:  Past Medical History:   Diagnosis Date    Adjustment disorder with mixed anxiety and depressed mood 2/6/2013    Anemia, B12 deficiency     Anxiety     Major depression, single episode 2/6/2013    Major depression, single episode 2/6/2013    Post partum depression        PAST SURGICAL HISTORY:  Past Surgical History:   Procedure Laterality Date    CHOLECYSTECTOMY  10/2012    COLONOSCOPY N/A 2/14/2020    Procedure: COLONOSCOPY;  Surgeon: Jas Moore MD;   Location: [facility] (4TH FLR);  Service: Endoscopy;  Laterality: N/A;  Pt procedure time changed to 0800 with 0715 - second half prep completed from [id number]- Pt verbalized understanding- ERW2/13/20@1022    COLONOSCOPY N/A 3/10/2023    Procedure: COLONOSCOPY;  Surgeon: HORACE Moore MD;  Location: [facility] (4TH FLR);  Service: Endoscopy;  Laterality: N/A;  inst emailed-RB    DILATION AND CURETTAGE OF UTERUS      ESOPHAGOGASTRODUODENOSCOPY N/A 7/20/2018    Procedure: ESOPHAGOGASTRODUODENOSCOPY (EGD);  Surgeon: Darwin Hansen MD;  Location: [facility] (4TH FLR);  Service: Endoscopy;  Laterality: N/A;  Please measure pouch and limbs    GASTRIC BYPASS  2008    Revision August 2019    LAPAROSCOPIC REPAIR OF HIATAL HERNIA  2019    Liver scope  10/2012    LYSIS OF ADHESIONS         CURRENT MEDS:  Current Outpatient Medications   Medication Sig Dispense Refill    cyanocobalamin 1,000 mcg/mL injection ONE INJECTION AS DIRECTED EVERY 28 DAYS 4 mL 3    fluticasone propionate (FLONASE) 50 mcg/actuation nasal spray 1 spray by Each Nostril route once daily.      linaCLOtide (LINZESS) 290 mcg Cap capsule Take 1 capsule (290 mcg total) by mouth before breakfast. 90 capsule 3    LINZESS 290 mcg Cap capsule TAKE ONE CAPSULE BY MOUTH EVERY DAY 30 capsule 11    albuterol (PROVENTIL/VENTOLIN HFA) 90 mcg/actuation inhaler Inhale 1-2 puffs into the lungs every 6 (six) hours as needed for Wheezing. 6.7 g 0     No current facility-administered medications for this visit.       ALLERGIES:  Review of patient's allergies indicates:  No Active Allergies      FAMILY HISTORY:  Family History   Problem Relation Age of Onset    Breast cancer Neg Hx     Colon cancer Neg Hx     Ovarian cancer Neg Hx     Diabetes Neg Hx     Hypertension Neg Hx     Stroke Neg Hx        SOCIAL HISTORY:  Social History     Tobacco Use    Smoking status: Never    Smokeless tobacco: Never   Substance Use Topics    Alcohol use: Never     Comment: Alcohol use  "rarely    Drug use: No     Comment: Mirena       Review of Systems:  Gen: no fever, no chills, no generalized feeling of weakness   HEENT: no double vision, no blurred vision, no eye pain, no eye exudates. no nasal congestion, no traumatic injury of head, no neck pain, no neck stiffness. no photophobia or phonophobia at this time. ?    Heart: no chest pain, no SOB    Lungs: no SOB, no cough    MSK: no weakness of legs, intact ROM    ABD: no abd pain, no N/V/D/C, no difficulty with defecation.    Extremities: No leg pain, no edema, (+) arm pain       Objective:     Vitals:    06/30/23 1122   BP: 94/69   Pulse: 66   Weight: 103.4 kg (228 lb)   Height: 5' 8" (1.727 m)       General: female in NAD, alert and awake, Aox3, well groomed. ?    ? ?    HEENT: Head is NC/AT EOMI, pupil size: 4 mm B/L, no nystagmus noted; hearing grossly intact b/l. Mucous membrane moist, uvula midline, no pharyngeal erythema, exudates or discharges.      Neck: Supple. no nuchal rigidity.      Cardiovascular: well perfused, no cyanosis        Respiratory: Symmetric chest rise noted       Musculoskeletal: Muscle tone noted to be adequate for patient age, muscle mass is WNL. No spontaneous movements or fasciculations noted during this examination.       Extremities:  No gross bruising or discoloration in the proximal portion of the left upper extremity.  Palm of the hand appears blanched with splotchy white appearance compared to right.  Pulses are strong however at ulnar and radial arteries. No bulging veins    Sore to palpation around posterior shoulder but good movement    No resting tremor in the upper extremities.    When muscle groups of the forearm and hands are engaged against gravity, medium amplitude medium velocity tremor observed.    No cogwheeling.  Normal finger tapping.     Neurological Examination.    Mental status: AA&O x3; Affect/mood is euthymic/congruent; no aphasia noted during examination. Patient answers simple questions " appropriately & follows simple commands; no dysarthria or expressive aphasia; no clifford-neglect or extinction. Vocabulary/word finding: excellent.       Cranial Nerves: II-XII grossly intact. visual fields intact to confrontation, EOMI, no nystagmus, PERRLA,?facial muscles symmetric and no facial droop noted, patient hearing is grossly intact b/l, ?facial sensation to sharp and light touch grossly intact B/L. Patient smiles, frowns, closes eyes ?forcefully uneventfully. Uvula midline and no difficulty with pronunciation. No SCM/trapezius/muscle of mastication weakness noted B/L. Patient has adequate control of tongue and may protrude it and move it adequately.       Muscle Function: Tone WNL and Muscle bulk WNL.  5/5 throughout right upper and bilateral lower extremities.  Left  strength and intrinsic hand musculature 3/5, wrist flexion and extension 4-/5, left biceps 4/5, 5/5 deltoid/shoulder abduction     Sensory: ?Intact to light touch throughout.  No patterns of dermatomal sensory changes in the left upper extremity     Reflexes:  Left biceps 3/4, left triceps 2/4, left brachioradialis 2/4 but slightly more brisk than right side.  Otherwise, 2/4 throughout     Gait: adequate casual gait with stride length and arm swing WNL.  Stable without the use of cane or walker      Assessment:   Johanna Bullock is a 38 y.o. female presenting for evaluation of left upper extremity complaints as a result of extravasation injury where contrast medium was injected into the left upper extremity.  We had a long discussion regarding her mild improvements in symptoms thus far and the need to complete EMG/NCV.  This testing should be completed at least 4 weeks after injury in optimally around the 6 week magaly.  We will coordinate with the patient's schedule to achieve this testing in a timely fashion.  In the meantime, patient was encouraged to continue close follow-up with orthopedics and physical therapy.     As the patient  has significant tremor, poor endurance, and weakness in the left upper extremity making stabilization difficult, workplace safety is of serious concern.  I do not recommend returning to regular work activities until physical or occupational therapy finds that the patient can safely and smoothly maneuver left upper extremity in a manner fit to work with chemicals.    Plan:     Problem List Items Addressed This Visit    None  Visit Diagnoses       Extravasation injury of intravenous catheter site with other complication, sequela    -  Primary    Relevant Orders    EMG W/ ULTRASOUND AND NERVE CONDUCTION TEST 1 Extremity    Tremor        Decreased  strength of left hand        Relevant Orders    EMG W/ ULTRASOUND AND NERVE CONDUCTION TEST 1 Extremity    Left arm pain        Relevant Orders    EMG W/ ULTRASOUND AND NERVE CONDUCTION TEST 1 Extremity          - continue therapies as ordered  - no medication changes  - EMG/NCV to be completed approximally 6 weeks after injury  - we will defer to physical therapy  - follow-up with our clinic after EMG/NCV completed    I spent a total of 60 minutes on the day of the visit. This includes face to face time and non-face to face time preparing to see the patient (eg, review of tests), obtaining and/or reviewing separately obtained history, documenting clinical information in the electronic or other health record, independently interpreting results and communicating results to the patient/family/caregiver, or care coordinator.    A dictation device was used to produce this document. Use of such devices sometimes results in grammatical errors or replacement of words that sound similarly.    Killian Almanzar, DO

## 2023-07-01 PROBLEM — M79.602 LEFT ARM PAIN: Status: ACTIVE | Noted: 2023-07-01

## 2023-07-01 PROBLEM — R29.898 WEAKNESS OF LEFT HAND: Status: ACTIVE | Noted: 2023-07-01

## 2023-07-01 NOTE — PLAN OF CARE
[facility]   Physical Therapy Initial Evaluation        Date: 6/29/2023     Name: Johanna Bullock  Clinic Number: [id number]  Therapy Diagnosis:   Encounter Diagnoses   Name Primary?    Strain of left shoulder, initial encounter     Strain of left elbow, initial encounter     Left arm pain Yes    Weakness of left hand       Physician: Familia Chilel MD      Physician Orders: PT Eval and Treat  Medical Diagnosis from Referral: Strain of left shoulder  Evaluation Date: 6/29/2023  Authorization Period Expiration: 12/31/2023  Plan of Care Expiration: 08/28/2023    Progress Update: 07/29/2023   Visit # / Visits authorized: 1 / 1   FOTO: Visit #1 6/29/2023 - Scored: 1 / 3     PRECAUTIONS: Standard Precautions and limitations of use of left upper extremity and hand     Job/Position: laboratory technician     Time In: 0935  Time Out: 1015  Total Appointment Time (timed & untimed codes): 40    SUBJECTIVE     Date of onset: 14 days  Chief Complaint: left UE pain    History of current condition - Johanna is a 38 y.o. female who presents to physical therapy reporting she was to receive CT with contrast with injection into left arm. States she developed immediate pain with infusion with swelling.  She states her left hand is cold and develops change in coloration when hanging down.  She reports increase in pain with walking and the arm hanging and weakness in hand.    Falls: [x] No  [] Yes:     Other concerns: none    Prior Therapy:  [x] No  [] Yes:     Occupation: Patient is      Prior Level of Function: Independent with all activities of daily living  Current Level of Function: mpaired used of left hand due to weakness and pain      Pain:  Current 4/10, worst 8/10, best 0 /10   Location: [] Right [x] Left [] Bilateral: UE  Description: Constant and tender  Aggravating Factors: trying to use for lifting or carrying and raising  Easing Factors: activity avoidance, rest    Pts  goals: Pt reported goals are to improve pain and function    _______________________________________________________  Medical History:   Past Medical History:   Diagnosis Date    Adjustment disorder with mixed anxiety and depressed mood 2/6/2013    Anemia, B12 deficiency     Anxiety     Major depression, single episode 2/6/2013    Major depression, single episode 2/6/2013    Post partum depression        Surgical History:   Johanna Bullock  has a past surgical history that includes Dilation and curettage of uterus; Gastric bypass (2008); Cholecystectomy (10/2012); Liver scope (10/2012); Esophagogastroduodenoscopy (N/A, 7/20/2018); Lysis of adhesions; Colonoscopy (N/A, 2/14/2020); Laparoscopic repair of hiatal hernia (2019); and Colonoscopy (N/A, 3/10/2023).    Medications:   Johanna has a current medication list which includes the following prescription(s): albuterol, cyanocobalamin, fluticasone propionate, linaclotide, and linzess.    Allergies:   Review of patient's allergies indicates:  No Active Allergies         OBJECTIVE     Sensation:  Sensation is [x] Intact [] Impaired-- to light touch    Palpation: Increased tone and tenderness noted with palpation to: medial upper arm and wrist    ROM   %    Cervical Flexion 100 -------------------   Cervical Extension 100 -------------------    Right % Left %   Cervical Sidebending 100 100   Cervical Rotation 100 100    Right (degrees) Left (degrees   Shoulder Flexion  170 160   Shoulder Abduction  170 160   Shoulder Extension WFL WFL   Shoulder Internal Rotation WFL LIMITED   Shoulder External Rotation WFL WFL   Elbow Flexion  145 130   Elbow Extension 0 0       Strength   Right    Left   Shoulder Flexion 5/5 4-/5   Shoulder Abduction 5/5 4-/5   Shoulder Extension  5/5 4-/5   Shoulder External Rotation  5/5 4/5   Shoulder Internal Rotation  5/5 4/5   Elbow Flexion 5/5 4/5   Elbow Extension  5/5 4/5      strength: (R) 75 lbs   (L) 35 lbs    FUNCTION:      Intake Outcome Measure for FOTO Shoulder Survey    Therapist reviewed FOTO scores for Johanna Bullock on 6/29/2023.   FOTO documents entered into [employer] - see Media section.    Intake Score: 74% (DASH)         TREATMENT     Total Treatment time separate from Evaluation: (25) minutes    Johanna received the following interventions:     Neuromuscular re-education activities to improve: Coordination and Proprioception for 25 minutes. The following activities were included:    Wrist extension/flexion  MP flexion with foam  Elbow flexion and extension  Supine shoulder flexion      PATIENT EDUCATION AND HOME EXERCISES     Education/Self-Care provided:  (included in treatment) minutes   Patient educated on the impairments noted above and the effects of physical therapy intervention to improve overall condition and Quality of Life  Patient was educated on all the above exercise prior/during/after for proper posture, positioning, and execution for safe performance with home exercise program.     Written Home Exercises Provided: yes. Prefers: [x] Printed left upper extremity  Electronic  Exercises were reviewed and Johanna was able to demonstrate them prior to the end of the session.  Johanna demonstrated good understanding of the education provided. See EMR under Patient Instructions for exercises provided during therapy sessions.      ASSESSMENT     Johanna is a 38 y.o. female referred to outpatient Physical Therapy with a medical diagnosis of   Encounter Diagnoses   Name Primary?    Strain of left shoulder, initial encounter     Strain of left elbow, initial encounter     Left arm pain Yes    Weakness of left hand    .    Physical exam supports left upper extremity  impairments including: decreased range of motion, decreased muscular strength, decreased endurance, decreased muscular length/flexibility, impaired joint mobility, and impaired functional mobility.     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 Patient was treated and educated on exercises for home program, progression of therapy, and benefits of therapy to achieve full functional mobility.       Pt prognosis is Good.   Pt will benefit from skilled outpatient Physical Therapy to address the deficits stated above and in the chart below, provide pt/family education, and to maximize pt's level of independence.     Plan of care discussed with patient: Yes  Pt's spiritual, cultural and educational needs considered and patient is agreeable to the plan of care and goals as stated below:     Anticipated Barriers for therapy: none    Medical Necessity is demonstrated by the following:     History  Co-morbidities and personal factors that may impact the plan of care [x] LOW: no personal factors / co-morbidities  [] MODERATE: 1-2 personal factors / co-morbidities  [] HIGH: 3+ personal factors / co-morbidities    Moderate / High Support Documentation:   Co-morbidities affecting plan of care:   Past Medical History:   Diagnosis Date    Adjustment disorder with mixed anxiety and depressed mood 2/6/2013    Anemia, B12 deficiency     Anxiety     Major depression, single episode 2/6/2013    Major depression, single episode 2/6/2013    Post partum depression        Personal Factors:   no deficits     Examination  Body Structures and Functions, activity limitations and participation restrictions that may impact the plan of care [x] LOW: addressing 1-2 elements  [] MODERATE: 3+ elements  [] HIGH: 4+ elements (please support below)    Moderate / High Support Documentation:      Clinical Presentation [x] LOW: stable  [] MODERATE: Evolving  [] HIGH: Unstable     Decision Making/ Complexity Score: low         SHORT TERM GOALS:  4 weeks  Progress Date Met    Recent signs and systems trend is improving in order to progress towards Long term goals.  [] Met  [] Not Met  [] Progressing    Patient will be independent with Home Exercise Program  in order to further progress and return to maximal function. [] Met  [] Not Met  [] Progressing    Pain rating at Worst: 3/10 in order to progress towards increased independence with activity. [] Met  [] Not Met  [] Progressing    Patient will be able to correct postural deviations in sitting and standing, to decrease pain and promote postural awareness for injury prevention.  [] Met  [] Not Met  [] Progressing    Patient will improve functional outcome (FOTO) score: by 5% to increase self-worth & perceived functional ability towards long term goals [] Met  [] Not Met  [] Progressing      LONG TERM GOALS: 8 weeks  Progress Date Met   Patient will return to normal activites of daily living, recreational, and work related activities with less pain and limitation.  [] Met  [] Not Met  [] Progressing    Patient will improve range of motion  to 170 degrees left shoulder flexion in order to return to maximal functional potential.  [] Met  [] Not Met  [] Progressing    Patient will improve Strength to 5/5 left upper extremity in order to improve functional independence.  [] Met  [] Not Met  [] Progressing    Pain Rating at Best: 1/10 to improve Quality of Life.  [] Met  [] Not Met  [] Progressing    Patient will meet predicted functional outcome (FOTO) score: 50% to increase self-worth & perceived functional ability. [] Met  [] Not Met  [] Progressing    Patient will have met/partially met personal goal of: improve pain and function  [] Met  [] Not Met  [] Progressing          PLAN   Plan of care Certification: 6/29/2023 to 08/28/2023    Outpatient Physical Therapy 2 times weekly for 8 weeks to include any combination of the following interventions: virtual visits, dry needling, modalities, electrical stimulation (IFC, Pre-Mod, Attended  with Functional Dry Needling), Manual Therapy, Moist Heat/ Ice, Neuromuscular Re-ed, Patient Education, Self Care, Therapeutic Exercise, Functional Training, and Therapeutic Activites     Thank you for this referral.    Cayden Lr, PT

## 2023-07-03 ENCOUNTER — TELEPHONE (OUTPATIENT)
Dept: SPORTS MEDICINE | Facility: CLINIC | Age: 38
End: 2023-07-03
Payer: COMMERCIAL

## 2023-07-03 ENCOUNTER — PATIENT MESSAGE (OUTPATIENT)
Dept: SPORTS MEDICINE | Facility: CLINIC | Age: 38
End: 2023-07-03
Payer: COMMERCIAL

## 2023-07-03 NOTE — TELEPHONE ENCOUNTER
Returned call to let patient know that letter was attached and sent to her on [employer] as requested.   ----- Message from Charlotte Wilson sent at 7/3/2023  2:07 PM CDT -----  Regarding: pt call back  Name of Who is Calling:PORTER MCCURDY [[id number]]           What is the request in detail:pt needs to called regarding paperwork            Can the clinic reply by [facility]:           What Number to Call Back if not in [facility]:[phone].

## 2023-07-05 DIAGNOSIS — M25.522 ELBOW PAIN, LEFT: ICD-10-CM

## 2023-07-05 DIAGNOSIS — T82.898A EXTRAVASATION INJURY OF IV CATHETER SITE WITH OTHER COMPLICATION, INITIAL ENCOUNTER: Primary | ICD-10-CM

## 2023-07-05 DIAGNOSIS — S46.912A STRAIN OF LEFT ELBOW, INITIAL ENCOUNTER: ICD-10-CM

## 2023-07-06 ENCOUNTER — CLINICAL SUPPORT (OUTPATIENT)
Dept: REHABILITATION | Facility: HOSPITAL | Age: 38
End: 2023-07-06
Payer: COMMERCIAL

## 2023-07-06 DIAGNOSIS — M79.602 LEFT ARM PAIN: Primary | ICD-10-CM

## 2023-07-06 DIAGNOSIS — R29.898 WEAKNESS OF LEFT HAND: ICD-10-CM

## 2023-07-06 PROCEDURE — 97112 NEUROMUSCULAR REEDUCATION: CPT | Mod: PN

## 2023-07-07 ENCOUNTER — PATIENT MESSAGE (OUTPATIENT)
Dept: REHABILITATION | Facility: HOSPITAL | Age: 38
End: 2023-07-07
Payer: COMMERCIAL

## 2023-07-08 NOTE — PROGRESS NOTES
[facility]   Physical Therapy Treatment Note      Name: Johanna Bullock  Clinic Number: [id number]    Therapy Diagnosis:   Encounter Diagnoses   Name Primary?    Left arm pain Yes    Weakness of left hand      Physician: Familia Chilel MD    Visit Date: 7/6/2023    Physician Orders: PT Eval and Treat  Medical Diagnosis from Referral: Strain of left shoulder  Evaluation Date: 6/29/2023  Authorization Period Expiration: 12/31/2023  Plan of Care Expiration: 08/28/2023                          Progress Update: 07/29/2023            Visit # / Visits authorized: 1 / 1          FOTO: Visit #1 6/29/2023 - Scored: 1 / 3      PRECAUTIONS: Standard Precautions and limitations of use of left upper extremity and hand      Job/Position: laboratory technician      Time In: 0935  Time Out: 1015  Total Appointment Time (timed & untimed codes): 40    PTA Visit #: 0/5       Subjective     Pt reports: No change.  She was compliant with home exercise program.  Response to previous treatment: N/A  Functional change: N/A    Pain: 4/10  Location: left upper extremity       Objective      Objective Measures updated at progress report unless specified.     Treatment     Johanna received the treatments listed below:      neuromuscular re-education activities to improve: Coordination, Kinesthetic, and Sense for 38 minutes. The following activities were included:  UBE 2 minutes  Finger dexterity activity  Wall slides with liftoff\  Supine shoulder extension  Prone shoulder flexion  Prone shoulder abduction  Standing with WB on hands on tall mat  Shoulder shrugs        Patient Education and Home Exercises       Education provided:   - Home progam    Written Home Exercises Provided: Patient instructed to cont prior HEP. Exercises were reviewed and Johanna was able to demonstrate them prior to the end of the session.  Johanna demonstrated good  understanding of the education provided. See EMR under Patient  Instructions for exercises provided during therapy sessions    Assessment     The patient has weakness and decreased temp in left upper extremity with activity    Johanna Is progressing well towards her goals.   Pt prognosis is Good.     Pt will continue to benefit from skilled outpatient physical therapy to address the deficits listed in the problem list box on initial evaluation, provide pt/family education and to maximize pt's level of independence in the home and community environment.     Pt's spiritual, cultural and educational needs considered and pt agreeable to plan of care and goals.     Anticipated barriers to physical therapy: None    Goals:   SHORT TERM GOALS:  4 weeks  Progress Date Met   Recent signs and systems trend is improving in order to progress towards Long term goals.  [] Met  [] Not Met  [] Progressing     Patient will be independent with Home Exercise Program  in order to further progress and return to maximal function. [] Met  [] Not Met  [] Progressing     Pain rating at Worst: 3/10 in order to progress towards increased independence with activity. [] Met  [] Not Met  [] Progressing     Patient will be able to correct postural deviations in sitting and standing, to decrease pain and promote postural awareness for injury prevention.  [] Met  [] Not Met  [] Progressing     Patient will improve functional outcome (FOTO) score: by 5% to increase self-worth & perceived functional ability towards long term goals [] Met  [] Not Met  [] Progressing        LONG TERM GOALS: 8 weeks  Progress Date Met   Patient will return to normal activites of daily living, recreational, and work related activities with less pain and limitation.  [] Met  [] Not Met  [] Progressing     Patient will improve range of motion  to 170 degrees left shoulder flexion in order to return to maximal functional potential.  [] Met  [] Not Met  [] Progressing     Patient will improve Strength to 5/5 left upper extremity in  order to improve functional independence.  [] Met  [] Not Met  [] Progressing     Pain Rating at Best: 1/10 to improve Quality of Life.  [] Met  [] Not Met  [] Progressing     Patient will meet predicted functional outcome (FOTO) score: 50% to increase self-worth & perceived functional ability. [] Met  [] Not Met  [] Progressing     Patient will have met/partially met personal goal of: improve pain and function  [] Met  [] Not Met  [] Progressing          Plan     Plan of care Certification: 6/29/2023 to 08/28/2023     Outpatient Physical Therapy 2 times weekly for 8 weeks to include any combination of the following interventions: virtual visits, dry needling, modalities, electrical stimulation (IFC, Pre-Mod, Attended with Functional Dry Needling), Manual Therapy, Moist Heat/ Ice, Neuromuscular Re-ed, Patient Education, Self Care, Therapeutic Exercise, Functional Training, and Therapeutic Activites     Cayden Lr, PT

## 2023-07-10 ENCOUNTER — CLINICAL SUPPORT (OUTPATIENT)
Dept: REHABILITATION | Facility: HOSPITAL | Age: 38
End: 2023-07-10
Attending: STUDENT IN AN ORGANIZED HEALTH CARE EDUCATION/TRAINING PROGRAM
Payer: COMMERCIAL

## 2023-07-10 DIAGNOSIS — S46.912A STRAIN OF LEFT ELBOW, INITIAL ENCOUNTER: ICD-10-CM

## 2023-07-10 DIAGNOSIS — M25.522 ELBOW PAIN, LEFT: ICD-10-CM

## 2023-07-10 DIAGNOSIS — M79.602 LEFT ARM PAIN: ICD-10-CM

## 2023-07-10 DIAGNOSIS — R20.2 PARESTHESIA OF LEFT ARM: ICD-10-CM

## 2023-07-10 DIAGNOSIS — R29.898 LEFT ARM WEAKNESS: ICD-10-CM

## 2023-07-10 DIAGNOSIS — T82.898A EXTRAVASATION INJURY OF IV CATHETER SITE WITH OTHER COMPLICATION, INITIAL ENCOUNTER: ICD-10-CM

## 2023-07-10 DIAGNOSIS — R29.898 WEAKNESS OF LEFT HAND: Primary | ICD-10-CM

## 2023-07-10 PROCEDURE — 97110 THERAPEUTIC EXERCISES: CPT | Mod: PN

## 2023-07-10 PROCEDURE — 97112 NEUROMUSCULAR REEDUCATION: CPT | Mod: PN

## 2023-07-10 PROCEDURE — 97165 OT EVAL LOW COMPLEX 30 MIN: CPT | Mod: PN

## 2023-07-11 PROBLEM — R20.2 PARESTHESIA OF LEFT ARM: Status: ACTIVE | Noted: 2023-07-11

## 2023-07-11 PROBLEM — R29.898 LEFT ARM WEAKNESS: Status: ACTIVE | Noted: 2023-07-11

## 2023-07-11 NOTE — PLAN OF CARE
[facility]  Occupational Therapy Initial Evaluation     Date: 7/10/2023  Name: Johanna Bullock  Clinic Number: [id number]    Therapy Diagnosis:   Encounter Diagnoses   Name Primary?    Strain of left elbow, initial encounter     Extravasation injury of IV catheter site with other complication, initial encounter     Elbow pain, left     Left arm weakness     Weakness of left hand Yes    Left arm pain     Paresthesia of left arm      Physician: Familia Chilel MD    Physician Orders: OT eval and tx  Medical Diagnosis: Strain of left elbow, initial encounter [S46.912A], Extravasation injury of IV catheter site with other complication, initial encounter [T82.898A], Elbow pain, left [M25.522]  Evaluation Date: 7/10/2023  Insurance Authorization Period Expiration: 12/31/2023  Plan of Care Certification Period: 7/10/2023 to 10/10/2023    Visit # / Visits authorized: 1/1  FOTO: 1/3    Surgical Procedure: none  Date of Return to MD: 8/22/2023    Precautions:  Standard; left upper extremity restrictions for return to work    Time In: 1340  Time Out: 1450  Total Appointment Time (timed & untimed codes): 70 minutes    Subjective     Involved Side: left  Dominant Side: Ambidextrous; write with right hand, drives with left hand, opens appliances and carries pots/pans with the left hand    Date of Onset: 6/16/2023  Mechanism of Injury/ History of Current Condition: Pt was originally evaluated by physical therapy on 7/1/2023 who has referred pt to occupational therapy instead. Per physical therapy evaluation: Johanna is a 38 y.o. female who presents to physical therapy reporting she was to receive CT with contrast with injection into left arm. States she developed immediate pain with infusion with swelling.  She states her left hand is cold and develops change in coloration when hanging down.  She reports increase in pain with walking and the arm hanging and weakness in hand.    Pt reports  injection was proximal to medial epicondyle on left upper extremity. Pt reports she feels like she has two separate injuries. When first injected with saline, she felt like it traveled straight down her arm to her hand and caused numbness/tingling and her hand to turn blue. When injected with the contrast, she felt like it traveled straight up to her shoulder. She reports numbness in her thenar eminence and in the tips of all of her fingers. She also reports a dull ache in her anterior upper arm and in her palm. She reports symptoms worse at night and whole arm numbness and heaviness upon waking.     Falls: no    Per MD Notes on 6/27/2023: Johanna Bullock is a right-hand dominant 38 y.o. female who presents today with left arm/ elbow pain, on 6/16/23, she went to the [facility] ER for abdominal distention concerned she may have had repeat hernia which was repaired previously, when she was eating she was having some abdominal distention, While there she had a CT of her abdomen pelvis that was ordered with contrast, during IV access there was some difficulty, upon administration of IV contrast dye she had extravasation into her left upper arm, immediately caused severe pain and swelling radiating down to her hand. She has had some persistent issues since that time, she tried ice pack, she has kept it elevated, she has had some decreased swelling however continued pain, numbness down into the hand with some discoloration as well slightly more bluish color than her right hand. History, physical and radiographs are consistent with a likely diagnosis of elbow muscle strain and shoulder strain, possible compressive nerve injury 2/2 increased volume in the elbow region. Plan: PT referral [facility]. Ice, voltaren gel as needed. Neurology referral. Too early for EMG to show anything right now. Return to work with light duty in 1 week. Continue conservative management for pain. Follow up 8 weeks.    Imaging: see EPIC    Previous  "Therapy: pt started with physical therapy but was referred over to OT    Patient's Goals for Therapy: "regain strength and use of my fingers without pain and shakiness"    Pain:  Functional Pain Scale Rating 0-10:   6/10 on average  2/10 at best  8/10 at worst  Location: left upper arm   Description: Aching, Dull, Throbbing, and constant  Aggravating Factors: when gripping with hand too much  Easing Factors: rest    Functional Limitations/Social History:    Previous Functional Status: Independent with all ADL/IADL tasks; working    Current Functional Status: impaired used of left upper extremity/hand due to weakness and pain  Home/Living environment: lives with their family          Limitation of Functional Status as follows:   ADLs/IADLs:     - Feeding: modified Ind    - Bathing: assistance 2* decreased ability to use of LUE    - Dressing/Grooming: assistance 2* decreased ability to use of LUE    - Home Management: assistance 2* decreased ability to use of LUE    - Driving: unable to use left upper extremity to assist     Leisure: Driving    Occupation: works as a laboratory technician  Working presently: employed - went back to work 2* unit being down and not having to make samples  Duties: makes insecticides; turns jars, weighing samples/powders, opening syringes and caps, carries chemicals in beakers and graduated cylinders, carries 2L bottles of chemicals        Past Medical History/Physical Systems Review:   Medical History:   Past Medical History:   Diagnosis Date    Adjustment disorder with mixed anxiety and depressed mood 2/6/2013    Anemia, B12 deficiency     Anxiety     Major depression, single episode 2/6/2013    Major depression, single episode 2/6/2013    Post partum depression        Surgical History:    has a past surgical history that includes Dilation and curettage of uterus; Gastric bypass (2008); Cholecystectomy (10/2012); Liver scope (10/2012); Esophagogastroduodenoscopy (N/A, 7/20/2018); Lysis " of adhesions; Colonoscopy (N/A, 2/14/2020); Laparoscopic repair of hiatal hernia (2019); and Colonoscopy (N/A, 3/10/2023).    Medications:   has a current medication list which includes the following prescription(s): albuterol, cyanocobalamin, fluticasone propionate, linaclotide, and linzess.    Allergies:   Review of patient's allergies indicates:  No Active Allergies       Objective     Observation/Inspection: Left hand cold to touch and bluish in color - venous insufficiency; pt muscle-guarding 2* pain    ULTT for Ulnar Nerve: positive  ULTT for Median Nerve: positive    Cervical Screening: WNL - pt reports pain does not go beyond shoulder     Sensation: Pt denies hypersensitivity. Paresthesias reported at left elbow, in left forearm, and tips of all fingers      Bilateral Upper Extremity Active Range of Motion:  WNL      Left Hand Active Range of Motion: WNL     Manual Muscles Test:   Left Right   Strength 7/10/2023 7/10/2023   Shoulder flexion 4+/5 5/5   Shoulder abduction 4+/5 5/5   Shoulder internal rotation 4+/5 5/5   Shoulder external rotation 4/5 5/5   Elbow flexion 5/5 5/5   Elbow extension 5/5 5/5     Median Nerve MMT  Muscle Action 7/10/2023   Forearm:     Pronator Teres Pronation 4+/5   Flexor Carpi Radialis Wrist flex/radial deviation 4+/5   Palmar Longus Wrist flex 3+/5   Flexor Digitorum Superficialis (Index) PIP flex 3+/5   Flexor Digitorum Superficialis (Middle) PIP flex 4/5   Flexor Digitorum Superficialis (Ring) PIP flex 3/5   Flexor Digitorum Superficialis (Small) PIP flex 3/5   Forearm - Anterior Interosseous Nerve     Flexor Digitorum Profundus (Index) DIP flex 4+/5   Flexor Digitorum Profundus (Middle) DIP flex 4+/5   Flexor Pollicis Longus (Thumb) IP flex 4+/5   Pronator Quadratus Pronation 4+/5   Wrist/Hand:     Abductor Pollicis Brevis (Thumb) Palmar abd 5/5   Opponens Pollicis (Thumb) Opposition 3+/5   Flexor Pollicis Brevis - superficial head (Thumb) MP flex 5/5   Lumbricals (Index) MP  flex w/ IP ext 4+/5   Lumbricals (Middle) MP flex w/ IP ext 3+/5     Ulnar Nerve MMT  Muscle Action 7/10/2023   Forearm:     Flexor Carpi Ulnaris Wrist flex/UD 3+/5   Flexor Digitorum Profundus (Ring) DIP flex 3/5   Flexor Digitorum Profundus (Small) DIP flex 3/5   Wrist/Hand:     Palmaris Brevis Deepens palm 3+/5   Abductor Digiti Minimi (Small) MP abd 3+/5   Opponens Digiti Minimi (Small) Opposition 3/5   Flexor Digiti Minimi (Small) MP flex 3+/5   Lumbricals (Ring) MP flex w/ IP ext 3+/5   Lumbricals (Small) MP flex w/ IP ext 3+/5   Palmar Interossei - First Index add 3+/5   Palmar Interossei - Second Ring add 3/5   Palmar Interossei - Third Small add 3/5   Dorsal Interossei - First Index abd 3+/5   Dorsal Interossei - Second Long abd 3+/5   Dorsal Interossei - Third Long add 3+/5   Dorsal Interossei - Fourth Ring abd 3/5   Flexor Pollicis Brevis - deep head (Thumb) MP flex 4+/5   Adductor Pollicis Brevis (Thumb) Palmar add 4+/5      and Pinch Strength (in pounds, psi's):   Left Right    7/10/2023 7/10/2023    II 30 75   Lateral 6 11   Tripod 4 13   Tip 1 6       Special Tests: for VIKTOR  Fromcamilo: negative, Sharri's: negative, and Randolph's Test: positive       Intake Outcome Measure for FOTO Upper Arm Survey    Therapist reviewed FOTO scores for Johanna Bullock on 7/10/2023.   FOTO documents entered into [employer] - see Media section.    Intake Score: 29%     Predicted Functional Score: 60% in 13 visits    Treatment     Total Treatment time (time-based codes) separate from Evaluation: 20 minutes    Johanna received the treatments listed below:      therapeutic exercises to develop strength, endurance, and ROM for 10 minutes including:  - HEP: intrinsic strengthening    neuromuscular re-education activities to improve: Coordination, Sense, and Proprioception for 10 minutes, including:  - HEP: left upper extremity median and ulnar nerve glides    Home Exercise Program/Education:    Education provided:   -  Role of OT, goals for OT, scheduling/cancellations, insurance limitations with patient  - Additional Education provided: nerve vs muscle pain and recovery expectations    Written Home Exercises Provided: Yes  Exercises were reviewed and Johanna was able to demonstrate them prior to the end of the session. Johanna demonstrated good understanding of the education provided. See EMR under Patient Instructions for exercises provided during therapy sessions.     Pt was advised to perform these exercises free of pain, and to stop performing them if pain occurs.    Assessment     Johanna Bullock is a 38 y.o. female referred to outpatient occupational therapy and presents with a medical diagnosis of Strain of left elbow, initial encounter [S46.642A], Extravasation injury of IV catheter site with other complication, initial encounter [T82.108A], Elbow pain, left [M25.522].  Patient presents with the following therapy deficits: Decreased ROM, Decreased  strength, Decreased pinch strength, Decreased muscle strength, Decreased functional hand use, Increased pain, Diminished/Impaired Sensation, and Diminished/Impaired Coordination and demonstrates limitations as described in the chart below.     Following medical record review, it is determined that the pt will benefit from occupational therapy services in order to maximize pain free and/or functional use of her left UE. The following goals were discussed with the patient and patient is in agreement with them as to be addressed in the treatment plan. The patient's rehab potential is Good.     Anticipated barriers to occupational therapy: none  Pt has no cultural, educational or language barriers to learning provided.    Medical Necessity is demonstrated by the following  Occupational Profile/History  Co-morbidities and personal factors that may impact the plan of care [x] LOW: Brief chart review  [] MODERATE: Expanded chart review   [] HIGH: Extensive chart  review    Moderate / High Support Documentation: n/a     Examination  Performance deficits relating to physical, cognitive or psychosocial skills that result in activity limitations and/or participation restrictions  [] LOW: addressing 1-3 Performance deficits  [x] MODERATE: 3-5 Performance deficits  [] HIGH: 5+ Performance deficits (please support below)    Moderate / High Support Documentation:    Physical:  Muscle Power/Strength  Muscle Endurance   Strength  Pinch Strength  Gross Motor Coordination  Fine Motor Coordination  Proprioception Functions  Tactile Functions  Muscle Tone  Pain    Cognitive:  Safety Awareness/Insight to Disability    Psychosocial:    No Deficits     Treatment Options [] LOW: Limited options  [x] MODERATE: Several options  [] HIGH: Multiple options      Decision Making/ Complexity Score: low       The following goals were discussed with the patient and patient is in agreement with them as to be addressed in the treatment plan.     Goals:   Short Term Goals: (4 weeks)  1. Pt will be independent with HEP.  2. Pt will report decreased left upper extremity pain to a 4-5/10 with ADL/IADL tasks.   3. Pt will report decreased left upper extremity paresthesia.   4. Pt will exhibit increase left  strength by 5-10# to enable grasping during ADL/IADL tasks.  5. Pt will report an increase in FOTO intake score of > 35%, which would indicate an improvement in quality of life.    Long Term Goals: (8 weeks)  1. Pt will report decreased left upper extremity pain to 1-2/10 with ADL/IADL tasks.   2. Pt will exhibit increased MMT/strength of left forearm and wrist muscles by one muscle grade to improve lifting and reaching during IADL tasks.  3. Pt will exhibit 45-55# of left  strength to allow a firm grasp on cooking utensils, steering wheel, etc.  4. Pt will exhibit 8+# of left functional lateral pinch strength to allow writing, opening containers, and turning keys.  5. Pt will report an  increase in FOTO intake score of > 45%, which would indicate an improvement in quality of life.      Plan   Plan of Care Certification: 7/10/2023 to 10/10/2023     Outpatient Occupational Therapy 2 times weekly for 8-10 weeks to include the following interventions: Fluidotherapy, Manual therapy/joint mobilizations, Modalities for pain management, US 3 mhz, Therapeutic exercises/activities., Strengthening, Orthotic Fabrication/Fit/Training, Electrical Modalities, Joint Protection, and Energy Conservation      ROSANNA BROWN, OT

## 2023-08-07 ENCOUNTER — CLINICAL SUPPORT (OUTPATIENT)
Dept: REHABILITATION | Facility: HOSPITAL | Age: 38
End: 2023-08-07
Attending: STUDENT IN AN ORGANIZED HEALTH CARE EDUCATION/TRAINING PROGRAM
Payer: COMMERCIAL

## 2023-08-07 DIAGNOSIS — R20.2 PARESTHESIA OF LEFT ARM: ICD-10-CM

## 2023-08-07 DIAGNOSIS — M79.602 LEFT ARM PAIN: ICD-10-CM

## 2023-08-07 DIAGNOSIS — R29.898 WEAKNESS OF LEFT HAND: ICD-10-CM

## 2023-08-07 DIAGNOSIS — R29.898 LEFT ARM WEAKNESS: Primary | ICD-10-CM

## 2023-08-07 PROCEDURE — 97110 THERAPEUTIC EXERCISES: CPT | Mod: PN

## 2023-08-07 PROCEDURE — 97112 NEUROMUSCULAR REEDUCATION: CPT | Mod: PN

## 2023-08-07 NOTE — PROGRESS NOTES
"  [facility]  Occupational Therapy Treatment Note & Progress Note     Date: 8/7/2023  Name: Johanna Bullock  Clinic Number: [id number]    Therapy Diagnosis:   Encounter Diagnoses   Name Primary?    Left arm weakness Yes    Weakness of left hand     Left arm pain     Paresthesia of left arm      Physician: Familia Chilel MD    Physician Orders: OT eval and tx  Medical Diagnosis: Strain of left elbow, initial encounter [S46.912A], Extravasation injury of IV catheter site with other complication, initial encounter [T82.898A], Elbow pain, left [M25.522]  Evaluation Date: 7/10/2023  Insurance Authorization Period Expiration: 12/31/2023  Plan of Care Certification Period: 7/10/2023 to 10/10/2023     Visit # / Visits authorized: 1/20  FOTO: 2/3     Surgical Procedure: none  Date of Return to MD: 8/22/2023     Precautions:  Standard; left upper extremity restrictions for return to work    Time In: 1140  Time Out: 1230  Total Billable Time: 50 minutes    Subjective     Pt reports: "I didn't have any leave time so I had to go back to work. They did accommodate me at work more. I feel like my muscle stuff has gotten better. I can hold things better. But I still have some numbness and tingling and purplish-color skin. I can tell when my hand gets weak because my fingers will cross. I still get more achy and swollen at nighttime."    she was compliant with home exercise program given on evaluation.  Response to previous treatment: good  Functional change: decreased left upper arm pain    Pain: 3/10 (6/10 on evaluation)  Location: left upper arm - dull pain    Objective   Objective measures updated on this date.    Manual Muscles Test:    Left Left Right   Strength 8/7/2023 7/10/2023 7/10/2023   Shoulder flexion 4+/5 4+/5 5/5   Shoulder abduction 4+/5 4+/5 5/5   Shoulder internal rotation 4+/5 4+/5 5/5   Shoulder external rotation 5/5 4/5 5/5      Left upper extremity Median Nerve " MMT  Muscle Action 8/7/2023 7/10/2023   Forearm:        Pronator Teres Pronation 5/5 4+/5   Flexor Carpi Radialis Wrist flex/radial deviation 5/5 4+/5   Palmar Longus Wrist flex 5/5 3+/5   Flexor Digitorum Superficialis (Index) PIP flex 4+/5 3+/5   Flexor Digitorum Superficialis (Middle) PIP flex 4+/5 4/5   Flexor Digitorum Superficialis (Ring) PIP flex 3+/5 3/5   Flexor Digitorum Superficialis (Small) PIP flex 3/5 3/5   Forearm - Anterior Interosseous Nerve        Flexor Digitorum Profundus (Index) DIP flex 5/5 4+/5   Flexor Digitorum Profundus (Middle) DIP flex 5/5 4+/5   Flexor Pollicis Longus (Thumb) IP flex 5/5 4+/5   Pronator Quadratus Pronation 4+/5 4+/5   Wrist/Hand:        Abductor Pollicis Brevis (Thumb) Palmar abd 5/5 5/5   Opponens Pollicis (Thumb) Opposition 5/5 3+/5   Flexor Pollicis Brevis - superficial head (Thumb) MP flex 5/5 5/5   Lumbricals (Index) MP flex w/ IP ext 5/5 4+/5   Lumbricals (Middle) MP flex w/ IP ext 5/5 3+/5      Left upper extremity Ulnar Nerve MMT  Muscle Action 8/7/2023 7/10/2023   Forearm:        Flexor Carpi Ulnaris Wrist flex/UD 5/5 3+/5   Flexor Digitorum Profundus (Ring) DIP flex 3+/5 3/5   Flexor Digitorum Profundus (Small) DIP flex 3+/5 3/5   Wrist/Hand:        Palmaris Brevis Deepens palm 4/5 3+/5   Abductor Digiti Minimi (Small) MP abd 3+/5 3+/5   Opponens Digiti Minimi (Small) Opposition 3+/5 3/5   Flexor Digiti Minimi (Small) MP flex 3+/5 3+/5   Lumbricals (Ring) MP flex w/ IP ext 3+/5 3+/5   Lumbricals (Small) MP flex w/ IP ext 3+/5 3+/5   Palmar Interossei - First Index add 4/5 3+/5   Palmar Interossei - Second Ring add 3/5 3/5   Palmar Interossei - Third Small add 3/5 3/5   Dorsal Interossei - First Index abd 4/5 3+/5   Dorsal Interossei - Second Long abd 3+/5 3+/5   Dorsal Interossei - Third Long add 3+/5 3+/5   Dorsal Interossei - Fourth Ring abd 3+/5 3/5   Flexor Pollicis Brevis - deep head (Thumb) MP flex 5/5 4+/5   Adductor Pollicis Brevis (Thumb) Palmar add  5/5 4+/5       and Pinch Strength (in pounds, psi's):    Left Left Right     8/7/2023 7/10/2023 7/10/2023    II 34 30 75   Lateral 13 6 11   Tripod 9 4 13   Tip 4 1 6          Intake Outcome Measure for FOTO Upper Arm Survey     Therapist reviewed FOTO scores for Johanna Bullock on 8/7/2023.   FOTO documents entered into [facility] - see Media section.     Intake Score: 36%  - 29% on 7/10/2023      Predicted Functional Score: 60% in 13 visits      Treatment     Johanna received the treatments listed below:      therapeutic exercises to develop strength and endurance for 38 minutes including:  - reviewed HEP issued at evaluation:   - median nerve glides   - ulnar nerve glides   - digit abd/add AROM   - lumbrical fisting AROM   - TGE's (hook<-->lumbrical)  - UBE x 5 min for BUE strengthening and endurance    neuromuscular re-education activities to improve: Sense and Proprioception for 8 minutes, including:  - weightbearing through extended left upper extremity against countertop  - push-ups against edge of countertop x 10 reps    therapeutic activities to improve functional performance for 4 minutes, including:  - composite  strengthening with blue digiciser 3x10    Home Exercises and Education Provided     Education provided:   - reviewed HEP issued at evaluation  - added push-ups against countertop to HEP for upper extremity loading  - encouraged pt to try upper extremity elevation and hand-pumping at end of day before bedtime  - progress toward goals    Written Home Exercises Provided: Patient instructed to cont prior HEP  Exercises were reviewed and Johanna was able to demonstrate them prior to the end of the session. Johanna demonstrated good understanding of the HEP provided.     See EMR under Patient Instructions for exercises provided during prior visit.        Assessment     Pt would continue to benefit from skilled OT. She returned today for her first tx session after not attending since  her initial evaluation on 7/10/2023. She reported not having enough leave time from work. Reassessment completed. She is reporting decreased left upper extremity pain and demonstrating improved strength of muscles innervated by median nerve. She is also exhibiting improved left hand  and pinch strength. She continues to be limited by neurological pain and symptoms - primarily along ulnar nerve distribution.     Johanna is progressing towards her goals and there are no updates to goals at this time. Pt prognosis is Good.     Pt will continue to benefit from skilled outpatient occupational therapy to address the deficits listed in the problem list on initial evaluation provide pt/family education and to maximize pt's level of independence in the home and community environment.     Pt's spiritual, cultural and educational needs considered and pt agreeable to plan of care and goals.    Anticipated barriers to occupational therapy: none    Goals:  Short Term Goals: (4 weeks)  1. Pt will be independent with HEP. - MET 8/7/2023  2. Pt will report decreased left upper extremity pain to a 4-5/10 with ADL/IADL tasks. - MET 8/7/2023   3. Pt will report decreased left upper extremity paresthesia. - progressing, continue goal 8/7/2023  4. Pt will exhibit increase left  strength by 5-10# to enable grasping during ADL/IADL tasks. - progressing, continue goal 8/7/2023  5. Pt will report an increase in FOTO intake score of > 35%, which would indicate an improvement in quality of life. - MET 8/7/2023      Long Term Goals: (8 weeks)  1. Pt will report decreased left upper extremity pain to 1-2/10 with ADL/IADL tasks. - progressing, continue goal 8/7/2023   2. Pt will exhibit increased MMT/strength of left forearm and wrist muscles by one muscle grade to improve lifting and reaching during IADL tasks. - progressing, continue goal 8/7/2023  3. Pt will exhibit 45-55# of left  strength to allow a firm grasp on cooking  utensils, steering wheel, etc. - progressing, continue goal 8/7/2023  4. Pt will exhibit 8+# of left functional lateral pinch strength to allow writing, opening containers, and turning keys. - progressing, continue goal 8/7/2023  5. Pt will report an increase in FOTO intake score of > 45%, which would indicate an improvement in quality of life. - progressing, continue goal 8/7/2023    Plan     Plan of Care Certification: 7/10/2023 to 10/10/2023      Outpatient Occupational Therapy 2 times weekly for 8-10 weeks to include the following interventions: Fluidotherapy, Manual therapy/joint mobilizations, Modalities for pain management, US 3 mhz, Therapeutic exercises/activities., Strengthening, Orthotic Fabrication/Fit/Training, Electrical Modalities, Joint Protection, and Energy Conservation    Updates/Grading for next session: left upper extremity loading and weightbearing    ROSANNA BROWN OT

## 2023-08-09 ENCOUNTER — CLINICAL SUPPORT (OUTPATIENT)
Dept: REHABILITATION | Facility: HOSPITAL | Age: 38
End: 2023-08-09
Attending: STUDENT IN AN ORGANIZED HEALTH CARE EDUCATION/TRAINING PROGRAM
Payer: COMMERCIAL

## 2023-08-09 DIAGNOSIS — M79.602 LEFT ARM PAIN: ICD-10-CM

## 2023-08-09 DIAGNOSIS — R29.898 WEAKNESS OF LEFT HAND: ICD-10-CM

## 2023-08-09 DIAGNOSIS — R29.898 LEFT ARM WEAKNESS: Primary | ICD-10-CM

## 2023-08-09 DIAGNOSIS — R20.2 PARESTHESIA OF LEFT ARM: ICD-10-CM

## 2023-08-09 PROCEDURE — 97530 THERAPEUTIC ACTIVITIES: CPT | Mod: PN

## 2023-08-09 PROCEDURE — 97022 WHIRLPOOL THERAPY: CPT | Mod: 59,PN

## 2023-08-09 PROCEDURE — 97112 NEUROMUSCULAR REEDUCATION: CPT | Mod: PN

## 2023-08-09 PROCEDURE — 97110 THERAPEUTIC EXERCISES: CPT | Mod: PN

## 2023-08-09 NOTE — PROGRESS NOTES
"  [facility]  Occupational Therapy Treatment Note     Date: 8/9/2023  Name: Johanna Bullock  Clinic Number: [id number]    Therapy Diagnosis:   Encounter Diagnoses   Name Primary?    Left arm weakness Yes    Weakness of left hand     Left arm pain     Paresthesia of left arm      Physician: Familia Chilel MD    Physician Orders: OT eval and tx  Medical Diagnosis: Strain of left elbow, initial encounter [S46.898W], Extravasation injury of IV catheter site with other complication, initial encounter [T82.892H], Elbow pain, left [M25.522]  Evaluation Date: 7/10/2023  Insurance Authorization Period Expiration: 12/31/2023  Plan of Care Certification Period: 7/10/2023 to 10/10/2023     Visit # / Visits authorized: 2/20  FOTO: 2/3     Surgical Procedure: none  Date of Return to MD: 8/22/2023     Precautions:  Standard; left upper extremity restrictions for return to work    Time In: 1107  Time Out: 1212  Total Billable Time: 65 minutes    Subjective     Pt reports: "I've been doing what you tell me. Can I go in that fluido machine? I've been staying away from heat because I thought I was supposed to."    she was compliant with home exercise program given on evaluation.  Response to previous treatment: good  Functional change: improved left upper extremity coordination during activities    Pain: 3/10 (6/10 on evaluation)  Location: left upper arm - dull pain    Objective   Objective measures updated at progress note unless specified.    Manual Muscles Test:    Left Left Right   Strength 8/7/2023 7/10/2023 7/10/2023   Shoulder flexion 4+/5 4+/5 5/5   Shoulder abduction 4+/5 4+/5 5/5   Shoulder internal rotation 4+/5 4+/5 5/5   Shoulder external rotation 5/5 4/5 5/5      Left upper extremity Median Nerve MMT  Muscle Action 8/7/2023 7/10/2023   Forearm:        Pronator Teres Pronation 5/5 4+/5   Flexor Carpi Radialis Wrist flex/radial deviation 5/5 4+/5   Palmar Longus Wrist flex 5/5 3+/5 "   Flexor Digitorum Superficialis (Index) PIP flex 4+/5 3+/5   Flexor Digitorum Superficialis (Middle) PIP flex 4+/5 4/5   Flexor Digitorum Superficialis (Ring) PIP flex 3+/5 3/5   Flexor Digitorum Superficialis (Small) PIP flex 3/5 3/5   Forearm - Anterior Interosseous Nerve        Flexor Digitorum Profundus (Index) DIP flex 5/5 4+/5   Flexor Digitorum Profundus (Middle) DIP flex 5/5 4+/5   Flexor Pollicis Longus (Thumb) IP flex 5/5 4+/5   Pronator Quadratus Pronation 4+/5 4+/5   Wrist/Hand:        Abductor Pollicis Brevis (Thumb) Palmar abd 5/5 5/5   Opponens Pollicis (Thumb) Opposition 5/5 3+/5   Flexor Pollicis Brevis - superficial head (Thumb) MP flex 5/5 5/5   Lumbricals (Index) MP flex w/ IP ext 5/5 4+/5   Lumbricals (Middle) MP flex w/ IP ext 5/5 3+/5      Left upper extremity Ulnar Nerve MMT  Muscle Action 8/7/2023 7/10/2023   Forearm:        Flexor Carpi Ulnaris Wrist flex/UD 5/5 3+/5   Flexor Digitorum Profundus (Ring) DIP flex 3+/5 3/5   Flexor Digitorum Profundus (Small) DIP flex 3+/5 3/5   Wrist/Hand:        Palmaris Brevis Deepens palm 4/5 3+/5   Abductor Digiti Minimi (Small) MP abd 3+/5 3+/5   Opponens Digiti Minimi (Small) Opposition 3+/5 3/5   Flexor Digiti Minimi (Small) MP flex 3+/5 3+/5   Lumbricals (Ring) MP flex w/ IP ext 3+/5 3+/5   Lumbricals (Small) MP flex w/ IP ext 3+/5 3+/5   Palmar Interossei - First Index add 4/5 3+/5   Palmar Interossei - Second Ring add 3/5 3/5   Palmar Interossei - Third Small add 3/5 3/5   Dorsal Interossei - First Index abd 4/5 3+/5   Dorsal Interossei - Second Long abd 3+/5 3+/5   Dorsal Interossei - Third Long add 3+/5 3+/5   Dorsal Interossei - Fourth Ring abd 3+/5 3/5   Flexor Pollicis Brevis - deep head (Thumb) MP flex 5/5 4+/5   Adductor Pollicis Brevis (Thumb) Palmar add 5/5 4+/5       and Pinch Strength (in pounds, psi's):    Left Left Right     8/7/2023 7/10/2023 7/10/2023    II 34 30 75   Lateral 13 6 11   Tripod 9 4 13   Tip 4 1 6           Intake Outcome Measure for FOTO Upper Arm Survey     Therapist reviewed FOTO scores for Johanna Bullock on 8/7/2023.   FOTO documents entered into [facility] - see Media section.     Intake Score: 36%  - 29% on 7/10/2023      Predicted Functional Score: 60% in 13 visits      Treatment     Johanna received the treatments listed below:      therapeutic exercises to develop strength and endurance for 32 minutes including:  - median nerve glides (NT)  - ulnar nerve glides (2 ways)  - composite digit abd/add AROM x 20 reps with min A to block extrinsic compensation  - SF abduction AROM 2x10   - lumbrical fisting AROM x 20 reps with min A to block extrinsic compensation  - TGE's (hook<-->lumbrical) (NT)  - UBE x 7 min for BUE strengthening and endurance    neuromuscular re-education activities to improve sense and proprioception for 10 minutes, including:  - left upper extremity stability: weightbearing through ball (unstable surface) against wall x 30 sec, 3 rounds  - left upper extremity loading: throwing/catching a weighted ball against rebounder 2x15  - weightbearing through extended left upper extremity against countertop (NT)  - push-ups against edge of countertop x 10 reps (NT)    therapeutic activities to improve functional performance for 8 minutes, including:  - composite  strengthening with blue digiciser 3x10 (NT)  - resisted composite digit adduction with yellow sponges x 20 reps with min A to block extrinsic compensation  - resisted composite digit abduction with rubber band x 20 reps with min A to block extrinsic compensation    Supervised modalities: x 15 min at end of session  Fluidotherapy to left hand/arm to decrease pain, desensitize, and increase tissue extensibility    Home Exercises and Education Provided     Education provided:   - precautions for ulnar nerve compression  - nerve healing expectations  - progress toward goals    Written Home Exercises Provided: Patient instructed to cont  "prior HEP  Exercises were reviewed and Johanna was able to demonstrate them prior to the end of the session. Johanna demonstrated good understanding of the HEP provided.     See EMR under Patient Instructions for exercises provided during prior visit.        Assessment     Pt would continue to benefit from skilled OT. She tolerated therapy better on this date (as compared to last session) and she is starting to identify differences between muscle pain and nerve "firing". She continues to be limited by decreased muscle endurance/stamina.    Johanna is progressing towards her goals and there are no updates to goals at this time. Pt prognosis is Good.     Pt will continue to benefit from skilled outpatient occupational therapy to address the deficits listed in the problem list on initial evaluation provide pt/family education and to maximize pt's level of independence in the home and community environment.     Pt's spiritual, cultural and educational needs considered and pt agreeable to plan of care and goals.    Anticipated barriers to occupational therapy: none    Goals:  Short Term Goals: (4 weeks)  1. Pt will be independent with HEP. - MET 8/7/2023  2. Pt will report decreased left upper extremity pain to a 4-5/10 with ADL/IADL tasks. - MET 8/7/2023   3. Pt will report decreased left upper extremity paresthesia. - progressing, continue goal 8/7/2023  4. Pt will exhibit increase left  strength by 5-10# to enable grasping during ADL/IADL tasks. - progressing, continue goal 8/7/2023  5. Pt will report an increase in FOTO intake score of > 35%, which would indicate an improvement in quality of life. - MET 8/7/2023      Long Term Goals: (8 weeks)  1. Pt will report decreased left upper extremity pain to 1-2/10 with ADL/IADL tasks. - progressing, continue goal 8/7/2023   2. Pt will exhibit increased MMT/strength of left forearm and wrist muscles by one muscle grade to improve lifting and reaching during IADL " tasks. - progressing, continue goal 8/7/2023  3. Pt will exhibit 45-55# of left  strength to allow a firm grasp on cooking utensils, steering wheel, etc. - progressing, continue goal 8/7/2023  4. Pt will exhibit 8+# of left functional lateral pinch strength to allow writing, opening containers, and turning keys. - progressing, continue goal 8/7/2023  5. Pt will report an increase in FOTO intake score of > 45%, which would indicate an improvement in quality of life. - progressing, continue goal 8/7/2023    Plan     Plan of Care Certification: 7/10/2023 to 10/10/2023      Outpatient Occupational Therapy 2 times weekly for 8-10 weeks to include the following interventions: Fluidotherapy, Manual therapy/joint mobilizations, Modalities for pain management, US 3 mhz, Therapeutic exercises/activities., Strengthening, Orthotic Fabrication/Fit/Training, Electrical Modalities, Joint Protection, and Energy Conservation    Updates/Grading for next session: left upper extremity loading and weightbearing    ROSANNA BROWN OT

## 2023-08-15 ENCOUNTER — CLINICAL SUPPORT (OUTPATIENT)
Dept: REHABILITATION | Facility: HOSPITAL | Age: 38
End: 2023-08-15
Payer: COMMERCIAL

## 2023-08-15 DIAGNOSIS — M79.602 LEFT ARM PAIN: ICD-10-CM

## 2023-08-15 DIAGNOSIS — R29.898 WEAKNESS OF LEFT HAND: ICD-10-CM

## 2023-08-15 DIAGNOSIS — R29.898 LEFT ARM WEAKNESS: Primary | ICD-10-CM

## 2023-08-15 DIAGNOSIS — R20.2 PARESTHESIA OF LEFT ARM: ICD-10-CM

## 2023-08-15 PROCEDURE — 97112 NEUROMUSCULAR REEDUCATION: CPT | Mod: PN

## 2023-08-15 PROCEDURE — 97110 THERAPEUTIC EXERCISES: CPT | Mod: PN

## 2023-08-15 PROCEDURE — 97530 THERAPEUTIC ACTIVITIES: CPT | Mod: PN

## 2023-08-15 NOTE — PROGRESS NOTES
"  [facility]  Occupational Therapy Treatment Note     Date: 8/15/2023  Name: Johanna Bullock  Clinic Number: [id number]    Therapy Diagnosis:   Encounter Diagnoses   Name Primary?    Left arm weakness Yes    Weakness of left hand     Left arm pain     Paresthesia of left arm        Physician: Familia Chilel MD    Physician Orders: OT eval and tx  Medical Diagnosis: Strain of left elbow, initial encounter [S46.804I], Extravasation injury of IV catheter site with other complication, initial encounter [T82.899C], Elbow pain, left [M25.522]  Evaluation Date: 7/10/2023  Insurance Authorization Period Expiration: 12/31/2023  Plan of Care Certification Period: 7/10/2023 to 10/10/2023     Visit # / Visits authorized: 3/20  FOTO: 2/3     Surgical Procedure: none  Date of Return to MD: 8/22/2023     Precautions:  Standard; left upper extremity restrictions for return to work    Time In: 0947  Time Out: 1030  Total Billable Time: 43 minutes    Subjective     Pt reports: "I didn't sleep well and my arm was a little tingly when I woke up."    she was compliant with home exercise program given on evaluation.  Response to previous treatment: good  Functional change: decreased pain    Pain: 0/10 (6/10 on evaluation)  Location: left upper arm     Objective   Objective measures updated at progress note unless specified.    Treatment     Johanna received the treatments listed below:      therapeutic exercises to develop strength and endurance for 13 minutes including:  - composite digit abd/add AROM x 20 reps with min A to block extrinsic compensation  - SF abduction and MP flexion AROM x 20 reps each  - SF opposition AAROM 2x10  - lumbrical fisting AROM x 20 reps with min A to block extrinsic compensation  - UBE x 7 min (level 1.5 resistance) for BUE strengthening and endurance  - median nerve glides (NT)  - ulnar nerve glides (NT)  - TGE's (hook<-->lumbrical) (NT)    neuromuscular " re-education activities to improve sense and proprioception for 15 minutes, including:  - left upper extremity stability: weightbearing through ball (unstable surface) against wall x 30 sec, 3 rounds  - push-ups against edge of countertop 3x5  - left upper extremity loading: inverted rows using TRX bands 2x10 - standing mostly vertical  - left upper extremity loading: throwing/catching a weighted ball against rebounder 2x15 (NT)    therapeutic activities to improve functional performance for 15 minutes, including:  - composite  strengthening with blue digiciser 3x10   - resisted composite digit adduction with yellow sponges x 20 reps with min A to block extrinsic compensation  - resisted composite digit abduction with rubber band x 20 reps with min A to block extrinsic compensation  - resisted lumbrical fisting/intrinsic strengthening with red sponge x 20 reps    Supervised modalities: x 0 min at end of session  Fluidotherapy to left hand/arm to decrease pain, desensitize, and increase tissue extensibility (NT)    Home Exercises and Education Provided     Education provided:   - progress toward goals    Written Home Exercises Provided: Patient instructed to cont prior HEP  Exercises were reviewed and Johanna was able to demonstrate them prior to the end of the session. Johanna demonstrated good understanding of the HEP provided.     See EMR under Patient Instructions for exercises provided during prior visit.        Assessment     Pt would continue to benefit from skilled OT. Despite her report of increased fatigue, she tolerated session well. She tolerated left upper extremity loading and weight-bearing activities better. She continues to exhibit weakness in ulnarly-innervated muscles and reports ongoing tingling in her RF and SF. She also reports increased relief of symptoms when she drapes her left upper extremity over her head (vs allowing her arm to hang down).     Johanna is progressing towards her  goals and there are no updates to goals at this time. Pt prognosis is Good.     Pt will continue to benefit from skilled outpatient occupational therapy to address the deficits listed in the problem list on initial evaluation provide pt/family education and to maximize pt's level of independence in the home and community environment.     Pt's spiritual, cultural and educational needs considered and pt agreeable to plan of care and goals.    Anticipated barriers to occupational therapy: none    Goals:  Short Term Goals: (4 weeks)  1. Pt will be independent with HEP. - MET 8/7/2023  2. Pt will report decreased left upper extremity pain to a 4-5/10 with ADL/IADL tasks. - MET 8/7/2023   3. Pt will report decreased left upper extremity paresthesia. - progressing, continue goal 8/7/2023  4. Pt will exhibit increase left  strength by 5-10# to enable grasping during ADL/IADL tasks. - progressing, continue goal 8/7/2023  5. Pt will report an increase in FOTO intake score of > 35%, which would indicate an improvement in quality of life. - MET 8/7/2023      Long Term Goals: (8 weeks)  1. Pt will report decreased left upper extremity pain to 1-2/10 with ADL/IADL tasks. - progressing, continue goal 8/7/2023   2. Pt will exhibit increased MMT/strength of left forearm and wrist muscles by one muscle grade to improve lifting and reaching during IADL tasks. - progressing, continue goal 8/7/2023  3. Pt will exhibit 45-55# of left  strength to allow a firm grasp on cooking utensils, steering wheel, etc. - progressing, continue goal 8/7/2023  4. Pt will exhibit 8+# of left functional lateral pinch strength to allow writing, opening containers, and turning keys. - progressing, continue goal 8/7/2023  5. Pt will report an increase in FOTO intake score of > 45%, which would indicate an improvement in quality of life. - progressing, continue goal 8/7/2023    Plan     Plan of Care Certification: 7/10/2023 to 10/10/2023       Outpatient Occupational Therapy 2 times weekly for 8-10 weeks to include the following interventions: Fluidotherapy, Manual therapy/joint mobilizations, Modalities for pain management, US 3 mhz, Therapeutic exercises/activities., Strengthening, Orthotic Fabrication/Fit/Training, Electrical Modalities, Joint Protection, and Energy Conservation    Updates/Grading for next session: left upper extremity loading and weightbearing    ROSANNA BROWN, OT

## 2023-08-22 ENCOUNTER — PROCEDURE VISIT (OUTPATIENT)
Dept: NEUROLOGY | Facility: CLINIC | Age: 38
End: 2023-08-22
Payer: COMMERCIAL

## 2023-08-22 ENCOUNTER — PATIENT MESSAGE (OUTPATIENT)
Dept: NEUROLOGY | Facility: CLINIC | Age: 38
End: 2023-08-22

## 2023-08-22 ENCOUNTER — PATIENT MESSAGE (OUTPATIENT)
Dept: NEUROLOGY | Facility: CLINIC | Age: 38
End: 2023-08-22
Payer: COMMERCIAL

## 2023-08-22 DIAGNOSIS — M79.602 LEFT ARM PAIN: ICD-10-CM

## 2023-08-22 DIAGNOSIS — R20.0 LEFT ARM NUMBNESS: ICD-10-CM

## 2023-08-22 DIAGNOSIS — M79.602 PAIN OF LEFT UPPER EXTREMITY: ICD-10-CM

## 2023-08-22 PROCEDURE — 95886 PR EMG COMPLETE, W/ NERVE CONDUCTION STUDIES, 5+ MUSCLES: ICD-10-PCS | Mod: S$GLB,,, | Performed by: PSYCHIATRY & NEUROLOGY

## 2023-08-22 PROCEDURE — 95886 MUSC TEST DONE W/N TEST COMP: CPT | Mod: S$GLB,,, | Performed by: PSYCHIATRY & NEUROLOGY

## 2023-08-22 PROCEDURE — 99213 OFFICE O/P EST LOW 20 MIN: CPT | Mod: S$GLB,,, | Performed by: PSYCHIATRY & NEUROLOGY

## 2023-08-22 PROCEDURE — 99213 PR OFFICE/OUTPT VISIT, EST, LEVL III, 20-29 MIN: ICD-10-PCS | Mod: S$GLB,,, | Performed by: PSYCHIATRY & NEUROLOGY

## 2023-08-22 PROCEDURE — 95910 NRV CNDJ TEST 7-8 STUDIES: CPT | Mod: S$GLB,,, | Performed by: PSYCHIATRY & NEUROLOGY

## 2023-08-22 PROCEDURE — 95910 PR NERVE CONDUCTION STUDY; 7-8 STUDIES: ICD-10-PCS | Mod: S$GLB,,, | Performed by: PSYCHIATRY & NEUROLOGY

## 2023-08-22 NOTE — PROCEDURES
EMG W/ ULTRASOUND AND NERVE CONDUCTION TEST 1 Extremity    Date/Time: 8/22/2023 11:00 AM    Performed by: Familia Ng MD  Authorized by: Killian Almanzar, DO [facility] [address] Neurology    Subjective:       Patient ID: Johanna Bullock is a 38 y.o. female here for a EMG focused evaluation for arm pain. Previous visits and diagnostic evaluation reviewed.  The reader is referred to a very detailed note from Dr. Almanzar dated June 30, 2023.  Patient had an IV placed on June 16, 2023 and states pain and swelling.  She states the swelling has improved but she still is experiencing pain in the upper arm on the left as well as intermittent tingling of the left hand.  She is also bothered by tremors of the left arm.  HPI  Review of patient's allergies indicates:  No Active Allergies   There were no vitals filed for this visit.   Chief Complaint: No chief complaint on file.    Past Medical History:   Diagnosis Date    Adjustment disorder with mixed anxiety and depressed mood 2/6/2013    Anemia, B12 deficiency     Anxiety     Major depression, single episode 2/6/2013    Major depression, single episode 2/6/2013    Post partum depression       Social History     Socioeconomic History    Marital status: Single   Occupational History    Occupation: works as      Employer: [employer]     Employer: [employer]   Tobacco Use    Smoking status: Never    Smokeless tobacco: Never   Substance and Sexual Activity    Alcohol use: Never     Comment: Alcohol use rarely    Drug use: No     Comment: Mirena    Sexual activity: Yes     Partners: Male     Birth control/protection: I.U.D.   Social History Narrative    Born and raised in [place]    Went to [employer] and got BS in bio with minor in chem    1 child    Has boyfriend    A fewnot close friends    Likes to read and go to movies     Social Determinants of Health     Stress: No Stress Concern  Present (9/26/2019)    [facility] [place] [facility] Occupational Health - Occupational Stress Questionnaire     Feeling of Stress : Not at all            Objective:      Physical Exam    Constitutional: Patient appears well-nourished.   Head: Normocephalic and atraumatic.   Mouth/Throat: Oropharynx is clear and moist.   Pulmonary/Chest: Effort normal.   Abdominal: Soft.   Skin: Skin is warm and dry.      General:  Patient is alert and cooperative.  Affect:  Patient is appropriate to surroundings and environment.  Language:  Speech is fluent.  HEENT:  There are no outward signs of trauma to head or face.  Cranial Nerves:  Pupils are equal round and reactive to light. Extra-ocular movements are intact. Face, tongue, and palate are symmetrical.  Motor:  Patient exhibits normal strength testing in bilateral proximal and distal upper extremities.  Reflexes:  Symmetrical in bilateral upper extremities.  Gait:  Ambulation is independent without use of cane or walker without signs of ataxia or circumduction.  Cerebellar:  Normal finger to nose testing without dysmetria.  Sensory:  Intact to sensory modalities tested.  Musculoskeletal:  There is no severe tenderness to palpation and manipulation of the cervical spine region.  There is an intermittent coarse tremor of the left upper extremity.  Assessment:       We reviewed and discussed at length results of EMG of the left upper extremity performed today which was normal. These findings are available via media section of chart review.   1. Pain of left upper extremity    2. Left arm pain    3. Left arm numbness              Plan:       We discussed treatment options at length. Recommend patient keep appointment with referring provider.         I spent a total of 25 minutes on the day of the visit. This includes face to face time and non-face to face time preparing to see the patient (eg, review of tests), obtaining and/or reviewing separately obtained history, documenting clinical  information in the electronic or other health record, independently interpreting results and communicating results to the patient/family/caregiver, or care coordinator  .  Familia Ng MD, FAAN   08/22/2023   11:48 AM     A dictation device was used to produce this document. Use of such devices sometimes results in grammatical errors or replacement of words that sound similarly.

## 2023-08-24 ENCOUNTER — PATIENT MESSAGE (OUTPATIENT)
Dept: REHABILITATION | Facility: HOSPITAL | Age: 38
End: 2023-08-24

## 2023-08-25 ENCOUNTER — DOCUMENTATION ONLY (OUTPATIENT)
Dept: REHABILITATION | Facility: HOSPITAL | Age: 38
End: 2023-08-25
Payer: COMMERCIAL

## 2023-08-25 NOTE — PROGRESS NOTES
Johanna did not show for her therapy apt yesterday or today. Messaged her through [facility] yesterday and called her today. No answer, left voicemail. She has no future apts scheduled. Encouraged her to call the office to make another apt or to message therapist through [facility] with any further requests.

## 2023-08-30 ENCOUNTER — TELEPHONE (OUTPATIENT)
Dept: SPORTS MEDICINE | Facility: CLINIC | Age: 38
End: 2023-08-30
Payer: COMMERCIAL

## 2023-09-06 ENCOUNTER — OFFICE VISIT (OUTPATIENT)
Dept: RHEUMATOLOGY | Facility: CLINIC | Age: 38
End: 2023-09-06
Payer: COMMERCIAL

## 2023-09-06 ENCOUNTER — PATIENT MESSAGE (OUTPATIENT)
Dept: RHEUMATOLOGY | Facility: CLINIC | Age: 38
End: 2023-09-06

## 2023-09-06 ENCOUNTER — PATIENT MESSAGE (OUTPATIENT)
Dept: REHABILITATION | Facility: HOSPITAL | Age: 38
End: 2023-09-06
Payer: COMMERCIAL

## 2023-09-06 ENCOUNTER — LAB VISIT (OUTPATIENT)
Dept: LAB | Facility: HOSPITAL | Age: 38
End: 2023-09-06
Payer: COMMERCIAL

## 2023-09-06 VITALS
HEART RATE: 80 BPM | BODY MASS INDEX: 36.12 KG/M2 | DIASTOLIC BLOOD PRESSURE: 59 MMHG | SYSTOLIC BLOOD PRESSURE: 87 MMHG | WEIGHT: 238.31 LBS | HEIGHT: 68 IN

## 2023-09-06 DIAGNOSIS — M47.819 SPONDYLOARTHRITIS: ICD-10-CM

## 2023-09-06 DIAGNOSIS — M79.18 MYOFASCIAL PAIN: ICD-10-CM

## 2023-09-06 DIAGNOSIS — M71.9 BURSOPATHY: ICD-10-CM

## 2023-09-06 DIAGNOSIS — M54.50 CHRONIC LOW BACK PAIN, UNSPECIFIED BACK PAIN LATERALITY, UNSPECIFIED WHETHER SCIATICA PRESENT: ICD-10-CM

## 2023-09-06 DIAGNOSIS — Z98.84 S/P GASTRIC BYPASS: ICD-10-CM

## 2023-09-06 DIAGNOSIS — M35.00 SICCA SYNDROME: ICD-10-CM

## 2023-09-06 DIAGNOSIS — R53.1 WEAKNESS: ICD-10-CM

## 2023-09-06 DIAGNOSIS — R06.09 DOE (DYSPNEA ON EXERTION): ICD-10-CM

## 2023-09-06 DIAGNOSIS — Z82.69 FAMILY HISTORY OF SCLERODERMA: ICD-10-CM

## 2023-09-06 DIAGNOSIS — G89.29 CHRONIC LOW BACK PAIN, UNSPECIFIED BACK PAIN LATERALITY, UNSPECIFIED WHETHER SCIATICA PRESENT: ICD-10-CM

## 2023-09-06 DIAGNOSIS — I95.9 HYPOTENSION, UNSPECIFIED HYPOTENSION TYPE: ICD-10-CM

## 2023-09-06 DIAGNOSIS — Z87.442 HISTORY OF NEPHROLITHIASIS: ICD-10-CM

## 2023-09-06 DIAGNOSIS — M79.7 FIBROMYALGIA: Primary | ICD-10-CM

## 2023-09-06 DIAGNOSIS — M79.7 FIBROMYALGIA: ICD-10-CM

## 2023-09-06 DIAGNOSIS — E66.01 SEVERE OBESITY (BMI 35.0-35.9 WITH COMORBIDITY): ICD-10-CM

## 2023-09-06 DIAGNOSIS — F33.9 RECURRENT MAJOR DEPRESSIVE DISORDER, REMISSION STATUS UNSPECIFIED: ICD-10-CM

## 2023-09-06 DIAGNOSIS — R20.2 PARESTHESIA OF LEFT ARM: ICD-10-CM

## 2023-09-06 DIAGNOSIS — E55.9 VITAMIN D INSUFFICIENCY: ICD-10-CM

## 2023-09-06 DIAGNOSIS — F41.1 GAD (GENERALIZED ANXIETY DISORDER): ICD-10-CM

## 2023-09-06 LAB
CREAT UR-MCNC: 173 MG/DL (ref 15–325)
CRP SERPL-MCNC: 2.3 MG/L (ref 0–8.2)
PROT UR-MCNC: 8 MG/DL (ref 0–15)
PROT/CREAT UR: 0.05 MG/G{CREAT} (ref 0–0.2)

## 2023-09-06 PROCEDURE — 86140 C-REACTIVE PROTEIN: CPT | Performed by: INTERNAL MEDICINE

## 2023-09-06 PROCEDURE — 99999 PR PBB SHADOW E&M-EST. PATIENT-LVL IV: CPT | Mod: PBBFAC,,, | Performed by: INTERNAL MEDICINE

## 2023-09-06 PROCEDURE — 3008F BODY MASS INDEX DOCD: CPT | Mod: CPTII,S$GLB,, | Performed by: INTERNAL MEDICINE

## 2023-09-06 PROCEDURE — 99205 OFFICE O/P NEW HI 60 MIN: CPT | Mod: S$GLB,,, | Performed by: INTERNAL MEDICINE

## 2023-09-06 PROCEDURE — 1159F PR MEDICATION LIST DOCUMENTED IN MEDICAL RECORD: ICD-10-PCS | Mod: CPTII,S$GLB,, | Performed by: INTERNAL MEDICINE

## 2023-09-06 PROCEDURE — 3078F DIAST BP <80 MM HG: CPT | Mod: CPTII,S$GLB,, | Performed by: INTERNAL MEDICINE

## 2023-09-06 PROCEDURE — 3078F PR MOST RECENT DIASTOLIC BLOOD PRESSURE < 80 MM HG: ICD-10-PCS | Mod: CPTII,S$GLB,, | Performed by: INTERNAL MEDICINE

## 2023-09-06 PROCEDURE — 82306 VITAMIN D 25 HYDROXY: CPT | Performed by: INTERNAL MEDICINE

## 2023-09-06 PROCEDURE — 86200 CCP ANTIBODY: CPT | Performed by: INTERNAL MEDICINE

## 2023-09-06 PROCEDURE — 99205 PR OFFICE/OUTPT VISIT, NEW, LEVL V, 60-74 MIN: ICD-10-PCS | Mod: S$GLB,,, | Performed by: INTERNAL MEDICINE

## 2023-09-06 PROCEDURE — 99999 PR PBB SHADOW E&M-EST. PATIENT-LVL IV: ICD-10-PCS | Mod: PBBFAC,,, | Performed by: INTERNAL MEDICINE

## 2023-09-06 PROCEDURE — 86235 NUCLEAR ANTIGEN ANTIBODY: CPT | Mod: 59 | Performed by: INTERNAL MEDICINE

## 2023-09-06 PROCEDURE — 87340 HEPATITIS B SURFACE AG IA: CPT | Performed by: INTERNAL MEDICINE

## 2023-09-06 PROCEDURE — 3074F SYST BP LT 130 MM HG: CPT | Mod: CPTII,S$GLB,, | Performed by: INTERNAL MEDICINE

## 2023-09-06 PROCEDURE — 3008F PR BODY MASS INDEX (BMI) DOCUMENTED: ICD-10-PCS | Mod: CPTII,S$GLB,, | Performed by: INTERNAL MEDICINE

## 2023-09-06 PROCEDURE — 86038 ANTINUCLEAR ANTIBODIES: CPT | Performed by: INTERNAL MEDICINE

## 2023-09-06 PROCEDURE — 1159F MED LIST DOCD IN RCRD: CPT | Mod: CPTII,S$GLB,, | Performed by: INTERNAL MEDICINE

## 2023-09-06 PROCEDURE — 86704 HEP B CORE ANTIBODY TOTAL: CPT | Performed by: INTERNAL MEDICINE

## 2023-09-06 PROCEDURE — 3074F PR MOST RECENT SYSTOLIC BLOOD PRESSURE < 130 MM HG: ICD-10-PCS | Mod: CPTII,S$GLB,, | Performed by: INTERNAL MEDICINE

## 2023-09-06 PROCEDURE — 83516 IMMUNOASSAY NONANTIBODY: CPT | Mod: 59 | Performed by: INTERNAL MEDICINE

## 2023-09-06 PROCEDURE — 36415 COLL VENOUS BLD VENIPUNCTURE: CPT | Performed by: INTERNAL MEDICINE

## 2023-09-06 PROCEDURE — 86431 RHEUMATOID FACTOR QUANT: CPT | Performed by: INTERNAL MEDICINE

## 2023-09-06 PROCEDURE — 84156 ASSAY OF PROTEIN URINE: CPT | Performed by: INTERNAL MEDICINE

## 2023-09-06 PROCEDURE — 86480 TB TEST CELL IMMUN MEASURE: CPT | Performed by: INTERNAL MEDICINE

## 2023-09-06 PROCEDURE — 81374 HLA I TYPING 1 ANTIGEN LR: CPT | Performed by: INTERNAL MEDICINE

## 2023-09-06 RX ORDER — PREGABALIN 25 MG/1
25 CAPSULE ORAL 3 TIMES DAILY
Qty: 270 CAPSULE | Refills: 1 | Status: SHIPPED | OUTPATIENT
Start: 2023-09-06 | End: 2023-11-01

## 2023-09-06 RX ORDER — NAPROXEN 500 MG/1
500 TABLET ORAL 2 TIMES DAILY WITH MEALS
Qty: 28 TABLET | Refills: 1 | Status: SHIPPED | OUTPATIENT
Start: 2023-09-06 | End: 2023-09-20

## 2023-09-06 NOTE — PROGRESS NOTES
RHEUMATOLOGY OUTPATIENT CLINIC NOTE    9/6/2023    Attending Rheumatologist: Ari Valadez  Primary Care Provider/Physician Requesting Consultation: Iman Mora MD   Chief Complaint/Reason For Consultation:  No chief complaint on file.      Subjective:     Johanna Bullock is a 38 y.o. Black or  female with chronic pain for evaluation.    Main concern of chronic widespread and lower back pain.  Describes mixed pattern of pain.     Addendum 9/14: no efficacy from naprosyn.  Intolerance to Lyrica.  Alternative pharmacotherapy with LDN suggested for primary FMS sx.  Side effect profile information provided via patient portal     Addendum 9/28: severe back pain interfering with ADLs.  Failure to physical therapy.  Negative XR.  CT reported w/ no evidence of sacroiliitis.  Need MRI to determine presence of non radiographic spondyloarthritis.  Would benefit from CBT, suggest f/u with PCP for referral to mental health provider that provides this service.      Review of Systems   Constitutional:  Positive for malaise/fatigue. Negative for fever.   HENT:  Negative for nosebleeds.         Mild sicca sx   Eyes:  Negative for photophobia and pain.        No hx of uveitis   Respiratory:  Negative for cough, hemoptysis and shortness of breath (NOGUERA, chronic).    Cardiovascular:  Negative for chest pain.   Gastrointestinal:  Positive for abdominal pain and blood in stool (hx of hemorrhoids). Negative for melena.        No hx of IBD   Genitourinary:  Negative for hematuria.   Musculoskeletal:  Positive for back pain, joint pain, myalgias and neck pain.        FHx of grandmother w/ SSC   Skin:  Negative for rash.        No hx of PsO.  Denies RP   Neurological:  Positive for weakness. Negative for focal weakness.   Endo/Heme/Allergies:         No hx of DVT/PE       Chronic comorbid conditions affecting medical decision making today:  Past Medical History:   Diagnosis Date    Adjustment disorder with mixed  anxiety and depressed mood 2/6/2013    Anemia, B12 deficiency     Anxiety     Major depression, single episode 2/6/2013    Major depression, single episode 2/6/2013    Post partum depression      Past Surgical History:   Procedure Laterality Date    CHOLECYSTECTOMY  10/2012    COLONOSCOPY N/A 2/14/2020    Procedure: COLONOSCOPY;  Surgeon: Jas Moore MD;  Location: [facility] ENDO (4TH FLR);  Service: Endoscopy;  Laterality: N/A;  Pt procedure time changed to 0800 with 0715 - second half prep completed from 6297-9319- Pt verbalized understanding- ERW2/13/20@1022    COLONOSCOPY N/A 3/10/2023    Procedure: COLONOSCOPY;  Surgeon: HORACE Moore MD;  Location: [facility] ENDO (4TH FLR);  Service: Endoscopy;  Laterality: N/A;  inst emailed-RB    DILATION AND CURETTAGE OF UTERUS      ESOPHAGOGASTRODUODENOSCOPY N/A 7/20/2018    Procedure: ESOPHAGOGASTRODUODENOSCOPY (EGD);  Surgeon: Darwin Hansen MD;  Location: [facility] ENDO (4TH FLR);  Service: Endoscopy;  Laterality: N/A;  Please measure pouch and limbs    GASTRIC BYPASS  2008    Revision August 2019    LAPAROSCOPIC REPAIR OF HIATAL HERNIA  2019    Liver scope  10/2012    LYSIS OF ADHESIONS       Family History   Problem Relation Age of Onset    Breast cancer Neg Hx     Colon cancer Neg Hx     Ovarian cancer Neg Hx     Diabetes Neg Hx     Hypertension Neg Hx     Stroke Neg Hx      Social History     Tobacco Use   Smoking Status Never   Smokeless Tobacco Never       Current Outpatient Medications:     cyanocobalamin 1,000 mcg/mL injection, ONE INJECTION AS DIRECTED EVERY 28 DAYS, Disp: 4 mL, Rfl: 3    fluticasone propionate (FLONASE) 50 mcg/actuation nasal spray, 1 spray by Each Nostril route once daily., Disp: , Rfl:     linaCLOtide (LINZESS) 290 mcg Cap capsule, Take 1 capsule (290 mcg total) by mouth before breakfast., Disp: 90 capsule, Rfl: 3    LINZESS 290 mcg Cap capsule, TAKE ONE CAPSULE BY MOUTH EVERY DAY, Disp: 30 capsule, Rfl: 11    albuterol  (PROVENTIL/VENTOLIN HFA) 90 mcg/actuation inhaler, Inhale 1-2 puffs into the lungs every 6 (six) hours as needed for Wheezing., Disp: 6.7 g, Rfl: 0     Objective:     Vitals:    09/06/23 1359   BP: (!) 87/59   Pulse: 80     Physical Exam   Constitutional: She appears obese.   Eyes: Conjunctivae are normal.   Pulmonary/Chest: Effort normal. No respiratory distress.   Musculoskeletal:         General: Tenderness present. No swelling. Normal range of motion.   Neurological: She displays no weakness.   Skin: No rash noted.       Reviewed available old and all outside pertinent medical records available.    All lab results personally reviewed and interpreted by me.       ASSESSMENT      Encounter Diagnoses   Name Primary?    Paresthesia of left arm     Severe obesity (BMI 35.0-35.9 with comorbidity)     Fibromyalgia     Chronic low back pain, unspecified back pain laterality, unspecified whether sciatica present     Hypotension, unspecified hypotension type     Recurrent major depressive disorder, remission status unspecified     UMESH (generalized anxiety disorder)     History of nephrolithiasis     S/P gastric bypass     NOGUERA (dyspnea on exertion)     Myofascial pain     Bursopathy     Weakness     Sicca syndrome     Spondyloarthritis Yes    Family history of scleroderma     Vitamin D insufficiency       PLAN     FMS sx and back pain with mixed pattern of pain.  Mild allodynia on exam. Equivocal TTP some enthesis.  No active inflammatory rashes, trophic changes fingertips, or reproducible weakness on exam.  Labs w/o concerning cytopenias.  Liver and kidney function stable. CK WNR.  Negative GERA. Remote (2012) elevation of smooth ab 1:40.  Will refer to hepatology if persistent serologic abnormality.  Trace pleural effusion reported on CTAP 6/2023.  No ankylosis, marginal erosive changes, LAD reported.  Early DDD/DJD changes, including SI joints.  Equivocal irregularity left SI joint, will attempt to discuss w/ radioloogy.   EMG reported as normal this year.  FMS might improve w/ antiseizure, antidepressant, or muscle relaxant.  Suggest trial of Lyrica as tolerated for at least 3 months, side effects discussed.  Initial script provided with further dose adjustments per PCP.  Recommend w/u for SpA and SjS, SLE.  Trial of NSAID x2 short course.  PT. Cardiology referral for hypotension evaluation    60 minutes of total time spent on the encounter, which includes face to face time and non-face to face time preparing to see the patient (eg, review of tests), Obtaining and/or reviewing separately obtained history, Documenting clinical information in the electronic or other health record, Independently interpreting results (not separately reported) and communicating results to the patient/family/caregiver, or Care coordination (not separately reported).     Ari Valadez M.D.

## 2023-09-07 DIAGNOSIS — E55.9 VITAMIN D INSUFFICIENCY: Primary | ICD-10-CM

## 2023-09-07 LAB
25(OH)D3+25(OH)D2 SERPL-MCNC: 21 NG/ML (ref 30–96)
ANA SER QL IF: NORMAL
CCP AB SER IA-ACNC: 0.8 U/ML
HBV CORE AB SERPL QL IA: NORMAL
HBV SURFACE AG SERPL QL IA: NORMAL
RHEUMATOID FACT SERPL-ACNC: <13 IU/ML (ref 0–15)

## 2023-09-07 RX ORDER — ERGOCALCIFEROL 1.25 MG/1
50000 CAPSULE ORAL
Qty: 12 CAPSULE | Refills: 0 | Status: SHIPPED | OUTPATIENT
Start: 2023-09-07 | End: 2023-12-06

## 2023-09-08 LAB
GAMMA INTERFERON BACKGROUND BLD IA-ACNC: 0.13 IU/ML
M TB IFN-G CD4+ BCKGRND COR BLD-ACNC: -0.03 IU/ML
M TB IFN-G CD4+ BCKGRND COR BLD-ACNC: 0.01 IU/ML
MITOGEN IGNF BCKGRD COR BLD-ACNC: 9.87 IU/ML
TB GOLD PLUS: NEGATIVE

## 2023-09-13 ENCOUNTER — OFFICE VISIT (OUTPATIENT)
Dept: SPORTS MEDICINE | Facility: CLINIC | Age: 38
End: 2023-09-13
Payer: COMMERCIAL

## 2023-09-13 ENCOUNTER — TELEPHONE (OUTPATIENT)
Dept: RHEUMATOLOGY | Facility: CLINIC | Age: 38
End: 2023-09-13
Payer: COMMERCIAL

## 2023-09-13 VITALS — WEIGHT: 238.31 LBS | BODY MASS INDEX: 36.12 KG/M2 | HEIGHT: 68 IN

## 2023-09-13 DIAGNOSIS — S46.912A STRAIN OF LEFT ELBOW, INITIAL ENCOUNTER: ICD-10-CM

## 2023-09-13 DIAGNOSIS — S46.912D STRAIN OF LEFT SHOULDER, SUBSEQUENT ENCOUNTER: ICD-10-CM

## 2023-09-13 DIAGNOSIS — R20.2 PARESTHESIA OF LEFT ARM: Primary | ICD-10-CM

## 2023-09-13 DIAGNOSIS — T82.898D: Primary | ICD-10-CM

## 2023-09-13 DIAGNOSIS — S46.912D STRAIN OF LEFT ELBOW, SUBSEQUENT ENCOUNTER: ICD-10-CM

## 2023-09-13 DIAGNOSIS — R29.898 LEFT HAND WEAKNESS: ICD-10-CM

## 2023-09-13 PROCEDURE — 99215 PR OFFICE/OUTPT VISIT, EST, LEVL V, 40-54 MIN: ICD-10-PCS | Mod: S$GLB,,, | Performed by: STUDENT IN AN ORGANIZED HEALTH CARE EDUCATION/TRAINING PROGRAM

## 2023-09-13 PROCEDURE — 1160F RVW MEDS BY RX/DR IN RCRD: CPT | Mod: CPTII,S$GLB,, | Performed by: STUDENT IN AN ORGANIZED HEALTH CARE EDUCATION/TRAINING PROGRAM

## 2023-09-13 PROCEDURE — 99999 PR PBB SHADOW E&M-EST. PATIENT-LVL IV: CPT | Mod: PBBFAC,,, | Performed by: STUDENT IN AN ORGANIZED HEALTH CARE EDUCATION/TRAINING PROGRAM

## 2023-09-13 PROCEDURE — 1159F PR MEDICATION LIST DOCUMENTED IN MEDICAL RECORD: ICD-10-PCS | Mod: CPTII,S$GLB,, | Performed by: STUDENT IN AN ORGANIZED HEALTH CARE EDUCATION/TRAINING PROGRAM

## 2023-09-13 PROCEDURE — 99999 PR PBB SHADOW E&M-EST. PATIENT-LVL IV: ICD-10-PCS | Mod: PBBFAC,,, | Performed by: STUDENT IN AN ORGANIZED HEALTH CARE EDUCATION/TRAINING PROGRAM

## 2023-09-13 PROCEDURE — 3008F BODY MASS INDEX DOCD: CPT | Mod: CPTII,S$GLB,, | Performed by: STUDENT IN AN ORGANIZED HEALTH CARE EDUCATION/TRAINING PROGRAM

## 2023-09-13 PROCEDURE — 3008F PR BODY MASS INDEX (BMI) DOCUMENTED: ICD-10-PCS | Mod: CPTII,S$GLB,, | Performed by: STUDENT IN AN ORGANIZED HEALTH CARE EDUCATION/TRAINING PROGRAM

## 2023-09-13 PROCEDURE — 1160F PR REVIEW ALL MEDS BY PRESCRIBER/CLIN PHARMACIST DOCUMENTED: ICD-10-PCS | Mod: CPTII,S$GLB,, | Performed by: STUDENT IN AN ORGANIZED HEALTH CARE EDUCATION/TRAINING PROGRAM

## 2023-09-13 PROCEDURE — 1159F MED LIST DOCD IN RCRD: CPT | Mod: CPTII,S$GLB,, | Performed by: STUDENT IN AN ORGANIZED HEALTH CARE EDUCATION/TRAINING PROGRAM

## 2023-09-13 PROCEDURE — 99215 OFFICE O/P EST HI 40 MIN: CPT | Mod: S$GLB,,, | Performed by: STUDENT IN AN ORGANIZED HEALTH CARE EDUCATION/TRAINING PROGRAM

## 2023-09-13 NOTE — TELEPHONE ENCOUNTER
"Returned patients phone call. Patient stated that she saw Dr. Valadez a week ago today. Dr. Valadez had prescribed her Naproxen and Lyrica. She was unable to take the Lyrica due to it making her feel horrible. She is currently taking the Naproxen twice a day along with Aleve back and body. Patient stated that her pain level has not gotten any better. If anything, it has gotten worse. Her pain level is currently a 6/10. She also stated that she is struggling to drive. She would like to know if there is something else that she can try to help with the pain that she is having. She also would like to know if she can get a temporary handicap tag so that she is able to park closer to her job since she is having trouble walking far distances. Advised patient that I will send this message to Dr. Valadez for review and give her a call back once I hear something. Patient thanked me for the return phone call and verbalized understanding. All questions answered.      Heike Jernigan (Allye) [facility]  Rheumatology Department    "

## 2023-09-13 NOTE — PROGRESS NOTES
Patient ID: Johanna Bullock  YOB: 1985  MRN: [id number]    Chief Complaint: Pain of the Left Arm and Follow-up      History of Present Illness: Johanna Bullock is here  today with left arm/ elbow pain, she's still having some weakness and muscle spasms. Complaining of sharp pain down elbow. She has had some persistent issues since that time, she tried ice pack, she has kept it elevated, she has had some decreased swelling however continued pain. She satates that she missed the last 3 sessions of PT due to being diagnosed recently with fibromyalgia by Dr. Valadez.         Past Medical History:   Past Medical History:   Diagnosis Date    Adjustment disorder with mixed anxiety and depressed mood 2/6/2013    Anemia, B12 deficiency     Anxiety     Major depression, single episode 2/6/2013    Major depression, single episode 2/6/2013    Post partum depression      Past Surgical History:   Procedure Laterality Date    CHOLECYSTECTOMY  10/2012    COLONOSCOPY N/A 2/14/2020    Procedure: COLONOSCOPY;  Surgeon: Jas Moore MD;  Location: [facility] ([address] [place] [address]);  Service: Endoscopy;  Laterality: N/A;  Pt procedure time changed to 0800 with 0715 - second half prep completed from 2441-1757- Pt verbalized understanding- ERW2/13/20@1022    COLONOSCOPY N/A 3/10/2023    Procedure: COLONOSCOPY;  Surgeon: HORACE Moore MD;  Location: [facility] ([address] [place] [address]);  Service: Endoscopy;  Laterality: N/A;  inst emailed-RB    DILATION AND CURETTAGE OF UTERUS      ESOPHAGOGASTRODUODENOSCOPY N/A 7/20/2018    Procedure: ESOPHAGOGASTRODUODENOSCOPY (EGD);  Surgeon: Darwin Hansen MD;  Location: [facility] ([address] [place] [address]);  Service: Endoscopy;  Laterality: N/A;  Please measure pouch and limbs    GASTRIC BYPASS  2008    Revision August 2019    LAPAROSCOPIC REPAIR OF HIATAL HERNIA  2019    Liver scope  10/2012    LYSIS OF ADHESIONS       Family History   Problem Relation Age of Onset    Breast cancer Neg Hx     Colon  cancer Neg Hx     Ovarian cancer Neg Hx     Diabetes Neg Hx     Hypertension Neg Hx     Stroke Neg Hx      Social History     Socioeconomic History    Marital status: Single   Occupational History    Occupation: works as      Employer: [employer]     Employer: [employer]   Tobacco Use    Smoking status: Never    Smokeless tobacco: Never   Substance and Sexual Activity    Alcohol use: Never     Comment: Alcohol use rarely    Drug use: No     Comment: Mirena    Sexual activity: Yes     Partners: Male     Birth control/protection: I.U.D.   Social History Narrative    Born and raised in [place]    Went to [employer] and got BS in bio with minor in chem    1 child    Has boyfriend    A fewnot close friends    Likes to read and go to [employer]     Social Determinants of Health     Stress: No Stress Concern Present (9/26/2019)    [place] [employer] - Occupational Stress Questionnaire     Feeling of Stress : Not at all     Medication List with Changes/Refills   Current Medications    ALBUTEROL (PROVENTIL/VENTOLIN HFA) 90 MCG/ACTUATION INHALER    Inhale 1-2 puffs into the lungs every 6 (six) hours as needed for Wheezing.    CYANOCOBALAMIN 1,000 MCG/ML INJECTION    ONE INJECTION AS DIRECTED EVERY 28 DAYS    ERGOCALCIFEROL (ERGOCALCIFEROL) 50,000 UNIT CAP    Take 1 capsule (50,000 Units total) by mouth every 7 days.    FLUTICASONE PROPIONATE (FLONASE) 50 MCG/ACTUATION NASAL SPRAY    1 spray by Each Nostril route once daily.    LINACLOTIDE (LINZESS) 290 MCG CAP CAPSULE    Take 1 capsule (290 mcg total) by mouth before breakfast.    LINZESS 290 MCG CAP CAPSULE    TAKE ONE CAPSULE BY MOUTH EVERY DAY    NAPROXEN (NAPROSYN) 500 MG TABLET    Take 1 tablet (500 mg total) by mouth 2 (two) times daily with meals. for 14 days    PREGABALIN (LYRICA) 25 MG CAPSULE    Take 1 capsule (25 mg total) by mouth 3 (three) times daily.     Review of patient's allergies indicates:  No Known Allergies    Physical Exam:    Body mass index is 36.24 kg/m².    GENERAL: Well appearing, in no acute distress.  HEAD: Normocephalic and atraumatic.  ENT: External ears and nose grossly normal.  EYES: EOMI bilaterally  PULMONARY: Respirations are grossly even and non-labored.  NEURO: Awake, alert, and oriented x 3.  SKIN: No obvious rashes appreciated.  PSYCH: Mood & affect are appropriate.    Detailed MSK exam:     Left elbow exam:   -TTP: medial epicondyle  -ROM: extension 0, flexion 130  -Resisted wrist extension: negative  -Resisted 3rd finger extension: negative  -Resisted wrist flexion: negative  -Resisted pronation: negative  -Resisted supination: negative  -Sensation intact  -Pulses 2+  - strength 5/5  -negative ulnar tinel sign  -4th digit tremor when attempting thumb opposition.   -Unable to flex 5th MCP when trying to make a fist.     Imaging:  X-Ray Elbow Complete Left  Narrative: EXAMINATION:  XR ELBOW COMPLETE 3 VIEW LEFT    CLINICAL HISTORY:  Pain in left elbow    TECHNIQUE:  AP, lateral, and oblique views of the left elbow were performed.    COMPARISON:  None    FINDINGS:  No acute fracture or dislocation.  No joint effusion suggested.  Joint spaces appear to be relatively well maintained.  Impression: As above    Electronically signed by: Aramis Bass DO  Date:    06/27/2023  Time:    15:06        Relevant imaging results were reviewed and interpreted by me and per my read shows no acute abnormalities.  This was discussed with the patient and / or family today.     Assessment:  Johanna Bullock is a 38 y.o. female following up for left elbow IV extravasation injury. Still having numbness/tingling issues as well as issues at the 4th digit with thumb opposition and unable to flex 5th digit when making a fist. Still having significant work issues as she is unable to use her left hand effectively.   Plan: OT referral for left hand. Referral to Dr Larose for 2nd opinion. Reviewed notes and EMG test from neurology and  rheumatology. Continue follow up with those specialties.   Follow up with Dr Larose. All questions answered.     Extravasation injury of IV catheter site with other complication, subsequent encounter    Left hand weakness    Strain of left elbow, subsequent encounter  -     Ambulatory referral/consult to Physical/Occupational Therapy; Future; Expected date: 09/20/2023    Strain of left shoulder, subsequent encounter  -     Ambulatory referral/consult to Physical/Occupational Therapy; Future; Expected date: 09/20/2023         I spent a total of 40 minutes on the day of the visit.  This includes face to face time and non-face to face time preparing to see the patient (eg, review of tests), obtaining and/or reviewing separately obtained history, documenting clinical information in the electronic or other health record, independently interpreting results and communicating results to the patient/family/caregiver, or care coordinator.    Electronically signed:  Familia Chilel MD, MPH  09/13/2023  1:35 PM

## 2023-09-13 NOTE — TELEPHONE ENCOUNTER
----- Message from Priya Rubio sent at 9/13/2023  2:01 PM CDT -----  Contact: [phone]  Good afternoon,    Patient came in with concerns about her medication. Medication has been giving patient side effects and would like to know of any other options. Patient also stated her pain medication has not been relieving any of her pain. And patient would also like to speak about getting a handicap tag. Patient's range of motion has not improved. You can reach patient at, [phone].    Thank you,  Priya Rubio

## 2023-09-13 NOTE — PATIENT INSTRUCTIONS
Assessment:  Johanna Bullock is a 38 y.o. female   Chief Complaint   Patient presents with    Left Arm - Pain    Follow-up       No diagnosis found.     Plan:  Occupational therapy [facility]  Referral to Dr. Larose    Follow-up: PRN or sooner if there are any problems between now and then.    Thank you for choosing [facility] and Dr. Familia Chilel for your orthopedic & sports medicine care. It is our goal to provide you with exceptional care that will help keep you healthy, active, and get you back in the game.    Please do not hesitate to reach out to us via email, phone, or MyChart with any questions, concerns, or feedback.    If you felt that you received exemplary care today, please consider leaving us feedback on [employer] at:  [email or web address]    If you are experiencing pain/discomfort ,or have questions after 5pm and would like to be connected to the [facility] on-call team, please call this number and specify which Sports Medicine provider is treating you: [phone]

## 2023-09-14 ENCOUNTER — TELEPHONE (OUTPATIENT)
Dept: RHEUMATOLOGY | Facility: CLINIC | Age: 38
End: 2023-09-14
Payer: COMMERCIAL

## 2023-09-14 NOTE — TELEPHONE ENCOUNTER
Ari Valadez MD  to Me        9/14/23  1:29 PM  Suggest trial of low dose naltrexone for primary fibromyalgia symptoms.  Expected onset of action after 6 months of continuous therapy, goal of 30% improvement to symptoms.  Plan to double dose in 3 months if no significant effect.  Medication information attached to summary of last encounter.

## 2023-09-14 NOTE — TELEPHONE ENCOUNTER
----- Message from Salina Velazquez MA sent at 9/14/2023  2:10 PM CDT -----  natrexone tablet 3 mg   Type:  Pharmacy Calling to Clarify an RX    Name of Caller:rober   Pharmacy Name  [id number] - [place] - [address] [facility]  [address]  [place] [address]  Phone: [phone] Fax: [phone]    Prescription Name:natrexone tablet 3 mg  What do they need to clarify?:states  that they don't have that medication they are not sure if the nam is correct

## 2023-09-14 NOTE — TELEPHONE ENCOUNTER
"Returned phone call. Prescription cancelled. All questions answered.      Heike Jernigan (Allye), [facility]  Rheumatology Department    "

## 2023-09-14 NOTE — TELEPHONE ENCOUNTER
"Contacted patient to discuss this with her. Patient started crying on this phone with me stating that she is a single parent and she can barely drive to bring her son to and from school. She can barely drive to make it to and from work. She got stuck in traffic the other day and was on the verge of tears due to the pain. The pain is impacting her daily life. She would like to know if there is anything that can be done to give her some relief soon. She has been dealing with this pain for almost a month now and can't take it anymore. Advised patient that I will send this message to Dr. Valadez and give her a call back once I hear something. Patient thanked me for the return phone call and verbalized understanding. All questions answered.     Heike Jernigan (Allye), [facility]  Rheumatology Department   "

## 2023-09-15 ENCOUNTER — PATIENT MESSAGE (OUTPATIENT)
Dept: SPORTS MEDICINE | Facility: CLINIC | Age: 38
End: 2023-09-15
Payer: COMMERCIAL

## 2023-09-18 ENCOUNTER — TELEPHONE (OUTPATIENT)
Dept: RHEUMATOLOGY | Facility: CLINIC | Age: 38
End: 2023-09-18
Payer: COMMERCIAL

## 2023-09-18 DIAGNOSIS — T82.898D: Primary | ICD-10-CM

## 2023-09-18 DIAGNOSIS — R29.898 WEAKNESS OF LEFT HAND: ICD-10-CM

## 2023-09-18 DIAGNOSIS — S46.912D STRAIN OF LEFT ELBOW, SUBSEQUENT ENCOUNTER: ICD-10-CM

## 2023-09-18 NOTE — TELEPHONE ENCOUNTER
"Attempted to return patients phone call. Left v/m for patient to call back at earliest convenience.    Heike Jernigan (Allye), [facility]  Rheumatology Department    "

## 2023-09-18 NOTE — TELEPHONE ENCOUNTER
----- Message from Linh Duarte sent at 9/18/2023  2:47 PM CDT -----  Contact: patient  Johanna Bullock would like a call back at [phone], in regards to the pain medication that was ordered for her. She states that it will take 3 week to be delivered to her. She is requesting to be prescribed something that she can receive now to help with the pain. She states the naproxen isn't working. Pt would like a call back as as possible. Reporting her pain is at a 9/10.

## 2023-09-19 DIAGNOSIS — M79.7 FIBROMYALGIA: Primary | ICD-10-CM

## 2023-09-19 DIAGNOSIS — M54.50 CHRONIC LOW BACK PAIN, UNSPECIFIED BACK PAIN LATERALITY, UNSPECIFIED WHETHER SCIATICA PRESENT: ICD-10-CM

## 2023-09-19 DIAGNOSIS — G89.29 CHRONIC LOW BACK PAIN, UNSPECIFIED BACK PAIN LATERALITY, UNSPECIFIED WHETHER SCIATICA PRESENT: ICD-10-CM

## 2023-09-19 RX ORDER — METHOCARBAMOL 500 MG/1
500 TABLET, FILM COATED ORAL NIGHTLY PRN
Qty: 60 TABLET | Refills: 1 | Status: SHIPPED | OUTPATIENT
Start: 2023-09-19 | End: 2023-09-28 | Stop reason: SDUPTHER

## 2023-09-21 ENCOUNTER — PATIENT MESSAGE (OUTPATIENT)
Dept: RHEUMATOLOGY | Facility: CLINIC | Age: 38
End: 2023-09-21
Payer: COMMERCIAL

## 2023-09-21 NOTE — TELEPHONE ENCOUNTER
"Contacted patient to discuss this with her. Advised patient that due to the symptoms she is experiencing she needs to go to the nearest ER to her for evaluation. Patient verbalized understanding. All questions answered.       Heike Jernigan (Allye), [facility]  Rheumatology Department   "

## 2023-09-23 LAB
ANTI-PM/SCL AB: <20 UNITS
ANTI-SS-A 52 KD AB, IGG: <20 UNITS
EJ AB SER QL: NEGATIVE
ENA JO1 AB SER IA-ACNC: <20 UNITS
ENA SM+RNP AB SER IA-ACNC: <20 UNITS
FIBRILLARIN (U3 RNP): NEGATIVE
KU AB SER QL: NEGATIVE
MDA-5 (P140): <20 UNITS
MI2 AB SER QL: NEGATIVE
NXP-2 (P140): <20 UNITS
OJ AB SER QL: NEGATIVE
PL12 AB SER QL: NEGATIVE
PL7 AB SER QL: NEGATIVE
SRP AB SERPL QL: NEGATIVE
TIF1 GAMMA (P155/140): <20 UNITS
U2 SNRNP: NEGATIVE

## 2023-09-27 NOTE — TELEPHONE ENCOUNTER
"Dr. Valadez, I sent Mrs. Spencer a message yesterday regarding which area hurt her more (between her right hand, left hand, right foot, left foot, and back) for the MRI. This was her reply.    My pain and issue is axial for sure    Middle lower spine and tail bone and hip joints both sides equally.      Sitting standing and laying are all uncomfortable.     I will start it today, just wanted to be sure. Im not in a position to have any setbacks or side effects that add to my issues already.        I stretch a little but cant hold due to pain. I just stop and rest. Soaking in my tub and legs propped up/ inverted are the only positions that give me relief too.      Im having issues driving when its bad. Moving from break to gas and back, not so much when I press the movement causes issues    Please advise.    Heike Jernigan (Allye), [facility]  Rheumatology Department   "

## 2023-09-28 RX ORDER — METHOCARBAMOL 500 MG/1
500 TABLET, FILM COATED ORAL NIGHTLY PRN
Qty: 90 TABLET | Refills: 0 | Status: SHIPPED | OUTPATIENT
Start: 2023-09-28 | End: 2024-01-09

## 2023-09-28 RX ORDER — ERGOCALCIFEROL 1.25 MG/1
50000 CAPSULE ORAL
Qty: 12 CAPSULE | Refills: 0 | Status: SHIPPED | OUTPATIENT
Start: 2023-09-28 | End: 2023-12-27

## 2023-09-28 NOTE — TELEPHONE ENCOUNTER
Follow up visit recommended at least 4 months into active naltrexone therapy.  MRI to rule out spondyloarthritis, if detected suggest sooner follow up visit.  Initial script of robaxin provided. Further refills may be done by internal medicine or family practice.     Would benefit from cognitive behavioral therapy.  Suggest follow up with primary care for referral to mental health provider that offers this service.       Links for helpful resources below.     [email or web address].[email or web address] (on-line modules aimed at patient education and self-help; Dr. Jim Lisa, [facility])     www.fmaware.org (National Fibromyalgia Association website)     https://EachNet.com.au/ (on-line cognitive behavior therapy option; annual prescription; small randomized study using this platform showed improvement compared to usual therapy; additional studies evaluating on-line CBT have demonstrated effectiveness in FM as well)

## 2023-09-29 ENCOUNTER — OFFICE VISIT (OUTPATIENT)
Dept: ORTHOPEDICS | Facility: CLINIC | Age: 38
End: 2023-09-29
Payer: COMMERCIAL

## 2023-09-29 VITALS — HEIGHT: 68 IN | BODY MASS INDEX: 36.07 KG/M2 | WEIGHT: 238 LBS

## 2023-09-29 DIAGNOSIS — R20.2 PARESTHESIA OF LEFT ARM: ICD-10-CM

## 2023-09-29 DIAGNOSIS — S54.02XA NEURAPRAXIA OF LEFT ULNAR NERVE, INITIAL ENCOUNTER: Primary | ICD-10-CM

## 2023-09-29 DIAGNOSIS — M47.819 SPONDYLOARTHRITIS: ICD-10-CM

## 2023-09-29 LAB
HLA B27 INTERPRETATION: NORMAL
HLA-B27 RELATED AG QL: NEGATIVE
HLA-B27 RELATED AG QL: NORMAL

## 2023-09-29 PROCEDURE — 99999 PR PBB SHADOW E&M-EST. PATIENT-LVL IV: ICD-10-PCS | Mod: PBBFAC,,, | Performed by: ORTHOPAEDIC SURGERY

## 2023-09-29 PROCEDURE — 99203 PR OFFICE/OUTPT VISIT, NEW, LEVL III, 30-44 MIN: ICD-10-PCS | Mod: S$GLB,,, | Performed by: ORTHOPAEDIC SURGERY

## 2023-09-29 PROCEDURE — 1160F PR REVIEW ALL MEDS BY PRESCRIBER/CLIN PHARMACIST DOCUMENTED: ICD-10-PCS | Mod: CPTII,S$GLB,, | Performed by: ORTHOPAEDIC SURGERY

## 2023-09-29 PROCEDURE — 99999 PR PBB SHADOW E&M-EST. PATIENT-LVL IV: CPT | Mod: PBBFAC,,, | Performed by: ORTHOPAEDIC SURGERY

## 2023-09-29 PROCEDURE — 1159F PR MEDICATION LIST DOCUMENTED IN MEDICAL RECORD: ICD-10-PCS | Mod: CPTII,S$GLB,, | Performed by: ORTHOPAEDIC SURGERY

## 2023-09-29 PROCEDURE — 1159F MED LIST DOCD IN RCRD: CPT | Mod: CPTII,S$GLB,, | Performed by: ORTHOPAEDIC SURGERY

## 2023-09-29 PROCEDURE — 1160F RVW MEDS BY RX/DR IN RCRD: CPT | Mod: CPTII,S$GLB,, | Performed by: ORTHOPAEDIC SURGERY

## 2023-09-29 PROCEDURE — 3008F PR BODY MASS INDEX (BMI) DOCUMENTED: ICD-10-PCS | Mod: CPTII,S$GLB,, | Performed by: ORTHOPAEDIC SURGERY

## 2023-09-29 PROCEDURE — 99203 OFFICE O/P NEW LOW 30 MIN: CPT | Mod: S$GLB,,, | Performed by: ORTHOPAEDIC SURGERY

## 2023-09-29 PROCEDURE — 3008F BODY MASS INDEX DOCD: CPT | Mod: CPTII,S$GLB,, | Performed by: ORTHOPAEDIC SURGERY

## 2023-09-29 RX ORDER — NAPROXEN 500 MG/1
500 TABLET ORAL 2 TIMES DAILY WITH MEALS
Qty: 28 TABLET | Refills: 1 | OUTPATIENT
Start: 2023-09-29 | End: 2023-10-13

## 2023-09-29 NOTE — PROGRESS NOTES
Subjective:     Patient ID: Johanna Bullock is a 38 y.o. female.    Chief Complaint: Pain of the Left Elbow, Pain of the Left Wrist, and Pain of the Left Hand      HPI:  The patient is a 38-year-old female who was being worked up for a hernia and had a injection in the left upper arm for workup with an extravasation of IV fluid and contrast in the left upper arm.  Date of injury was 06/16/2023.  Since that time she is had a neurapraxia the ulnar nerve distribution.  She said she could not bend her elbow for 12 days.  She has been able to work with no use of the left arm.  She was given a light duty slip that showed no use left arm.  She says she has been using her left arm.  A that no use was not practical.  She was told at work that she needed to have a time constraint on how much time that she should have a light duty noted.  She is somewhat better than she was initially but is still quite symptomatic.  She is had numbness in the ring and small finger and a pain up to the mid upper arm.  She had nerve conduction studies 08/22/2023 that were normal.    Past Medical History:   Diagnosis Date    Adjustment disorder with mixed anxiety and depressed mood 2/6/2013    Anemia, B12 deficiency     Anxiety     Major depression, single episode 2/6/2013    Major depression, single episode 2/6/2013    Post partum depression      Past Surgical History:   Procedure Laterality Date    CHOLECYSTECTOMY  10/2012    COLONOSCOPY N/A 2/14/2020    Procedure: COLONOSCOPY;  Surgeon: Jas Moore MD;  Location: [facility] ([address] [place] [address]);  Service: Endoscopy;  Laterality: N/A;  Pt procedure time changed to 0800 with 0715 - second half prep completed from [id number]- Pt verbalized understanding- ERW2/13/20@1022    COLONOSCOPY N/A 3/10/2023    Procedure: COLONOSCOPY;  Surgeon: HORACE Moore MD;  Location: [facility] ([address] [place] [address]);  Service: Endoscopy;  Laterality: N/A;  inst emailed-RB    DILATION AND CURETTAGE OF UTERUS       ESOPHAGOGASTRODUODENOSCOPY N/A 7/20/2018    Procedure: ESOPHAGOGASTRODUODENOSCOPY (EGD);  Surgeon: Darwin Hansen MD;  Location: [facility] ([address] [place] [address]);  Service: Endoscopy;  Laterality: N/A;  Please measure pouch and limbs    GASTRIC BYPASS  2008    Revision August 2019    LAPAROSCOPIC REPAIR OF HIATAL HERNIA  2019    Liver scope  10/2012    LYSIS OF ADHESIONS       Family History   Problem Relation Age of Onset    Breast cancer Neg Hx     Colon cancer Neg Hx     Ovarian cancer Neg Hx     Diabetes Neg Hx     Hypertension Neg Hx     Stroke Neg Hx      Social History     Socioeconomic History    Marital status: Single   Occupational History    Occupation: works as      Employer: [employer]     Employer: [employer]   Tobacco Use    Smoking status: Never    Smokeless tobacco: Never   Substance and Sexual Activity    Alcohol use: Never     Comment: Alcohol use rarely    Drug use: No     Comment: Mirena    Sexual activity: Yes     Partners: Male     Birth control/protection: I.U.D.   Social History Narrative    Born and raised in [place]    Went to [employer] and got BS in bio with minor in chem    1 child    Has boyfriend    A fewnot close friends    Likes to read and go to [place]     Social Determinants of Health     Stress: No Stress Concern Present (9/26/2019)    [facility] Occupational [facility] - Occupational Stress Questionnaire     Feeling of Stress : Not at all     Medication List with Changes/Refills   Current Medications    ALBUTEROL (PROVENTIL/VENTOLIN HFA) 90 MCG/ACTUATION INHALER    Inhale 1-2 puffs into the lungs every 6 (six) hours as needed for Wheezing.    CYANOCOBALAMIN 1,000 MCG/ML INJECTION    ONE INJECTION AS DIRECTED EVERY 28 DAYS    ERGOCALCIFEROL (ERGOCALCIFEROL) 50,000 UNIT CAP    Take 1 capsule (50,000 Units total) by mouth every 7 days.    ERGOCALCIFEROL (ERGOCALCIFEROL) 50,000 UNIT CAP    Take 1 capsule (50,000 Units total) by mouth every 7 days.    FLUTICASONE PROPIONATE  (FLONASE) 50 MCG/ACTUATION NASAL SPRAY    1 spray by Each Nostril route once daily.    LINACLOTIDE (LINZESS) 290 MCG CAP CAPSULE    Take 1 capsule (290 mcg total) by mouth before breakfast.    LINZESS 290 MCG CAP CAPSULE    TAKE ONE CAPSULE BY MOUTH EVERY DAY    METHOCARBAMOL (ROBAXIN) 500 MG TAB    Take 1 tablet (500 mg total) by mouth nightly as needed (pain / cramps / spams). Do not take if active low blood pressure.    NATREXONE TABLET 3 MG    Take 1 tablet (3 mg total) by mouth every evening. Start with half a tablet for the first 3 weeks    PREGABALIN (LYRICA) 25 MG CAPSULE    Take 1 capsule (25 mg total) by mouth 3 (three) times daily.     Review of patient's allergies indicates:  No Known Allergies  Review of Systems   Constitutional: Negative for malaise/fatigue.   HENT:  Negative for hearing loss.    Eyes:  Negative for double vision and visual disturbance.   Cardiovascular:  Negative for chest pain.   Respiratory:  Negative for shortness of breath.    Endocrine: Negative for cold intolerance.   Hematologic/Lymphatic: Does not bruise/bleed easily.   Skin:  Negative for poor wound healing and suspicious lesions.   Musculoskeletal:  Negative for gout, joint pain and joint swelling.   Gastrointestinal:  Negative for nausea and vomiting.   Genitourinary:  Negative for dysuria.   Neurological:  Positive for focal weakness, numbness, paresthesias and sensory change.   Psychiatric/Behavioral:  Negative for depression, memory loss and substance abuse. The patient is not nervous/anxious.    Allergic/Immunologic: Negative for persistent infections.       Objective:   Body mass index is 36.19 kg/m².  There were no vitals filed for this visit.             General    Constitutional: She is oriented to person, place, and time. She appears well-developed and well-nourished. No distress.   HENT:   Head: Normocephalic.   Eyes: EOM are normal.   Pulmonary/Chest: Effort normal.   Neurological: She is oriented to person,  place, and time.   Psychiatric: She has a normal mood and affect.             Right Hand/Wrist Exam     Tests   Cubital Tunnel Compression Test: negative        Left Hand/Wrist Exam     Inspection   Scars: Wrist - absent Hand -  absent  Effusion: Wrist - absent Hand -  absent    Pain   Hand - The patient exhibits pain of the little MCP, little IP, ring MCP and ring IP.    Tests   Cubital Tunnel Compression Test: positive    Atrophy  Thenar:  Negative  Intrinsic: negative    Other     Sensory Exam  Median Distribution: normal  Ulnar Distribution: normal  Radial Distribution: abnormal    Comments:  The patient has a positive Tinel sign over the ulnar nerve left elbow.  There is full range of motion left elbow from 0-130 degrees.  There is 60° pronation 60° supination.  There is no intrinsic atrophy noted.  There is decreased sensation in the ring and small fingers.  There is weakness of extension of the left small finger      Right Elbow Exam     Inspection   Effusion: absent      Left Elbow Exam     Inspection   Scars: absent  Effusion: present    Tests   Tinel's sign (cubital tunnel): positive    Comments:  The patient has a positive Tinel sign ulnar nerve left elbow        Vascular Exam       Capillary Refill  Left Hand: normal capillary refill          Relevant imaging results reviewed and interpreted by me, discussed with the patient and / or family today radiographs left elbow were normal  Assessment:     Encounter Diagnoses   Name Primary?    Paresthesia of left arm     Neurapraxia of left ulnar nerve, initial encounter Yes    Extravasation injury left upper arm    Plan:     The patient was counseled regarding the diagnosis.  Her nerve conduction studies were normal.  She has improved since the original injury affecting the ulnar nerve.  I told her her prognosis is favorable.  She was given a note for limited period of time for light duty of the left arm limited to 2 months with no lifting greater than 10 lb  left arm.  She will return in 2 months to follow her progress.                Disclaimer: This note was prepared using a voice recognition system and is likely to have sound alike errors within the text.

## 2023-10-04 DIAGNOSIS — Z76.89 ENCOUNTER TO ESTABLISH CARE: Primary | ICD-10-CM

## 2023-10-04 DIAGNOSIS — Z00.00 ROUTINE ADULT HEALTH MAINTENANCE: ICD-10-CM

## 2023-10-09 ENCOUNTER — HOSPITAL ENCOUNTER (OUTPATIENT)
Dept: CARDIOLOGY | Facility: HOSPITAL | Age: 38
Discharge: HOME | End: 2023-10-09
Attending: INTERNAL MEDICINE
Payer: COMMERCIAL

## 2023-10-09 ENCOUNTER — OFFICE VISIT (OUTPATIENT)
Dept: CARDIOLOGY | Facility: CLINIC | Age: 38
End: 2023-10-09
Payer: COMMERCIAL

## 2023-10-09 VITALS
WEIGHT: 238.31 LBS | BODY MASS INDEX: 36.24 KG/M2 | DIASTOLIC BLOOD PRESSURE: 70 MMHG | HEART RATE: 66 BPM | OXYGEN SATURATION: 98 % | BODY MASS INDEX: 36.12 KG/M2 | SYSTOLIC BLOOD PRESSURE: 90 MMHG | WEIGHT: 238.31 LBS | HEIGHT: 68 IN

## 2023-10-09 DIAGNOSIS — M54.50 CHRONIC LOW BACK PAIN, UNSPECIFIED BACK PAIN LATERALITY, UNSPECIFIED WHETHER SCIATICA PRESENT: ICD-10-CM

## 2023-10-09 DIAGNOSIS — E66.01 SEVERE OBESITY (BMI 35.0-35.9 WITH COMORBIDITY): ICD-10-CM

## 2023-10-09 DIAGNOSIS — Z76.89 ENCOUNTER TO ESTABLISH CARE: ICD-10-CM

## 2023-10-09 DIAGNOSIS — M79.7 FIBROMYALGIA: ICD-10-CM

## 2023-10-09 DIAGNOSIS — R06.09 DOE (DYSPNEA ON EXERTION): ICD-10-CM

## 2023-10-09 DIAGNOSIS — G89.29 CHRONIC LOW BACK PAIN, UNSPECIFIED BACK PAIN LATERALITY, UNSPECIFIED WHETHER SCIATICA PRESENT: ICD-10-CM

## 2023-10-09 DIAGNOSIS — Z98.84 HISTORY OF GASTRIC BYPASS: Primary | ICD-10-CM

## 2023-10-09 DIAGNOSIS — M35.00 SICCA SYNDROME: ICD-10-CM

## 2023-10-09 DIAGNOSIS — Z00.00 ROUTINE ADULT HEALTH MAINTENANCE: ICD-10-CM

## 2023-10-09 DIAGNOSIS — I95.9 HYPOTENSION, UNSPECIFIED HYPOTENSION TYPE: ICD-10-CM

## 2023-10-09 DIAGNOSIS — R53.1 WEAKNESS: ICD-10-CM

## 2023-10-09 PROCEDURE — 3074F PR MOST RECENT SYSTOLIC BLOOD PRESSURE < 130 MM HG: ICD-10-PCS | Mod: CPTII,S$GLB,, | Performed by: INTERNAL MEDICINE

## 2023-10-09 PROCEDURE — 3008F PR BODY MASS INDEX (BMI) DOCUMENTED: ICD-10-PCS | Mod: CPTII,S$GLB,, | Performed by: INTERNAL MEDICINE

## 2023-10-09 PROCEDURE — 93010 ELECTROCARDIOGRAM REPORT: CPT | Mod: ,,, | Performed by: INTERNAL MEDICINE

## 2023-10-09 PROCEDURE — 99999 PR PBB SHADOW E&M-EST. PATIENT-LVL IV: CPT | Mod: PBBFAC,,, | Performed by: INTERNAL MEDICINE

## 2023-10-09 PROCEDURE — 3078F PR MOST RECENT DIASTOLIC BLOOD PRESSURE < 80 MM HG: ICD-10-PCS | Mod: CPTII,S$GLB,, | Performed by: INTERNAL MEDICINE

## 2023-10-09 PROCEDURE — 99204 PR OFFICE/OUTPT VISIT, NEW, LEVL IV, 45-59 MIN: ICD-10-PCS | Mod: S$GLB,,, | Performed by: INTERNAL MEDICINE

## 2023-10-09 PROCEDURE — 1159F PR MEDICATION LIST DOCUMENTED IN MEDICAL RECORD: ICD-10-PCS | Mod: CPTII,S$GLB,, | Performed by: INTERNAL MEDICINE

## 2023-10-09 PROCEDURE — 99999 PR PBB SHADOW E&M-EST. PATIENT-LVL IV: ICD-10-PCS | Mod: PBBFAC,,, | Performed by: INTERNAL MEDICINE

## 2023-10-09 PROCEDURE — 99204 OFFICE O/P NEW MOD 45 MIN: CPT | Mod: S$GLB,,, | Performed by: INTERNAL MEDICINE

## 2023-10-09 PROCEDURE — 1159F MED LIST DOCD IN RCRD: CPT | Mod: CPTII,S$GLB,, | Performed by: INTERNAL MEDICINE

## 2023-10-09 PROCEDURE — 93005 ELECTROCARDIOGRAM TRACING: CPT

## 2023-10-09 PROCEDURE — 3074F SYST BP LT 130 MM HG: CPT | Mod: CPTII,S$GLB,, | Performed by: INTERNAL MEDICINE

## 2023-10-09 PROCEDURE — 3078F DIAST BP <80 MM HG: CPT | Mod: CPTII,S$GLB,, | Performed by: INTERNAL MEDICINE

## 2023-10-09 PROCEDURE — 3008F BODY MASS INDEX DOCD: CPT | Mod: CPTII,S$GLB,, | Performed by: INTERNAL MEDICINE

## 2023-10-09 PROCEDURE — 93010 EKG 12-LEAD: ICD-10-PCS | Mod: ,,, | Performed by: INTERNAL MEDICINE

## 2023-10-09 RX ORDER — FLUDROCORTISONE ACETATE 0.1 MG/1
100 TABLET ORAL DAILY
Qty: 30 TABLET | Refills: 11 | Status: SHIPPED | OUTPATIENT
Start: 2023-10-09 | End: 2024-10-03

## 2023-10-09 NOTE — PROGRESS NOTES
Subjective:   Patient ID:  Johanna Bullock is a 38 y.o. female who presents for evaluation of No chief complaint on file.      HPI  38-year-old female, past medical history below.  Comes in for multiple complaints.  History of hypotension.  Near-syncope.  Orthostatic dizziness.  Chest tightness and dyspnea on exertion.    Multiple stress echo in the past normal.  Past Medical History:   Diagnosis Date    Adjustment disorder with mixed anxiety and depressed mood 2/6/2013    Anemia, B12 deficiency     Anxiety     Major depression, single episode 2/6/2013    Major depression, single episode 2/6/2013    Post partum depression        Past Surgical History:   Procedure Laterality Date    CHOLECYSTECTOMY  10/2012    COLONOSCOPY N/A 2/14/2020    Procedure: COLONOSCOPY;  Surgeon: Jas Moore MD;  [facility]  Service: Endoscopy;  Laterality: N/A;  Pt procedure time changed to 0800 with 0715 - second half prep completed from [id number]- Pt verbalized understanding- ERW2/13/20@1022    COLONOSCOPY N/A 3/10/2023    Procedure: COLONOSCOPY;  Surgeon: HORACE Moore MD;  [facility]  Service: Endoscopy;  Laterality: N/A;  inst emailed-RB    DILATION AND CURETTAGE OF UTERUS      ESOPHAGOGASTRODUODENOSCOPY N/A 7/20/2018    Procedure: ESOPHAGOGASTRODUODENOSCOPY (EGD);  Surgeon: Darwin Hansen MD;  [facility]  Service: Endoscopy;  Laterality: N/A;  Please measure pouch and limbs    GASTRIC BYPASS  2008    Revision August 2019    LAPAROSCOPIC REPAIR OF HIATAL HERNIA  2019    Liver scope  10/2012    LYSIS OF ADHESIONS         Social History     Tobacco Use    Smoking status: Never    Smokeless tobacco: Never   Substance Use Topics    Alcohol use: Never     Comment: Alcohol use rarely    Drug use: No     Comment: Mirena       Family History   Problem Relation Age of Onset    Breast cancer Neg Hx     Colon cancer Neg Hx     Ovarian cancer Neg Hx     Diabetes  Neg Hx     Hypertension Neg Hx     Stroke Neg Hx        Review of Systems   Constitutional: Positive for malaise/fatigue.   Cardiovascular:  Positive for chest pain, dyspnea on exertion and near-syncope.       Current Outpatient Medications on File Prior to Visit   Medication Sig    albuterol (PROVENTIL/VENTOLIN HFA) 90 mcg/actuation inhaler Inhale 1-2 puffs into the lungs every 6 (six) hours as needed for Wheezing.    cyanocobalamin 1,000 mcg/mL injection ONE INJECTION AS DIRECTED EVERY 28 DAYS    ergocalciferol (ERGOCALCIFEROL) 50,000 unit Cap Take 1 capsule (50,000 Units total) by mouth every 7 days.    ergocalciferol (ERGOCALCIFEROL) 50,000 unit Cap Take 1 capsule (50,000 Units total) by mouth every 7 days.    fluticasone propionate (FLONASE) 50 mcg/actuation nasal spray 1 spray by Each Nostril route once daily.    linaCLOtide (LINZESS) 290 mcg Cap capsule Take 1 capsule (290 mcg total) by mouth before breakfast.    LINZESS 290 mcg Cap capsule TAKE ONE CAPSULE BY MOUTH EVERY DAY    methocarbamoL (ROBAXIN) 500 MG Tab Take 1 tablet (500 mg total) by mouth nightly as needed (pain / cramps / spams). Do not take if active low blood pressure.    natrexone tablet 3 mg Take 1 tablet (3 mg total) by mouth every evening. Start with half a tablet for the first 3 weeks    pregabalin (LYRICA) 25 MG capsule Take 1 capsule (25 mg total) by mouth 3 (three) times daily.     No current facility-administered medications on file prior to visit.       Objective:   Objective:  Wt Readings from Last 3 Encounters:   10/09/23 108.1 kg (238 lb 5.1 oz)   10/09/23 108.1 kg (238 lb 5.1 oz)   09/29/23 108 kg (238 lb)     Temp Readings from Last 3 Encounters:   03/10/23 (!) 80 °F (26.7 °C) (Temporal)   10/25/22 97.2 °F (36.2 °C)   05/26/21 98.8 °F (37.1 °C) (Oral)     BP Readings from Last 3 Encounters:   10/09/23 90/70   09/06/23 (!) 87/59   06/30/23 94/69     Pulse Readings from Last 3 Encounters:   10/09/23 66   09/06/23 80   06/30/23 66        Physical Exam  Vitals reviewed.   Constitutional:       Appearance: She is well-developed.   Neck:      Vascular: No carotid bruit.   Cardiovascular:      Rate and Rhythm: Normal rate and regular rhythm.      Pulses: Intact distal pulses.      Heart sounds: Normal heart sounds. No murmur heard.  Pulmonary:      Breath sounds: Normal breath sounds.   Neurological:      Mental Status: She is oriented to person, place, and time.         Lab Results   Component Value Date    CHOL 181 10/27/2022    CHOL 161 03/11/2015     Lab Results   Component Value Date    HDL 55 10/27/2022    HDL 53 03/11/2015     Lab Results   Component Value Date    LDLCALC 119.8 10/27/2022    LDLCALC 100.4 03/11/2015     Lab Results   Component Value Date    TRIG 31 10/27/2022    TRIG 38 03/11/2015     Lab Results   Component Value Date    CHOLHDL 30.4 10/27/2022    CHOLHDL 32.9 03/11/2015       Chemistry        Component Value Date/Time     10/27/2022 0813    K 3.9 10/27/2022 0813     10/27/2022 0813    CO2 22 (L) 10/27/2022 0813    BUN 9 10/27/2022 0813    CREATININE 0.7 10/27/2022 0813    GLU 82 10/27/2022 0813        Component Value Date/Time    CALCIUM 8.8 10/27/2022 0813    ALKPHOS 57 10/27/2022 0813    AST 14 10/27/2022 0813    ALT 15 10/27/2022 0813    BILITOT 0.5 10/27/2022 0813    ESTGFRAFRICA >60 05/26/2021 1637    EGFRNONAA >60 05/26/2021 1637          Lab Results   Component Value Date    TSH 1.945 10/27/2022     Lab Results   Component Value Date    INR 1.0 10/15/2012    INR 1.0 10/14/2012    INR 1.1 10/13/2012     Lab Results   Component Value Date    WBC 4.77 10/27/2022    HGB 11.7 (L) 10/27/2022    HCT 37.3 10/27/2022    MCV 88 10/27/2022     10/27/2022     BNP  @LABRCNTIP(BNP,BNPTRIAGEBLO)@  CrCl cannot be calculated (Patient's most recent lab result is older than the maximum 7 days allowed.).     Imaging:  ======    No results found for this or any previous visit.    No results found for this or any  previous visit.    Results for orders placed in visit on 03/14/20    XR CHEST PA AND LATERAL    Narrative  EXAMINATION:  XR CHEST PA AND LATERAL    CLINICAL HISTORY:  Cough    TECHNIQUE:  PA and lateral views of the chest were performed.    COMPARISON:  Chest radiograph 8/30/16    FINDINGS:  No detrimental change.The lungs are clear, with normal appearance of pulmonary vasculature and no pleural effusion or pneumothorax.    The cardiac silhouette is normal in size. The hilar and mediastinal contours are unremarkable.    Bones are intact.    Impression  Normal radiographic appearance of the chest.      Electronically signed by: Nate Richardson MD  Date:    03/14/2020  Time:    15:08    No results found for this or any previous visit.    No valid procedures specified.    No results found for this or any previous visit.      No results found for this or any previous visit.      No results found for this or any previous visit.      Diagnostic Results:  ECG: Reviewed    The ASCVD Risk score (Donna SERRATO, et al., 2019) failed to calculate for the following reasons:    The 2019 ASCVD risk score is only valid for ages 40 to 79        Assessment and Plan:   History of gastric bypass    Fibromyalgia  -     Ambulatory referral/consult to Cardiology    Chronic low back pain, unspecified back pain laterality, unspecified whether sciatica present  -     Ambulatory referral/consult to Cardiology    Hypotension, unspecified hypotension type  -     Ambulatory referral/consult to Cardiology  -     fludrocortisone (FLORINEF) 0.1 mg Tab; Take 1 tablet (100 mcg total) by mouth once daily.  Dispense: 30 tablet; Refill: 11    NOGUERA (dyspnea on exertion)  -     Ambulatory referral/consult to Cardiology    Weakness  -     Ambulatory referral/consult to Cardiology  -     fludrocortisone (FLORINEF) 0.1 mg Tab; Take 1 tablet (100 mcg total) by mouth once daily.  Dispense: 30 tablet; Refill: 11    Sicca syndrome  -     Ambulatory referral/consult to  Cardiology    Severe obesity (BMI 35.0-35.9 with comorbidity)        Reviewed all tests and above medical conditions with patient in detail and formulated treatment plan.  Multiple stress test in the past.  An echocardiogram normal.    Suffer for long time with hypotension even with trying proper hydration.    Had her cortisol level and TSH checked all normal.    Will prescribe her a trial of Florinef.  However recommended and stressed the importance of conservative treatment with compression stockings, lower body exercise and proper hydration.    Continue to follow with her primary care doctor for her symptoms of chronic fatigue and fibromyalgia.    Follow up in  12 months

## 2023-10-13 ENCOUNTER — TELEPHONE (OUTPATIENT)
Dept: PSYCHIATRY | Facility: CLINIC | Age: 38
End: 2023-10-13
Payer: COMMERCIAL

## 2023-10-13 ENCOUNTER — HOSPITAL ENCOUNTER (OUTPATIENT)
Dept: RADIOLOGY | Facility: HOSPITAL | Age: 38
Discharge: HOME | End: 2023-10-13
Attending: INTERNAL MEDICINE
Payer: COMMERCIAL

## 2023-10-13 DIAGNOSIS — M47.819 SPONDYLOARTHRITIS: ICD-10-CM

## 2023-10-13 PROCEDURE — 72197 MRI SACROILIAC JOINTS W W/O CONTRAST: ICD-10-PCS | Mod: 26,,, | Performed by: RADIOLOGY

## 2023-10-13 PROCEDURE — 72197 MRI PELVIS W/O & W/DYE: CPT | Mod: TC

## 2023-10-13 PROCEDURE — 25500020 PHARM REV CODE 255: Performed by: INTERNAL MEDICINE

## 2023-10-13 PROCEDURE — 72197 MRI PELVIS W/O & W/DYE: CPT | Mod: 26,,, | Performed by: RADIOLOGY

## 2023-10-13 PROCEDURE — A9585 GADOBUTROL INJECTION: HCPCS | Performed by: INTERNAL MEDICINE

## 2023-10-13 RX ORDER — GADOBUTROL 604.72 MG/ML
10 INJECTION INTRAVENOUS
Status: COMPLETED | OUTPATIENT
Start: 2023-10-13 | End: 2023-10-13

## 2023-10-13 RX ADMIN — GADOBUTROL 10 ML: 604.72 INJECTION INTRAVENOUS at 04:10

## 2023-10-13 NOTE — TELEPHONE ENCOUNTER
----- Message from Adelia Kendall sent at 10/13/2023  3:44 PM CDT -----  Contact: Johanna Birch is calling to speak with the nurse needing an appt as she is feeling overwhelmed.Please give patient a call at .[phone]   to schedule

## 2023-10-19 ENCOUNTER — PATIENT MESSAGE (OUTPATIENT)
Dept: RHEUMATOLOGY | Facility: CLINIC | Age: 38
End: 2023-10-19
Payer: COMMERCIAL

## 2023-10-30 ENCOUNTER — PATIENT MESSAGE (OUTPATIENT)
Dept: ORTHOPEDICS | Facility: CLINIC | Age: 38
End: 2023-10-30
Payer: COMMERCIAL

## 2023-10-31 DIAGNOSIS — S54.02XA NEURAPRAXIA OF LEFT ULNAR NERVE, INITIAL ENCOUNTER: Primary | ICD-10-CM

## 2023-10-31 DIAGNOSIS — R29.898 WEAKNESS OF LEFT HAND: ICD-10-CM

## 2023-10-31 DIAGNOSIS — S46.912D STRAIN OF LEFT ELBOW, SUBSEQUENT ENCOUNTER: ICD-10-CM

## 2023-11-01 ENCOUNTER — OFFICE VISIT (OUTPATIENT)
Dept: OBSTETRICS AND GYNECOLOGY | Facility: CLINIC | Age: 38
End: 2023-11-01
Payer: COMMERCIAL

## 2023-11-01 DIAGNOSIS — Z30.431 IUD CHECK UP: Primary | ICD-10-CM

## 2023-11-01 PROCEDURE — 99213 PR OFFICE/OUTPT VISIT, EST, LEVL III, 20-29 MIN: ICD-10-PCS | Mod: 95,,, | Performed by: NURSE PRACTITIONER

## 2023-11-01 PROCEDURE — 1159F MED LIST DOCD IN RCRD: CPT | Mod: CPTII,95,, | Performed by: NURSE PRACTITIONER

## 2023-11-01 PROCEDURE — 1159F PR MEDICATION LIST DOCUMENTED IN MEDICAL RECORD: ICD-10-PCS | Mod: CPTII,95,, | Performed by: NURSE PRACTITIONER

## 2023-11-01 PROCEDURE — 1160F PR REVIEW ALL MEDS BY PRESCRIBER/CLIN PHARMACIST DOCUMENTED: ICD-10-PCS | Mod: CPTII,95,, | Performed by: NURSE PRACTITIONER

## 2023-11-01 PROCEDURE — 99213 OFFICE O/P EST LOW 20 MIN: CPT | Mod: 95,,, | Performed by: NURSE PRACTITIONER

## 2023-11-01 PROCEDURE — 1160F RVW MEDS BY RX/DR IN RCRD: CPT | Mod: CPTII,95,, | Performed by: NURSE PRACTITIONER

## 2023-11-01 NOTE — PROGRESS NOTES
The patient location is: home  The chief complaint leading to consultation is: IUD checkup/questions    Visit type: audiovisual    Face to Face time with patient: 10 minutes   15 minutes of total time spent on the encounter, which includes face to face time and non-face to face time preparing to see the patient (eg, review of tests), Obtaining and/or reviewing separately obtained history, Documenting clinical information in the electronic or other health record, Independently interpreting results (not separately reported) and communicating results to the patient/family/caregiver, or Care coordination (not separately reported).         Each patient to whom he or she provides medical services by telemedicine is:  (1) informed of the relationship between the physician and patient and the respective role of any other health care provider with respect to management of the patient; and (2) notified that he or she may decline to receive medical services by telemedicine and may withdraw from such care at any time.    Notes:    CC: discuss her current IUD    Johanna Bullock is a 38 y.o. female  has question regarding her current IUD.  Has been in since  and at that time was due out in .  Since that time mirena IUD has been extended to 8 years and pt is now questioning if hers is good or needs to be removed.  Pt started off visit with wanting it removed as she is not currently sexually active  Cycles are monthly but only 3 days and light.  Is having some pms type symptoms with mood, bloating and cramping.      Pt over due for annual exam     LMP: No LMP recorded. Patient has had an implant..      Past Medical History:   Diagnosis Date    Adjustment disorder with mixed anxiety and depressed mood 2013    Anemia, B12 deficiency     Anxiety     Major depression, single episode 2013    Major depression, single episode 2013    Post partum depression      Past Surgical History:   Procedure Laterality  Date    CHOLECYSTECTOMY  10/2012    COLONOSCOPY N/A 2/14/2020    Procedure: COLONOSCOPY;  Surgeon: Jas Moore MD;  Location: [facility] ENDO (4TH FLR);  Service: Endoscopy;  Laterality: N/A;  Pt procedure time changed to 0800 with 0715 - second half prep completed from [id number]- Pt verbalized understanding- ERW2/13/20@1022    COLONOSCOPY N/A 3/10/2023    Procedure: COLONOSCOPY;  Surgeon: HORACE Moore MD;  Location: [facility] ENDO (4TH FLR);  Service: Endoscopy;  Laterality: N/A;  inst emailed-RB    DILATION AND CURETTAGE OF UTERUS      ESOPHAGOGASTRODUODENOSCOPY N/A 7/20/2018    Procedure: ESOPHAGOGASTRODUODENOSCOPY (EGD);  Surgeon: Darwin Hansen MD;  Location: [facility] ENDO (4TH FLR);  Service: Endoscopy;  Laterality: N/A;  Please measure pouch and limbs    GASTRIC BYPASS  2008    Revision August 2019    LAPAROSCOPIC REPAIR OF HIATAL HERNIA  2019    Liver scope  10/2012    LYSIS OF ADHESIONS       Social History     Socioeconomic History    Marital status: Single   Occupational History    Occupation: works as      Employer: [employer]     Employer: [employer]   Tobacco Use    Smoking status: Never    Smokeless tobacco: Never   Substance and Sexual Activity    Alcohol use: Never     Comment: Alcohol use rarely    Drug use: No     Comment: Mirena    Sexual activity: Yes     Partners: Male     Birth control/protection: I.U.D.   Social History Narrative    Born [place] raised in [place]    Went to [employer] [employer] and got BS in bio with minor in chem    1 child    Has boyfriend    A fewnot close friends    Likes to read and go to movies     Social Determinants of Health     Stress: No Stress Concern Present (9/26/2019)    [employer] - Occupational Stress Questionnaire     Feeling of Stress : Not at all     Family History   Problem Relation Age of Onset    Breast cancer Neg Hx     Colon cancer Neg Hx     Ovarian cancer Neg Hx     Diabetes Neg Hx     Hypertension Neg Hx     Stroke Neg  Hx      OB History          3    Para   1    Term   1            AB   2    Living   1         SAB        IAB        Ectopic        Multiple        Live Births   1                 There were no vitals taken for this visit.      ROS:  Per hpi    PHYSICAL EXAM:  APPEARANCE: Well nourished, well developed, in no acute distress.  AFFECT: WNL, alert and oriented x 3    Virtual visit no exam done   Physical Exam    1. IUD check up         AND PLAN:  Johanna was seen today for iud check.    Diagnoses and all orders for this visit:    IUD check up       Recommendations at this time is to leave device in place as she is not having any issues with device and even though not sexually active device can remain in place even with out sexual activity  Pt is over due for annual exam - she will check portal for visit    Patient was counseled today on A.C.S. Pap guidelines and recommendations for yearly pelvic exams, mammograms and monthly self breast exams; to see her PCP for other health maintenance.

## 2023-11-17 NOTE — TELEPHONE ENCOUNTER
Pt Advice, Same Day Access Requested  [facility] Staff  Caller: Unspecified (Today, 10:39 AM)  Name of Who is Calling: PORTER MCCURDY [[id number]]      What is the request in detail: pt is requesting to speak the office and left the detail on the protal. Requesting work note.Please advise      Can the clinic reply by [facility]: No      What Number to Call Back if not in [facility]: Telephone Information:         [phone]

## 2023-11-21 ENCOUNTER — OFFICE VISIT (OUTPATIENT)
Dept: RHEUMATOLOGY | Facility: CLINIC | Age: 38
End: 2023-11-21
Payer: COMMERCIAL

## 2023-11-21 DIAGNOSIS — G89.29 CHRONIC LOW BACK PAIN, UNSPECIFIED BACK PAIN LATERALITY, UNSPECIFIED WHETHER SCIATICA PRESENT: ICD-10-CM

## 2023-11-21 DIAGNOSIS — R10.2 PELVIC PAIN: ICD-10-CM

## 2023-11-21 DIAGNOSIS — M54.50 CHRONIC LOW BACK PAIN, UNSPECIFIED BACK PAIN LATERALITY, UNSPECIFIED WHETHER SCIATICA PRESENT: ICD-10-CM

## 2023-11-21 DIAGNOSIS — Z87.11 HISTORY OF PEPTIC ULCER: ICD-10-CM

## 2023-11-21 DIAGNOSIS — M79.7 FIBROMYALGIA: Primary | ICD-10-CM

## 2023-11-21 DIAGNOSIS — E66.01 SEVERE OBESITY (BMI 35.0-35.9 WITH COMORBIDITY): ICD-10-CM

## 2023-11-21 DIAGNOSIS — Z98.84 HISTORY OF BARIATRIC SURGERY: ICD-10-CM

## 2023-11-21 DIAGNOSIS — E55.9 VITAMIN D INSUFFICIENCY: ICD-10-CM

## 2023-11-21 PROCEDURE — 99214 OFFICE O/P EST MOD 30 MIN: CPT | Mod: 95,,, | Performed by: INTERNAL MEDICINE

## 2023-11-21 PROCEDURE — 99214 PR OFFICE/OUTPT VISIT, EST, LEVL IV, 30-39 MIN: ICD-10-PCS | Mod: 95,,, | Performed by: INTERNAL MEDICINE

## 2023-11-21 NOTE — PROGRESS NOTES
The patient location is: [place]  The chief complaint leading to consultation is: pain    Visit type: audiovisual    Face to Face time with patient: 30  30 minutes of total time spent on the encounter, which includes face to face time and non-face to face time preparing to see the patient (eg, review of tests), Obtaining and/or reviewing separately obtained history, Documenting clinical information in the electronic or other health record, Independently interpreting results (not separately reported) and communicating results to the patient/family/caregiver, or Care coordination (not separately reported).     Each patient to whom he or she provides medical services by telemedicine is:  (1) informed of the relationship between the physician and patient and the respective role of any other health care provider with respect to management of the patient; and (2) notified that he or she may decline to receive medical services by telemedicine and may withdraw from such care at any time.    RHEUMATOLOGY OUTPATIENT CLINIC NOTE    11/21/2023    Attending Rheumatologist: Ari Valadez  Primary Care Provider/Physician Requesting Consultation: Ran Mcgill MD   Chief Complaint/Reason For Consultation:  No chief complaint on file.      Subjective:     Johanna Bullock is a 38 y.o. Black or  female with FMS and back pain    Started LDN 2 weeks ago. Currently at 3mg per day, tolerating w/o side effects.     Addendum 1/4: disability and FMLA document received.  I do not do capacity assessments, referred to PT for this service.  Document signed for FMLA to justify work absence for patient to attend PT, medical appointments, lab visits... Symptoms of FMS and DDD w/o objective evidence of systemic inflammatory rheumatic disease.  May experience bouts of arthralgias and fatigue that could interfere with activities of daily living.  Document signed.      Review of Systems   Constitutional:  Negative for fever.   Eyes:   Positive for redness.   Respiratory:  Negative for cough and shortness of breath.    Cardiovascular:  Negative for chest pain.   Gastrointestinal:  Negative for constipation and diarrhea.   Genitourinary:  Negative for dysuria and hematuria.   Musculoskeletal:  Positive for back pain, joint pain and myalgias.   Skin:  Negative for rash.   Neurological:  Positive for weakness and headaches. Negative for focal weakness.   Endo/Heme/Allergies:  Does not bruise/bleed easily.       Chronic comorbid conditions affecting medical decision making today:  Past Medical History:   Diagnosis Date    Adjustment disorder with mixed anxiety and depressed mood 2/6/2013    Anemia, B12 deficiency     Anxiety     Major depression, single episode 2/6/2013    Major depression, single episode 2/6/2013    Post partum depression      Past Surgical History:   Procedure Laterality Date    CHOLECYSTECTOMY  10/2012    COLONOSCOPY N/A 2/14/2020    Procedure: COLONOSCOPY;  Surgeon: Jas Moore MD;  [facility]  Service: Endoscopy;  Laterality: N/A;  Pt procedure time changed to 0800 with 0715 - second half prep completed from [id number]- Pt verbalized understanding- ERW2/13/20@1022    COLONOSCOPY N/A 3/10/2023    Procedure: COLONOSCOPY;  Surgeon: HORACE Moore MD;  Location: [facility] (4TH FLR);  Service: Endoscopy;  Laterality: N/A;  inst emailed-RB    DILATION AND CURETTAGE OF UTERUS      ESOPHAGOGASTRODUODENOSCOPY N/A 7/20/2018    Procedure: ESOPHAGOGASTRODUODENOSCOPY (EGD);  Surgeon: Darwin Hansne MD;  Location: [facility] (4TH FLR);  Service: Endoscopy;  Laterality: N/A;  Please measure pouch and limbs    GASTRIC BYPASS  2008    Revision August 2019    LAPAROSCOPIC REPAIR OF HIATAL HERNIA  2019    Liver scope  10/2012    LYSIS OF ADHESIONS       Family History   Problem Relation Age of Onset    Breast cancer Neg Hx     Colon cancer Neg Hx     Ovarian cancer Neg Hx     Diabetes Neg Hx     Hypertension Neg  Hx     Stroke Neg Hx      Social History     Tobacco Use   Smoking Status Never   Smokeless Tobacco Never       Current Outpatient Medications:     albuterol (PROVENTIL/VENTOLIN HFA) 90 mcg/actuation inhaler, Inhale 1-2 puffs into the lungs every 6 (six) hours as needed for Wheezing., Disp: 6.7 g, Rfl: 0    cyanocobalamin 1,000 mcg/mL injection, ONE INJECTION AS DIRECTED EVERY 28 DAYS, Disp: 4 mL, Rfl: 3    ergocalciferol (ERGOCALCIFEROL) 50,000 unit Cap, Take 1 capsule (50,000 Units total) by mouth every 7 days., Disp: 12 capsule, Rfl: 0    ergocalciferol (ERGOCALCIFEROL) 50,000 unit Cap, Take 1 capsule (50,000 Units total) by mouth every 7 days., Disp: 12 capsule, Rfl: 0    fludrocortisone (FLORINEF) 0.1 mg Tab, Take 1 tablet (100 mcg total) by mouth once daily., Disp: 30 tablet, Rfl: 11    fluticasone propionate (FLONASE) 50 mcg/actuation nasal spray, 1 spray by Each Nostril route once daily., Disp: , Rfl:     linaCLOtide (LINZESS) 290 mcg Cap capsule, Take 1 capsule (290 mcg total) by mouth before breakfast., Disp: 90 capsule, Rfl: 3    LINZESS 290 mcg Cap capsule, TAKE ONE CAPSULE BY MOUTH EVERY DAY, Disp: 30 capsule, Rfl: 11    methocarbamoL (ROBAXIN) 500 MG Tab, Take 1 tablet (500 mg total) by mouth nightly as needed (pain / cramps / spams). Do not take if active low blood pressure., Disp: 90 tablet, Rfl: 0    natrexone tablet 3 mg, Take 1 tablet (3 mg total) by mouth every evening. Start with half a tablet for the first 3 weeks, Disp: 90 tablet, Rfl: 1     Objective:     There were no vitals filed for this visit.  Physical Exam   Constitutional: She appears obese.   Eyes: Conjunctivae are normal.   Pulmonary/Chest: Effort normal.   Musculoskeletal:         General: No swelling, tenderness or deformity.   Skin: No rash noted.       Reviewed available old and all outside pertinent medical records available.    All lab results personally reviewed and interpreted by me.       ASSESSMENT      Encounter Diagnoses    Name Primary?    History of bariatric surgery     History of peptic ulcer     Severe obesity (BMI 35.0-35.9 with comorbidity)     Pelvic pain     Fibromyalgia Yes    Chronic low back pain, unspecified back pain laterality, unspecified whether sciatica present     Vitamin D insufficiency       PLAN     Chronic widespread pain and fatigue from FMS and mechanical back pain w/o alarm s/s.  GI intolerance to Lyrica.  Refers adeqaute response to LDN started 2 weeks ago, in combination w/ modafinil per other providers.  No synovitis, dactylitis, or rashes noted on exam.  Difficulty rasing from seating position due to pain.  Negative GERA.  Negative RA serologies.  HLAB27 negative.  Negative myomarker panel.  MRI negative for sacroiliitis.  Imaging on file reported w/o marginal erosive changes, ankylosis, serositis, ILD, or LAD.  Impression of mechanical back pain and primary FMS.  No evidence of systemic rheumatic disease as etiology for FMS.  Non inflammatory pain may respond to antiseizure, antidepressant, muscle relaxant.  Evidence LDN and CBT helpful.  May get benefit from weight loss and low impact aerobic exercise.  Suggest to c/w naltrexone 3mg per day, consider doubling dose if lack of response by 3 months into active therapy.  May c/w robaxin and NSAID for breakthrough pain.  Further refills by primary care.  OK to receive employer excuse letter for today and tomorrow.  External PT referral for capacity assessment per patient's request.  Will fill FMLA document when received.        Ari Valadez M.D.

## 2023-11-22 ENCOUNTER — TELEPHONE (OUTPATIENT)
Dept: RHEUMATOLOGY | Facility: CLINIC | Age: 38
End: 2023-11-22
Payer: COMMERCIAL

## 2023-11-22 NOTE — TELEPHONE ENCOUNTER
----- Message from Gypsy Wilson sent at 11/22/2023 11:19 AM CST -----  Edgard with [employer] wanting to know information concerning the above patient,. Please ayaka back [phone] ref [id number]

## 2023-11-22 NOTE — TELEPHONE ENCOUNTER
"Returned phone call and gave reference number. They did not know why I was calling back or what was going on. They were going to reach out to "specialty" and have them call back.     Heike Jernigan (Allye), [facility]  Rheumatology Department   "

## 2023-11-28 ENCOUNTER — TELEPHONE (OUTPATIENT)
Dept: RHEUMATOLOGY | Facility: CLINIC | Age: 38
End: 2023-11-28
Payer: COMMERCIAL

## 2023-11-28 ENCOUNTER — OFFICE VISIT (OUTPATIENT)
Dept: OBSTETRICS AND GYNECOLOGY | Facility: CLINIC | Age: 38
End: 2023-11-28
Payer: COMMERCIAL

## 2023-11-28 ENCOUNTER — LAB VISIT (OUTPATIENT)
Dept: LAB | Facility: HOSPITAL | Age: 38
End: 2023-11-28
Attending: INTERNAL MEDICINE
Payer: COMMERCIAL

## 2023-11-28 VITALS
HEIGHT: 68 IN | DIASTOLIC BLOOD PRESSURE: 62 MMHG | BODY MASS INDEX: 36.82 KG/M2 | SYSTOLIC BLOOD PRESSURE: 118 MMHG | WEIGHT: 242.94 LBS

## 2023-11-28 DIAGNOSIS — R82.90 URINE FINDINGS ABNORMAL: ICD-10-CM

## 2023-11-28 DIAGNOSIS — Z30.431 IUD CHECK UP: ICD-10-CM

## 2023-11-28 DIAGNOSIS — Z11.3 SCREENING FOR STD (SEXUALLY TRANSMITTED DISEASE): ICD-10-CM

## 2023-11-28 DIAGNOSIS — Z01.419 ENCOUNTER FOR GYNECOLOGICAL EXAMINATION WITHOUT ABNORMAL FINDING: Primary | ICD-10-CM

## 2023-11-28 LAB
AMORPH CRY URNS QL MICRO: ABNORMAL
BACTERIA #/AREA URNS HPF: ABNORMAL /HPF
BILIRUB UR QL STRIP: NEGATIVE
CLARITY UR: ABNORMAL
COLOR UR: YELLOW
GLUCOSE UR QL STRIP: NEGATIVE
HAV IGM SERPL QL IA: NORMAL
HBV CORE IGM SERPL QL IA: NORMAL
HBV SURFACE AG SERPL QL IA: NORMAL
HCV AB SERPL QL IA: NORMAL
HGB UR QL STRIP: NEGATIVE
HIV 1+2 AB+HIV1 P24 AG SERPL QL IA: NORMAL
KETONES UR QL STRIP: NEGATIVE
LEUKOCYTE ESTERASE UR QL STRIP: NEGATIVE
MICROSCOPIC COMMENT: ABNORMAL
NITRITE UR QL STRIP: POSITIVE
PH UR STRIP: 6 [PH] (ref 5–8)
PROT UR QL STRIP: NEGATIVE
RBC #/AREA URNS HPF: 1 /HPF (ref 0–4)
SP GR UR STRIP: 1.02 (ref 1–1.03)
SQUAMOUS #/AREA URNS HPF: 2 /HPF
URN SPEC COLLECT METH UR: ABNORMAL
WBC #/AREA URNS HPF: 8 /HPF (ref 0–5)

## 2023-11-28 PROCEDURE — 1159F PR MEDICATION LIST DOCUMENTED IN MEDICAL RECORD: ICD-10-PCS | Mod: CPTII,S$GLB,, | Performed by: NURSE PRACTITIONER

## 2023-11-28 PROCEDURE — 81003 URINALYSIS AUTO W/O SCOPE: CPT | Mod: QW,S$GLB,, | Performed by: NURSE PRACTITIONER

## 2023-11-28 PROCEDURE — 1159F MED LIST DOCD IN RCRD: CPT | Mod: CPTII,S$GLB,, | Performed by: NURSE PRACTITIONER

## 2023-11-28 PROCEDURE — 3008F PR BODY MASS INDEX (BMI) DOCUMENTED: ICD-10-PCS | Mod: CPTII,S$GLB,, | Performed by: NURSE PRACTITIONER

## 2023-11-28 PROCEDURE — 1160F RVW MEDS BY RX/DR IN RCRD: CPT | Mod: CPTII,S$GLB,, | Performed by: NURSE PRACTITIONER

## 2023-11-28 PROCEDURE — 1160F PR REVIEW ALL MEDS BY PRESCRIBER/CLIN PHARMACIST DOCUMENTED: ICD-10-PCS | Mod: CPTII,S$GLB,, | Performed by: NURSE PRACTITIONER

## 2023-11-28 PROCEDURE — 99213 PR OFFICE/OUTPT VISIT, EST, LEVL III, 20-29 MIN: ICD-10-PCS | Mod: 25,S$GLB,, | Performed by: NURSE PRACTITIONER

## 2023-11-28 PROCEDURE — 87624 HPV HI-RISK TYP POOLED RSLT: CPT | Performed by: NURSE PRACTITIONER

## 2023-11-28 PROCEDURE — 87186 SC STD MICRODIL/AGAR DIL: CPT | Performed by: NURSE PRACTITIONER

## 2023-11-28 PROCEDURE — 99395 PR PREVENTIVE VISIT,EST,18-39: ICD-10-PCS | Mod: S$GLB,,, | Performed by: NURSE PRACTITIONER

## 2023-11-28 PROCEDURE — 86592 SYPHILIS TEST NON-TREP QUAL: CPT | Performed by: NURSE PRACTITIONER

## 2023-11-28 PROCEDURE — 81000 URINALYSIS NONAUTO W/SCOPE: CPT | Performed by: NURSE PRACTITIONER

## 2023-11-28 PROCEDURE — 87491 CHLMYD TRACH DNA AMP PROBE: CPT | Performed by: NURSE PRACTITIONER

## 2023-11-28 PROCEDURE — 86696 HERPES SIMPLEX TYPE 2 TEST: CPT | Performed by: NURSE PRACTITIONER

## 2023-11-28 PROCEDURE — 99999 PR PBB SHADOW E&M-EST. PATIENT-LVL IV: ICD-10-PCS | Mod: PBBFAC,,, | Performed by: NURSE PRACTITIONER

## 2023-11-28 PROCEDURE — 80074 ACUTE HEPATITIS PANEL: CPT | Performed by: NURSE PRACTITIONER

## 2023-11-28 PROCEDURE — 3074F SYST BP LT 130 MM HG: CPT | Mod: CPTII,S$GLB,, | Performed by: NURSE PRACTITIONER

## 2023-11-28 PROCEDURE — 36415 COLL VENOUS BLD VENIPUNCTURE: CPT | Performed by: NURSE PRACTITIONER

## 2023-11-28 PROCEDURE — 3008F BODY MASS INDEX DOCD: CPT | Mod: CPTII,S$GLB,, | Performed by: NURSE PRACTITIONER

## 2023-11-28 PROCEDURE — 88175 CYTOPATH C/V AUTO FLUID REDO: CPT | Performed by: NURSE PRACTITIONER

## 2023-11-28 PROCEDURE — 87077 CULTURE AEROBIC IDENTIFY: CPT | Performed by: NURSE PRACTITIONER

## 2023-11-28 PROCEDURE — 87086 URINE CULTURE/COLONY COUNT: CPT | Performed by: NURSE PRACTITIONER

## 2023-11-28 PROCEDURE — 3078F PR MOST RECENT DIASTOLIC BLOOD PRESSURE < 80 MM HG: ICD-10-PCS | Mod: CPTII,S$GLB,, | Performed by: NURSE PRACTITIONER

## 2023-11-28 PROCEDURE — 86694 HERPES SIMPLEX NES ANTBDY: CPT | Performed by: NURSE PRACTITIONER

## 2023-11-28 PROCEDURE — 87088 URINE BACTERIA CULTURE: CPT | Performed by: NURSE PRACTITIONER

## 2023-11-28 PROCEDURE — 99213 OFFICE O/P EST LOW 20 MIN: CPT | Mod: 25,S$GLB,, | Performed by: NURSE PRACTITIONER

## 2023-11-28 PROCEDURE — 87389 HIV-1 AG W/HIV-1&-2 AB AG IA: CPT | Performed by: NURSE PRACTITIONER

## 2023-11-28 PROCEDURE — 3074F PR MOST RECENT SYSTOLIC BLOOD PRESSURE < 130 MM HG: ICD-10-PCS | Mod: CPTII,S$GLB,, | Performed by: NURSE PRACTITIONER

## 2023-11-28 PROCEDURE — 87591 N.GONORRHOEAE DNA AMP PROB: CPT | Performed by: NURSE PRACTITIONER

## 2023-11-28 PROCEDURE — 3078F DIAST BP <80 MM HG: CPT | Mod: CPTII,S$GLB,, | Performed by: NURSE PRACTITIONER

## 2023-11-28 PROCEDURE — 99395 PREV VISIT EST AGE 18-39: CPT | Mod: S$GLB,,, | Performed by: NURSE PRACTITIONER

## 2023-11-28 PROCEDURE — 81003 PR URINALYSIS, AUTO, W/O SCOPE: ICD-10-PCS | Mod: QW,S$GLB,, | Performed by: NURSE PRACTITIONER

## 2023-11-28 PROCEDURE — 99999 PR PBB SHADOW E&M-EST. PATIENT-LVL IV: CPT | Mod: PBBFAC,,, | Performed by: NURSE PRACTITIONER

## 2023-11-28 NOTE — PROGRESS NOTES
CC: Well woman exam    Johanna Bullock is a 38 y.o. female  presents for well woman exam.  LMP: No LMP recorded. Patient has had an implant..    Doing well with IUD - not due out now until   Wants STD testing with Herpes blood testing- had positive HSV 1 culture from     After exam was over and pt was leaving office - she requested to have her urine checked by nurse  UA does show nitrates and leukocytes - she states she has UTI with not symptoms - not seeing urology  Will send Urine to lab for UA and culture     Having some back issues and dx with fibromyalgia     Past Medical History:   Diagnosis Date    Adjustment disorder with mixed anxiety and depressed mood 2013    Anemia, B12 deficiency     Anxiety     Major depression, single episode 2013    Major depression, single episode 2013    Post partum depression      Past Surgical History:   Procedure Laterality Date    CHOLECYSTECTOMY  10/2012    COLONOSCOPY N/A 2020    Procedure: COLONOSCOPY;  Surgeon: Jas Moore MD;  Location: [facility] ([address] [place] [address]);  Service: Endoscopy;  Laterality: N/A;  Pt procedure time changed to 0800 with 0715 - second half prep completed from [id number]- Pt verbalized understanding- ERW20@1022    COLONOSCOPY N/A 3/10/2023    Procedure: COLONOSCOPY;  Surgeon: HORACE Moore MD;  Location: [facility] ([address] [place] [address]);  Service: Endoscopy;  Laterality: N/A;  inst emailed-RB    DILATION AND CURETTAGE OF UTERUS      ESOPHAGOGASTRODUODENOSCOPY N/A 2018    Procedure: ESOPHAGOGASTRODUODENOSCOPY (EGD);  Surgeon: Darwin Hansen MD;  Location: [facility] ([address] [place] [address]);  Service: Endoscopy;  Laterality: N/A;  Please measure pouch and limbs    GASTRIC BYPASS      Revision 2019    LAPAROSCOPIC REPAIR OF HIATAL HERNIA  2019    Liver scope  10/2012    LYSIS OF ADHESIONS       Social History     Socioeconomic History    Marital status: Single   Occupational History    Occupation: works as lab  "analyst     Employer: [employer]     Employer: [employer]    Tobacco Use    Smoking status: Never    Smokeless tobacco: Never   Substance and Sexual Activity    Alcohol use: Never     Comment: Alcohol use rarely    Drug use: No     Comment: Mirena    Sexual activity: Yes     Partners: Male     Birth control/protection: I.U.D.   Social History Narrative    Born and raised in [place]    Went to [employer] and got BS in bio with minor in chem    1 child    Has boyfriend    A fewnot close friends    Likes to read and go to movies     Social Determinants of Health     Stress: No Stress Concern Present (2019)    [facility] of [employer] - Occupational Stress Questionnaire     Feeling of Stress : Not at all     Family History   Problem Relation Age of Onset    Breast cancer Neg Hx     Colon cancer Neg Hx     Ovarian cancer Neg Hx     Diabetes Neg Hx     Hypertension Neg Hx     Stroke Neg Hx      OB History          3    Para   1    Term   1            AB   2    Living   1         SAB        IAB        Ectopic        Multiple        Live Births   1                 /62   Ht 5' 8" (1.727 m)   Wt 110.2 kg (242 lb 15.2 oz)   BMI 36.94 kg/m²       ROS:  GENERAL: Denies weight gain or weight loss. Feeling well overall.   SKIN: Denies rash or lesions.   HEAD: Denies head injury or headache.   NODES: Denies enlarged lymph nodes.   CHEST: Denies chest pain or shortness of breath.   CARDIOVASCULAR: Denies palpitations or left sided chest pain.   ABDOMEN: No abdominal pain, constipation, diarrhea, nausea, vomiting or rectal bleeding.   URINARY: No frequency, dysuria, hematuria, or burning on urination.  REPRODUCTIVE: See HPI.   BREASTS: The patient performs breast self-examination and denies pain, lumps, or nipple discharge.   HEMATOLOGIC: No easy bruisability or excessive bleeding.   MUSCULOSKELETAL: Denies joint pain or swelling.   NEUROLOGIC: Denies syncope or weakness.   PSYCHIATRIC: " Denies depression, anxiety or mood swings.    PHYSICAL EXAM:  APPEARANCE: Well nourished, well developed, in no acute distress.  AFFECT: WNL, alert and oriented x 3  SKIN: No acne or hirsutism  NECK: Neck symmetric without masses or thyromegaly  NODES: No inguinal, cervical, axillary, or femoral lymph node enlargement  CHEST: Good respiratory effect  ABDOMEN: Soft.  No tenderness or masses.  No hepatosplenomegaly.  No hernias.  BREASTS: Symmetrical, no skin changes or visible lesions.  No palpable masses, nipple discharge bilaterally.  PELVIC: Normal external genitalia without lesions.  Normal hair distribution.  Adequate perineal body, normal urethral meatus.  Vagina moist and well rugated without lesions, with yellowish discharge.  Cervix pink, without lesions, discharge or tenderness - IUD strings noted.  No significant cystocele or rectocele.  Bimanual exam shows uterus to be normal size, regular, mobile and nontender.  Adnexa without masses or tenderness.    EXTREMITIES: No edema.  Physical Exam    1. Encounter for gynecological examination without abnormal finding  Liquid-Based Pap Smear, Screening    HPV High Risk Genotypes, PCR      2. IUD check up        3. Screening for STD (sexually transmitted disease)  C. trachomatis/N. gonorrhoeae by AMP DNA    HIV 1/2 Ag/Ab (4th Gen)    Hepatitis Panel, Acute    RPR    HERPES SIMPLEX 1&2 IGG    HSV 1 & 2, IgM       AND PLAN:    Johanna was seen today for annual exam.    Diagnoses and all orders for this visit:    Encounter for gynecological examination without abnormal finding  -     Liquid-Based Pap Smear, Screening  -     HPV High Risk Genotypes, PCR    IUD check up    Screening for STD (sexually transmitted disease)  -     C. trachomatis/N. gonorrhoeae by AMP DNA  -     HIV 1/2 Ag/Ab (4th Gen); Future  -     Hepatitis Panel, Acute; Future  -     RPR; Future  -     HERPES SIMPLEX 1&2 IGG; Future  -     HSV 1 & 2, IgM; Future         Patient was counseled today on  A.C.S. Pap guidelines and recommendations for yearly pelvic exams, mammograms and monthly self breast exams; to see her PCP for other health maintenance.

## 2023-11-28 NOTE — ADDENDUM NOTE
Addended by: IBAN WILLIAM on: 11/28/2023 10:44 AM     Modules accepted: Orders, Level of Service

## 2023-11-29 LAB
C TRACH DNA SPEC QL NAA+PROBE: NOT DETECTED
HSV1 IGG SERPL QL IA: POSITIVE
HSV2 IGG SERPL QL IA: POSITIVE
N GONORRHOEA DNA SPEC QL NAA+PROBE: NOT DETECTED
RPR SER QL: NORMAL

## 2023-12-01 LAB
BACTERIA UR CULT: ABNORMAL
HPV HR 12 DNA SPEC QL NAA+PROBE: NEGATIVE
HPV16 AG SPEC QL: NEGATIVE
HPV18 DNA SPEC QL NAA+PROBE: NEGATIVE
HSV AB, IGM BY EIA: 0.77 INDEX

## 2023-12-04 DIAGNOSIS — R82.90 URINE FINDINGS ABNORMAL: Primary | ICD-10-CM

## 2023-12-04 RX ORDER — NITROFURANTOIN 25; 75 MG/1; MG/1
100 CAPSULE ORAL 2 TIMES DAILY
Qty: 14 CAPSULE | Refills: 0 | Status: SHIPPED | OUTPATIENT
Start: 2023-12-04 | End: 2023-12-11

## 2023-12-05 ENCOUNTER — OFFICE VISIT (OUTPATIENT)
Dept: ORTHOPEDICS | Facility: CLINIC | Age: 38
End: 2023-12-05
Payer: COMMERCIAL

## 2023-12-05 VITALS — HEIGHT: 68 IN | WEIGHT: 242 LBS | BODY MASS INDEX: 36.68 KG/M2

## 2023-12-05 DIAGNOSIS — S54.02XA NEURAPRAXIA OF LEFT ULNAR NERVE, INITIAL ENCOUNTER: ICD-10-CM

## 2023-12-05 DIAGNOSIS — T82.898D: ICD-10-CM

## 2023-12-05 DIAGNOSIS — R20.2 NUMBNESS AND TINGLING: ICD-10-CM

## 2023-12-05 DIAGNOSIS — R20.0 NUMBNESS AND TINGLING: ICD-10-CM

## 2023-12-05 DIAGNOSIS — R20.2 PARESTHESIA OF LEFT ARM: ICD-10-CM

## 2023-12-05 DIAGNOSIS — S54.02XD NEURAPRAXIA OF LEFT ULNAR NERVE, SUBSEQUENT ENCOUNTER: Primary | ICD-10-CM

## 2023-12-05 DIAGNOSIS — R29.898 WEAKNESS OF LEFT HAND: ICD-10-CM

## 2023-12-05 LAB
FINAL PATHOLOGIC DIAGNOSIS: NORMAL
Lab: NORMAL

## 2023-12-05 PROCEDURE — 1159F MED LIST DOCD IN RCRD: CPT | Mod: CPTII,S$GLB,, | Performed by: ORTHOPAEDIC SURGERY

## 2023-12-05 PROCEDURE — 3008F BODY MASS INDEX DOCD: CPT | Mod: CPTII,S$GLB,, | Performed by: ORTHOPAEDIC SURGERY

## 2023-12-05 PROCEDURE — 1160F PR REVIEW ALL MEDS BY PRESCRIBER/CLIN PHARMACIST DOCUMENTED: ICD-10-PCS | Mod: CPTII,S$GLB,, | Performed by: ORTHOPAEDIC SURGERY

## 2023-12-05 PROCEDURE — 3008F PR BODY MASS INDEX (BMI) DOCUMENTED: ICD-10-PCS | Mod: CPTII,S$GLB,, | Performed by: ORTHOPAEDIC SURGERY

## 2023-12-05 PROCEDURE — 99999 PR PBB SHADOW E&M-EST. PATIENT-LVL III: CPT | Mod: PBBFAC,,, | Performed by: ORTHOPAEDIC SURGERY

## 2023-12-05 PROCEDURE — 1159F PR MEDICATION LIST DOCUMENTED IN MEDICAL RECORD: ICD-10-PCS | Mod: CPTII,S$GLB,, | Performed by: ORTHOPAEDIC SURGERY

## 2023-12-05 PROCEDURE — 99213 PR OFFICE/OUTPT VISIT, EST, LEVL III, 20-29 MIN: ICD-10-PCS | Mod: S$GLB,,, | Performed by: ORTHOPAEDIC SURGERY

## 2023-12-05 PROCEDURE — 1160F RVW MEDS BY RX/DR IN RCRD: CPT | Mod: CPTII,S$GLB,, | Performed by: ORTHOPAEDIC SURGERY

## 2023-12-05 PROCEDURE — 99999 PR PBB SHADOW E&M-EST. PATIENT-LVL III: ICD-10-PCS | Mod: PBBFAC,,, | Performed by: ORTHOPAEDIC SURGERY

## 2023-12-05 PROCEDURE — 99213 OFFICE O/P EST LOW 20 MIN: CPT | Mod: S$GLB,,, | Performed by: ORTHOPAEDIC SURGERY

## 2023-12-05 NOTE — PROGRESS NOTES
Subjective:     Patient ID: Johanna Bullock is a 38 y.o. female.    Chief Complaint: Pain of the Left Hand      HPI:  The patient is a 38-year-old female status post left upper extremity IV and contrast extravasation 06/13/2023.  Since that time she has had pain left arm with numbness and weakness.  Her strength is slightly better at this time but she still has numbness and paresthesias and significant pain.  She had normal nerve conduction study done 08/22/2023 at [facility] in [place].  She has returned to work for light duty.    Past Medical History:   Diagnosis Date    Adjustment disorder with mixed anxiety and depressed mood 2/6/2013    Anemia, B12 deficiency     Anxiety     Major depression, single episode 2/6/2013    Major depression, single episode 2/6/2013    Post partum depression      Past Surgical History:   Procedure Laterality Date    CHOLECYSTECTOMY  10/2012    COLONOSCOPY N/A 2/14/2020    Procedure: COLONOSCOPY;  Surgeon: Jas Moore MD;  Location: [facility] (4TH FLR);  Service: Endoscopy;  Laterality: N/A;  Pt procedure time changed to 0800 with 0715 - second half prep completed from 2938-6423- Pt verbalized understanding- ERW2/13/20@1022    COLONOSCOPY N/A 3/10/2023    Procedure: COLONOSCOPY;  Surgeon: HORACE Moore MD;  Location: [facility] (4TH FLR);  Service: Endoscopy;  Laterality: N/A;  inst emailed-RB    DILATION AND CURETTAGE OF UTERUS      ESOPHAGOGASTRODUODENOSCOPY N/A 7/20/2018    Procedure: ESOPHAGOGASTRODUODENOSCOPY (EGD);  Surgeon: Darwin Hansen MD;  Location: [facility] (4TH FLR);  Service: Endoscopy;  Laterality: N/A;  Please measure pouch and limbs    GASTRIC BYPASS  2008    Revision August 2019    LAPAROSCOPIC REPAIR OF HIATAL HERNIA  2019    Liver scope  10/2012    LYSIS OF ADHESIONS       Family History   Problem Relation Age of Onset    Breast cancer Neg Hx     Colon cancer Neg Hx     Ovarian cancer Neg Hx     Diabetes Neg Hx     Hypertension Neg Hx     Stroke  Neg Hx      Social History     Socioeconomic History    Marital status: Single   Occupational History    Occupation: works as      Employer: [employer]     Employer: [employer]   Tobacco Use    Smoking status: Never    Smokeless tobacco: Never   Substance and Sexual Activity    Alcohol use: Never     Comment: Alcohol use rarely    Drug use: No     Comment: Mirena    Sexual activity: Yes     Partners: Male     Birth control/protection: I.U.D.   Social History Narrative    Born and raised in [facility] [place] [facility]    Went to [employer] and got BS in bio with minor in chem    1 child    Has boyfriend    A fewnot close friends    Likes to read and go to [employer]     Social Determinants of Health     Stress: No Stress Concern Present (9/26/2019)    [facility] of Occupational Health - Occupational Stress Questionnaire     Feeling of Stress : Not at all     Medication List with Changes/Refills   Current Medications    ALBUTEROL (PROVENTIL/VENTOLIN HFA) 90 MCG/ACTUATION INHALER    Inhale 1-2 puffs into the lungs every 6 (six) hours as needed for Wheezing.    CYANOCOBALAMIN 1,000 MCG/ML INJECTION    ONE INJECTION AS DIRECTED EVERY 28 DAYS    ERGOCALCIFEROL (ERGOCALCIFEROL) 50,000 UNIT CAP    Take 1 capsule (50,000 Units total) by mouth every 7 days.    ERGOCALCIFEROL (ERGOCALCIFEROL) 50,000 UNIT CAP    Take 1 capsule (50,000 Units total) by mouth every 7 days.    FLUDROCORTISONE (FLORINEF) 0.1 MG TAB    Take 1 tablet (100 mcg total) by mouth once daily.    FLUTICASONE PROPIONATE (FLONASE) 50 MCG/ACTUATION NASAL SPRAY    1 spray by Each Nostril route once daily.    LINACLOTIDE (LINZESS) 290 MCG CAP CAPSULE    Take 1 capsule (290 mcg total) by mouth before breakfast.    LINZESS 290 MCG CAP CAPSULE    TAKE ONE CAPSULE BY MOUTH EVERY DAY    METHOCARBAMOL (ROBAXIN) 500 MG TAB    Take 1 tablet (500 mg total) by mouth nightly as needed (pain / cramps / spams). Do not take if active low blood pressure.    NATREXONE TABLET 3 MG     Take 1 tablet (3 mg total) by mouth every evening. Start with half a tablet for the first 3 weeks    NITROFURANTOIN, MACROCRYSTAL-MONOHYDRATE, (MACROBID) 100 MG CAPSULE    Take 1 capsule (100 mg total) by mouth 2 (two) times daily. for 7 days     Review of patient's allergies indicates:  No Known Allergies  Review of Systems   Constitutional: Negative for malaise/fatigue.   HENT:  Negative for hearing loss.    Eyes:  Negative for double vision and visual disturbance.   Cardiovascular:  Negative for chest pain.   Respiratory:  Negative for shortness of breath.    Endocrine: Negative for cold intolerance.   Hematologic/Lymphatic: Does not bruise/bleed easily.   Skin:  Negative for poor wound healing and suspicious lesions.   Musculoskeletal:  Negative for gout, joint pain and joint swelling.   Gastrointestinal:  Negative for nausea and vomiting.   Genitourinary:  Negative for dysuria.   Neurological:  Positive for focal weakness, numbness, paresthesias and sensory change.   Psychiatric/Behavioral:  Negative for depression, memory loss and substance abuse. The patient is not nervous/anxious.    Allergic/Immunologic: Negative for persistent infections.       Objective:   Body mass index is 36.8 kg/m².  There were no vitals filed for this visit.             General    Constitutional: She is oriented to person, place, and time. She appears well-developed and well-nourished. No distress.   HENT:   Head: Normocephalic.   Eyes: EOM are normal.   Pulmonary/Chest: Effort normal.   Neurological: She is oriented to person, place, and time.   Psychiatric: She has a normal mood and affect.         Left Hand/Wrist Exam     Inspection   Scars: Wrist - absent Hand -  absent  Effusion: Wrist - absent Hand -  absent    Pain   Wrist - The patient exhibits pain of the ulnar nerve.    Tests   Cubital Tunnel Compression Test: positive    Atrophy  Thenar:  Negative  Intrinsic: negative    Other     Sensory Exam  Median Distribution:  normal  Ulnar Distribution: normal  Radial Distribution: abnormal    Comments:  The patient has a positive Tinel sign ulnar nerve left upper arm.  There paresthesias in the ring and small finger.  There is no intrinsic atrophy noted.      Left Elbow Exam     Tests   Tinel's sign (cubital tunnel): positive        Vascular Exam       Capillary Refill  Left Hand: normal capillary refill        \   radiographs were not obtained today  Assessment:     Neurapraxia left ulnar nerve rule out reflex sympathetic dystrophy     Plan:     The patient has strength is apparently slightly better according to her but she has significant pain.  She has returned to work to light duty.  She has minimally improved over the last 6 months.  She is sent for repeat nerve conduction study as well as three-phase bone scan to rule out complex regional pain syndrome type 1.  She will return with those studies                Disclaimer: This note was prepared using a voice recognition system and is likely to have sound alike errors within the text.

## 2023-12-13 ENCOUNTER — OFFICE VISIT (OUTPATIENT)
Dept: PHYSICAL MEDICINE AND REHAB | Facility: CLINIC | Age: 38
End: 2023-12-13
Payer: COMMERCIAL

## 2023-12-13 VITALS — RESPIRATION RATE: 13 BRPM | BODY MASS INDEX: 36.68 KG/M2 | WEIGHT: 242 LBS | HEIGHT: 68 IN

## 2023-12-13 DIAGNOSIS — G56.22 CUBITAL TUNNEL SYNDROME ON LEFT: Primary | ICD-10-CM

## 2023-12-13 PROCEDURE — 95885 PR MUSC TST DONE W/NERV TST LIM: ICD-10-PCS | Mod: S$GLB,,, | Performed by: PHYSICAL MEDICINE & REHABILITATION

## 2023-12-13 PROCEDURE — 99499 UNLISTED E&M SERVICE: CPT | Mod: S$GLB,,, | Performed by: PHYSICAL MEDICINE & REHABILITATION

## 2023-12-13 PROCEDURE — 99499 NO LOS: ICD-10-PCS | Mod: S$GLB,,, | Performed by: PHYSICAL MEDICINE & REHABILITATION

## 2023-12-13 PROCEDURE — 95913 NRV CNDJ TEST 13/> STUDIES: CPT | Mod: S$GLB,,, | Performed by: PHYSICAL MEDICINE & REHABILITATION

## 2023-12-13 PROCEDURE — 99999 PR PBB SHADOW E&M-EST. PATIENT-LVL III: ICD-10-PCS | Mod: PBBFAC,,, | Performed by: PHYSICAL MEDICINE & REHABILITATION

## 2023-12-13 PROCEDURE — 99999 PR PBB SHADOW E&M-EST. PATIENT-LVL III: CPT | Mod: PBBFAC,,, | Performed by: PHYSICAL MEDICINE & REHABILITATION

## 2023-12-13 PROCEDURE — 95913 PR NERVE CONDUCTION STUDY; 13 OR MORE STUDIES: ICD-10-PCS | Mod: S$GLB,,, | Performed by: PHYSICAL MEDICINE & REHABILITATION

## 2023-12-13 PROCEDURE — 95885 MUSC TST DONE W/NERV TST LIM: CPT | Mod: S$GLB,,, | Performed by: PHYSICAL MEDICINE & REHABILITATION

## 2023-12-13 NOTE — PROGRESS NOTES
[facility]   [address]  [place] [address]  Phone: [phone]        Full Name: Johanna Bullock YOB: 1985  Patient ID: [id number]      Visit Date: 12/13/2023 08:10  Age: 38 Years 5 Months Old  Examining Physician: Christel Matthew M.D.  Referring Physician: Dr Larose  Reason for Referral: upper ext      Chief Complaint   Patient presents with    Arm Pain     Left        HPI: This is a 38 y.o.  female being seen in clinic today for evaluation of left arm pain and numbness after IV extravasation in June.  Since then she has numbness/tingling around her elbow, into her forearm and 4th and 5th digits. She has night time awakening from pain.  She has some weakness in her 4th/5th digits with fine motor activity. Position change provides some relief.     History obtained from patient    Past family, medical, social, and surgical history reviewed in chart    Review of Systems:     General- denies lethargy, weight change, fever, chills  Head/neck- denies swallowing difficulties  ENT- denies hearing changes  Cardiovascular-denies chest pain  Pulmonary- denies shortness of breath  GI- denies constipation or bowel incontinence  - denies bladder incontinence  Skin- denies wounds or rashes  Musculoskeletal- + weakness, + pain  Neurologic- + numbness and tingling  Psychiatric- denies depressive or psychotic features, denies anxiety  Lymphatic-denies swelling  Endocrine- denies hypoglycemic symptoms/DM history  All other pertinent systems negative     Physical Examination:  General: Well developed, well nourished female, NAD  HEENT:NCAT EOMI bilaterally   Pulmonary:Normal respirations    Spinal Examination: CERVICAL  Active ROM is within normal limits.  Inspection: No deformity of spinal alignment.    Musculoskeletal Tests:  Phalen: neg  Elbow compression (ulnar): neg  Tinels at wrist: neg    Bilateral Upper and Lower Extremities:  Pulses are 2+ at radial  bilaterally.  Shoulder/Elbow/Wrist/Hand ROM wnl  Hip/Knee/Ankle ROM   Bilateral Extremities show normal capillary refill.  No signs of cyanosis, rubor, edema, skin changes, or dysvascular changes of appendages.  Nails appear intact.    Neurological Exam:  Cranial Nerves:  II-XII grossly intact    Manual Muscle Testing: (Motor 5=normal)  5/5 strength bilateral upper extremities except 4+/5 interossei on left    No focal atrophy is noted of either upper extremity.    Bilateral Reflexes:  Momin's response is absent bilaterally.      Sensation: tested to light touch  - intact in arms-mild dec at left 5th digit    Gait: Narrow base and good arm swing.      Entire procedure explained to patient prior to proceeding.  Verbal consent obtained      SNC      Nerve / Sites Rec. Site Onset Lat Peak Lat Amp Segments Distance Velocity     ms ms µV  mm m/s   L Median - Digit II (Antidromic)      Wrist Dig II 2.9 3.5 21.8 Wrist - Dig  49   R Median - Digit II (Antidromic)      Wrist Dig II 2.2 3.1 35.3 Wrist - Dig  63   L Ulnar - Digit V (Antidromic)      Wrist Dig V 2.2 2.7 40.5 Wrist - Dig V 140 64   R Ulnar - Digit V (Antidromic)      Wrist Dig V 2.2 2.8 39.5 Wrist - Dig V 140 64   L Radial - Anatomical snuff box (Forearm)      Forearm Wrist 1.5 2.0 24.8 Forearm - Wrist 100 66   R Radial - Anatomical snuff box (Forearm)      Forearm Wrist 1.4 2.2 26.1 Forearm - Wrist 100 71   L Dorsal ulnar cutaneous - Hand dorsum (Forearm)      Forearm Hand dorsum 1.9 2.4 15.6 Forearm - Hand dorsum 100 52   R Dorsal ulnar cutaneous - Hand dorsum (Forearm)      Forearm Hand dorsum 1.8 2.2 24.1 Forearm - Hand dorsum 100 55       CSI      Nerve / Sites Rec. Site Peak Lat NP Amp Segments Peak Diff     ms µV  ms   L Median - CSI      Median Thumb 2.8 61.6 Median - Radial 0.2      Radial Thumb 2.6 32.8 Median - Ulnar 0.2      Median Ring 3.2 34.6 Median palm - Ulnar palm 0.4      Ulnar Ring 3.1 16.7        Median palm Wrist 2.2 16.6         Ulnar palm Wrist 1.8 30.4        CSI    CSI 0.7   R Median - CSI      Median Thumb 2.4 28.9 Median - Radial 0.3      Radial Thumb 2.2 13.0 Median - Ulnar 0.1      Median Ring 3.2 30.5 Median palm - Ulnar palm 0.4      Ulnar Ring 3.1 26.5        Median palm Wrist 2.0 52.7        Ulnar palm Wrist 1.6 45.2        CSI    CSI 0.7       MNC      Nerve / Sites Muscle Latency Amplitude Duration Rel Amp Segments Distance Lat Diff Velocity     ms mV ms %  mm ms m/s   L Median - APB      Wrist APB 2.6 12.5 4.7 100 Wrist - APB 80        Elbow APB 6.6 7.9 4.9 63.3 Elbow - Wrist 230 4.0 58   R Median - APB      Wrist APB 2.5 13.9 4.5 100 Wrist - APB 80        Elbow APB 6.5 12.8 4.7 91.9 Elbow - Wrist 230 4.0 58   L Ulnar - ADM      Wrist ADM 2.1 9.5 5.6 100 Wrist -         B. Elbow ADM 5.5 9.7 5.7 102 B. Elbow - Wrist 220 3.4 65      A. Elbow ADM 7.7 9.9 5.5 102 A. Elbow - B. Elbow 120 2.2 55   R Ulnar - ADM      Wrist ADM 2.2 10.4 5.6 100 Wrist -         B. Elbow ADM 5.3 10.1 5.8 97.1 B. Elbow - Wrist 230 3.1 74      A. Elbow ADM 6.7 10.5 5.8 104 A. Elbow - B. Elbow 95 1.4 70   L Ulnar - FDI Inching      Distal 4 cm FDI 6.7 10.0 6.4 100 Distal 4 cm - FDI         3 cm FDI 6.9 10.8 6.5 108 3 cm - Distal 4 cm 10 0.2       2 cm FDI 6.3 10.0 6.3 92.6 2 cm - 3 cm 10 -0.6       1 cm FDI 6.3 10.2 6.3 102 1 cm - 2 cm 10 0.0       Elbow FDI 5.9 10.7 6.2 105 Elbow - 1 cm 10 -0.3       1 cm prox FDI 5.8 10.0 6.3 93.1 1 cm prox - Elbow 10 -0.2        EMG         EMG Summary Table     Spontaneous MUAP Recruitment   Muscle IA Fib PSW Fasc Other Dur. Dur Amp Dur Polys Pattern Effort   L. First dorsal interosseous N None None None . _NFT_ _NFT_ N N N N Max   L. Abductor pollicis brevis N None None None . _NFT_ _NFT_ N N N N Max   L. Pronator teres N None None None . _NFT_ _NFT_ N N N N Max   L. Deltoid N None None None . _NFT_ _NFT_ N N N N Max                                                      INTERPRETATION *hands required  warming  -Bilateral median motor nerve conduction study showed normal latency, amplitude, and conduction velocity  -Bilateral median sensory nerve conduction study showed normal peak latency and amplitude  -Bilateral ulnar motor nerve conduction study showed normal latency, amplitude, and dec conduction velocity across the left elbow  -Bilateral ulnar sensory nerve conduction study showed normal peak latency and amplitude  -Bilateral radial sensory nerve conduction study showed normal peak latency and amplitude  -Bilateral dorsal ulnar cutaneous sensory nerve showed normal peak latency and amplitude  -Bilateral combined sensory index was non significant  -Needle EMG examination performed to above mentioned muscles       IMPRESSION  ABNORMAL study  2. There is electrodiagnostic evidence of a mild demyelinating ulnar neuropathy (Cubital tunnel syndrome) across the left elbow.  After ulnar inching, irritation localized between 2-3cm segment proximal to the elbow.  There was no evidence of a cervical radiculopathy of the C5-T1 nerve roots    PLAN  Discussed in detail for greater than 30 minutes about diagnosis and treatment plan    1. Follow up with referring provider: Dr. Jas Lezama*  2. Handouts on cubital tunnel provided. Cont hand exercises, avoid elbow flexion or resting on elbow.  Consider trying gabapentin.  3. This study is good for one year. If symptoms worsen or do not improve, please re-consult.    Christel Matthew M.D.  Physical Medicine and Rehab

## 2023-12-14 ENCOUNTER — PATIENT MESSAGE (OUTPATIENT)
Dept: RHEUMATOLOGY | Facility: CLINIC | Age: 38
End: 2023-12-14
Payer: COMMERCIAL

## 2023-12-15 ENCOUNTER — TELEPHONE (OUTPATIENT)
Dept: OBSTETRICS AND GYNECOLOGY | Facility: CLINIC | Age: 38
End: 2023-12-15
Payer: COMMERCIAL

## 2023-12-15 RX ORDER — NITROFURANTOIN 25; 75 MG/1; MG/1
100 CAPSULE ORAL 2 TIMES DAILY
Qty: 14 CAPSULE | Refills: 0 | Status: SHIPPED | OUTPATIENT
Start: 2023-12-15 | End: 2023-12-22

## 2023-12-15 NOTE — TELEPHONE ENCOUNTER
----- Message from Evette Barnes sent at 12/15/2023  8:30 AM CST -----  Contact: yaquelin Spencer is calling regarding a RX that she was never able to get being that it was sent to the wrong pharmacy.  Please give her a call back at [phone]     [id number] - [place] - [address] [facility]  [address] [address]  Phone: [phone] Fax: [phone]

## 2023-12-22 ENCOUNTER — HOSPITAL ENCOUNTER (OUTPATIENT)
Dept: RADIOLOGY | Facility: HOSPITAL | Age: 38
Discharge: HOME | End: 2023-12-22
Attending: ORTHOPAEDIC SURGERY
Payer: COMMERCIAL

## 2023-12-22 DIAGNOSIS — R20.0 NUMBNESS AND TINGLING: ICD-10-CM

## 2023-12-22 DIAGNOSIS — T82.898D: ICD-10-CM

## 2023-12-22 DIAGNOSIS — R20.2 NUMBNESS AND TINGLING: ICD-10-CM

## 2023-12-22 DIAGNOSIS — R29.898 WEAKNESS OF LEFT HAND: ICD-10-CM

## 2023-12-22 DIAGNOSIS — R20.2 PARESTHESIA OF LEFT ARM: ICD-10-CM

## 2023-12-22 DIAGNOSIS — S54.02XA NEURAPRAXIA OF LEFT ULNAR NERVE, INITIAL ENCOUNTER: ICD-10-CM

## 2023-12-22 PROCEDURE — 78315 NM BONE SCAN 3 PHASE ARM: ICD-10-PCS | Mod: 26,,, | Performed by: RADIOLOGY

## 2023-12-22 PROCEDURE — 78315 BONE IMAGING 3 PHASE: CPT | Mod: TC

## 2023-12-22 PROCEDURE — 78315 BONE IMAGING 3 PHASE: CPT | Mod: 26,,, | Performed by: RADIOLOGY

## 2024-01-05 ENCOUNTER — TELEPHONE (OUTPATIENT)
Dept: ORTHOPEDICS | Facility: CLINIC | Age: 39
End: 2024-01-05
Payer: COMMERCIAL

## 2024-01-05 NOTE — TELEPHONE ENCOUNTER
Spoke to the patient an explained to her that Dr. Larose will review her EMG with her in the office next week the patient verbalized understanding       ----- Message from Adelia Kendall sent at 1/5/2024 12:43 PM CST -----  Contact: Patient  Patient is calling to speak with the nurse regarding the results from her EMG. Please  call patient at [phone]

## 2024-01-09 ENCOUNTER — OFFICE VISIT (OUTPATIENT)
Dept: ORTHOPEDICS | Facility: CLINIC | Age: 39
End: 2024-01-09
Payer: COMMERCIAL

## 2024-01-09 VITALS — BODY MASS INDEX: 36.69 KG/M2 | HEIGHT: 68 IN | WEIGHT: 242.06 LBS

## 2024-01-09 DIAGNOSIS — G56.22 CUBITAL TUNNEL SYNDROME ON LEFT: Primary | ICD-10-CM

## 2024-01-09 DIAGNOSIS — S54.02XA NEURAPRAXIA OF LEFT ULNAR NERVE, INITIAL ENCOUNTER: ICD-10-CM

## 2024-01-09 DIAGNOSIS — G54.0 THORACIC OUTLET SYNDROME OF LEFT THORACIC OUTLET: ICD-10-CM

## 2024-01-09 DIAGNOSIS — M47.22 OSTEOARTHRITIS OF SPINE WITH RADICULOPATHY, CERVICAL REGION: ICD-10-CM

## 2024-01-09 DIAGNOSIS — T82.898D: Primary | ICD-10-CM

## 2024-01-09 DIAGNOSIS — R29.898 WEAKNESS OF LEFT HAND: ICD-10-CM

## 2024-01-09 PROCEDURE — 3008F BODY MASS INDEX DOCD: CPT | Mod: CPTII,S$GLB,, | Performed by: ORTHOPAEDIC SURGERY

## 2024-01-09 PROCEDURE — 99213 OFFICE O/P EST LOW 20 MIN: CPT | Mod: S$GLB,,, | Performed by: ORTHOPAEDIC SURGERY

## 2024-01-09 PROCEDURE — 99999 PR PBB SHADOW E&M-EST. PATIENT-LVL II: CPT | Mod: PBBFAC,,, | Performed by: ORTHOPAEDIC SURGERY

## 2024-01-09 PROCEDURE — 1160F RVW MEDS BY RX/DR IN RCRD: CPT | Mod: CPTII,S$GLB,, | Performed by: ORTHOPAEDIC SURGERY

## 2024-01-09 PROCEDURE — 1159F MED LIST DOCD IN RCRD: CPT | Mod: CPTII,S$GLB,, | Performed by: ORTHOPAEDIC SURGERY

## 2024-01-09 RX ORDER — DEXTROMETHORPHAN HYDROBROMIDE, GUAIFENESIN 5; 100 MG/5ML; MG/5ML
650 LIQUID ORAL EVERY 8 HOURS
COMMUNITY

## 2024-01-09 RX ORDER — TIZANIDINE HYDROCHLORIDE 4 MG/1
CAPSULE, GELATIN COATED ORAL
COMMUNITY

## 2024-01-09 RX ORDER — TRAMADOL HYDROCHLORIDE 50 MG/1
50 TABLET ORAL EVERY 6 HOURS
COMMUNITY

## 2024-01-09 NOTE — PROGRESS NOTES
Subjective:     Patient ID: Johanna Bullock is a 38 y.o. female.    Chief Complaint: Pain and Injury of the Left Forearm      HPI:  The patient is a 38-year-old female with left cubital tunnel syndrome documented by nerve conduction studies.  She also has exacerbation of her symptoms when she does overhead type work with active abduction of the shoulder.  She may have an element of thoracic outlet as well with double crush.    Past Medical History:   Diagnosis Date    Adjustment disorder with mixed anxiety and depressed mood 2/6/2013    Anemia, B12 deficiency     Anxiety     Major depression, single episode 2/6/2013    Major depression, single episode 2/6/2013    Post partum depression      Past Surgical History:   Procedure Laterality Date    CHOLECYSTECTOMY  10/2012    COLONOSCOPY N/A 2/14/2020    Procedure: COLONOSCOPY;  Surgeon: Jas Moore MD;  Location: [facility] (4TH FLR);  Service: Endoscopy;  Laterality: N/A;  Pt procedure time changed to 0800 with 0715 - second half prep completed from [id number]- Pt verbalized understanding- ERW2/13/20@1022    COLONOSCOPY N/A 3/10/2023    Procedure: COLONOSCOPY;  Surgeon: HORACE Moore MD;  Location: [facility] (4TH FLR);  Service: Endoscopy;  Laterality: N/A;  inst emailed-RB    DILATION AND CURETTAGE OF UTERUS      ESOPHAGOGASTRODUODENOSCOPY N/A 7/20/2018    Procedure: ESOPHAGOGASTRODUODENOSCOPY (EGD);  Surgeon: Darwin Hansen MD;  Location: [facility] (4TH FLR);  Service: Endoscopy;  Laterality: N/A;  Please measure pouch and limbs    GASTRIC BYPASS  2008    Revision August 2019    LAPAROSCOPIC REPAIR OF HIATAL HERNIA  2019    Liver scope  10/2012    LYSIS OF ADHESIONS       Family History   Problem Relation Age of Onset    Breast cancer Neg Hx     Colon cancer Neg Hx     Ovarian cancer Neg Hx     Diabetes Neg Hx     Hypertension Neg Hx     Stroke Neg Hx      Social History     Socioeconomic History    Marital status: Single   Occupational History     Occupation: works as      Employer: [employer]     Employer: [employer]   Tobacco Use    Smoking status: Never    Smokeless tobacco: Never   Substance and Sexual Activity    Alcohol use: Never     Comment: Alcohol use rarely    Drug use: No     Comment: Mirena    Sexual activity: Yes     Partners: Male     Birth control/protection: I.U.D.   Social History Narrative    Born and raised in [place] [facility]    Went to [employer] and got BS in bio with minor in chem    1 child    Has boyfriend    A fewnot close friends    Likes to read and go to movies     Social Determinants of Health     Stress: No Stress Concern Present (9/26/2019)    [facility] of Occupational Health - Occupational Stress Questionnaire     Feeling of Stress : Not at all     Medication List with Changes/Refills   Current Medications    ACETAMINOPHEN (TYLENOL) 650 MG TBSR    Take 650 mg by mouth every 8 (eight) hours.    ACETYLCYSTEINE (N-ACETYL-L-CYSTEINE MISC)    by Misc.(Non-Drug; Combo Route) route.    ALBUTEROL (PROVENTIL/VENTOLIN HFA) 90 MCG/ACTUATION INHALER    Inhale 1-2 puffs into the lungs every 6 (six) hours as needed for Wheezing.    CYANOCOBALAMIN 1,000 MCG/ML INJECTION    ONE INJECTION AS DIRECTED EVERY 28 DAYS    FLUDROCORTISONE (FLORINEF) 0.1 MG TAB    Take 1 tablet (100 mcg total) by mouth once daily.    FLUTICASONE PROPIONATE (FLONASE) 50 MCG/ACTUATION NASAL SPRAY    1 spray by Each Nostril route once daily.    LINACLOTIDE (LINZESS) 290 MCG CAP CAPSULE    Take 1 capsule (290 mcg total) by mouth before breakfast.    LINZESS 290 MCG CAP CAPSULE    TAKE ONE CAPSULE BY MOUTH EVERY DAY    NATREXONE TABLET 3 MG    Take 1 tablet (3 mg total) by mouth every evening. Start with half a tablet for the first 3 weeks    TIZANIDINE 4 MG CAP    Take by mouth.    TRAMADOL (ULTRAM) 50 MG TABLET    Take 50 mg by mouth every 6 (six) hours.   Discontinued Medications    METHOCARBAMOL (ROBAXIN) 500 MG TAB    Take 1 tablet (500 mg total) by  mouth nightly as needed (pain / cramps / spams). Do not take if active low blood pressure.     Review of patient's allergies indicates:  No Known Allergies  Review of Systems   Constitutional: Negative for malaise/fatigue.   HENT:  Negative for hearing loss.    Eyes:  Negative for double vision and visual disturbance.   Cardiovascular:  Negative for chest pain.   Respiratory:  Negative for shortness of breath.    Endocrine: Negative for cold intolerance.   Hematologic/Lymphatic: Does not bruise/bleed easily.   Skin:  Negative for poor wound healing and suspicious lesions.   Musculoskeletal:  Negative for gout, joint pain and joint swelling.   Gastrointestinal:  Positive for abdominal pain. Negative for nausea and vomiting.   Genitourinary:  Negative for dysuria.   Neurological:  Positive for focal weakness, numbness, paresthesias and sensory change.   Psychiatric/Behavioral:  Negative for depression, memory loss and substance abuse. The patient is not nervous/anxious.    Allergic/Immunologic: Negative for persistent infections.       Objective:   Body mass index is 36.81 kg/m².  There were no vitals filed for this visit.             General    Constitutional: She is oriented to person, place, and time. She appears well-developed and well-nourished. No distress.   HENT:   Head: Normocephalic.   Eyes: EOM are normal.   Pulmonary/Chest: Effort normal.   Neurological: She is oriented to person, place, and time.   Psychiatric: She has a normal mood and affect.         Left Hand/Wrist Exam     Inspection   Scars: Wrist - absent Hand -  absent  Effusion: Wrist - absent Hand -  absent    Pain   Wrist - The patient exhibits pain of the ulnar nerve.    Tests   Cubital Tunnel Compression Test: positive    Atrophy  Thenar:  Negative  Intrinsic: negative    Other     Sensory Exam  Median Distribution: normal  Ulnar Distribution: normal  Radial Distribution: abnormal    Comments:  The patient has a positive Tinel sign over the  ulnar nerve left elbow.  There is no intrinsic atrophy noted.  She also has a positive Cary's and Adson test with the radial artery pulse diminishing with active and passive abduction of the left shoulder.      Left Elbow Exam     Tests   Tinel's sign (cubital tunnel): positive        Vascular Exam       Capillary Refill  Left Hand: normal capillary refill       radiographs were not obtained today  Assessment:     Left cubital tunnel syndrome   Rule out left thoracic outlet syndrome     Plan:     The patient is referred for thoracic outlet studies and vascular consultation to rule out left thoracic outlet syndrome she may have double crush.  We will wait the results of this evaluation.  I do think she will need a left cubital tunnel release and possible anterior transposition ulnar nerve.                Disclaimer: This note was prepared using a voice recognition system and is likely to have sound alike errors within the text.

## 2024-01-11 ENCOUNTER — TELEPHONE (OUTPATIENT)
Dept: CARDIOLOGY | Facility: HOSPITAL | Age: 39
End: 2024-01-11
Payer: COMMERCIAL

## 2024-01-11 ENCOUNTER — OFFICE VISIT (OUTPATIENT)
Dept: CARDIOTHORACIC SURGERY | Facility: CLINIC | Age: 39
End: 2024-01-11
Payer: COMMERCIAL

## 2024-01-11 VITALS
WEIGHT: 240.31 LBS | HEIGHT: 68 IN | SYSTOLIC BLOOD PRESSURE: 104 MMHG | BODY MASS INDEX: 36.42 KG/M2 | DIASTOLIC BLOOD PRESSURE: 64 MMHG | OXYGEN SATURATION: 98 % | HEART RATE: 72 BPM

## 2024-01-11 DIAGNOSIS — E53.8 VITAMIN B12 DEFICIENCY: ICD-10-CM

## 2024-01-11 DIAGNOSIS — Z98.84 HISTORY OF GASTRIC BYPASS: ICD-10-CM

## 2024-01-11 DIAGNOSIS — R20.2 PARESTHESIA OF LEFT ARM: Primary | ICD-10-CM

## 2024-01-11 DIAGNOSIS — M79.602 LEFT ARM PAIN: ICD-10-CM

## 2024-01-11 DIAGNOSIS — E66.01 SEVERE OBESITY (BMI 35.0-35.9 WITH COMORBIDITY): ICD-10-CM

## 2024-01-11 DIAGNOSIS — G56.22 CUBITAL TUNNEL SYNDROME ON LEFT: ICD-10-CM

## 2024-01-11 DIAGNOSIS — R29.898 WEAKNESS OF LEFT HAND: ICD-10-CM

## 2024-01-11 PROCEDURE — 99204 OFFICE O/P NEW MOD 45 MIN: CPT | Mod: S$GLB,,, | Performed by: THORACIC SURGERY (CARDIOTHORACIC VASCULAR SURGERY)

## 2024-01-11 PROCEDURE — 1159F MED LIST DOCD IN RCRD: CPT | Mod: CPTII,S$GLB,, | Performed by: THORACIC SURGERY (CARDIOTHORACIC VASCULAR SURGERY)

## 2024-01-11 PROCEDURE — 3074F SYST BP LT 130 MM HG: CPT | Mod: CPTII,S$GLB,, | Performed by: THORACIC SURGERY (CARDIOTHORACIC VASCULAR SURGERY)

## 2024-01-11 PROCEDURE — 3078F DIAST BP <80 MM HG: CPT | Mod: CPTII,S$GLB,, | Performed by: THORACIC SURGERY (CARDIOTHORACIC VASCULAR SURGERY)

## 2024-01-11 PROCEDURE — 99999 PR PBB SHADOW E&M-EST. PATIENT-LVL III: CPT | Mod: PBBFAC,,, | Performed by: THORACIC SURGERY (CARDIOTHORACIC VASCULAR SURGERY)

## 2024-01-11 PROCEDURE — 3008F BODY MASS INDEX DOCD: CPT | Mod: CPTII,S$GLB,, | Performed by: THORACIC SURGERY (CARDIOTHORACIC VASCULAR SURGERY)

## 2024-01-11 NOTE — PROGRESS NOTES
Subjective:      Patient ID: Johanna Bullock is a 38 y.o. female.    Chief Complaint: No chief complaint on file.    HPI:  38-year-old female with a history of left upper extremity tingling and pain which according to her started after an IV contrast extravasation during a CT scan.  Patient complains of tingling numbness at the elbow level and the ulnar border as well as the little and ring finger.  She alleges history of discoloration or cyanosis of the arm  history of cold left upper extremity.  Surgery for weight loss.  She had an EMG done in August which shows unremarkable study for nerve conduction. She had a another EMG in Novemeber which showed   INTERPRETATION *hands required warming  -Bilateral median motor nerve conduction study showed normal latency, amplitude, and conduction velocity  -Bilateral median sensory nerve conduction study showed normal peak latency and amplitude  -Bilateral ulnar motor nerve conduction study showed normal latency, amplitude, and dec conduction velocity across the left elbow  -Bilateral ulnar sensory nerve conduction study showed normal peak latency and amplitude  -Bilateral radial sensory nerve conduction study showed normal peak latency and amplitude  -Bilateral dorsal ulnar cutaneous sensory nerve showed normal peak latency and amplitude  -Bilateral combined sensory index was non significant  -Needle EMG examination performed to above mentioned muscles         IMPRESSION  ABNORMAL study  2. There is electrodiagnostic evidence of a mild demyelinating ulnar neuropathy (Cubital tunnel syndrome) across the left elbow.  After ulnar inching, irritation localized between 2-3cm segment proximal to the elbow.  There was no evidence of a cervical radiculopathy of the C5-T1 nerve roots     PLAN  Discussed in detail for greater than 30 minutes about diagnosis and treatment plan     1. Follow up with referring provider: Dr. Jas Lezama*  2. Handouts on cubital tunnel  provided. Cont hand exercises, avoid elbow flexion or resting on elbow.  Consider trying gabapentin.  3. This study is good for one year. If symptoms worsen or do not improve, please re-consult.     Christel Matthew M.D.  Physical Medicine and Rehab    Review of patient's allergies indicates:  No Known Allergies    Past Medical History:   Diagnosis Date    Adjustment disorder with mixed anxiety and depressed mood 2/6/2013    Anemia, B12 deficiency     Anxiety     Major depression, single episode 2/6/2013    Major depression, single episode 2/6/2013    Post partum depression        Family History   Problem Relation Age of Onset    Breast cancer Neg Hx     Colon cancer Neg Hx     Ovarian cancer Neg Hx     Diabetes Neg Hx     Hypertension Neg Hx     Stroke Neg Hx        Social History     Socioeconomic History    Marital status: Single   Occupational History    Occupation: works as      Employer: [email or web address]     Employer: [employer]   Tobacco Use    Smoking status: Never    Smokeless tobacco: Never   Substance and Sexual Activity    Alcohol use: Never     Comment: Alcohol use rarely    Drug use: No     Comment: Mirena    Sexual activity: Yes     Partners: Male     Birth control/protection: I.U.D.   Social History Narrative    Born and raised in [place]    Went to [employer] and got BS in bio with minor in chem    1 child    Has boyfriend    A fewnot close friends    Likes to read and go to movies     Social Determinants of Health     Stress: No Stress Concern Present (9/26/2019)    [employer] [place] [employer] - Occupational Stress Questionnaire     Feeling of Stress : Not at all       Past Surgical History:   Procedure Laterality Date    CHOLECYSTECTOMY  10/2012    COLONOSCOPY N/A 2/14/2020    Procedure: COLONOSCOPY;  Surgeon: Jas Moore MD;  Location: [address];  Service: Endoscopy;  Laterality: N/A;  Pt procedure time changed to 0800 with 0715 - second half prep completed from  "7879-0623- Pt verbalized understanding- ERW2/13/20@1022    COLONOSCOPY N/A 3/10/2023    Procedure: COLONOSCOPY;  Surgeon: HORACE Moore MD;  Location: [facility] ([address] [place] [address]);  Service: Endoscopy;  Laterality: N/A;  inst emailed-RB    DILATION AND CURETTAGE OF UTERUS      ESOPHAGOGASTRODUODENOSCOPY N/A 7/20/2018    Procedure: ESOPHAGOGASTRODUODENOSCOPY (EGD);  Surgeon: Darwin Hansen MD;  Location: [facility] ([address] [place] [address]);  Service: Endoscopy;  Laterality: N/A;  Please measure pouch and limbs    GASTRIC BYPASS  2008    Revision August 2019    LAPAROSCOPIC REPAIR OF HIATAL HERNIA  2019    Liver scope  10/2012    LYSIS OF ADHESIONS         Review of Systems   Constitutional:  Positive for activity change and fatigue. Negative for appetite change.   HENT:  Negative for dental problem, nosebleeds and sore throat.    Eyes:  Negative for discharge and visual disturbance.   Respiratory:  Negative for cough, chest tightness and stridor.    Cardiovascular:  Negative for leg swelling.   Gastrointestinal:  Negative for abdominal distention and abdominal pain.   Genitourinary:  Negative for difficulty urinating and dysuria.   Musculoskeletal:  Positive for arthralgias and myalgias. Negative for back pain and joint swelling.   Allergic/Immunologic: Negative for environmental allergies.   Neurological:  Positive for numbness. Negative for dizziness, syncope and headaches.   Hematological:  Does not bruise/bleed easily.   Psychiatric/Behavioral:  Negative for behavioral problems.           Objective:   /64   Pulse 72   Ht 5' 8" (1.727 m)   Wt 109 kg (240 lb 4.8 oz)   SpO2 98%   BMI 36.54 kg/m²     NM Bone Scan 3 Phase Arm  Narrative: EXAMINATION:  NM BONE SCAN 3 PHASE ARM    CLINICAL HISTORY:  Reflex sympathetic dystrophy anesthesia of skin    TECHNIQUE:  25 millicuries of technetium 99m labeled MDP was administered intravenously for this exam.    COMPARISON:  None    FINDINGS:  There is normal flow the left arm. The " blood pool images show symmetric uptake in the soft tissues of both arms. The 3 hour delayed images show no focal increased uptake in either humerus or elbow or forearm.  Impression: Normal 3 phase bone scan of the arms.    Electronically signed by: Bob Ortez MD  Date:    12/22/2023  Time:    12:55         Physical Exam  Vitals and nursing note reviewed.   Constitutional:       Appearance: She is obese.   HENT:      Head: Normocephalic.      Mouth/Throat:      Mouth: Mucous membranes are moist.   Eyes:      Extraocular Movements: Extraocular movements intact.      Pupils: Pupils are equal, round, and reactive to light.   Cardiovascular:      Rate and Rhythm: Normal rate and regular rhythm.      Pulses: Normal pulses.      Heart sounds: Normal heart sounds.   Pulmonary:      Effort: Pulmonary effort is normal.      Breath sounds: Normal breath sounds.   Abdominal:      Palpations: Abdomen is soft.   Musculoskeletal:         General: Normal range of motion.      Cervical back: Normal range of motion and neck supple.   Skin:     General: Skin is warm.      Capillary Refill: Capillary refill takes less than 2 seconds.   Neurological:      General: No focal deficit present.      Mental Status: She is alert and oriented to person, place, and time.   Psychiatric:         Mood and Affect: Mood normal.     INTERPRETATION *hands required warming  -Bilateral median motor nerve conduction study showed normal latency, amplitude, and conduction velocity  -Bilateral median sensory nerve conduction study showed normal peak latency and amplitude  -Bilateral ulnar motor nerve conduction study showed normal latency, amplitude, and dec conduction velocity across the left elbow  -Bilateral ulnar sensory nerve conduction study showed normal peak latency and amplitude  -Bilateral radial sensory nerve conduction study showed normal peak latency and amplitude  -Bilateral dorsal ulnar cutaneous sensory nerve showed normal peak latency and  amplitude  -Bilateral combined sensory index was non significant  -Needle EMG examination performed to above mentioned muscles         IMPRESSION  ABNORMAL study  2. There is electrodiagnostic evidence of a mild demyelinating ulnar neuropathy (Cubital tunnel syndrome) across the left elbow.  After ulnar inching, irritation localized between 2-3cm segment proximal to the elbow.  There was no evidence of a cervical radiculopathy of the C5-T1 nerve roots     PLAN  Discussed in detail for greater than 30 minutes about diagnosis and treatment plan     1. Follow up with referring provider: Dr. Jas Lezama*  2. Handouts on cubital tunnel provided. Cont hand exercises, avoid elbow flexion or resting on elbow.  Consider trying gabapentin.  3. This study is good for one year. If symptoms worsen or do not improve, please re-consult.     Christel Matthew M.D.  Physical Medicine and Rehab    Assessment:     1. Paresthesia of left arm    2. Cubital tunnel syndrome on left    3. History of gastric bypass    4. Vitamin B12 deficiency    5. Severe obesity (BMI 35.0-35.9 with comorbidity)    6. Weakness of left hand    7. Left arm pain        Plan   Shoulder muscle strengthening exercises and weight reduction.  We will do an MRI to rule out left-sided thoracic outlet syndrome.  Dynamic left upper extremity subclavian Doppler with Adson test to rule out vascular compression.  Continue aspirin  Follow up in 1 month after the test results.          Pradip Christine MD,   [facility]  [place]

## 2024-01-15 ENCOUNTER — PATIENT MESSAGE (OUTPATIENT)
Dept: RHEUMATOLOGY | Facility: CLINIC | Age: 39
End: 2024-01-15
Payer: COMMERCIAL

## 2024-01-16 ENCOUNTER — HOSPITAL ENCOUNTER (OUTPATIENT)
Dept: CARDIOLOGY | Facility: HOSPITAL | Age: 39
Discharge: HOME | End: 2024-01-16
Attending: THORACIC SURGERY (CARDIOTHORACIC VASCULAR SURGERY)
Payer: COMMERCIAL

## 2024-01-16 VITALS — WEIGHT: 240 LBS | HEIGHT: 68 IN | BODY MASS INDEX: 36.37 KG/M2

## 2024-01-16 DIAGNOSIS — R20.2 PARESTHESIA OF LEFT ARM: ICD-10-CM

## 2024-01-16 LAB
UPPER ARTERIAL LEFT ARM BRACHIAL SYS MAX: 113 CM/S
UPPER ARTERIAL LEFT ARM RADIAL SYS MAX: 77 CM/S
UPPER ARTERIAL LEFT ARM SUBCLAVIAN SYS MAX: 139 CM/S

## 2024-01-16 PROCEDURE — 93931 UPPER EXTREMITY STUDY: CPT | Mod: LT

## 2024-01-16 PROCEDURE — 93931 UPPER EXTREMITY STUDY: CPT | Mod: 26,LT,, | Performed by: INTERNAL MEDICINE

## 2024-01-17 ENCOUNTER — OFFICE VISIT (OUTPATIENT)
Dept: RHEUMATOLOGY | Facility: CLINIC | Age: 39
End: 2024-01-17
Payer: COMMERCIAL

## 2024-01-17 DIAGNOSIS — E66.01 SEVERE OBESITY (BMI 35.0-35.9 WITH COMORBIDITY): ICD-10-CM

## 2024-01-17 DIAGNOSIS — R10.2 PELVIC PAIN: ICD-10-CM

## 2024-01-17 DIAGNOSIS — M79.7 FIBROMYALGIA: Primary | ICD-10-CM

## 2024-01-17 DIAGNOSIS — M51.36 DDD (DEGENERATIVE DISC DISEASE), LUMBAR: ICD-10-CM

## 2024-01-17 DIAGNOSIS — G56.22 CUBITAL TUNNEL SYNDROME ON LEFT: ICD-10-CM

## 2024-01-17 DIAGNOSIS — G89.29 CHRONIC LOW BACK PAIN, UNSPECIFIED BACK PAIN LATERALITY, UNSPECIFIED WHETHER SCIATICA PRESENT: ICD-10-CM

## 2024-01-17 DIAGNOSIS — Z98.84 HISTORY OF BARIATRIC SURGERY: ICD-10-CM

## 2024-01-17 DIAGNOSIS — M15.9 PRIMARY OSTEOARTHRITIS INVOLVING MULTIPLE JOINTS: ICD-10-CM

## 2024-01-17 DIAGNOSIS — Z87.442 HISTORY OF NEPHROLITHIASIS: ICD-10-CM

## 2024-01-17 DIAGNOSIS — E55.9 VITAMIN D INSUFFICIENCY: ICD-10-CM

## 2024-01-17 DIAGNOSIS — M54.50 CHRONIC LOW BACK PAIN, UNSPECIFIED BACK PAIN LATERALITY, UNSPECIFIED WHETHER SCIATICA PRESENT: ICD-10-CM

## 2024-01-17 PROCEDURE — 99215 OFFICE O/P EST HI 40 MIN: CPT | Mod: 95,,, | Performed by: INTERNAL MEDICINE

## 2024-01-17 NOTE — PROGRESS NOTES
The patient location is: [place]  The chief complaint leading to consultation is: pain    Visit type: audiovisual    Face to Face time with patient: 20  40 minutes of total time spent on the encounter, which includes face to face time and non-face to face time preparing to see the patient (eg, review of tests), Obtaining and/or reviewing separately obtained history, Documenting clinical information in the electronic or other health record, Independently interpreting results (not separately reported) and communicating results to the patient/family/caregiver, or Care coordination (not separately reported).         Each patient to whom he or she provides medical services by telemedicine is:  (1) informed of the relationship between the physician and patient and the respective role of any other health care provider with respect to management of the patient; and (2) notified that he or she may decline to receive medical services by telemedicine and may withdraw from such care at any time.       RHEUMATOLOGY OUTPATIENT CLINIC NOTE    1/17/2024    Attending Rheumatologist: Ari Valadez  Primary Care Provider/Physician Requesting Consultation: Ran Mcgill MD   Chief Complaint/Reason For Consultation:  No chief complaint on file.      Subjective:     Johanna Bullock is a 38 y.o. Black or  female with FMS, DDD    No acute complaints. Describes adequate response to LDN in terms of fatigue and peripheral arthralgias relief, currently 3mg per day.  No efficacy in terms of DDD related pain relief.    Review of Systems   Constitutional:  Negative for fever.   Eyes:  Negative for photophobia and pain.   Respiratory:  Negative for cough and shortness of breath.    Cardiovascular:  Negative for chest pain.   Gastrointestinal:  Negative for blood in stool and melena.   Genitourinary:  Negative for dysuria and hematuria.   Musculoskeletal:  Positive for back pain. Negative for joint pain.   Skin:  Negative for  rash.   Neurological:  Negative for focal weakness and weakness.     Chronic comorbid conditions affecting medical decision making today:  Past Medical History:   Diagnosis Date    Adjustment disorder with mixed anxiety and depressed mood 2/6/2013    Anemia, B12 deficiency     Anxiety     Major depression, single episode 2/6/2013    Major depression, single episode 2/6/2013    Post partum depression      Past Surgical History:   Procedure Laterality Date    CHOLECYSTECTOMY  10/2012    COLONOSCOPY N/A 2/14/2020    Procedure: COLONOSCOPY;  Surgeon: Jas Moore MD;  Location: [facility]  Service: Endoscopy;  Laterality: N/A;  Pt procedure time changed to 0800 with 0715 - second half prep completed from [id number]- Pt verbalized understanding- ERW2/13/20@1022    COLONOSCOPY N/A 3/10/2023    Procedure: COLONOSCOPY;  Surgeon: HORACE Moore MD;  Location: [facility]  Service: Endoscopy;  Laterality: N/A;  inst emailed-RB    DILATION AND CURETTAGE OF UTERUS      ESOPHAGOGASTRODUODENOSCOPY N/A 7/20/2018    Procedure: ESOPHAGOGASTRODUODENOSCOPY (EGD);  Surgeon: Darwin Hansen MD;  Location: [facility]  Service: Endoscopy;  Laterality: N/A;  Please measure pouch and limbs    GASTRIC BYPASS  2008    Revision August 2019    LAPAROSCOPIC REPAIR OF HIATAL HERNIA  2019    Liver scope  10/2012    LYSIS OF ADHESIONS       Family History   Problem Relation Age of Onset    Breast cancer Neg Hx     Colon cancer Neg Hx     Ovarian cancer Neg Hx     Diabetes Neg Hx     Hypertension Neg Hx     Stroke Neg Hx      Social History     Tobacco Use   Smoking Status Never   Smokeless Tobacco Never       Current Outpatient Medications:     acetaminophen (TYLENOL) 650 MG TbSR, Take 650 mg by mouth every 8 (eight) hours., Disp: , Rfl:     acetylcysteine (N-ACETYL-L-CYSTEINE MISC), by Misc.(Non-Drug; Combo Route) route., Disp: , Rfl:     albuterol (PROVENTIL/VENTOLIN HFA) 90 mcg/actuation inhaler, Inhale  1-2 puffs into the lungs every 6 (six) hours as needed for Wheezing., Disp: 6.7 g, Rfl: 0    cyanocobalamin 1,000 mcg/mL injection, ONE INJECTION AS DIRECTED EVERY 28 DAYS, Disp: 4 mL, Rfl: 3    fludrocortisone (FLORINEF) 0.1 mg Tab, Take 1 tablet (100 mcg total) by mouth once daily., Disp: 30 tablet, Rfl: 11    fluticasone propionate (FLONASE) 50 mcg/actuation nasal spray, 1 spray by Each Nostril route once daily., Disp: , Rfl:     linaCLOtide (LINZESS) 290 mcg Cap capsule, Take 1 capsule (290 mcg total) by mouth before breakfast., Disp: 90 capsule, Rfl: 3    LINZESS 290 mcg Cap capsule, TAKE ONE CAPSULE BY MOUTH EVERY DAY, Disp: 30 capsule, Rfl: 11    natrexone tablet 3 mg, Take 1 tablet (3 mg total) by mouth every evening. Start with half a tablet for the first 3 weeks, Disp: 90 tablet, Rfl: 1    tiZANidine 4 mg Cap, Take by mouth., Disp: , Rfl:     traMADoL (ULTRAM) 50 mg tablet, Take 50 mg by mouth every 6 (six) hours., Disp: , Rfl:      Objective:     There were no vitals filed for this visit.  Physical Exam   Eyes: Conjunctivae are normal.   Pulmonary/Chest: Effort normal. No respiratory distress.   Musculoskeletal:         General: No swelling or tenderness. Normal range of motion.   Neurological: She displays no weakness.   Skin: No rash noted.       Reviewed available old and all outside pertinent medical records available.    All lab results personally reviewed and interpreted by me.       ASSESSMENT      Encounter Diagnoses   Name Primary?    Cubital tunnel syndrome on left     Severe obesity (BMI 35.0-35.9 with comorbidity)     History of bariatric surgery     Fibromyalgia Yes    Pelvic pain     Vitamin D insufficiency     Chronic low back pain, unspecified back pain laterality, unspecified whether sciatica present     History of nephrolithiasis     Primary osteoarthritis involving multiple joints     DDD (degenerative disc disease), lumbar       PLAN     - adequate response to LDN, currently 3mg per  day.  Recurrent back pain w/ mechanical pain characteristics  - no synovitis, dactylitis, active inflammatory rashes.  - repeat GERA remains negative. Negative RA serologies.  Myomarker panel negative.  HLAB27 negative.  - MRI and bone scan reported w/o evidence of acute/chronic inflammatory arthritis.   DDD lower spine present  - no rheumatic indication for immunosupprion  - refers interval biopsy of mass on left jaw, recommend faxing biopsy report (IgG4, sarcoidosis?)  - trial of increase LDN to 8mg per day      Ari Valadez M.D.

## 2024-01-23 ENCOUNTER — TELEPHONE (OUTPATIENT)
Dept: ORTHOPEDICS | Facility: CLINIC | Age: 39
End: 2024-01-23
Payer: COMMERCIAL

## 2024-01-23 DIAGNOSIS — G56.22 CUBITAL TUNNEL SYNDROME ON LEFT: ICD-10-CM

## 2024-01-23 DIAGNOSIS — M25.531 BILATERAL WRIST PAIN: Primary | ICD-10-CM

## 2024-01-23 DIAGNOSIS — M25.532 BILATERAL WRIST PAIN: Primary | ICD-10-CM

## 2024-01-23 DIAGNOSIS — S54.02XA NEURAPRAXIA OF LEFT ULNAR NERVE, INITIAL ENCOUNTER: ICD-10-CM

## 2024-01-23 DIAGNOSIS — M25.522 LEFT ELBOW PAIN: Primary | ICD-10-CM

## 2024-01-23 DIAGNOSIS — R29.898 WEAKNESS OF LEFT HAND: ICD-10-CM

## 2024-01-23 DIAGNOSIS — R20.2 PARESTHESIA OF LEFT ARM: ICD-10-CM

## 2024-01-23 NOTE — TELEPHONE ENCOUNTER
Spoke to the patient as well as Dr. Larose and he review her chart an stated that she will benefit from an MRI of her Elbow an not of her chest as also to have her follow up with Dr. Cain due to her needing to have someone follow her for quite sometime after she finally does have surgery an he stated that he will eventually be retiring. The patient verbalized all understandings        ----- Message from Andrea Patricia sent at 1/23/2024  9:38 AM CST -----  Contact: [phone]  Pt is calling needing to know what are the next steps regarding hand . Please call back at [phone]. Thanksdj

## 2024-01-30 ENCOUNTER — OFFICE VISIT (OUTPATIENT)
Dept: ORTHOPEDICS | Facility: CLINIC | Age: 39
End: 2024-01-30
Payer: COMMERCIAL

## 2024-01-30 ENCOUNTER — PATIENT MESSAGE (OUTPATIENT)
Dept: ORTHOPEDICS | Facility: CLINIC | Age: 39
End: 2024-01-30

## 2024-01-30 VITALS — HEIGHT: 68 IN | BODY MASS INDEX: 36.38 KG/M2 | WEIGHT: 240.06 LBS

## 2024-01-30 DIAGNOSIS — G54.0 THORACIC OUTLET SYNDROME OF LEFT THORACIC OUTLET: Primary | ICD-10-CM

## 2024-01-30 PROCEDURE — 99499 UNLISTED E&M SERVICE: CPT | Mod: S$GLB,,, | Performed by: STUDENT IN AN ORGANIZED HEALTH CARE EDUCATION/TRAINING PROGRAM

## 2024-01-30 PROCEDURE — 99999 PR PBB SHADOW E&M-EST. PATIENT-LVL III: CPT | Mod: PBBFAC,,, | Performed by: STUDENT IN AN ORGANIZED HEALTH CARE EDUCATION/TRAINING PROGRAM

## 2024-01-30 NOTE — PROGRESS NOTES
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  Symptoms have taken place for 7-1/2 months, ever since patient had an IV infiltrate injury in June of 2023.  During the incident, patient developed significant swelling throughout her elbow and her upper arm.  She has had all of these symptoms ever since.  Previous workup has included evaluation by Cardiothoracic surgery for thoracic outlet syndrome.  She has had issues with the left upper extremity feeling cold.  Three-phase bone scan has been performed and was found to be normal.  Electrodiagnostic studies were performed in December with the results reviewed below.  Left upper arterial Doppler ultrasound was performed on 01/11/2024 and the left subclavian, brachial, and radial arteries were found to have triphasic flow.  Patient has previously completed 3 weeks of physical therapy.  She states that she has soreness throughout her elbow and upper arm.  She states that her ring and small fingers twitch and will sometimes feel numb.  She states that she has pain all the time, particularly consisting of an aching sensation.    Review of Systems  Review of systems negative for chest pain, shortness of breath, fevers, chills, nausea/vomiting.    Past Medical History  Past Medical History:   Diagnosis Date    Adjustment disorder with mixed anxiety and depressed mood 2/6/2013    Anemia, B12 deficiency     Anxiety     Major depression, single episode 2/6/2013    Major depression, single episode 2/6/2013    Post partum depression        Past Surgical History  Past Surgical History:   Procedure Laterality Date    CHOLECYSTECTOMY  10/2012    COLONOSCOPY N/A  2/14/2020    Procedure: COLONOSCOPY;  Surgeon: Jas Moore MD;  Location: [facility] (4TH F[facility]);  Service: Endoscopy;  Laterality: N/A;  Pt procedure time changed to 0800 with 0715 - second half prep completed from [id number]- Pt verbalized understanding- ERW2/13/20@1022    COLONOSCOPY N/A 3/10/2023    Procedure: COLONOSCOPY;  Surgeon: HORACE Moore MD;  Location: [facility] (4TH FLR);  Service: Endoscopy;  Laterality: N/A;  inst emailed-RB    DILATION AND CURETTAGE OF UTERUS      ESOPHAGOGASTRODUODENOSCOPY N/A 7/20/2018    Procedure: ESOPHAGOGASTRODUODENOSCOPY (EGD);  Surgeon: Darwin Hansen MD;  Location: [facility] (4TH FLR);  Service: Endoscopy;  Laterality: N/A;  Please measure pouch and limbs    GASTRIC BYPASS  2008    Revision August 2019    LAPAROSCOPIC REPAIR OF HIATAL HERNIA  2019    Liver scope  10/2012    LYSIS OF ADHESIONS         Allergies  Review of patient's allergies indicates:  No Known Allergies    Family History  Family History   Problem Relation Age of Onset    Breast cancer Neg Hx     Colon cancer Neg Hx     Ovarian cancer Neg Hx     Diabetes Neg Hx     Hypertension Neg Hx     Stroke Neg Hx        Social History  Social History     Socioeconomic History    Marital status: Single   Occupational History    Occupation: works as      Employer: [employer]     Employer: [employer]   Tobacco Use    Smoking status: Never    Smokeless tobacco: Never   Substance and Sexual Activity    Alcohol use: Never     Comment: Alcohol use rarely    Drug use: No     Comment: Mirena    Sexual activity: Yes     Partners: Male     Birth control/protection: I.U.D.   Social History Narrative    Born and raised in [place]    Went to [employer] and got BS in bio with minor in chem    1 child    Has boyfriend    A fewnot close friends    Likes to read and go to movies     Social Determinants of Health     Stress: No Stress Concern Present (9/26/2019)    [place] [place] [employer] -  Occupational Stress Questionnaire     Feeling of Stress : Not at all       Vital Signs  There were no vitals filed for this visit.    Physical Exam  Constitutional: Appears well-developed and well-nourished. No distress.   HENT:   Head: Normocephalic.   Eyes: EOM are normal.   Pulmonary/Chest: Effort normal.   Neurological: Oriented to person, place, and time.   Psychiatric: Normal mood and affect.     Left Upper Extremity:  No abrasions, lacerations, wounds.  No swelling.  No erythema.  Patient has full elbow flexion and extension.  Patient has shoulder abduction to 170°, shoulder flexion to 170°.  Negative Tinel's in the ulnar nerve distribution at the elbow.  No ulnar nerve subluxation with elbow flexion.  Negative Tinel's in the median and ulnar nerve distributions at the wrist.  Negative Phalen's.  Two-point discrimination is 5 mm in all 5 fingers.  No thenar or FDI atrophy.  5/5 apb strength.  5/5 FDI strength.  Patient experiences twitching movements when she attempts to flex her ring and small fingers but she is ultimately may able to make a fist.  No reproduction of pain or paresthesias with superficial clavicular pressure test.  Patient experiences pain with Adson's test, no loss of radial pulse.  Normal Cary test.    Imaging  Left elbow x-rays five views were obtained on 06/27/2023 and independently reviewed by myself.  No evidence of arthritic changes at the radiocapitellar or ulnohumeral articulations.  No fracture.  No dislocation or subluxation.  No foreign body.    Electrodiagnostic studies were reviewed by me today that were performed on 12/13/23. The Left median DSL was 3.5 ms and the right was 3.1 ms. The Left median DML was 2.6 ms and the right was 2.5 ms. Left Ulnar velocity across elbow was 55 m/s and Right was 70 m/s.  EMG was normal.  Performing physician interpretation: There is electrodiagnostic evidence of a mild demyelinating ulnar neuropathy (Cubital tunnel syndrome) across the left  elbow.  After ulnar inching, irritation localized between 2-3cm segment proximal to the elbow.  There was no evidence of a cervical radiculopathy of the C5-T1 nerve roots     Assessment and Plan  38-year-old right-hand dominant female presents with left elbow and arm pain after an IV infiltrate in June of 2023.  The pain is aching in quality and occurs all the time.  She does experience numbness in the ring and small fingers.  Her ulnar velocity across the elbow is greater than 50 m/sec and she does not have any positive findings for cubital tunnel on physical exam when I evaluate her today.  I do not know if these symptoms are related to thoracic outlet syndrome versus irritation of all his nerves in her upper arm after the IV on full trach as they continue to recover.  I discussed with her that I think the best next step for her is to complete a full course of therapy which she has not done up to this point.  Patient became very frustrated with this plan.  She stated that she was referred here to have her cubital tunnel release.  Unfortunately, I do not know that this will help her symptoms and I think that this is not the best next step from my perspective.  She was going to return to see Dr. Larose tomorrow to discuss further treatment options.    Lena Cain MD  Orthopaedic Hand Surgery

## 2024-01-31 ENCOUNTER — OFFICE VISIT (OUTPATIENT)
Dept: ORTHOPEDICS | Facility: CLINIC | Age: 39
End: 2024-01-31
Payer: COMMERCIAL

## 2024-01-31 VITALS — HEIGHT: 68 IN | WEIGHT: 240 LBS | BODY MASS INDEX: 36.37 KG/M2

## 2024-01-31 DIAGNOSIS — G56.22 CUBITAL TUNNEL SYNDROME ON LEFT: Primary | ICD-10-CM

## 2024-01-31 PROCEDURE — 3008F BODY MASS INDEX DOCD: CPT | Mod: CPTII,S$GLB,, | Performed by: ORTHOPAEDIC SURGERY

## 2024-01-31 PROCEDURE — 99213 OFFICE O/P EST LOW 20 MIN: CPT | Mod: PBBFAC | Performed by: ORTHOPAEDIC SURGERY

## 2024-01-31 PROCEDURE — 1159F MED LIST DOCD IN RCRD: CPT | Mod: CPTII,S$GLB,, | Performed by: ORTHOPAEDIC SURGERY

## 2024-01-31 PROCEDURE — 99214 OFFICE O/P EST MOD 30 MIN: CPT | Mod: S$GLB,,, | Performed by: ORTHOPAEDIC SURGERY

## 2024-01-31 PROCEDURE — 1160F RVW MEDS BY RX/DR IN RCRD: CPT | Mod: CPTII,S$GLB,, | Performed by: ORTHOPAEDIC SURGERY

## 2024-01-31 PROCEDURE — 99999 PR PBB SHADOW E&M-EST. PATIENT-LVL III: CPT | Mod: PBBFAC,,, | Performed by: ORTHOPAEDIC SURGERY

## 2024-01-31 NOTE — H&P (VIEW-ONLY)
Subjective:     Patient ID: Johanna Bullock is a 38 y.o. female.    Chief Complaint: Pain of the Left Elbow      HPI:  The patient is a 38-year-old female with left cubital tunnel syndrome documented by nerve conduction studies.  She had an infusion incident and is awaiting MRI scan today.  She wishes to have a left ulnar nerve decompression and cubital tunnel release with possible anterior transposition ulnar nerve.    Past Medical History:   Diagnosis Date    Adjustment disorder with mixed anxiety and depressed mood 2/6/2013    Anemia, B12 deficiency     Anxiety     Major depression, single episode 2/6/2013    Major depression, single episode 2/6/2013    Post partum depression      Past Surgical History:   Procedure Laterality Date    CHOLECYSTECTOMY  10/2012    COLONOSCOPY N/A 2/14/2020    Procedure: COLONOSCOPY;  Surgeon: Jas Moore MD;  [facility]  Service: Endoscopy;  Laterality: N/A;  Pt procedure time changed to 0800 with 0715 - second half prep completed from [id number]- Pt verbalized understanding- ERW2/13/20@1022    COLONOSCOPY N/A 3/10/2023    Procedure: COLONOSCOPY;  Surgeon: HORACE Moore MD;  Location: [facility] (4TH FLR);  Service: Endoscopy;  Laterality: N/A;  inst emailed-RB    DILATION AND CURETTAGE OF UTERUS      ESOPHAGOGASTRODUODENOSCOPY N/A 7/20/2018    Procedure: ESOPHAGOGASTRODUODENOSCOPY (EGD);  Surgeon: Darwin Hansen MD;  Location: [facility] ([facility] FLR);  Service: Endoscopy;  Laterality: N/A;  Please measure pouch and limbs    GASTRIC BYPASS  2008    Revision August 2019    LAPAROSCOPIC REPAIR OF HIATAL HERNIA  2019    Liver scope  10/2012    LYSIS OF ADHESIONS       Family History   Problem Relation Age of Onset    Breast cancer Neg Hx     Colon cancer Neg Hx     Ovarian cancer Neg Hx     Diabetes Neg Hx     Hypertension Neg Hx     Stroke Neg Hx      Social History     Socioeconomic History    Marital status: Single   Occupational History     Occupation: works as      Employer: [employer]     Employer: [employer]   Tobacco Use    Smoking status: Never    Smokeless tobacco: Never   Substance and Sexual Activity    Alcohol use: Never     Comment: Alcohol use rarely    Drug use: No     Comment: Mirena    Sexual activity: Yes     Partners: Male     Birth control/protection: I.U.D.   Social History Narrative    Born [place] raised in [place]    Went to [employer] and got BS in bio with minor in chem    1 child    Has boyfriend    A fewnot close friends    Likes to read and go to movies     Social Determinants of Health     Stress: No Stress Concern Present (9/26/2019)    [facility] of Occupational Health - Occupational Stress Questionnaire     Feeling of Stress : Not at all     Medication List with Changes/Refills   Current Medications    ACETAMINOPHEN (TYLENOL) 650 MG TBSR    Take 650 mg by mouth every 8 (eight) hours.    ACETYLCYSTEINE (N-ACETYL-L-CYSTEINE MISC)    by Misc.(Non-Drug; Combo Route) route.    ALBUTEROL (PROVENTIL/VENTOLIN HFA) 90 MCG/ACTUATION INHALER    Inhale 1-2 puffs into the lungs every 6 (six) hours as needed for Wheezing.    CYANOCOBALAMIN 1,000 MCG/ML INJECTION    ONE INJECTION AS DIRECTED EVERY 28 DAYS    FLUDROCORTISONE (FLORINEF) 0.1 MG TAB    Take 1 tablet (100 mcg total) by mouth once daily.    FLUTICASONE PROPIONATE (FLONASE) 50 MCG/ACTUATION NASAL SPRAY    1 spray by Each Nostril route once daily.    LINACLOTIDE (LINZESS) 290 MCG CAP CAPSULE    Take 1 capsule (290 mcg total) by mouth before breakfast.    LINZESS 290 MCG CAP CAPSULE    TAKE ONE CAPSULE BY MOUTH EVERY DAY    NATREXONE TABLET 3 MG    Take 2 tablets (6 mg total) by mouth every evening. Do not take with opitates    TIZANIDINE 4 MG CAP    Take by mouth.    TRAMADOL (ULTRAM) 50 MG TABLET    Take 50 mg by mouth every 6 (six) hours.     Review of patient's allergies indicates:  No Known Allergies  Review of Systems   Constitutional: Negative for  malaise/fatigue.   HENT:  Negative for hearing loss.    Eyes:  Negative for double vision and visual disturbance.   Cardiovascular:  Negative for chest pain.   Respiratory:  Negative for shortness of breath.    Endocrine: Negative for cold intolerance.   Hematologic/Lymphatic: Does not bruise/bleed easily.   Skin:  Negative for poor wound healing and suspicious lesions.   Musculoskeletal:  Negative for gout, joint pain and joint swelling.   Gastrointestinal:  Negative for nausea and vomiting.   Genitourinary:  Positive for pelvic pain. Negative for dysuria.   Neurological:  Positive for focal weakness, numbness, paresthesias and sensory change.   Psychiatric/Behavioral:  Negative for depression, memory loss and substance abuse. The patient is not nervous/anxious.    Allergic/Immunologic: Negative for persistent infections.       Objective:   Body mass index is 36.49 kg/m².  There were no vitals filed for this visit.             General    Constitutional: She is oriented to person, place, and time. She appears well-developed and well-nourished. No distress.   HENT:   Head: Normocephalic.   Mouth/Throat: Oropharynx is clear and moist.   Eyes: EOM are normal.   Cardiovascular:  Normal rate.            Pulmonary/Chest: Effort normal.   Abdominal: Soft.   Neurological: She is alert and oriented to person, place, and time. No cranial nerve deficit.   Psychiatric: She has a normal mood and affect.         Left Hand/Wrist Exam     Inspection   Scars: Wrist - absent Hand -  absent  Effusion: Wrist - absent Hand -  absent    Pain   Wrist - The patient exhibits pain of the ulnar nerve.    Tests   Cubital Tunnel Compression Test: positive    Atrophy  Thenar:  Negative  Intrinsic: negative  1st Dorsal Interosseous:  negative    Other     Sensory Exam  Median Distribution: normal  Ulnar Distribution: normal  Radial Distribution: abnormal    Comments:  The patient has a positive Tinel sign about 2-3 inches proximal to the cubital  tunnel with ulnar nerve paresthesias.  There is no intrinsic atrophy noted.      Left Elbow Exam     Tests   Tinel's sign (cubital tunnel): positive        Vascular Exam       Capillary Refill  Left Hand: normal capillary refill          Relevant imaging results reviewed and interpreted by me, discussed with the patient and / or family today MRI scan is pending later today  Assessment:     Encounter Diagnosis   Name Primary?    Cubital tunnel syndrome on left Yes        Plan:     The patient was counseled regarding a ulnar nerve decompression left elbow with cubital tunnel release and possible anterior transposition ulnar nerve.  Risk complications and alternatives were discussed including the risk of infection, anesthetic risk, injury to nerves and vessels, loss of motion, possible need for additional surgeries were discussed.  She seems to understand and agree to that surgery.  All questions were answered.                Disclaimer: This note was prepared using a voice recognition system and is likely to have sound alike errors within the text.

## 2024-01-31 NOTE — PROGRESS NOTES
Subjective:     Patient ID: Johanna Bullock is a 38 y.o. female.    Chief Complaint: Pain of the Left Elbow      HPI:  The patient is a 38-year-old female with left cubital tunnel syndrome documented by nerve conduction studies.  She had an infusion incident and is awaiting MRI scan today.  She wishes to have a left ulnar nerve decompression and cubital tunnel release with possible anterior transposition ulnar nerve.    Past Medical History:   Diagnosis Date    Adjustment disorder with mixed anxiety and depressed mood 2/6/2013    Anemia, B12 deficiency     Anxiety     Major depression, single episode 2/6/2013    Major depression, single episode 2/6/2013    Post partum depression      Past Surgical History:   Procedure Laterality Date    CHOLECYSTECTOMY  10/2012    COLONOSCOPY N/A 2/14/2020    Procedure: COLONOSCOPY;  Surgeon: Jas Moore MD;  [facility]  Service: Endoscopy;  Laterality: N/A;  Pt procedure time changed to 0800 with 0715 - second half prep completed from [id number]- Pt verbalized understanding- ERW2/13/20@1022    COLONOSCOPY N/A 3/10/2023    Procedure: COLONOSCOPY;  Surgeon: HORACE Moore MD;  Location: [facility] (4TH FLR);  Service: Endoscopy;  Laterality: N/A;  inst emailed-RB    DILATION AND CURETTAGE OF UTERUS      ESOPHAGOGASTRODUODENOSCOPY N/A 7/20/2018    Procedure: ESOPHAGOGASTRODUODENOSCOPY (EGD);  Surgeon: Darwin Hansen MD;  Location: [facility] ([facility] FLR);  Service: Endoscopy;  Laterality: N/A;  Please measure pouch and limbs    GASTRIC BYPASS  2008    Revision August 2019    LAPAROSCOPIC REPAIR OF HIATAL HERNIA  2019    Liver scope  10/2012    LYSIS OF ADHESIONS       Family History   Problem Relation Age of Onset    Breast cancer Neg Hx     Colon cancer Neg Hx     Ovarian cancer Neg Hx     Diabetes Neg Hx     Hypertension Neg Hx     Stroke Neg Hx      Social History     Socioeconomic History    Marital status: Single   Occupational History     Occupation: works as      Employer: [employer]     Employer: [employer]   Tobacco Use    Smoking status: Never    Smokeless tobacco: Never   Substance and Sexual Activity    Alcohol use: Never     Comment: Alcohol use rarely    Drug use: No     Comment: Mirena    Sexual activity: Yes     Partners: Male     Birth control/protection: I.U.D.   Social History Narrative    Born and raised in [place]    Went to [employer] and got BS in bio with minor in chem    1 child    Has boyfriend    A fewnot close friends    Likes to read and go to movies     Social Determinants of Health     Stress: No Stress Concern Present (9/26/2019)    [facility] of Occupational Health - Occupational Stress Questionnaire     Feeling of Stress : Not at all     Medication List with Changes/Refills   Current Medications    ACETAMINOPHEN (TYLENOL) 650 MG TBSR    Take 650 mg by mouth every 8 (eight) hours.    ACETYLCYSTEINE (N-ACETYL-L-CYSTEINE MISC)    by Misc.(Non-Drug; Combo Route) route.    ALBUTEROL (PROVENTIL/VENTOLIN HFA) 90 MCG/ACTUATION INHALER    Inhale 1-2 puffs into the lungs every 6 (six) hours as needed for Wheezing.    CYANOCOBALAMIN 1,000 MCG/ML INJECTION    ONE INJECTION AS DIRECTED EVERY 28 DAYS    FLUDROCORTISONE (FLORINEF) 0.1 MG TAB    Take 1 tablet (100 mcg total) by mouth once daily.    FLUTICASONE PROPIONATE (FLONASE) 50 MCG/ACTUATION NASAL SPRAY    1 spray by Each Nostril route once daily.    LINACLOTIDE (LINZESS) 290 MCG CAP CAPSULE    Take 1 capsule (290 mcg total) by mouth before breakfast.    LINZESS 290 MCG CAP CAPSULE    TAKE ONE CAPSULE BY MOUTH EVERY DAY    NATREXONE TABLET 3 MG    Take 2 tablets (6 mg total) by mouth every evening. Do not take with opitates    TIZANIDINE 4 MG CAP    Take by mouth.    TRAMADOL (ULTRAM) 50 MG TABLET    Take 50 mg by mouth every 6 (six) hours.     Review of patient's allergies indicates:  No Known Allergies  Review of Systems   Constitutional: Negative for  malaise/fatigue.   HENT:  Negative for hearing loss.    Eyes:  Negative for double vision and visual disturbance.   Cardiovascular:  Negative for chest pain.   Respiratory:  Negative for shortness of breath.    Endocrine: Negative for cold intolerance.   Hematologic/Lymphatic: Does not bruise/bleed easily.   Skin:  Negative for poor wound healing and suspicious lesions.   Musculoskeletal:  Negative for gout, joint pain and joint swelling.   Gastrointestinal:  Negative for nausea and vomiting.   Genitourinary:  Positive for pelvic pain. Negative for dysuria.   Neurological:  Positive for focal weakness, numbness, paresthesias and sensory change.   Psychiatric/Behavioral:  Negative for depression, memory loss and substance abuse. The patient is not nervous/anxious.    Allergic/Immunologic: Negative for persistent infections.       Objective:   Body mass index is 36.49 kg/m².  There were no vitals filed for this visit.             General    Constitutional: She is oriented to person, place, and time. She appears well-developed and well-nourished. No distress.   HENT:   Head: Normocephalic.   Mouth/Throat: Oropharynx is clear and moist.   Eyes: EOM are normal.   Cardiovascular:  Normal rate.            Pulmonary/Chest: Effort normal.   Abdominal: Soft.   Neurological: She is alert and oriented to person, place, and time. No cranial nerve deficit.   Psychiatric: She has a normal mood and affect.         Left Hand/Wrist Exam     Inspection   Scars: Wrist - absent Hand -  absent  Effusion: Wrist - absent Hand -  absent    Pain   Wrist - The patient exhibits pain of the ulnar nerve.    Tests   Cubital Tunnel Compression Test: positive    Atrophy  Thenar:  Negative  Intrinsic: negative  1st Dorsal Interosseous:  negative    Other     Sensory Exam  Median Distribution: normal  Ulnar Distribution: normal  Radial Distribution: abnormal    Comments:  The patient has a positive Tinel sign about 2-3 inches proximal to the cubital  tunnel with ulnar nerve paresthesias.  There is no intrinsic atrophy noted.      Left Elbow Exam     Tests   Tinel's sign (cubital tunnel): positive        Vascular Exam       Capillary Refill  Left Hand: normal capillary refill          Relevant imaging results reviewed and interpreted by me, discussed with the patient and / or family today MRI scan is pending later today  Assessment:     Encounter Diagnosis   Name Primary?    Cubital tunnel syndrome on left Yes        Plan:     The patient was counseled regarding a ulnar nerve decompression left elbow with cubital tunnel release and possible anterior transposition ulnar nerve.  Risk complications and alternatives were discussed including the risk of infection, anesthetic risk, injury to nerves and vessels, loss of motion, possible need for additional surgeries were discussed.  She seems to understand and agree to that surgery.  All questions were answered.                Disclaimer: This note was prepared using a voice recognition system and is likely to have sound alike errors within the text.

## 2024-02-01 ENCOUNTER — HOSPITAL ENCOUNTER (OUTPATIENT)
Dept: RADIOLOGY | Facility: HOSPITAL | Age: 39
Discharge: HOME | End: 2024-02-01
Attending: ORTHOPAEDIC SURGERY
Payer: COMMERCIAL

## 2024-02-01 DIAGNOSIS — S54.02XA NEURAPRAXIA OF LEFT ULNAR NERVE, INITIAL ENCOUNTER: ICD-10-CM

## 2024-02-01 DIAGNOSIS — R29.898 WEAKNESS OF LEFT HAND: ICD-10-CM

## 2024-02-01 DIAGNOSIS — R20.2 PARESTHESIA OF LEFT ARM: ICD-10-CM

## 2024-02-01 DIAGNOSIS — G56.22 CUBITAL TUNNEL SYNDROME ON LEFT: ICD-10-CM

## 2024-02-01 DIAGNOSIS — M25.522 LEFT ELBOW PAIN: ICD-10-CM

## 2024-02-01 PROCEDURE — 73221 MRI JOINT UPR EXTREM W/O DYE: CPT | Mod: TC,LT

## 2024-02-01 PROCEDURE — 73221 MRI JOINT UPR EXTREM W/O DYE: CPT | Mod: 26,LT,, | Performed by: RADIOLOGY

## 2024-02-02 DIAGNOSIS — Z01.818 PREOP TESTING: ICD-10-CM

## 2024-02-02 DIAGNOSIS — G56.22 CUBITAL TUNNEL SYNDROME ON LEFT: Primary | ICD-10-CM

## 2024-02-02 DIAGNOSIS — S54.02XA NEURAPRAXIA OF LEFT ULNAR NERVE, INITIAL ENCOUNTER: ICD-10-CM

## 2024-02-02 DIAGNOSIS — R20.2 PARESTHESIA OF LEFT ARM: ICD-10-CM

## 2024-02-02 DIAGNOSIS — R29.898 WEAKNESS OF LEFT HAND: ICD-10-CM

## 2024-02-02 RX ORDER — PANTOPRAZOLE SODIUM 40 MG/1
1 TABLET, DELAYED RELEASE ORAL EVERY MORNING
COMMUNITY
Start: 2024-01-24 | End: 2024-05-15

## 2024-02-02 RX ORDER — GABAPENTIN 300 MG/1
300 CAPSULE ORAL 3 TIMES DAILY
COMMUNITY
Start: 2023-12-28 | End: 2024-05-15

## 2024-02-02 RX ORDER — KETOROLAC TROMETHAMINE 10 MG/1
TABLET, FILM COATED ORAL
COMMUNITY
Start: 2024-01-08

## 2024-02-08 ENCOUNTER — LAB VISIT (OUTPATIENT)
Dept: LAB | Facility: HOSPITAL | Age: 39
End: 2024-02-08
Attending: ORTHOPAEDIC SURGERY
Payer: COMMERCIAL

## 2024-02-08 ENCOUNTER — TELEPHONE (OUTPATIENT)
Dept: PREADMISSION TESTING | Facility: HOSPITAL | Age: 39
End: 2024-02-08
Payer: COMMERCIAL

## 2024-02-08 ENCOUNTER — PATIENT MESSAGE (OUTPATIENT)
Dept: PREADMISSION TESTING | Facility: HOSPITAL | Age: 39
End: 2024-02-08
Payer: COMMERCIAL

## 2024-02-08 DIAGNOSIS — Z01.818 PREOP TESTING: ICD-10-CM

## 2024-02-08 LAB
ALBUMIN SERPL BCP-MCNC: 3.6 G/DL (ref 3.5–5.2)
ALP SERPL-CCNC: 61 U/L (ref 55–135)
ALT SERPL W/O P-5'-P-CCNC: 12 U/L (ref 10–44)
ANION GAP SERPL CALC-SCNC: 7 MMOL/L (ref 8–16)
AST SERPL-CCNC: 15 U/L (ref 10–40)
BASOPHILS # BLD AUTO: 0.06 K/UL (ref 0–0.2)
BASOPHILS NFR BLD: 1.1 % (ref 0–1.9)
BILIRUB SERPL-MCNC: 0.3 MG/DL (ref 0.1–1)
BUN SERPL-MCNC: 9 MG/DL (ref 6–20)
CALCIUM SERPL-MCNC: 9.2 MG/DL (ref 8.7–10.5)
CHLORIDE SERPL-SCNC: 109 MMOL/L (ref 95–110)
CO2 SERPL-SCNC: 20 MMOL/L (ref 23–29)
CREAT SERPL-MCNC: 0.8 MG/DL (ref 0.5–1.4)
DIFFERENTIAL METHOD BLD: ABNORMAL
EOSINOPHIL # BLD AUTO: 0.1 K/UL (ref 0–0.5)
EOSINOPHIL NFR BLD: 1.7 % (ref 0–8)
ERYTHROCYTE [DISTWIDTH] IN BLOOD BY AUTOMATED COUNT: 14.9 % (ref 11.5–14.5)
EST. GFR  (NO RACE VARIABLE): >60 ML/MIN/1.73 M^2
GLUCOSE SERPL-MCNC: 72 MG/DL (ref 70–110)
HCT VFR BLD AUTO: 36.5 % (ref 37–48.5)
HGB BLD-MCNC: 11.9 G/DL (ref 12–16)
IMM GRANULOCYTES # BLD AUTO: 0.02 K/UL (ref 0–0.04)
IMM GRANULOCYTES NFR BLD AUTO: 0.4 % (ref 0–0.5)
LYMPHOCYTES # BLD AUTO: 2.1 K/UL (ref 1–4.8)
LYMPHOCYTES NFR BLD: 39.1 % (ref 18–48)
MCH RBC QN AUTO: 28.4 PG (ref 27–31)
MCHC RBC AUTO-ENTMCNC: 32.6 G/DL (ref 32–36)
MCV RBC AUTO: 87 FL (ref 82–98)
MONOCYTES # BLD AUTO: 0.5 K/UL (ref 0.3–1)
MONOCYTES NFR BLD: 10.1 % (ref 4–15)
NEUTROPHILS # BLD AUTO: 2.5 K/UL (ref 1.8–7.7)
NEUTROPHILS NFR BLD: 47.6 % (ref 38–73)
NRBC BLD-RTO: 0 /100 WBC
PLATELET # BLD AUTO: 356 K/UL (ref 150–450)
PMV BLD AUTO: 10.3 FL (ref 9.2–12.9)
POTASSIUM SERPL-SCNC: 4.2 MMOL/L (ref 3.5–5.1)
PROT SERPL-MCNC: 7.1 G/DL (ref 6–8.4)
RBC # BLD AUTO: 4.19 M/UL (ref 4–5.4)
SODIUM SERPL-SCNC: 136 MMOL/L (ref 136–145)
WBC # BLD AUTO: 5.24 K/UL (ref 3.9–12.7)

## 2024-02-08 PROCEDURE — 80053 COMPREHEN METABOLIC PANEL: CPT | Performed by: ORTHOPAEDIC SURGERY

## 2024-02-08 PROCEDURE — 85025 COMPLETE CBC W/AUTO DIFF WBC: CPT | Performed by: ORTHOPAEDIC SURGERY

## 2024-02-08 PROCEDURE — 36415 COLL VENOUS BLD VENIPUNCTURE: CPT | Performed by: ORTHOPAEDIC SURGERY

## 2024-02-08 RX ORDER — MODAFINIL 200 MG/1
200 TABLET ORAL DAILY
COMMUNITY

## 2024-02-08 NOTE — TELEPHONE ENCOUNTER
During PAT call, patient stated that she has had blurry vision intermittently this week. Denies chest pain or headache. Unsure of blood pressure. Informed pt to follow up with PCP prior to surgery. Pt v/u. DOS 02/15/24

## 2024-02-12 ENCOUNTER — TELEPHONE (OUTPATIENT)
Dept: PREADMISSION TESTING | Facility: HOSPITAL | Age: 39
End: 2024-02-12
Payer: COMMERCIAL

## 2024-02-12 ENCOUNTER — ANESTHESIA EVENT (OUTPATIENT)
Dept: SURGERY | Facility: HOSPITAL | Age: 39
End: 2024-02-12
Payer: COMMERCIAL

## 2024-02-12 NOTE — ANESTHESIA PREPROCEDURE EVALUATION
02/12/2024  Johanna Bullock is a 38 y.o., female.      Pre-op Assessment    I have reviewed the Patient Summary Reports.    I have reviewed the NPO Status.   I have reviewed the Medications.     Review of Systems  Anesthesia Hx:   History of prior surgery of interest to airway management or planning:            Denies Personal Hx of Anesthesia complications.                    Cardiovascular:  Cardiovascular Normal                                            Pulmonary:  Pulmonary Normal                       Renal/:  Renal/ Normal                 Hepatic/GI:     GERD             Psych:    depression                Physical Exam  General: Well nourished    Airway:  Mallampati: II   Mouth Opening: Normal  TM Distance: Normal  Tongue: Normal  Neck ROM: Normal ROM    Dental:  Intact        Anesthesia Plan  Type of Anesthesia, risks & benefits discussed:    Anesthesia Type: Gen Supraglottic Airway, Gen Natural Airway  Intra-op Monitoring Plan: Standard ASA Monitors  Post Op Pain Control Plan: multimodal analgesia and IV/PO Opioids PRN  Induction:  IV  Informed Consent: Informed consent signed with the Patient and all parties understand the risks and agree with anesthesia plan.  All questions answered. Patient consented to blood products? No  ASA Score: 2  Day of Surgery Review of History & Physical: H&P Update referred to the surgeon/provider.    Ready For Surgery From Anesthesia Perspective.     .

## 2024-02-12 NOTE — TELEPHONE ENCOUNTER
Spoke with patient to follow up on symptoms. PT denies blurry vision, stated /74 this morning, runny nose/congestion, no fever, no cough. Informed pt to notify MD if symptoms worsen. Pt v/u.

## 2024-02-14 ENCOUNTER — PATIENT MESSAGE (OUTPATIENT)
Dept: PREADMISSION TESTING | Facility: HOSPITAL | Age: 39
End: 2024-02-14
Payer: COMMERCIAL

## 2024-02-15 ENCOUNTER — HOSPITAL ENCOUNTER (OUTPATIENT)
Facility: HOSPITAL | Age: 39
Discharge: HOME | End: 2024-02-15
Attending: ORTHOPAEDIC SURGERY | Admitting: ORTHOPAEDIC SURGERY
Payer: COMMERCIAL

## 2024-02-15 ENCOUNTER — ANESTHESIA (OUTPATIENT)
Dept: SURGERY | Facility: HOSPITAL | Age: 39
End: 2024-02-15
Payer: COMMERCIAL

## 2024-02-15 DIAGNOSIS — G56.22 CUBITAL TUNNEL SYNDROME ON LEFT: Primary | ICD-10-CM

## 2024-02-15 LAB
B-HCG UR QL: NEGATIVE
CTP QC/QA: YES

## 2024-02-15 PROCEDURE — 71000033 HC RECOVERY, INTIAL HOUR: Performed by: ORTHOPAEDIC SURGERY

## 2024-02-15 PROCEDURE — D9220A PRA ANESTHESIA: Mod: ,,, | Performed by: NURSE ANESTHETIST, CERTIFIED REGISTERED

## 2024-02-15 PROCEDURE — 63600175 PHARM REV CODE 636 W HCPCS: Performed by: NURSE ANESTHETIST, CERTIFIED REGISTERED

## 2024-02-15 PROCEDURE — 71000015 HC POSTOP RECOV 1ST HR: Performed by: ORTHOPAEDIC SURGERY

## 2024-02-15 PROCEDURE — 63600175 PHARM REV CODE 636 W HCPCS: Performed by: ANESTHESIOLOGY

## 2024-02-15 PROCEDURE — 64718 REVISE ULNAR NERVE AT ELBOW: CPT | Mod: LT,,, | Performed by: ORTHOPAEDIC SURGERY

## 2024-02-15 PROCEDURE — 36000707: Performed by: ORTHOPAEDIC SURGERY

## 2024-02-15 PROCEDURE — 37000008 HC ANESTHESIA 1ST 15 MINUTES: Performed by: ORTHOPAEDIC SURGERY

## 2024-02-15 PROCEDURE — 63600175 PHARM REV CODE 636 W HCPCS: Mod: JZ,JG | Performed by: ORTHOPAEDIC SURGERY

## 2024-02-15 PROCEDURE — 81025 URINE PREGNANCY TEST: CPT | Performed by: ORTHOPAEDIC SURGERY

## 2024-02-15 PROCEDURE — 25000003 PHARM REV CODE 250: Performed by: NURSE ANESTHETIST, CERTIFIED REGISTERED

## 2024-02-15 PROCEDURE — 36000706: Performed by: ORTHOPAEDIC SURGERY

## 2024-02-15 PROCEDURE — 25000003 PHARM REV CODE 250: Performed by: ANESTHESIOLOGY

## 2024-02-15 PROCEDURE — 37000009 HC ANESTHESIA EA ADD 15 MINS: Performed by: ORTHOPAEDIC SURGERY

## 2024-02-15 RX ORDER — DEXMEDETOMIDINE HYDROCHLORIDE 100 UG/ML
INJECTION, SOLUTION INTRAVENOUS
Status: DISCONTINUED | OUTPATIENT
Start: 2024-02-15 | End: 2024-02-15

## 2024-02-15 RX ORDER — CEFAZOLIN SODIUM 1 G/3ML
INJECTION, POWDER, FOR SOLUTION INTRAMUSCULAR; INTRAVENOUS
Status: DISCONTINUED | OUTPATIENT
Start: 2024-02-15 | End: 2024-02-15

## 2024-02-15 RX ORDER — LIDOCAINE HYDROCHLORIDE 20 MG/ML
INJECTION, SOLUTION EPIDURAL; INFILTRATION; INTRACAUDAL; PERINEURAL
Status: DISCONTINUED | OUTPATIENT
Start: 2024-02-15 | End: 2024-02-15

## 2024-02-15 RX ORDER — MIDAZOLAM HYDROCHLORIDE 1 MG/ML
INJECTION, SOLUTION INTRAMUSCULAR; INTRAVENOUS
Status: DISCONTINUED | OUTPATIENT
Start: 2024-02-15 | End: 2024-02-15

## 2024-02-15 RX ORDER — KETOROLAC TROMETHAMINE 30 MG/ML
INJECTION, SOLUTION INTRAMUSCULAR; INTRAVENOUS
Status: DISCONTINUED | OUTPATIENT
Start: 2024-02-15 | End: 2024-02-15

## 2024-02-15 RX ORDER — FENTANYL CITRATE 50 UG/ML
INJECTION, SOLUTION INTRAMUSCULAR; INTRAVENOUS
Status: DISCONTINUED | OUTPATIENT
Start: 2024-02-15 | End: 2024-02-15

## 2024-02-15 RX ORDER — ACETAMINOPHEN 10 MG/ML
INJECTION, SOLUTION INTRAVENOUS
Status: DISCONTINUED | OUTPATIENT
Start: 2024-02-15 | End: 2024-02-15

## 2024-02-15 RX ORDER — SODIUM CHLORIDE 0.9 % (FLUSH) 0.9 %
10 SYRINGE (ML) INJECTION
Status: DISCONTINUED | OUTPATIENT
Start: 2024-02-15 | End: 2024-02-15 | Stop reason: HOSPADM

## 2024-02-15 RX ORDER — HYDROCODONE BITARTRATE AND ACETAMINOPHEN 5; 325 MG/1; MG/1
1 TABLET ORAL EVERY 4 HOURS PRN
Status: DISCONTINUED | OUTPATIENT
Start: 2024-02-15 | End: 2024-02-15 | Stop reason: HOSPADM

## 2024-02-15 RX ORDER — CHLORHEXIDINE GLUCONATE ORAL RINSE 1.2 MG/ML
10 SOLUTION DENTAL
Status: ACTIVE | OUTPATIENT
Start: 2024-02-15

## 2024-02-15 RX ORDER — DEXAMETHASONE SODIUM PHOSPHATE 4 MG/ML
INJECTION, SOLUTION INTRA-ARTICULAR; INTRALESIONAL; INTRAMUSCULAR; INTRAVENOUS; SOFT TISSUE
Status: DISCONTINUED | OUTPATIENT
Start: 2024-02-15 | End: 2024-02-15

## 2024-02-15 RX ORDER — FENTANYL CITRATE 50 UG/ML
25 INJECTION, SOLUTION INTRAMUSCULAR; INTRAVENOUS EVERY 5 MIN PRN
Status: COMPLETED | OUTPATIENT
Start: 2024-02-15 | End: 2024-02-15

## 2024-02-15 RX ORDER — ONDANSETRON HYDROCHLORIDE 2 MG/ML
4 INJECTION, SOLUTION INTRAVENOUS DAILY PRN
Status: DISCONTINUED | OUTPATIENT
Start: 2024-02-15 | End: 2024-02-15 | Stop reason: HOSPADM

## 2024-02-15 RX ORDER — ONDANSETRON HYDROCHLORIDE 2 MG/ML
INJECTION, SOLUTION INTRAVENOUS
Status: DISCONTINUED | OUTPATIENT
Start: 2024-02-15 | End: 2024-02-15

## 2024-02-15 RX ORDER — BUPIVACAINE HYDROCHLORIDE 2.5 MG/ML
INJECTION, SOLUTION EPIDURAL; INFILTRATION; INTRACAUDAL
Status: DISCONTINUED | OUTPATIENT
Start: 2024-02-15 | End: 2024-02-15 | Stop reason: HOSPADM

## 2024-02-15 RX ORDER — PROPOFOL 10 MG/ML
VIAL (ML) INTRAVENOUS
Status: DISCONTINUED | OUTPATIENT
Start: 2024-02-15 | End: 2024-02-15

## 2024-02-15 RX ORDER — CHLORHEXIDINE GLUCONATE ORAL RINSE 1.2 MG/ML
10 SOLUTION DENTAL 2 TIMES DAILY
Status: DISCONTINUED | OUTPATIENT
Start: 2024-02-15 | End: 2024-02-15 | Stop reason: HOSPADM

## 2024-02-15 RX ORDER — BUPIVACAINE HYDROCHLORIDE 2.5 MG/ML
INJECTION, SOLUTION EPIDURAL; INFILTRATION; INTRACAUDAL
Status: DISCONTINUED
Start: 2024-02-15 | End: 2024-02-15 | Stop reason: HOSPADM

## 2024-02-15 RX ORDER — OXYCODONE AND ACETAMINOPHEN 5; 325 MG/1; MG/1
1 TABLET ORAL
Status: DISCONTINUED | OUTPATIENT
Start: 2024-02-15 | End: 2024-02-15 | Stop reason: HOSPADM

## 2024-02-15 RX ORDER — HYDROCODONE BITARTRATE AND ACETAMINOPHEN 5; 325 MG/1; MG/1
1 TABLET ORAL EVERY 6 HOURS PRN
Qty: 28 TABLET | Refills: 0 | Status: SHIPPED | OUTPATIENT
Start: 2024-02-15 | End: 2024-02-20 | Stop reason: SDUPTHER

## 2024-02-15 RX ADMIN — FENTANYL CITRATE 25 MCG: 50 INJECTION, SOLUTION INTRAMUSCULAR; INTRAVENOUS at 09:02

## 2024-02-15 RX ADMIN — DEXMEDETOMIDINE HYDROCHLORIDE 4 MCG: 100 INJECTION, SOLUTION INTRAVENOUS at 08:02

## 2024-02-15 RX ADMIN — FENTANYL CITRATE 25 MCG: 50 INJECTION, SOLUTION INTRAMUSCULAR; INTRAVENOUS at 08:02

## 2024-02-15 RX ADMIN — KETOROLAC TROMETHAMINE 30 MG: 30 INJECTION, SOLUTION INTRAMUSCULAR; INTRAVENOUS at 10:02

## 2024-02-15 RX ADMIN — FENTANYL CITRATE 25 MCG: 50 INJECTION, SOLUTION INTRAMUSCULAR; INTRAVENOUS at 10:02

## 2024-02-15 RX ADMIN — MIDAZOLAM 2 MG: 1 INJECTION INTRAMUSCULAR; INTRAVENOUS at 08:02

## 2024-02-15 RX ADMIN — DEXMEDETOMIDINE HYDROCHLORIDE 4 MCG: 100 INJECTION, SOLUTION INTRAVENOUS at 09:02

## 2024-02-15 RX ADMIN — PROPOFOL 200 MG: 10 INJECTION, EMULSION INTRAVENOUS at 08:02

## 2024-02-15 RX ADMIN — LIDOCAINE HYDROCHLORIDE 70 MG: 20 INJECTION, SOLUTION EPIDURAL; INFILTRATION; INTRACAUDAL; PERINEURAL at 08:02

## 2024-02-15 RX ADMIN — ONDANSETRON 4 MG: 2 INJECTION INTRAMUSCULAR; INTRAVENOUS at 09:02

## 2024-02-15 RX ADMIN — DEXAMETHASONE SODIUM PHOSPHATE 8 MG: 4 INJECTION, SOLUTION INTRA-ARTICULAR; INTRALESIONAL; INTRAMUSCULAR; INTRAVENOUS; SOFT TISSUE at 08:02

## 2024-02-15 RX ADMIN — CEFAZOLIN 2 G: 330 INJECTION, POWDER, FOR SOLUTION INTRAMUSCULAR; INTRAVENOUS at 08:02

## 2024-02-15 RX ADMIN — OXYCODONE HYDROCHLORIDE AND ACETAMINOPHEN 1 TABLET: 5; 325 TABLET ORAL at 10:02

## 2024-02-15 RX ADMIN — FENTANYL CITRATE 50 MCG: 50 INJECTION, SOLUTION INTRAMUSCULAR; INTRAVENOUS at 10:02

## 2024-02-15 RX ADMIN — ACETAMINOPHEN 1000 MG: 10 INJECTION, SOLUTION INTRAVENOUS at 09:02

## 2024-02-15 NOTE — ANESTHESIA PROCEDURE NOTES
Intubation    Date/Time: 2/15/2024 8:45 AM    Performed by: Wilberto Aguilar CRNA  Authorized by: Wilberto Aguilar CRNA    Intubation:     Induction:  Intravenous    Intubated:  Postinduction    Mask Ventilation:  Not attempted    Attempts:  1    Difficult Airway Encountered?: No      Complications:  None    Airway Device:  Supraglottic airway/LMA    Secured at:  The lips    Placement Verified By:  Capnometry    Complicating Factors:  None    Findings Post-Intubation:  BS equal bilateral and atraumatic/condition of teeth unchanged  Notes:      #4 LMA inserted without difficulty

## 2024-02-15 NOTE — DISCHARGE INSTRUCTIONS
Nozin Instructions  Goal: the goal of Nozin is to reduce the risk of post-procedural infections by bacteria in the nasal cavity. Think of it as hand  for your nose.    How to use:    1. Shake Nozin bottle well    2. Take a cotton swab and apply 4 drops to one tip    3. Insert cotton tip into one nostril, being sure not to go deeper into nose than tip of the swab.    4. Swab nostril 6 times counterclockwise and 6 times clockwise. Make sure to swab the inside front pocket of the nostril.    5. Take swab out and apply 2 drops to the same cotton tip. Repeat steps 3 and 4 in the other nostril.        Do steps 1-5 twice a day for 7 days.        After Hand Surgery  After surgery, the better you take care of yourself--especially your hand--the sooner it will heal. Follow your surgeons instructions. Try not to bump your hand, and dont move or lift anything while youre still wearing bandages, a splint, or a cast.  Care for your hand    Keep your hand elevated above heart level as much as possible for the first several days after surgery. This helps reduce swelling and pain.  To help prevent infection and speed healing, take care not to get your cast or bandages wet.  Keep dressing clean, dry, & intact until your follow up appointment.   Relieve pain as directed  Your surgeon may prescribe pain medicine or suggest you take an anti-inflammatory medicine. You might also be instructed to apply ice (or another cold source) to your hand. If you use ice cubes, put them in a plastic bag and rest it on top of your bandages. Leave the cold source on your hand for as long as its comfortable. Do this several times a day for the first few days after surgery. It may take several minutes before you can feel the cold through the cast or bandages.  Follow up with your surgeon  During a follow-up visit after surgery, your surgeon will check your progress. The stitches, bandages, splint, or cast may be removed. A new cast or splint  may be placed. If your hand has healed enough, your surgeon may prescribe exercises.  Do prescribed hand exercises  Your surgeon may recommend that you do exercises. These may be done under the guidance of a physical or occupational therapist. The exercises strengthen your hand, help you regain flexibility, and restore proper function. Do the exercises as advised.  Call your surgeon if you have...  A fever higher than 100.4°F (38.0°C) taken by mouth  Side effects from your medicine, such as prolonged nausea  A wet or loose dressing, or a dressing that is too tight  Excessive bleeding  Increased, ongoing pain or numbness  Signs of infection (such as drainage, warmth, or redness) at the incision site   Date Last Reviewed: 11/11/2015  © 2036-4297 [employer]. [address] [place] [address], [place] [address]. All rights reserved. This information is not intended as a substitute for professional medical care. Always follow your healthcare professional's instructions.

## 2024-02-15 NOTE — DISCHARGE SUMMARY
[facility]  Discharge Note  Short Stay    Procedure(s) (LRB):  RELEASE, CUBITAL TUNNEL (Left)        OUTCOME: Patient tolerated treatment/procedure well without complication and is now ready for discharge.    DISPOSITION: Home or Self Care    FINAL DIAGNOSIS:  Left cubital tunnel syndrome    FOLLOWUP: In clinic    DISCHARGE INSTRUCTIONS:    Discharge Procedure Orders   Diet general     Call MD for:  temperature >100.4     Call MD for:  persistent nausea and vomiting     Call MD for:  severe uncontrolled pain     Call MD for:  difficulty breathing, headache or visual disturbances     Call MD for:  redness, tenderness, or signs of infection (pain, swelling, redness, odor or green/yellow discharge around incision site)     Call MD for:  hives     Call MD for:  persistent dizziness or light-headedness     Call MD for:  extreme fatigue        TIME SPENT ON DISCHARGE:  Twenty minutes

## 2024-02-15 NOTE — OP NOTE
[facility]  General Surgery  Operative Note    SUMMARY     Date of Procedure: 2/15/2024     Procedure:  Left cubital tunnel release       Surgeon(s) and Role:     * Jas Larose MD - Primary    Assisting Surgeon: None    Pre-Operative Diagnosis: Cubital tunnel syndrome on left [G56.22]      Post-Operative Diagnosis:  Left cubital tunnel syndrome    Anesthesia: General    Description:  The patient was taken to the operating room where general anesthesia was administered.  Satisfactory anesthesia had been achieved the left arm was prepped and draped usual sterile fashion.  After exsanguination of the extremity a proximal tourniquet was inflated to 250 mmHg after patient received 2 g of Ancef intravenously preoperatively.  At this time a longitudinal incision was made just anterior to the medial epicondyle.  Dissection was carried posterior to the medial epicondyle.  Saldana's ligaments were released sharply.  The ulnar nerves identified in the cubital tunnel and decompressed from the medial intermuscular septum to the 1st branch of the flexor carpi ulnaris.  Satisfactory decompression had been achieved the elbow was flexed and extended and the ulnar nerve was stable.  It was elected not to do an anterior transposition.  Excess scar and fat were removed around the nerve.  The tourniquet was deflated.  Hemostasis achieved with pressure and electrocautery as needed.  No unusual bleeding was encountered.  Subcutaneous tissue was closed using 3-0 Vicryl suture.  Skin was closed using horizontal mattress 4-0 nylon suture.  10 cc of 0.25% plain Marcaine were injected about the wound.  Xeroform gauze 4x4s and 2 ABD pads overwrapped with a 3 in gauze dressing.  A sling was applied.  The patient tolerated the procedure well and was transferred recovery room in satisfactory condition.    Significant Surgical Tasks Conducted by the Assistant(s), if Applicable:  No assistant    Complications:  None      Estimated Blood Loss (EBL):  5 mL           Implants: None    Specimens: None           Condition: Good    Disposition: PACU - hemodynamically stable.    Attestation: I was present and scrubbed for the entire procedure.

## 2024-02-15 NOTE — TRANSFER OF CARE
"Anesthesia Transfer of Care Note    Patient: Johanna Bullock    Procedure(s) Performed: Procedure(s) (LRB):  RELEASE, CUBITAL TUNNEL (Left)  DECOMPRESSION, NERVE, ULNAR (Left)    Patient location: PACU    Anesthesia Type: general    Transport from OR: Transported from OR on room air with adequate spontaneous ventilation    Post pain: adequate analgesia    Post assessment: no apparent anesthetic complications and tolerated procedure well    Post vital signs: stable    Level of consciousness: awake    Nausea/Vomiting: no nausea/vomiting    Complications: none    Transfer of care protocol was followed      Last vitals: Visit Vitals  BP (!) 121/55 (BP Location: Right arm, Patient Position: Sitting)   Pulse (!) 52   Temp 36.7 °C (98 °F) (Temporal)   Resp 18   Ht 5' 8" (1.727 m)   Wt 109.2 kg (240 lb 13.6 oz)   LMP 01/19/2024 (Exact Date)   SpO2 100%   Breastfeeding No   BMI 36.62 kg/m²     "

## 2024-02-15 NOTE — ANESTHESIA POSTPROCEDURE EVALUATION
Anesthesia Post Evaluation    Patient: Johanna Bullock    Procedure(s) Performed: Procedure(s) (LRB):  RELEASE, CUBITAL TUNNEL (Left)  DECOMPRESSION, NERVE, ULNAR (Left)    Final Anesthesia Type: general      Patient location during evaluation: PACU  Patient participation: Yes- Able to Participate  Level of consciousness: awake  Post-procedure vital signs: reviewed and stable  Pain management: adequate  Airway patency: patent    PONV status at discharge: No PONV  Anesthetic complications: no      Cardiovascular status: stable  Respiratory status: unassisted  Hydration status: euvolemic  Follow-up not needed.              Vitals Value Taken Time   BP 94/55 02/15/24 1045   Temp 36.4 °C (97.6 °F) 02/15/24 1015   Pulse 53 02/15/24 1045   Resp 12 02/15/24 1045   SpO2 100 % 02/15/24 1045   Vitals shown include unvalidated device data.      No case tracking events are documented in the log.      Pain/Vickie Score: Pain Rating Prior to Med Admin: 7 (2/15/2024 10:44 AM)  Vickie Score: 9 (2/15/2024 10:30 AM)

## 2024-02-16 VITALS
BODY MASS INDEX: 36.51 KG/M2 | WEIGHT: 240.88 LBS | DIASTOLIC BLOOD PRESSURE: 50 MMHG | TEMPERATURE: 98 F | HEART RATE: 64 BPM | RESPIRATION RATE: 16 BRPM | SYSTOLIC BLOOD PRESSURE: 105 MMHG | HEIGHT: 68 IN | OXYGEN SATURATION: 100 %

## 2024-02-19 NOTE — PROGRESS NOTES
SUBJECTIVE:      Patient ID: Johanna Bullock is a 38 y.o. female.    HPI: Ms. Bullock is here today for post-operative visit #1.  She is 5 days status post left cubital tunnel release by Dr. Larose on 2/15/24. She reports that she is having pain and numbness/tingling shooting down the arm, however, the tremors in her hand while opposing her digits have improved. Pain is 9/10. She is taking Norco every 6h for pain.  She has been compliant with postop instructions and keeping the extremity dry. She denies fever, chills, and sweats since the time of the surgery.     Past Medical History:   Diagnosis Date    Adjustment disorder with mixed anxiety and depressed mood 02/06/2013    Anemia, B12 deficiency     Anxiety     Arthritis     Bradycardia     Major depression, single episode 02/06/2013    Major depression, single episode 02/06/2013    Post partum depression      Past Surgical History:   Procedure Laterality Date    CHOLECYSTECTOMY  10/2012    COLONOSCOPY N/A 02/14/2020    Procedure: COLONOSCOPY;  Surgeon: Jas Moore MD;  [facility]  Service: Endoscopy;  Laterality: N/A;  Pt procedure time changed to 0800 with 0715 - second half prep completed from [id number]- Pt verbalized understanding- ERW2/13/20@1022    COLONOSCOPY N/A 03/10/2023    Procedure: COLONOSCOPY;  Surgeon: HORACE Moore MD;  Location: [facility] (4TH FLR);  Service: Endoscopy;  Laterality: N/A;  inst emailed-RB    DECOMPRESSION, NERVE, ULNAR Left 2/15/2024    Procedure: DECOMPRESSION, NERVE, ULNAR;  Surgeon: Jas Larose MD;  Location: [facility];  Service: Orthopedics;  Laterality: Left;  ulnar nerve decompression left elbow with cubital tunnel release and possible anterior transposition ulnar nerve    DILATION AND CURETTAGE OF UTERUS      ESOPHAGOGASTRODUODENOSCOPY N/A 07/20/2018    Procedure: ESOPHAGOGASTRODUODENOSCOPY (EGD);  Surgeon: Darwin Hansen MD;  Location: [facility] (4TH FLR);  Service:  Endoscopy;  Laterality: N/A;  Please measure pouch and limbs    GASTRIC BYPASS  2008    Revision August 2019    INJECTION OF ANESTHETIC AGENT AROUND LATERAL BRANCH NERVES OF SACROILIAC JOINT      LAPAROSCOPIC REPAIR OF HIATAL HERNIA  2019    Liver scope  10/2012    LYSIS OF ADHESIONS      ULNAR TUNNEL RELEASE Left 2/15/2024    Procedure: RELEASE, CUBITAL TUNNEL;  Surgeon: Jas Larose MD;  [facility]: [facility] OR;  Service: Orthopedics;  Laterality: Left;  ulnar nerve decompression left elbow with cubital tunnel release and possible anterior transposition ulnar nerve     Family History   Problem Relation Age of Onset    Hypertension Father     Heart disease Father     Breast cancer Neg Hx     Colon cancer Neg Hx     Ovarian cancer Neg Hx     Diabetes Neg Hx     Stroke Neg Hx      Social History     Socioeconomic History    Marital status: Single   Occupational History    Occupation: works as      Employer: [employer]     Employer: [employer]   Tobacco Use    Smoking status: Never    Smokeless tobacco: Never   Substance and Sexual Activity    Alcohol use: Never     Comment: Alcohol use rarely    Drug use: No     Comment: Mirena    Sexual activity: Yes     Partners: Male     Birth control/protection: I.U.D.   Social History Narrative    Born and raised in [facility]    Went to [employer] and got BS in bio with minor in chem    1 child    Has boyfriend    A fewnot close friends    Likes to read and go to movies     Social Determinants of Health     Stress: No Stress Concern Present (9/26/2019)    [place] [employer] - Occupational Stress Questionnaire     Feeling of Stress : Not at all     Medication List with Changes/Refills   Current Medications    ACETAMINOPHEN (TYLENOL) 650 MG TBSR    Take 650 mg by mouth every 8 (eight) hours.    ACETYLCYSTEINE (N-ACETYL-L-CYSTEINE MISC)    by Misc.(Non-Drug; Combo Route) route.    ALBUTEROL (PROVENTIL/VENTOLIN HFA) 90 MCG/ACTUATION INHALER     "Inhale 1-2 puffs into the lungs every 6 (six) hours as needed for Wheezing.    CYANOCOBALAMIN 1,000 MCG/ML INJECTION    ONE INJECTION AS DIRECTED EVERY 28 DAYS    FLUDROCORTISONE (FLORINEF) 0.1 MG TAB    Take 1 tablet (100 mcg total) by mouth once daily.    FLUTICASONE PROPIONATE (FLONASE) 50 MCG/ACTUATION NASAL SPRAY    1 spray by Each Nostril route once daily.    GABAPENTIN (NEURONTIN) 300 MG CAPSULE    Take 300 mg by mouth 3 (three) times daily.    KETOROLAC (TORADOL) 10 MG TABLET    Take by mouth.    LINACLOTIDE (LINZESS) 290 MCG CAP CAPSULE    Take 1 capsule (290 mcg total) by mouth before breakfast.    LINZESS 290 MCG CAP CAPSULE    TAKE ONE CAPSULE BY MOUTH EVERY DAY    MODAFINIL (PROVIGIL) 200 MG TAB    Take 200 mg by mouth once daily.    NATREXONE TABLET 3 MG    Take 2 tablets (6 mg total) by mouth every evening. Do not take with opitates    PANTOPRAZOLE (PROTONIX) 40 MG TABLET    Take 1 tablet by mouth every morning.    TIZANIDINE 4 MG CAP    Take by mouth.    TRAMADOL (ULTRAM) 50 MG TABLET    Take 50 mg by mouth every 6 (six) hours.    UNABLE TO FIND    medication name: thymoquinone/black seed   Changed and/or Refilled Medications    Modified Medication Previous Medication    HYDROCODONE-ACETAMINOPHEN (NORCO) 5-325 MG PER TABLET HYDROcodone-acetaminophen (NORCO) 5-325 mg per tablet       Take 1 tablet by mouth every 8 (eight) hours as needed for Pain.    Take 1 tablet by mouth every 6 (six) hours as needed for Pain.     Review of patient's allergies indicates:  No Known Allergies    OBJECTIVE:     Physical exam:    Vitals:    02/20/24 0918   Weight: 108.9 kg (240 lb)   Height: 5' 8" (1.727 m)   PainSc:   9   PainLoc: Elbow     Vital signs are stable, patient is afebrile.  Patient is well dressed and well groomed, no acute distress.  Alert and oriented to person, place, and time.    Left UE:  Post op dressing taken down.   Incision is clean, dry, and intact.   There is no erythema or exudate. There is no " sign of any infection.   Moderate swelling to elbow. Mild ecchymosis locally.  Decreased sensation to volar forearm  She is NVI.   Sutures in place.   2+ pulses noted.  Cap refill <2 seconds  Motor intact to hand    ASSESSMENT         Encounter Diagnosis   Name Primary?    S/P cubital tunnel release Yes            5 days status post left cubital tunnel release    PLAN:           Johanna was seen today for pain and post-op evaluation.    Diagnoses and all orders for this visit:    S/P cubital tunnel release  -     HYDROcodone-acetaminophen (NORCO) 5-325 mg per tablet; Take 1 tablet by mouth every 8 (eight) hours as needed for Pain.      - PO instruction reviewed and provided to patient. Op note reviewed with patient. Reassured that some numbness and tingling is normal at this time and should improve.  - The incision was cleaned with hydrogen peroxide and normal saline. A sterile Band-Aid was applied. Ace wrap applied.  - Patient may clean the incision daily as above.   - [place] refill sent to pharmacy. Discussed breaking tablets in half, weaning to tylenol. She is unable to take NSAIDs due to hx of gastric bypass.  - Patient may use brace as needed for symptomatic relief.    - Patient should notify the office of any signs or symptoms of infection including fevers, erythema, purulent drainage, increasing pain.    - Follow up for suture removal     POST OPERATIVE INSTRUCTIONS - Visit #1    BANDAGES/DRESSING/BATHING:  We have removed the dressing, cleaned your incision, and put on a band-aid at your first post op visit today. You may clean the incision daily as we did today. Keep the incision clean and dry. When showering, you may cover the incision with a plastic bag/umbrella bag.   Do not use Neosporin or other topical ointments or creams on the incision. If you are concerned about any redness or drainage of the incisions, please call the office.    SWELLING  Some swelling of your arm, hand and fingers is normal. The  swelling can be decreased by  elevating your arm on a few pillows when lying down. Swelling can be further controlled by cold therapy over the surgical site. Flexion/extension of the fingers (opening and closing your hands) will also help to relieve swelling and prevent stiffness.    ACTIVITY  You may use the hand and wrist as tolerated for light activity. You are encouraged to bend the wrist, elbow and fingers as soon as the initial surgical dressing is removed. Avoid lifting, pushing, or pulling any object greater than 5-10 pounds for the first 10-14 days.     BRACE  Wear your brace or splint as directed.    MEDICATIONS  Take pain medicines exactly as directed.  If the doctor gave you a prescription medicine for pain, take it as prescribed.  If you are not taking a prescription pain medicine, take an over-the-counter medicine such as acetaminophen (Tylenol), ibuprofen (Advil, Motrin), or naproxen (Aleve) as long as you do not have an allergy or medical condition that prevents you from taking them.  Do not take two or more pain medicines at the same time unless the doctor told you to. Many pain medicines have acetaminophen, which is Tylenol. Too much acetaminophen (Tylenol) can be harmful.    FOLLOW -UP  You should be scheduled for a post-op appointment within the 10-14 days following surgery, at which time we will review your surgery, remove sutures and evaluate incisions, review your post-operative program and answer any of your questions.          [facility]

## 2024-02-20 ENCOUNTER — OFFICE VISIT (OUTPATIENT)
Dept: ORTHOPEDICS | Facility: CLINIC | Age: 39
End: 2024-02-20
Payer: COMMERCIAL

## 2024-02-20 VITALS — BODY MASS INDEX: 36.37 KG/M2 | WEIGHT: 240 LBS | HEIGHT: 68 IN

## 2024-02-20 DIAGNOSIS — Z98.890 S/P CUBITAL TUNNEL RELEASE: Primary | ICD-10-CM

## 2024-02-20 PROCEDURE — 99999 PR PBB SHADOW E&M-EST. PATIENT-LVL III: CPT | Mod: PBBFAC,,,

## 2024-02-20 PROCEDURE — 1159F MED LIST DOCD IN RCRD: CPT | Mod: CPTII,S$GLB,,

## 2024-02-20 PROCEDURE — 99024 POSTOP FOLLOW-UP VISIT: CPT | Mod: S$GLB,,,

## 2024-02-20 RX ORDER — HYDROCODONE BITARTRATE AND ACETAMINOPHEN 5; 325 MG/1; MG/1
1 TABLET ORAL EVERY 8 HOURS PRN
Qty: 24 TABLET | Refills: 0 | Status: SHIPPED | OUTPATIENT
Start: 2024-02-20

## 2024-02-23 NOTE — PROGRESS NOTES
SUBJECTIVE:      Patient ID: Johanna Bullock is a 38 y.o. female.    HPI: Ms. Bullock is here today for post-operative visit #2.  She is 13 days status post left cubital tunnel release by Dr. Larose on 2/15/24. She reports that she is still having burning numbness and tingling to the left forearm, and a deep tingling pain in the wrist. Also reports coldness and cyanosis to hand at times. Pain is 6/10. She has a brachial plexus MRI scheduled for 3/6/24 for further testing. She has been compliant with postop instructions and keeping the extremity dry. She denies fever, chills, and sweats since the time of the surgery.     Interval hx 2/20/24: Ms. Bullock is here today for post-operative visit #1.  She is 5 days status post left cubital tunnel release by Dr. Larose on 2/15/24. She reports that she is having pain and numbness/tingling shooting down the arm, however, the tremors in her hand while opposing her digits have improved. Pain is 9/10. She is taking Norco every 6h for pain.  She has been compliant with postop instructions and keeping the extremity dry. She denies fever, chills, and sweats since the time of the surgery.      Past Medical History:   Diagnosis Date    Adjustment disorder with mixed anxiety and depressed mood 02/06/2013    Anemia, B12 deficiency     Anxiety     Arthritis     Bradycardia     Major depression, single episode 02/06/2013    Major depression, single episode 02/06/2013    Post partum depression      Past Surgical History:   Procedure Laterality Date    CHOLECYSTECTOMY  10/2012    COLONOSCOPY N/A 02/14/2020    Procedure: COLONOSCOPY;  Surgeon: Jas Moore MD;  Location: [facility] ([address] [place] [address]);  Service: Endoscopy;  Laterality: N/A;  Pt procedure time changed to 0800 with 0715 - second half prep completed from [id number]- Pt verbalized understanding- ERW2/13/20@1022    COLONOSCOPY N/A 03/10/2023    Procedure: COLONOSCOPY;  Surgeon: HORACE Moore MD;  Location: [facility]  (4TH FLR);  Service: Endoscopy;  Laterality: N/A;  inst emailed-RB    DECOMPRESSION, NERVE, ULNAR Left 2/15/2024    Procedure: DECOMPRESSION, NERVE, ULNAR;  Surgeon: Jas Larose MD;  Location: [facility] OR;  Service: Orthopedics;  Laterality: Left;  ulnar nerve decompression left elbow with cubital tunnel release and possible anterior transposition ulnar nerve    DILATION AND CURETTAGE OF UTERUS      ESOPHAGOGASTRODUODENOSCOPY N/A 07/20/2018    Procedure: ESOPHAGOGASTRODUODENOSCOPY (EGD);  Surgeon: Darwin Hansen MD;  Location: [facility] ENDO (4TH FLR);  Service: Endoscopy;  Laterality: N/A;  Please measure pouch and limbs    GASTRIC BYPASS  2008    Revision August 2019    INJECTION OF ANESTHETIC AGENT AROUND LATERAL BRANCH NERVES OF SACROILIAC JOINT      LAPAROSCOPIC REPAIR OF HIATAL HERNIA  2019    Liver scope  10/2012    LYSIS OF ADHESIONS      ULNAR TUNNEL RELEASE Left 2/15/2024    Procedure: RELEASE, CUBITAL TUNNEL;  Surgeon: Jas Larose MD;  Location: [facility] OR;  Service: Orthopedics;  Laterality: Left;  ulnar nerve decompression left elbow with cubital tunnel release and possible anterior transposition ulnar nerve     Family History   Problem Relation Age of Onset    Hypertension Father     Heart disease Father     Breast cancer Neg Hx     Colon cancer Neg Hx     Ovarian cancer Neg Hx     Diabetes Neg Hx     Stroke Neg Hx      Social History     Socioeconomic History    Marital status: Single   Occupational History    Occupation: works as      Employer: [employer]     Employer: [employer]   Tobacco Use    Smoking status: Never    Smokeless tobacco: Never   Substance and Sexual Activity    Alcohol use: Never     Comment: Alcohol use rarely    Drug use: No     Comment: Mirena    Sexual activity: Yes     Partners: Male     Birth control/protection: I.U.D.   Social History Narrative    Born and raised in [place] [place] [facility]    Went to [employer] and got BS in bio with minor in chem    1 child    Has  boyfriend    A fewnot close friends    Likes to read and go to movies     Social Determinants of Health     Stress: No Stress Concern Present (9/26/2019)    [employer] [place] [employer] Occupational [employer] - Occupational Stress Questionnaire     Feeling of Stress : Not at all     Medication List with Changes/Refills   Current Medications    ACETAMINOPHEN (TYLENOL) 650 MG TBSR    Take 650 mg by mouth every 8 (eight) hours.    ACETYLCYSTEINE (N-ACETYL-L-CYSTEINE MISC)    by Misc.(Non-Drug; Combo Route) route.    ALBUTEROL (PROVENTIL/VENTOLIN HFA) 90 MCG/ACTUATION INHALER    Inhale 1-2 puffs into the lungs every 6 (six) hours as needed for Wheezing.    CYANOCOBALAMIN 1,000 MCG/ML INJECTION    ONE INJECTION AS DIRECTED EVERY 28 DAYS    FLUDROCORTISONE (FLORINEF) 0.1 MG TAB    Take 1 tablet (100 mcg total) by mouth once daily.    FLUTICASONE PROPIONATE (FLONASE) 50 MCG/ACTUATION NASAL SPRAY    1 spray by Each Nostril route once daily.    GABAPENTIN (NEURONTIN) 300 MG CAPSULE    Take 300 mg by mouth 3 (three) times daily.    HYDROCODONE-ACETAMINOPHEN (NORCO) 5-325 MG PER TABLET    Take 1 tablet by mouth every 8 (eight) hours as needed for Pain.    KETOROLAC (TORADOL) 10 MG TABLET    Take by mouth.    LINACLOTIDE (LINZESS) 290 MCG CAP CAPSULE    Take 1 capsule (290 mcg total) by mouth before breakfast.    LINZESS 290 MCG CAP CAPSULE    TAKE ONE CAPSULE BY MOUTH EVERY DAY    MODAFINIL (PROVIGIL) 200 MG TAB    Take 200 mg by mouth once daily.    NATREXONE TABLET 3 MG    Take 2 tablets (6 mg total) by mouth every evening. Do not take with opitates    PANTOPRAZOLE (PROTONIX) 40 MG TABLET    Take 1 tablet by mouth every morning.    TIZANIDINE 4 MG CAP    Take by mouth.    TRAMADOL (ULTRAM) 50 MG TABLET    Take 50 mg by mouth every 6 (six) hours.    UNABLE TO FIND    medication name: thymoquinone/black seed     Review of patient's allergies indicates:  No Known Allergies    OBJECTIVE:     Physical exam:    Vitals:    02/28/24 1016  "  Weight: 108.9 kg (240 lb)   Height: 5' 8" (1.727 m)   PainSc:   6   PainLoc: Elbow     Vital signs are stable, patient is afebrile.  Patient is well dressed and well groomed, no acute distress.  Alert and oriented to person, place, and time.    Left UE:  Incision is clean, dry, and intact.   There is no erythema or exudate. There is no sign of any infection.   Mild swelling locally  +hypersensitivity, pain to volar forearm with light touch  She is able to make a composite fist  She is NVI.   Sutures in place.   2+ pulses noted.  Cap refill <2 seconds  Motor intact to hand     CV Ultrasound 1/16/24        Ref Range & Units 1 mo ago   Left subclavian sys cm/s 139.00   Left brachial sys cm/s 113.00   Left radial sys cm/s 77.00   Resulting Agency  [email or web address]   Study Result     Triphasic waveforms noted. No obstruction/stenosis noted.    ASSESSMENT         Encounter Diagnosis   Name Primary?    S/P cubital tunnel release Yes            13 days status post left cubital tunnel release    PLAN:           Johanna was seen today for pain and post-op evaluation.    Diagnoses and all orders for this visit:    S/P cubital tunnel release  -     Ambulatory referral/consult to Physical/Occupational Therapy; Future      - PO instruction reviewed and provided to patient  - The incision was cleaned with hydrogen peroxide. Sutures removed with no difficulty. Steri-Strips applied.   - Patient may use brace as needed for symptomatic relief.    - OT referral sent to the [facility]  - Patient should notify the office of any signs or symptoms of infection including fevers, erythema, purulent drainage, increasing pain.    - Follow up in about 4 weeks     POST OPERATIVE VISIT INSTRUCTIONS - Visit #2    1. No soaking the incision for at least 7-10 days. You may get it wet in the shower and use regular soap.    2. To avoid a hard, painful scar, we recommend you use Mederma and/or Silicone Scar patches. You may also use Cocoa Butter, Vitamin E " oil, Coconut oil, etc.    You may start this 5-7 days after stitches are removed and when wound is completely closed.    - Mederma scar cream: scar massage over incision 1-2 times per day. You may use topical lotion or Vitamin E oil. Massage an additional few times a day to help the scar become soft and less sensitive.    - Silicone Scar Patches: cut and place over the incision, then massage over the patch to help with scarring and sensitivity. Can be reused up to 10 days if you rinse it clean, let it dry out, then reapply.    Brands: [employer]epiform Silicone Scar Sheets (recommended), Scar Away, [employer] brand or generic pharmacy brand ([employer], [employer], [employer])    3. Continue range of motion exercises as instructed at todays visit.    4. You may take Tylenol 500mg and/or Ibuprofen 400mg every 4-6 hours with food for pain as tolerated as long as you do not have an allergy or medical condition that prevents you from taking them.    5. Therapy recommended: OT 2x/week    6. Immobilization: wrap/brace as needed    7. Lifting restrictions: start light at about 10lb and increase gradually as tolerated    8. Follow up: 4 weeks / sooner if needed         [facility]

## 2024-02-26 ENCOUNTER — TELEPHONE (OUTPATIENT)
Dept: ORTHOPEDICS | Facility: CLINIC | Age: 39
End: 2024-02-26
Payer: COMMERCIAL

## 2024-02-28 ENCOUNTER — OFFICE VISIT (OUTPATIENT)
Dept: ORTHOPEDICS | Facility: CLINIC | Age: 39
End: 2024-02-28
Payer: COMMERCIAL

## 2024-02-28 VITALS — WEIGHT: 240 LBS | HEIGHT: 68 IN | BODY MASS INDEX: 36.37 KG/M2

## 2024-02-28 DIAGNOSIS — Z98.890 S/P CUBITAL TUNNEL RELEASE: Primary | ICD-10-CM

## 2024-02-28 PROCEDURE — 1159F MED LIST DOCD IN RCRD: CPT | Mod: CPTII,S$GLB,,

## 2024-02-28 PROCEDURE — 99024 POSTOP FOLLOW-UP VISIT: CPT | Mod: S$GLB,,,

## 2024-02-28 PROCEDURE — 99999 PR PBB SHADOW E&M-EST. PATIENT-LVL IV: CPT | Mod: PBBFAC,,,

## 2024-03-05 ENCOUNTER — CLINICAL SUPPORT (OUTPATIENT)
Dept: REHABILITATION | Facility: HOSPITAL | Age: 39
End: 2024-03-05
Payer: COMMERCIAL

## 2024-03-05 DIAGNOSIS — M62.81 MUSCLE WEAKNESS: ICD-10-CM

## 2024-03-05 DIAGNOSIS — M25.622 STIFFNESS OF LEFT ELBOW JOINT: ICD-10-CM

## 2024-03-05 DIAGNOSIS — Z98.890 S/P CUBITAL TUNNEL RELEASE: ICD-10-CM

## 2024-03-05 DIAGNOSIS — M25.522 LEFT ELBOW PAIN: Primary | ICD-10-CM

## 2024-03-05 PROBLEM — M25.532 LEFT WRIST PAIN: Status: ACTIVE | Noted: 2023-07-01

## 2024-03-05 PROCEDURE — 97140 MANUAL THERAPY 1/> REGIONS: CPT

## 2024-03-05 PROCEDURE — 97112 NEUROMUSCULAR REEDUCATION: CPT

## 2024-03-05 PROCEDURE — 97166 OT EVAL MOD COMPLEX 45 MIN: CPT

## 2024-03-05 NOTE — PLAN OF CARE
[facility]  Occupational Therapy Initial Evaluation      Name: Johanna Bullock  Clinic Number: [id number]    Therapy Diagnosis:   Encounter Diagnoses   Name Primary?    S/P cubital tunnel release     Left elbow pain Yes    Stiffness of left elbow joint     Muscle weakness      Physician: Hanna Larsen PA-C    Physician Orders: S/p cubital tunnel release on 2/15/24  Eval and treat, modalities as needed  Medical Diagnosis: Z98.890 (ICD-10-CM) - S/P cubital tunnel release  Surgical Procedure and Date: Left cubital tunnel release, 02/15/24  Evaluation Date: 3/5/2024  Insurance Authorization Period Expiration: 02/27/2025  Plan of Care Certification Period: 03/05/24 to 05/27/24  Progress Note due: 04/04/24  Date of Return to MD: 03/26/24    Visit # / Visits authorized: 1 / pending  FOTO: 1/ 3  Initial=31% / Goal=62%      Precautions:  Standard and Weightbearing    Time In: 2:00 pm  Time Out: 3:00 pm  Total Billable Time: 60 minutes    Subjective      Date of Onset: initial injury:June 2023; recent surgical date: 02/15/24    History of Current Condition/Mechanism of Injury: Johanna reports she initially had an IV infiltrate in 06/2023 that resulted in Compartment Syndrome of her LUE.  Patient was referred to Ortho Hand MD for consultation. After completing a consultation with Dr. Larose and an EMG/NCV was recommended.  EMG/NCV revealed ulnar nerve compression and surgical intervention was required to improve condition.     Falls: [] Yes [x] No Comments:    Involved Side: [] Right [x] Left [] Bilateral  Dominant Side: Ambidextrous    Mechanism of Injury: [] Acute Trauma  [x]  Surgical [] Cumulative/Repetitive Strain Injury [] Congenital [] Degenerative Condition  Imaging: X-rays:: No acute fracture or dislocation. No joint effusion suggested. Joint spaces appear to be relatively well maintained. ' MRI: Unremarkable MRI of the left elbow; EMG/NCV: ABNORMAL study  2. There is  "electrodiagnostic evidence of a mild demyelinating ulnar neuropathy (Cubital tunnel syndrome) across the left elbow.  After ulnar inching, irritation localized between 2-3cm segment proximal to the elbow.  There was no evidence of a cervical radiculopathy of the C5-T1 nerve roots    Prior Therapy: [] Yes [x] No Comments:None for Cubital Tunnel    Pain:  Functional Pain Scale Rating 0-10:   6/10 on average  5/10 at best  10/10 at worst  Location: L medial elbow to wrist  Description: Aching, Dull, Burning, Tingling, Sharp, Electric, Shooting, and Cold  Aggravating Factors: spontaneous occurrences; reaching, lifting light objects  Easing Factors: massage, ice, heating pad, rest, and elevation    Occupation:  chemical   Working presently: on medical leave  Duties: transferring chemicals, prep of vials, storage of chemicals ; in home management tasks, patient is responsible for:   [x] Cooking [x] Cleaning [x] Laundry [] Yard work   [x] Shopping [x] Child/Elderly Care (11 y/o)    Functional Limitations/Social History:    Previous functional status includes: Independent with all ADLs.     Current Functional Status   Home/Living environment: lives with their family    -  [] One  [x] Two story home, 2 steps to enter    - DME: [x] None [] Wheelchair [] Walker [] Bedside Commode []Shower/Tub Chair  [] Cane [] Adaptive Equipment      Limitation of Functional Status as follows:   ADLs/IADLs:     - Feeding: []No []Minimal []Moderate [x]Significant difficulty/pain cutting foods    - Bathing: []No []Minimal [x]Moderate []Significant difficulty     - Dressing/Grooming: []No []Minimal []Moderate [x]Significant difficulty with fasteners/laces/accessories    - Driving: mostly using R hand, has []No []Minimal []Moderate [x]Significant discomfort while using L hand     Patient's Goals for Therapy: "reduce numbness & pain"    Past Medical History/Physical Systems Review:   Johanna Bullock  has a past medical history of " Adjustment disorder with mixed anxiety and depressed mood, Anemia, B12 deficiency, Anxiety, Arthritis, Bradycardia, Major depression, single episode, Major depression, single episode, and Post partum depression.    Johanna Bullock  has a past surgical history that includes Dilation and curettage of uterus; Gastric bypass (2008); Cholecystectomy (10/2012); Liver scope (10/2012); Esophagogastroduodenoscopy (N/A, 07/20/2018); Lysis of adhesions; Colonoscopy (N/A, 02/14/2020); Laparoscopic repair of hiatal hernia (2019); Colonoscopy (N/A, 03/10/2023); Injection of anesthetic agent around lateral branch nerves of sacroiliac joint; Ulnar tunnel release (Left, 2/15/2024); and decompression, nerve, ulnar (Left, 2/15/2024).    Johanna has a current medication list which includes the following prescription(s): acetaminophen, acetylcysteine, albuterol, cyanocobalamin, fludrocortisone, fluticasone propionate, gabapentin, hydrocodone-acetaminophen, ketorolac, linaclotide, linzess, modafinil, naltrexone hcl, pantoprazole, tizanidine, tramadol, and UNABLE TO FIND, and the following Facility-Administered Medications: ceFAZolin 2 g in dextrose 5 % in water (D5W) 50 mL IVPB (MB+) and chlorhexidine.    Review of patient's allergies indicates:  No Known Allergies       Objective      Patient is 2 weeks 6 days s/p Cubital Tunnel Release    Observations: [] Skin intact [] Sutures intact [x] Surgical incision Healing [x] No Signs of Infection [x] Hypertrophic scarring [x] Hypersensitivity to touch; Significant to any stimulation of L elbow/forearm;  patient is positive for symptoms of CRPS  [x]Edema present [] Deformities noted: None  [x] Myofascial tightness noted: Moderate through L forearm, dorsal to volar aspects  [] Other:  Vascular changes noted: mild hyper vascularization and shininess to hand noted.      3/5/2024 3/5/2024    Left Right   Above Elbow (2 in) 34.0 33.0   Elbow Crease  27.9 27.8   Below Elbow (2 in) 27.1 28.0  "  Wrist crease 16.0 16.5        Physical Performance Assessment: (ROM measured in degrees)   Left      Elbow AROM  MMT  Pain/Dysfunction with Movement   Extension -15 3+/5 [x] Pain [] Abnormal Movement   Flexion  134 3+/5 [] Pain [] Abnormal Movement   Forearm      Supination 75 4/5 [] Pain [] Abnormal Movement   Pronation 72 4/5 [] Pain [] Abnormal Movement   Wrist      Extension 51 3+/5 [x] Pain [] Abnormal Movement   Flexion 58 3+/5 [x] Pain [] Abnormal Movement   Radial Deviation WFL 3+/5 [] Pain [] Abnormal Movement   Ulnar Deviation WFL 3+/5 [] Pain [] Abnormal Movement     Palpation:  Patient is very guarded with any stimulation of the LUE, beginning at the elbow crease through the wrist          Strength (measured in pounds/square inch):   3/5/2024 3/5/2024    Left Right    II Deferred Deferred   Lateral " "   Tripod " "   Tip " "        Intake Outcome Measure for FOTO Elbow Survey    Therapist reviewed FOTO scores for Johanna Bullock on 3/5/2024.   FOTO report - see Media section or FOTO account episode details.    Intake Score: 31%         Treatment      Total Treatment time (time-based codes) separate from Evaluation: 30 minutes    Johanna received the treatments listed below:      manual therapy techniques: Myofacial release, Soft tissue Mobilization, and Scar Management were applied to the: affected elbow/forearm for 8 minutes, including:  Use of hand techniques to increase blood flow/circulation, improve soft tissue pliability and decrease pain.  Patient was provided with Tubi- for L upper arm to wrist/hand and instruct on purpose, wear schedule and precautions to monitor.     therapeutic exercises to develop ROM and Desensitization Program for 22 minutes including:  Performed Initial Home Exercise Program    Patient Education and Home Exercises      Education provided:   - Role of OT, goals for OT, scheduling/cancellations, insurance limitations with patient.  - Established initial Home " Exercise Program, see Patient Instructions for details  - Discussed activity restrictions/limitations, basic joint protection principles and repetitive strain injury prevention in ADL/IADL's    Written Home Exercises Provided: yes.  Exercises were reviewed and Johanna was able to demonstrate them prior to the end of the session.    Johanna demonstrated good  understanding of the education provided.     Pt was advised to perform these exercises free of pain, and to stop performing them if pain occurs.    See EMR under Patient Instructions for exercises provided 3/5/2024.    Assessment      Johanna Bullock is a 38 y.o. female referred to outpatient occupational therapy and presents with a medical diagnosis of Z98.890 (ICD-10-CM) - S/P cubital tunnel release.    Following medical record review it is determined that pt will benefit from occupational therapy services in order to maximize pain free and/or functional use of left elbow/forearm. The following goals were discussed with the patient and patient is in agreement with them as to be addressed in the treatment plan. The patient's rehab potential is Good for goals set.     Anticipated barriers to occupational therapy: comorbid diagnosis listed above, compliance with HEP and attendance to therapy as recommended     Plan of care discussed with patient: Yes  Patient's spiritual, cultural and educational needs considered and patient is agreeable to the plan of care and goals as stated below:     Medical Necessity is demonstrated by the following  Occupational Profile/History  Co-morbidities and personal factors that may impact the plan of care [] LOW: Brief chart review  [x] MODERATE: Expanded chart review   [] HIGH: Extensive chart review    Moderate / High Support Documentation: multiple diagnostic reports and physician's notes reviewed     Examination  Performance deficits relating to physical, cognitive or psychosocial skills that result in activity  limitations and/or participation restrictions  [] LOW: addressing 1-3 Performance deficits  [] MODERATE: 3-5 Performance deficits  [x] HIGH: 5+ Performance deficits (please support below)    Moderate / High Support Documentation:    Physical:  Joint Mobility  Muscle Power/Strength  Muscle Endurance  Skin Integrity/Scar Formation  Edema   Strength  Pinch Strength  Gross Motor Coordination  Fine Motor Coordination  Proprioception Functions  Tactile Functions  Pain    Cognitive:  No Deficits    Psychosocial:    No Deficits     Treatment Options [] LOW: Limited options  [x] MODERATE: Several options  [] HIGH: Multiple options      Decision Making/ Complexity Score: moderate       The following goals were discussed with the patient and patient is in agreement with them as to be addressed in the treatment plan.     Goals:    Short Term Goals (to be met in 4 weeks) Goal Status:   1) Establish HEP with patient independent in performing accurately [] Goal Met  [] Part Met   2) Patient will report PAIN of 2-3 levels lower than initial measures on the pain scale for worst pain in affected elbow/forearm [] Goal Met  [] Part Met   3) Patient will increase AROM of affected elbow/forearm/wrist, in limited planes of mobility documented at initial evaluation, by at lease 5-8 degrees for improved performance with ADL's [] Goal Met  [] Part Met   4) Increase STRENGTH of affected elbow/forearm/wrist by 1/2 MMT grade, in all planes of mobility, to improve functioning in ADL/IADL's [] Goal Met  [] Part Met   5) EDEMA in affected elbow/forearm is at least .2-.3 cm less than initial measures at evaluation [] Goal Met  [] Part Met   6) Patient will be able to achieve less than or equal to a 15-20% improvement on Elbow FOTO survey demonstrating overall improved functional ability with upper extremity.  [] Goal Met  [] Part Met   7) Assess or Increase  STRENGTH in affected upper extremity by 5-8 psi for improved functioning in  ADL/IADL's [] Goal Met  [] Part Met       Long Term Goals (to be met by Discharge) Goal Status:   1) PAIN in affected elbow/forearm will be no greater than 1-2/10 while performing ADL's/IADL's [] Goal Met  [] Part Met   2) Patient will demonstrate WFL AROM in affected elbow/forearm, in all functional planes of mobility, for improved functioning in ADL/IADL's  [] Goal Met  [] Part Met   3) Increase STRENGTH in affected elbow/forearm to at lease 4/5 in all functional planes of mobility for improved performance in ADL/IADL's [] Goal Met  [] Part Met   4) Edema in affected elbow/forearm is trace to none [] Goal Met  [] Part Met   5) Patient will be able to achieve less than or equal to 68% on Wrist/Hand FOTO survey demonstrating overall improved functional ability with upper extremity.  [] Goal Met  [] Part Met           Plan     Plan of Care Certification: 03/05/24 to 05/27/24.     Outpatient Occupational Therapy 2-3 times weekly for 12 weeks to include the following interventions: Fluidotherapy, Manual therapy/joint mobilizations, Modalities for pain management, US 3 mhz, Therapeutic exercises/activities., Strengthening, Orthotic Fabrication/Fit/Training, Edema Control, Scar Management, Electrical Modalities, and Joint Protection.    Abelino Worthington OT    Physician's Signature: _________________________________________ Date: ________________

## 2024-03-05 NOTE — PATIENT INSTRUCTIONS
[facility], OCCUPATIONAL THERAPY  HOME EXERCISE PROGRAM     Complete the following massages for 5-10 min each, 2-3x/day.                                              Complete the following exercises with 10-15 repetitions each, 3-4x/day.     AROM: Elbow Flexion / Extension        With left hand palm up, gently bend elbow as far as possible. Then straighten arm as far as possible.  Repeat __10__ times per set. Do __1__ sets per session. Do __4-6__ sessions per day.      AROM: Supination / Pronation   With your elbow by your side, turn your palm up then turn your palm down.     AROM: Wrist Flexion / Extension               Bend your wrist forward and back as far as possible.      AROM: Wrist Radial / Ulnar Deviation  Bend your wrist from side to side as far as possible.    AROM: Wrist Flexion / Extension  Make a fist, then bend your wrist forward then back as far as possible.         AROM: Wrist Radial / Ulnar Deviation   Make a fist then bend your wrist toward your body, then away.         Copyright © I. All rights reserved.       Desensitization Home Program  Perform these activities 3-4 times a day for a total of 1-2 minutes each session.    Massage the hypersensitive area using your other hand. Start with light pressure and gradually increase pressure going in circles. You may use lotion if you prefer.    Massage hypersensitive areas with a vibratory scar massager. If it hurts too much to use the massager directly on your skin, put a towel down first and then the massager on top of that. Eventually, try to use the massager without a towel.    Rub the hypersensitive areas with different textures of fabric, dry food, or household materials. Examples include cotton, velvet, velcro, wool, sandoval cloth, dish cloths, flannel, jeans, felt, scrub brushes, corduroy, beans, sane, rice, corn kernels, marbles, etc. Choose 3 textures. Start with the least irritating and work towards the most irritating.      Tap the area that is sensitive. You can use the end of a toothbrush or a pencil eraser to tap the area that hurts. Tap the sensitive areas on the table or other surfaces of other objects. You can start with several layers of towels and decrease the number of towels until you can stand to tap without padding. You can also tap objects like sponges and cloths.        Therapist: Abelino Worthington, OT

## 2024-03-06 ENCOUNTER — HOSPITAL ENCOUNTER (OUTPATIENT)
Dept: RADIOLOGY | Facility: HOSPITAL | Age: 39
Discharge: HOME | End: 2024-03-06
Attending: THORACIC SURGERY (CARDIOTHORACIC VASCULAR SURGERY)
Payer: COMMERCIAL

## 2024-03-06 DIAGNOSIS — R20.2 PARESTHESIA OF LEFT ARM: ICD-10-CM

## 2024-03-06 PROCEDURE — A9585 GADOBUTROL INJECTION: HCPCS | Performed by: THORACIC SURGERY (CARDIOTHORACIC VASCULAR SURGERY)

## 2024-03-06 PROCEDURE — 73220 MRI UPPR EXTREMITY W/O&W/DYE: CPT | Mod: 26,LT,, | Performed by: STUDENT IN AN ORGANIZED HEALTH CARE EDUCATION/TRAINING PROGRAM

## 2024-03-06 PROCEDURE — 73220 MRI UPPR EXTREMITY W/O&W/DYE: CPT | Mod: TC

## 2024-03-06 PROCEDURE — 25500020 PHARM REV CODE 255: Performed by: THORACIC SURGERY (CARDIOTHORACIC VASCULAR SURGERY)

## 2024-03-06 RX ORDER — GADOBUTROL 604.72 MG/ML
10 INJECTION INTRAVENOUS
Status: COMPLETED | OUTPATIENT
Start: 2024-03-06 | End: 2024-03-06

## 2024-03-06 RX ADMIN — GADOBUTROL 10 ML: 604.72 INJECTION INTRAVENOUS at 12:03

## 2024-03-07 ENCOUNTER — OFFICE VISIT (OUTPATIENT)
Dept: CARDIOTHORACIC SURGERY | Facility: CLINIC | Age: 39
End: 2024-03-07
Payer: COMMERCIAL

## 2024-03-07 VITALS
WEIGHT: 246.69 LBS | HEART RATE: 60 BPM | SYSTOLIC BLOOD PRESSURE: 115 MMHG | OXYGEN SATURATION: 98 % | BODY MASS INDEX: 37.51 KG/M2 | DIASTOLIC BLOOD PRESSURE: 70 MMHG

## 2024-03-07 DIAGNOSIS — R29.898 WEAKNESS OF LEFT HAND: ICD-10-CM

## 2024-03-07 DIAGNOSIS — E53.8 VITAMIN B12 DEFICIENCY: ICD-10-CM

## 2024-03-07 DIAGNOSIS — E66.01 SEVERE OBESITY (BMI 35.0-35.9 WITH COMORBIDITY): ICD-10-CM

## 2024-03-07 DIAGNOSIS — R20.2 PARESTHESIA OF LEFT ARM: Primary | ICD-10-CM

## 2024-03-07 DIAGNOSIS — G56.22 CUBITAL TUNNEL SYNDROME ON LEFT: ICD-10-CM

## 2024-03-07 DIAGNOSIS — Z98.84 HISTORY OF GASTRIC BYPASS: ICD-10-CM

## 2024-03-07 DIAGNOSIS — M79.602 LEFT ARM PAIN: ICD-10-CM

## 2024-03-07 PROCEDURE — 99212 OFFICE O/P EST SF 10 MIN: CPT | Mod: PBBFAC | Performed by: THORACIC SURGERY (CARDIOTHORACIC VASCULAR SURGERY)

## 2024-03-07 PROCEDURE — 3074F SYST BP LT 130 MM HG: CPT | Mod: CPTII,S$GLB,, | Performed by: THORACIC SURGERY (CARDIOTHORACIC VASCULAR SURGERY)

## 2024-03-07 PROCEDURE — 3078F DIAST BP <80 MM HG: CPT | Mod: CPTII,S$GLB,, | Performed by: THORACIC SURGERY (CARDIOTHORACIC VASCULAR SURGERY)

## 2024-03-07 PROCEDURE — 99999 PR PBB SHADOW E&M-EST. PATIENT-LVL II: CPT | Mod: PBBFAC,,, | Performed by: THORACIC SURGERY (CARDIOTHORACIC VASCULAR SURGERY)

## 2024-03-07 PROCEDURE — 99213 OFFICE O/P EST LOW 20 MIN: CPT | Mod: S$GLB,,, | Performed by: THORACIC SURGERY (CARDIOTHORACIC VASCULAR SURGERY)

## 2024-03-07 PROCEDURE — 3008F BODY MASS INDEX DOCD: CPT | Mod: CPTII,S$GLB,, | Performed by: THORACIC SURGERY (CARDIOTHORACIC VASCULAR SURGERY)

## 2024-03-08 NOTE — PROGRESS NOTES
Subjective:      Patient ID: Johanna Bullock is a 38 y.o. female.    Chief Complaint: No chief complaint on file.    HPI:  38-year-old female with a history of left arm tingling and pain was worked out for a possible thoracic outlet syndrome she has a history of obesity and bariatric surgery.  She was also treated for her ulnar nerve compression at the elbow with had hand surgery.  She is wearing a cast today on the left elbow and she feels better with her tingling since her surgery.  Recently underwent an MRI of the thoracic outlet.  She also had a Doppler ultrasound to see any compression of her subclavian vessels which was negative.    Review of patient's allergies indicates:  No Known Allergies    Past Medical History:   Diagnosis Date    Adjustment disorder with mixed anxiety and depressed mood 02/06/2013    Anemia, B12 deficiency     Anxiety     Arthritis     Bradycardia        Family History   Problem Relation Age of Onset    Hypertension Father     Heart disease Father     Breast cancer Neg Hx     Colon cancer Neg Hx     Ovarian cancer Neg Hx     Diabetes Neg Hx     Stroke Neg Hx        Social History     Socioeconomic History    Marital status: Single   Occupational History    Occupation: works as      Employer: [email or web address]     Employer: [employer]   Tobacco Use    Smoking status: Never    Smokeless tobacco: Never   Substance and Sexual Activity    Alcohol use: Never     Comment: Alcohol use rarely    Drug use: No     Comment: Mirena    Sexual activity: Yes     Partners: Male     Birth control/protection: I.U.D.   Social History Narrative    Born and raised in [place]    Went to [employer] and got BS in bio with minor in chem    1 child    Has boyfriend    A fewnot close friends    Likes to read and go to movies     Social Determinants of Health     Stress: No Stress Concern Present (9/26/2019)    [employer] - Occupational Stress Questionnaire     Feeling of  Stress : Not at all       Past Surgical History:   Procedure Laterality Date    CHOLECYSTECTOMY  10/2012    COLONOSCOPY N/A 02/14/2020    Procedure: COLONOSCOPY;  Surgeon: Jas Moore MD;  Location: [facility] ENDO (4TH FLR);  Service: Endoscopy;  Laterality: N/A;  Pt procedure time changed to 0800 with 0715 - second half prep completed from 0769-1372- Pt verbalized understanding- ERW2/13/20@1022    COLONOSCOPY N/A 03/10/2023    Procedure: COLONOSCOPY;  Surgeon: HORACE Moore MD;  Location: [facility] (4TH FLR);  Service: Endoscopy;  Laterality: N/A;  inst emailed-RB    DECOMPRESSION, NERVE, ULNAR Left 2/15/2024    Procedure: DECOMPRESSION, NERVE, ULNAR;  Surgeon: Jas Larose MD;  Location: [facility] OR;  Service: Orthopedics;  Laterality: Left;  ulnar nerve decompression left elbow with cubital tunnel release and possible anterior transposition ulnar nerve    DILATION AND CURETTAGE OF UTERUS      ESOPHAGOGASTRODUODENOSCOPY N/A 07/20/2018    Procedure: ESOPHAGOGASTRODUODENOSCOPY (EGD);  Surgeon: Darwin Hansen MD;  [facility]  Service: Endoscopy;  Laterality: N/A;  Please measure pouch and limbs    GASTRIC BYPASS  2008    Revision August 2019    INJECTION OF ANESTHETIC AGENT AROUND LATERAL BRANCH NERVES OF SACROILIAC JOINT      LAPAROSCOPIC REPAIR OF HIATAL HERNIA  2019    Liver scope  10/2012    LYSIS OF ADHESIONS      ULNAR TUNNEL RELEASE Left 2/15/2024    Procedure: RELEASE, CUBITAL TUNNEL;  Surgeon: Jas Larose MD;  Location: [facility] OR;  Service: Orthopedics;  Laterality: Left;  ulnar nerve decompression left elbow with cubital tunnel release and possible anterior transposition ulnar nerve       Review of Systems   Musculoskeletal:  Positive for arthralgias and myalgias.   Neurological:  Positive for numbness.          Objective:   /70 (BP Location: Right arm, Patient Position: Sitting)   Pulse 60   Wt 111.9 kg (246 lb 11.1 oz)   LMP 01/19/2024 (Exact  Date)   SpO2 98%   BMI 37.51 kg/m²     MRI Brachial Plexus W WO Contrast  Narrative: EXAMINATION:  MRI BRACHIAL PLEXUS W WO CONTRAST    CLINICAL HISTORY:  Thoracic outlet syndrome, neurogenic;  Paresthesia of skin    TECHNIQUE:  Multiplanar, multisequence MR images were obtained of the cervical brachial plexus without contrast.    COMPARISON:  Three-phase bone scan of the upper extremities dated 12/22/2023    FINDINGS:  BRACHIAL PLEXUS:    The cervical nerve roots are intact.  No pseudomeningocele is noted.  The brachial plexus has normal course and signal intensity without mass or mass effect.    SOFT TISSUES:    Muscle bulk is symmetric without denervation atrophy.    CERVICAL SPINE:    Visualized vertebral body height and alignment are normal.    Bone marrow signal is normal.    No cervical rib or enlarged C7 transverse process.    SPINAL CORD: Normal contour and signal intensity.    SPINAL CANAL: Multilevel disc osteophyte complexes in the visualized cervical spine likely resulting in mild spinal canal stenosis, most prominent at C6-C7.    OTHER: Remaining visualized cervical and thoracic structures are unremarkable..  Impression: Normal MRI brachial plexus.  No acute findings or abnormal enhancement.    Multilevel degenerative changes in the cervical spine with associated mild spinal canal stenosis, most prominent at C6-C7.  No gross cord compression or intramedullary signal abnormality.    Electronically signed by: Jeremie Stone  Date:    03/06/2024  Time:    14:16         Physical Exam  Vitals reviewed.   Constitutional:       Appearance: Normal appearance. She is obese.   HENT:      Head: Normocephalic and atraumatic.      Mouth/Throat:      Mouth: Mucous membranes are moist.   Eyes:      Extraocular Movements: Extraocular movements intact.   Cardiovascular:      Rate and Rhythm: Normal rate and regular rhythm.      Pulses: Normal pulses.      Heart sounds: Normal heart sounds.   Pulmonary:       Effort: Pulmonary effort is normal.      Breath sounds: Normal breath sounds.   Abdominal:      Palpations: Abdomen is soft.   Musculoskeletal:         General: Normal range of motion.      Cervical back: Normal range of motion and neck supple.      Comments: She has a cast in her elbow on the left side with a neck sling.  From her surgery   Skin:     General: Skin is warm.      Capillary Refill: Capillary refill takes less than 2 seconds.   Neurological:      General: No focal deficit present.      Mental Status: She is alert and oriented to person, place, and time.   Psychiatric:         Mood and Affect: Mood normal.         Behavior: Behavior normal.         Assessment:     1. Paresthesia of left arm    2. Cubital tunnel syndrome on left    3. History of gastric bypass    4. Vitamin B12 deficiency    5. Severe obesity (BMI 35.0-35.9 with comorbidity)    6. Weakness of left hand    7. Left arm pain        Plan   38-year-old female with a history of obesity gastric surgery and ulnar nerve compression release at the elbow level.  Her MRI shows no evidence of bony abnormality at the thoracic outlet but she does have some early changes of spinal canal stenosis at C6-C7 and osteophyte formation.  Her Doppler ultrasound show no vascular involvement at her thoracic outlet.  At this time I advised her to continue rehabilitation with shoulder strengthening exercise after the cast is removed from her left elbow.  Advised her to reduce weight.  She can follow up with her primary care and for neck symptoms get worse and she might need a referral to Neurosurgery for possible surgical intervention.  I will discharge this patient from my care today.  Follow-up as needed basis.          Pradip Christine MD,   [facility]  [place]

## 2024-03-12 ENCOUNTER — CLINICAL SUPPORT (OUTPATIENT)
Dept: REHABILITATION | Facility: HOSPITAL | Age: 39
End: 2024-03-12
Payer: COMMERCIAL

## 2024-03-12 DIAGNOSIS — M62.81 MUSCLE WEAKNESS: ICD-10-CM

## 2024-03-12 DIAGNOSIS — M25.522 LEFT ELBOW PAIN: ICD-10-CM

## 2024-03-12 DIAGNOSIS — M25.532 LEFT WRIST PAIN: Primary | ICD-10-CM

## 2024-03-12 DIAGNOSIS — M25.622 STIFFNESS OF LEFT ELBOW JOINT: ICD-10-CM

## 2024-03-12 PROCEDURE — 97110 THERAPEUTIC EXERCISES: CPT

## 2024-03-12 PROCEDURE — 97035 APP MDLTY 1+ULTRASOUND EA 15: CPT

## 2024-03-12 PROCEDURE — 97112 NEUROMUSCULAR REEDUCATION: CPT

## 2024-03-12 NOTE — PATIENT INSTRUCTIONS
[facility]  HOME EXERCISE PROGRAM     Perform nerve glides at 5 repetitions, 4-6 times per day:     Ulnar Nerve Glides/Flossing:         Smoking: Turn your head towards  your affected arm and pretend to be  smoking a cigarette.       Ulnar Nerve Glides    Complete 1 sequence (Positions 1-6) 5 times, 3-5 times per day.   Avoid numbness and tingling or excessive pain, if this occurs wait 30 min before resuming

## 2024-03-12 NOTE — PROGRESS NOTES
[facility]  Occupational Therapy Treatment Note     Date: 3/12/2024  Name: Johanna Bullock  Clinic Number: [id number]    Therapy Diagnosis:   Encounter Diagnoses   Name Primary?    Left wrist pain Yes    Left elbow pain     Stiffness of left elbow joint     Muscle weakness      Physician: Hanna Larsen PA-C    Physician Orders: S/p cubital tunnel release on 2/15/24  Eval and treat, modalities as needed  Medical Diagnosis: Z98.890 (ICD-10-CM) - S/P cubital tunnel release  Surgical Procedure and Date: Left cubital tunnel release, 02/15/24  Evaluation Date: 3/5/2024  Insurance Authorization Period Expiration: 02/27/2025  Plan of Care Certification Period: 03/05/24 to 05/27/24  Progress Note due: 04/04/24  Date of Return to MD: 03/26/24     Visit # / Visits authorized: 1 / pending  FOTO: 1/ 3  Initial=31% / Goal=62%       Precautions:  Standard and Weightbearing     Time In: 10:00 am  Time Out: 11:00 pm  Total Billable Time: 60 minutes    (billing reflects skilled 1:1 time)      Subjective     Patient reports: no problems & good compliance w/ initial HEP. She also reports nerve symptoms come & go, spontaneously   She was compliant with home exercise program given last session.   Response to previous treatment:No adverse reactions noted or reported   Functional change: None reported at first treatment after initial evaluation     Pain: 6/10  Location: left elbow      Objective     Objective Measures updated at progress report unless specified.    Treatment     Johanna received the treatments listed below:      Patient is post op 3 weeks 6 days (Cubital Tunnel Release)    Add Next visit: Supervised modalities after being cleared for contradictions for __ minutes:    Fluidotherapy:  to L elbow/forearm for 15 min, continuous air, 110 deg, air speed 50-60 to decrease pain, edema, hypersensitivity & scar tissue and increased tissue extensibility.     Direct contact modalities after being  cleared for contraindications: 1.0 MHz, .8 W/cm2, 50% pulsed, 10 min to L elbow surgical site , to decrease pain & edema, increase circulation and tissue extensibility     Therapeutic exercises to develop ROM and flexibility for 35 minutes, including:  AROM    Elbow 3 ways 10 reps each, 0# wt   Prayer Stretch 3 reps, 10 sec hold   Scap Protraction Stretch 3 reps, 10 sec hold   OH Triceps Ext stretch 3 reps, 10 sec hold    Biceps table stretch-2 ways 3 reps, 15 sec hold   Forearm sup/pron 10 reps, 0# wt       PHG 10 reps, setting 1          Neuromuscular re-education activities to improve Posture and Coordination for 15 minutes. The following activities were included:  Dexterciser 3 min   Isospheres  3 min   Desensitization Program:     -Sensory stick w/ 4 textures 2 minutes each texture       Add Next Visit:    Scrub & Carry protocol       Patient Education and Home Exercises     Education provided:   - Reviewed HEP  - Added Ulnar Nerve Glides to HEP, see Patent Instructions for details  - Patient was provided with a sensory stick w/ 4 textures for HEP use  - Progress towards goals     Written Home Exercises Provided: Patient instructed to cont prior HEP.  Exercises were reviewed and Johanna was able to demonstrate them prior to the end of the session.  Johanna demonstrated good  understanding of the home exercise program provided. See electronic medical record under Patient Instructions for exercises provided during therapy sessions.       Assessment     Patient remains very guarded during therapy, however, is cooperative in all above tasks performed.  Pain in L elbow/forearm remains moderate to significant.  Edema is moderate and localized to L elbow surgical site.  Patient was able to perform all above listed therapeutic exercises without increased pain or difficulty today.     Johanna is progressing fairly well towards her goals and there are no updates to goals at this time. Pt prognosis is Good to Fair.      Patient will continue to benefit from skilled outpatient occupational therapy to address the deficits listed in the problem list on initial evaluation provide patient/family education and to maximize patient's level of independence in the home and community environment.     Patient's spiritual, cultural and educational needs considered and patient agreeable to plan of care and goals.    Anticipated barriers to occupational therapy: comorbid diagnosis, compliance with HEP and attendance to therapy as recommended     Goals:  Short Term Goals (to be met in 4 weeks) Goal Status:   1) Establish HEP with patient independent in performing accurately [] Goal Met  [] Part Met   2) Patient will report PAIN of 2-3 levels lower than initial measures on the pain scale for worst pain in affected elbow/forearm [] Goal Met  [] Part Met   3) Patient will increase AROM of affected elbow/forearm/wrist, in limited planes of mobility documented at initial evaluation, by at lease 5-8 degrees for improved performance with ADL's [] Goal Met  [] Part Met   4) Increase STRENGTH of affected elbow/forearm/wrist by 1/2 MMT grade, in all planes of mobility, to improve functioning in ADL/IADL's [] Goal Met  [] Part Met   5) EDEMA in affected elbow/forearm is at least .2-.3 cm less than initial measures at evaluation [] Goal Met  [] Part Met   6) Patient will be able to achieve less than or equal to a 15-20% improvement on Elbow FOTO survey demonstrating overall improved functional ability with upper extremity.  [] Goal Met  [] Part Met   7) Assess or Increase  STRENGTH in affected upper extremity by 5-8 psi for improved functioning in ADL/IADL's [] Goal Met  [] Part Met         Long Term Goals (to be met by Discharge) Goal Status:   1) PAIN in affected elbow/forearm will be no greater than 1-2/10 while performing ADL's/IADL's [] Goal Met  [] Part Met   2) Patient will demonstrate WFL AROM in affected elbow/forearm, in all functional  planes of mobility, for improved functioning in ADL/IADL's  [] Goal Met  [] Part Met   3) Increase STRENGTH in affected elbow/forearm to at lease 4/5 in all functional planes of mobility for improved performance in ADL/IADL's [] Goal Met  [] Part Met   4) Edema in affected elbow/forearm is trace to none [] Goal Met  [] Part Met   5) Patient will be able to achieve less than or equal to 68% on Wrist/Hand FOTO survey demonstrating overall improved functional ability with upper extremity.  [] Goal Met  [] Part Met          Plan     Plan of Care Certification: 03/05/24 to 05/27/24.      Outpatient Occupational Therapy 2-3 times weekly for 12 weeks to include the following interventions: Fluidotherapy, Manual therapy/joint mobilizations, Modalities for pain management, US 3 mhz, Therapeutic exercises/activities., Strengthening, Orthotic Fabrication/Fit/Training, Edema Control, Scar Management, Electrical Modalities, and Joint Protection.  Updates/Grading for next session: progress ROM, as tolerated    Abelino Worthington OT   3/12/2024

## 2024-03-13 ENCOUNTER — CLINICAL SUPPORT (OUTPATIENT)
Dept: REHABILITATION | Facility: HOSPITAL | Age: 39
End: 2024-03-13
Payer: COMMERCIAL

## 2024-03-13 DIAGNOSIS — M25.522 LEFT ELBOW PAIN: ICD-10-CM

## 2024-03-13 DIAGNOSIS — M25.532 LEFT WRIST PAIN: Primary | ICD-10-CM

## 2024-03-13 DIAGNOSIS — M62.81 MUSCLE WEAKNESS: ICD-10-CM

## 2024-03-13 DIAGNOSIS — M25.622 STIFFNESS OF LEFT ELBOW JOINT: ICD-10-CM

## 2024-03-13 PROCEDURE — 97110 THERAPEUTIC EXERCISES: CPT

## 2024-03-13 PROCEDURE — 97112 NEUROMUSCULAR REEDUCATION: CPT

## 2024-03-13 PROCEDURE — 97022 WHIRLPOOL THERAPY: CPT | Mod: 59

## 2024-03-13 NOTE — PROGRESS NOTES
[facility]  Occupational Therapy Treatment Note     Date: 3/13/2024  Name: Johanna Bullock  Clinic Number: [id number]    Therapy Diagnosis:   Encounter Diagnoses   Name Primary?    Left wrist pain Yes    Left elbow pain     Stiffness of left elbow joint     Muscle weakness      Physician: Hanna Larsen PA-C    Physician Orders: S/p cubital tunnel release on 2/15/24  Eval and treat, modalities as needed  Medical Diagnosis: Z98.890 (ICD-10-CM) - S/P cubital tunnel release  Surgical Procedure and Date: Left cubital tunnel release, 02/15/24  Evaluation Date: 3/5/2024  Insurance Authorization Period Expiration: 02/27/2025  Plan of Care Certification Period: 03/05/24 to 05/27/24  Progress Note due: 04/04/24  Date of Return to MD: 03/26/24     Visit # / Visits authorized: 2 / 20  FOTO: 1/ 3  Initial=31% / Goal=62%       Precautions:  Standard and Weightbearing     Time In: 1:05 am  Time Out: 2:05 pm  Total Billable Time: 60 minutes    (billing reflects skilled 1:1 time)      Subjective     Patient reports: pain is about the same as last treatment. She feels her LUE felt looser and less tight after last treatment session, but tightness returned after using ice pack.    She was compliant with home exercise program given last session.   Response to previous treatment:No adverse reactions noted or reported   Functional change: None reported at first treatment after initial evaluation     Pain: 6/10  Location: left elbow      Objective     Objective Measures updated at progress report unless specified.    Treatment     Johanna received the treatments listed below:      Patient is post op 4 weeks (Cubital Tunnel Release, 02/15/24)    Supervised modalities after being cleared for contradictions for 12 minutes:    Fluidotherapy:  to L elbow/forearm for 12 min, continuous air, 110 deg, air speed 50-60 to decrease pain, edema, hypersensitivity & scar tissue and increased tissue extensibility.        Therapeutic exercises to develop ROM and flexibility for 20 minutes, including:  AROM    Postural Training:    -Shoulder shrugs 5 reps   -Scap Retraction 5 reps   TOS Stretches:    -Corner Pec stretch    -Pulleys-2 ways 3 min each   -Wall slides-2 ways (horiz & vertical) 5 reps, 10 sec hold each way   PHG 10 reps, setting 1          Neuromuscular re-education activities to improve Posture and Coordination for 28 minutes. The following activities were included:  Coordination:    Dexterciser 3 min   Isospheres  3 min   Desensitization Program:     Scrub & Carry protocol 3 minutes each   Weight-bearing/Shifting Exercises:    -Table top WB/WS-elbow extended 10 reps each way   -Quadruped WB/WS-elbows extended 10 reps each way      Patient Education and Home Exercises     Education provided:   - Reviewed HEP  - Reviewed Ulnar Nerve Glides to HEP  - Patient was provided with a sensory stick w/ 4 textures for HEP use  - Progress towards goals     Written Home Exercises Provided: Patient instructed to cont prior HEP.  Exercises were reviewed and Johanna was able to demonstrate them prior to the end of the session.  Johanna demonstrated good  understanding of the home exercise program provided. See electronic medical record under Patient Instructions for exercises provided during therapy sessions.       Assessment     Patient less guarded during therapy today.  Patient is noted to have an increase in LUE/hand tremors today during therapy, which did decrease by the end of treatment today however, did not completely resolve.  Pain in L elbow/forearm remains moderate to significant.  Edema is mininal and localized to L elbow surgical site.  Patient was able to perform all above listed therapeutic exercises without increased pain or difficulty today.     Johanna is progressing fairly well towards her goals and there are no updates to goals at this time. Pt prognosis is Good to Fair.     Patient will continue to benefit  from skilled outpatient occupational therapy to address the deficits listed in the problem list on initial evaluation provide patient/family education and to maximize patient's level of independence in the home and community environment.     Patient's spiritual, cultural and educational needs considered and patient agreeable to plan of care and goals.    Anticipated barriers to occupational therapy: comorbid diagnosis, compliance with HEP and attendance to therapy as recommended     Goals:  Short Term Goals (to be met in 4 weeks) Goal Status:   1) Establish HEP with patient independent in performing accurately [] Goal Met  [] Part Met   2) Patient will report PAIN of 2-3 levels lower than initial measures on the pain scale for worst pain in affected elbow/forearm [] Goal Met  [] Part Met   3) Patient will increase AROM of affected elbow/forearm/wrist, in limited planes of mobility documented at initial evaluation, by at lease 5-8 degrees for improved performance with ADL's [] Goal Met  [] Part Met   4) Increase STRENGTH of affected elbow/forearm/wrist by 1/2 MMT grade, in all planes of mobility, to improve functioning in ADL/IADL's [] Goal Met  [] Part Met   5) EDEMA in affected elbow/forearm is at least .2-.3 cm less than initial measures at evaluation [] Goal Met  [] Part Met   6) Patient will be able to achieve less than or equal to a 15-20% improvement on Elbow FOTO survey demonstrating overall improved functional ability with upper extremity.  [] Goal Met  [] Part Met   7) Assess or Increase  STRENGTH in affected upper extremity by 5-8 psi for improved functioning in ADL/IADL's [] Goal Met  [] Part Met         Long Term Goals (to be met by Discharge) Goal Status:   1) PAIN in affected elbow/forearm will be no greater than 1-2/10 while performing ADL's/IADL's [] Goal Met  [] Part Met   2) Patient will demonstrate WFL AROM in affected elbow/forearm, in all functional planes of mobility, for improved  functioning in ADL/IADL's  [] Goal Met  [] Part Met   3) Increase STRENGTH in affected elbow/forearm to at lease 4/5 in all functional planes of mobility for improved performance in ADL/IADL's [] Goal Met  [] Part Met   4) Edema in affected elbow/forearm is trace to none [] Goal Met  [] Part Met   5) Patient will be able to achieve less than or equal to 68% on Wrist/Hand FOTO survey demonstrating overall improved functional ability with upper extremity.  [] Goal Met  [] Part Met          Plan     Plan of Care Certification: 03/05/24 to 05/27/24.      Outpatient Occupational Therapy 2-3 times weekly for 12 weeks to include the following interventions: Fluidotherapy, Manual therapy/joint mobilizations, Modalities for pain management, US 3 mhz, Therapeutic exercises/activities., Strengthening, Orthotic Fabrication/Fit/Training, Edema Control, Scar Management, Electrical Modalities, and Joint Protection.  Updates/Grading for next session: progress ROM, as tolerated    Abelino Worthington OT   3/13/2024

## 2024-03-15 ENCOUNTER — CLINICAL SUPPORT (OUTPATIENT)
Dept: REHABILITATION | Facility: HOSPITAL | Age: 39
End: 2024-03-15
Payer: COMMERCIAL

## 2024-03-15 DIAGNOSIS — M25.532 LEFT WRIST PAIN: Primary | ICD-10-CM

## 2024-03-15 DIAGNOSIS — M62.81 MUSCLE WEAKNESS: ICD-10-CM

## 2024-03-15 DIAGNOSIS — M25.622 STIFFNESS OF LEFT ELBOW JOINT: ICD-10-CM

## 2024-03-15 DIAGNOSIS — M25.522 LEFT ELBOW PAIN: ICD-10-CM

## 2024-03-15 PROCEDURE — 97022 WHIRLPOOL THERAPY: CPT | Mod: 59

## 2024-03-15 PROCEDURE — 97110 THERAPEUTIC EXERCISES: CPT

## 2024-03-15 PROCEDURE — 97112 NEUROMUSCULAR REEDUCATION: CPT

## 2024-03-15 NOTE — PROGRESS NOTES
[facility]  Occupational Therapy Treatment Note     Date: 3/13/2024  Name: Johanna Bullock  Clinic Number: [id number]    Therapy Diagnosis:   Encounter Diagnoses   Name Primary?    Left wrist pain Yes    Left elbow pain     Stiffness of left elbow joint     Muscle weakness      Physician: Hanna Larsen PA-C    Physician Orders: S/p cubital tunnel release on 2/15/24  Eval and treat, modalities as needed  Medical Diagnosis: Z98.890 (ICD-10-CM) - S/P cubital tunnel release  Surgical Procedure and Date: Left cubital tunnel release, 02/15/24  Evaluation Date: 3/5/2024  Insurance Authorization Period Expiration: 02/27/2025  Plan of Care Certification Period: 03/05/24 to 05/27/24  Progress Note due: 04/04/24  Date of Return to MD: 03/26/24     Visit # / Visits authorized: 3 / 20  FOTO: 1/ 3  Initial=31% / Goal=62%       Precautions:  Standard and Weightbearing     Time In: 10:30 am  Time Out: 11:30 pm  Total Billable Time: 60 minutes    (billing reflects skilled 1:1 time)      Subjective     Patient reports: she is having less tremors in her LUE today.  Pain is about the same as last OT treatment    She was compliant with home exercise program given last session.   Response to previous treatment:less tremors in L hand   Functional change: able to hold light objects in L hand with some difficulty; Able to oppose L thumb to RF & SF with some difficulty     Pain: 6/10  Location: left elbow      Objective     Objective Measures updated at progress report unless specified.    Treatment     Johanna received the treatments listed below:      Patient is post op 4 weeks (Cubital Tunnel Release, 02/15/24)    Supervised modalities after being cleared for contradictions for 12 minutes:    Fluidotherapy:  to L elbow/forearm for 12 min, continuous air, 110 deg, air speed 50-60 to decrease pain, edema, hypersensitivity & scar tissue and increased tissue extensibility.       Therapeutic exercises to  develop ROM and flexibility for 26 minutes, including:  AROM    Elbow 3 ways 5 reps each, 0# wt   Scap Protraction Stretch 3 reps, 10 sec hold   OH Triceps Ext stretch 3 reps, 10 sec hold    Biceps table stretch-2 ways 3 reps, 15 sec hold   TOS Stretches:    -Corner Pec stretch 2 reps, 10 sec hold   -Pulleys-flexion 3 min    -Wall slides-2 ways (horiz & vertical) 5 reps, 10 sec hold each way       PHG 2/10 reps, setting 1   Ulnar Nerve Glides-smoking sim 8 reps      Neuromuscular re-education activities to improve Posture and Coordination for 20 minutes. The following activities were included:  Coordination:    Dexterciser 3 min   Isospheres  3 min   Desensitization Program:     -Sensory stick w/ 4 textures 2 minutes each texture   Scrub & Carry protocol 3 minutes each, w/ 4# wt      Patient Education and Home Exercises     Education provided:   - Reviewed HEP  - Reviewed Ulnar Nerve Glides to HEP  - Patient was provided with a sensory stick w/ 4 textures for HEP use  - Progress towards goals     Written Home Exercises Provided: Patient instructed to cont prior HEP.  Exercises were reviewed and Johanna was able to demonstrate them prior to the end of the session.  Johanna demonstrated good  understanding of the home exercise program provided. See electronic medical record under Patient Instructions for exercises provided during therapy sessions.       Assessment     Less tremors in LUE/hand tremors today, but pain is about the same.  Edema remains mininal and localized to L elbow surgical site.  Patient was able to perform all above listed therapeutic exercises without increased pain or difficulty today.     Johanna is progressing fairly well towards her goals and there are no updates to goals at this time. Pt prognosis is Good to Fair.     Patient will continue to benefit from skilled outpatient occupational therapy to address the deficits listed in the problem list on initial evaluation provide  patient/family education and to maximize patient's level of independence in the home and community environment.     Patient's spiritual, cultural and educational needs considered and patient agreeable to plan of care and goals.    Anticipated barriers to occupational therapy: comorbid diagnosis, compliance with HEP and attendance to therapy as recommended     Goals:  Short Term Goals (to be met in 4 weeks) Goal Status:   1) Establish HEP with patient independent in performing accurately [] Goal Met  [] Part Met   2) Patient will report PAIN of 2-3 levels lower than initial measures on the pain scale for worst pain in affected elbow/forearm [] Goal Met  [] Part Met   3) Patient will increase AROM of affected elbow/forearm/wrist, in limited planes of mobility documented at initial evaluation, by at lease 5-8 degrees for improved performance with ADL's [] Goal Met  [] Part Met   4) Increase STRENGTH of affected elbow/forearm/wrist by 1/2 MMT grade, in all planes of mobility, to improve functioning in ADL/IADL's [] Goal Met  [] Part Met   5) EDEMA in affected elbow/forearm is at least .2-.3 cm less than initial measures at evaluation [] Goal Met  [] Part Met   6) Patient will be able to achieve less than or equal to a 15-20% improvement on Elbow FOTO survey demonstrating overall improved functional ability with upper extremity.  [] Goal Met  [] Part Met   7) Assess or Increase  STRENGTH in affected upper extremity by 5-8 psi for improved functioning in ADL/IADL's [] Goal Met  [] Part Met         Long Term Goals (to be met by Discharge) Goal Status:   1) PAIN in affected elbow/forearm will be no greater than 1-2/10 while performing ADL's/IADL's [] Goal Met  [] Part Met   2) Patient will demonstrate WFL AROM in affected elbow/forearm, in all functional planes of mobility, for improved functioning in ADL/IADL's  [] Goal Met  [] Part Met   3) Increase STRENGTH in affected elbow/forearm to at lease 4/5 in all functional  planes of mobility for improved performance in ADL/IADL's [] Goal Met  [] Part Met   4) Edema in affected elbow/forearm is trace to none [] Goal Met  [] Part Met   5) Patient will be able to achieve less than or equal to 68% on Wrist/Hand FOTO survey demonstrating overall improved functional ability with upper extremity.  [] Goal Met  [] Part Met          Plan     Plan of Care Certification: 03/05/24 to 05/27/24.      Outpatient Occupational Therapy 2-3 times weekly for 12 weeks to include the following interventions: Fluidotherapy, Manual therapy/joint mobilizations, Modalities for pain management, US 3 mhz, Therapeutic exercises/activities., Strengthening, Orthotic Fabrication/Fit/Training, Edema Control, Scar Management, Electrical Modalities, and Joint Protection.  Updates/Grading for next session: progress ROM, as tolerated    Abelino Worthington OT   3/13/2024

## 2024-03-20 ENCOUNTER — CLINICAL SUPPORT (OUTPATIENT)
Dept: REHABILITATION | Facility: HOSPITAL | Age: 39
End: 2024-03-20
Payer: COMMERCIAL

## 2024-03-20 DIAGNOSIS — M25.522 LEFT ELBOW PAIN: ICD-10-CM

## 2024-03-20 DIAGNOSIS — M62.81 MUSCLE WEAKNESS: ICD-10-CM

## 2024-03-20 DIAGNOSIS — M25.532 LEFT WRIST PAIN: Primary | ICD-10-CM

## 2024-03-20 DIAGNOSIS — M25.622 STIFFNESS OF LEFT ELBOW JOINT: ICD-10-CM

## 2024-03-20 PROCEDURE — 97112 NEUROMUSCULAR REEDUCATION: CPT

## 2024-03-20 PROCEDURE — 97140 MANUAL THERAPY 1/> REGIONS: CPT

## 2024-03-20 PROCEDURE — 97022 WHIRLPOOL THERAPY: CPT | Mod: 59

## 2024-03-20 NOTE — PROGRESS NOTES
[facility]  Occupational Therapy Treatment Note     Date: 3/20/2024  Name: Johanna Bullock  Clinic Number: [id number]    Therapy Diagnosis:   Encounter Diagnoses   Name Primary?    Left wrist pain Yes    Left elbow pain     Stiffness of left elbow joint     Muscle weakness      Physician: Hanna Larsen PA-C    Physician Orders: S/p cubital tunnel release on 2/15/24  Eval and treat, modalities as needed  Medical Diagnosis: Z98.890 (ICD-10-CM) - S/P cubital tunnel release  Surgical Procedure and Date: Left cubital tunnel release, 02/15/24  Evaluation Date: 3/5/2024  Insurance Authorization Period Expiration: 02/27/2025  Plan of Care Certification Period: 03/05/24 to 05/27/24  Progress Note due: 04/04/24  Date of Return to MD: 03/26/24     Visit # / Visits authorized: 4 / 20  FOTO: 1/ 3  Initial=31% / Goal=62%       Precautions:  Standard and Weightbearing     Time In: 1:00 am  Time Out: 2:00 pm  Total Billable Time: 60 minutes    (billing reflects skilled 1:1 time)      Subjective     Patient reports:continues to struggle with her emotions and ongoing pain in her LUE.   She became labile in OT session today.   She was compliant with home exercise program given last session.   Response to previous treatment: less tremors in L hand, increased tolerance to tactile stimulation over hypersensitive area of LUE   Functional change: able to hold light objects in L hand with some difficulty; Able to oppose L thumb to RF & SF with some difficulty     Pain: 8/10  Location: left elbow      Objective     Objective Measures updated at progress report unless specified.    Treatment     Johanna received the treatments listed below:      Patient is post op 5 weeks (Cubital Tunnel Release, 02/15/24)    Supervised modalities after being cleared for contradictions for 12 minutes:    Fluidotherapy:  to L elbow/forearm for 12 min, continuous air, 98 deg, air speed 100 to decrease pain, edema,  hypersensitivity & scar tissue and increased tissue extensibility.       Manual therapy techniques: Myofacial release, Manual Lymphatic Drainage, Soft tissue Mobilization, and Scar Management were applied to the: L upper arm/elbow for 20 minutes, including:   use of hand techniques, cupping and IASTM tools to increase blood flow/circulation, improve soft tissue pliability and decrease pain.     Neuromuscular re-education activities to improve Posture and Coordination for 28 minutes. The following activities were included:  Coordination:    Dexterciser 3 min   Isospheres  3 min   Desensitization Program:     -Sensory stick w/ 4 textures 2 minutes each texture   Scrub & Carry protocol 3 minutes each, w/ 4# wt   Weight-bearing/Shifting Exercises:    -Table top WB/WS-elbow extended 10 reps each way   -Quadruped WB/WS-elbows extended 10 reps each way       Ulnar Nerve Glides 3 reps       Therapeutic exercises to develop ROM and flexibility for 0 minutes, including:        Patient Education and Home Exercises     Education provided:   - Reviewed HEP  - Reviewed Ulnar Nerve Glides to HEP  - Recommendations made to patient for Pain Management/PCP follow up regarding ongoing symptoms of Depression and High pain focus.    - Progress towards goals     Written Home Exercises Provided: Patient instructed to cont prior HEP.  Exercises were reviewed and Johanna was able to demonstrate them prior to the end of the session.  Johanna demonstrated good  understanding of the home exercise program provided. See electronic medical record under Patient Instructions for exercises provided during therapy sessions.       Assessment     Patient continues to report high pain levels.  She continues with a high pain focus and exhibits signs of depression.  Recommendation was made for patient to follow up with Pain management and/or PCP regarding ongoing symptoms.  Edema has decreased and is currently is minimal to trace in L elbow.  Patient  was able to perform all above listed therapeutic exercises without increased pain or difficulty today.     Johanna is progressing fairly well towards her goals and there are no updates to goals at this time. Pt prognosis is Good to Fair.     Patient will continue to benefit from skilled outpatient occupational therapy to address the deficits listed in the problem list on initial evaluation provide patient/family education and to maximize patient's level of independence in the home and community environment.     Patient's spiritual, cultural and educational needs considered and patient agreeable to plan of care and goals.    Anticipated barriers to occupational therapy: comorbid diagnosis, compliance with HEP and attendance to therapy as recommended     Goals:  Short Term Goals (to be met in 4 weeks) Goal Status:   1) Establish HEP with patient independent in performing accurately [] Goal Met  [] Part Met   2) Patient will report PAIN of 2-3 levels lower than initial measures on the pain scale for worst pain in affected elbow/forearm [] Goal Met  [] Part Met   3) Patient will increase AROM of affected elbow/forearm/wrist, in limited planes of mobility documented at initial evaluation, by at lease 5-8 degrees for improved performance with ADL's [] Goal Met  [] Part Met   4) Increase STRENGTH of affected elbow/forearm/wrist by 1/2 MMT grade, in all planes of mobility, to improve functioning in ADL/IADL's [] Goal Met  [] Part Met   5) EDEMA in affected elbow/forearm is at least .2-.3 cm less than initial measures at evaluation [] Goal Met  [] Part Met   6) Patient will be able to achieve less than or equal to a 15-20% improvement on Elbow FOTO survey demonstrating overall improved functional ability with upper extremity.  [] Goal Met  [] Part Met   7) Assess or Increase  STRENGTH in affected upper extremity by 5-8 psi for improved functioning in ADL/IADL's [] Goal Met  [] Part Met         Long Term Goals (to be  met by Discharge) Goal Status:   1) PAIN in affected elbow/forearm will be no greater than 1-2/10 while performing ADL's/IADL's [] Goal Met  [] Part Met   2) Patient will demonstrate WFL AROM in affected elbow/forearm, in all functional planes of mobility, for improved functioning in ADL/IADL's  [] Goal Met  [] Part Met   3) Increase STRENGTH in affected elbow/forearm to at lease 4/5 in all functional planes of mobility for improved performance in ADL/IADL's [] Goal Met  [] Part Met   4) Edema in affected elbow/forearm is trace to none [] Goal Met  [] Part Met   5) Patient will be able to achieve less than or equal to 68% on Wrist/Hand FOTO survey demonstrating overall improved functional ability with upper extremity.  [] Goal Met  [] Part Met          Plan     Plan of Care Certification: 03/05/24 to 05/27/24.      Outpatient Occupational Therapy 2-3 times weekly for 12 weeks to include the following interventions: Fluidotherapy, Manual therapy/joint mobilizations, Modalities for pain management, US 3 mhz, Therapeutic exercises/activities., Strengthening, Orthotic Fabrication/Fit/Training, Edema Control, Scar Management, Electrical Modalities, and Joint Protection.  Updates/Grading for next session: progress ROM, as tolerated    Abelino Worthington OT   3/20/2024

## 2024-04-01 RX ORDER — LINACLOTIDE 290 UG/1
CAPSULE, GELATIN COATED ORAL
Qty: 90 CAPSULE | Refills: 3 | Status: SHIPPED | OUTPATIENT
Start: 2024-04-01

## 2024-04-02 ENCOUNTER — OFFICE VISIT (OUTPATIENT)
Dept: ORTHOPEDICS | Facility: CLINIC | Age: 39
End: 2024-04-02
Payer: COMMERCIAL

## 2024-04-02 VITALS — BODY MASS INDEX: 37.39 KG/M2 | HEIGHT: 68 IN | WEIGHT: 246.69 LBS

## 2024-04-02 DIAGNOSIS — G56.22 CUBITAL TUNNEL SYNDROME ON LEFT: ICD-10-CM

## 2024-04-02 DIAGNOSIS — Z98.890 S/P CUBITAL TUNNEL RELEASE: Primary | ICD-10-CM

## 2024-04-02 DIAGNOSIS — G90.522 COMPLEX REGIONAL PAIN SYNDROME I OF LEFT LOWER LIMB: ICD-10-CM

## 2024-04-02 PROCEDURE — 99024 POSTOP FOLLOW-UP VISIT: CPT | Mod: S$GLB,,, | Performed by: ORTHOPAEDIC SURGERY

## 2024-04-02 PROCEDURE — 1159F MED LIST DOCD IN RCRD: CPT | Mod: CPTII,S$GLB,, | Performed by: ORTHOPAEDIC SURGERY

## 2024-04-02 PROCEDURE — 1160F RVW MEDS BY RX/DR IN RCRD: CPT | Mod: CPTII,S$GLB,, | Performed by: ORTHOPAEDIC SURGERY

## 2024-04-02 PROCEDURE — 99999 PR PBB SHADOW E&M-EST. PATIENT-LVL III: CPT | Mod: PBBFAC,,, | Performed by: ORTHOPAEDIC SURGERY

## 2024-04-02 NOTE — PROGRESS NOTES
Subjective:     Patient ID: Johanna Bullock is a 38 y.o. female.    Chief Complaint: Pain of the Left Forearm      HPI:  The patient is a 38-year-old female seen in follow-up after a left cubital tunnel release date of that surgery was 02/15/2024.  She said she was symptomatically better for about 2 weeks and then her pain from her complex regional pain syndrome recurred and she has left upper extremity pain.  She has been going to physical therapy and is trying desensitization therapy.  She takes Tylenol ibuprofen and has not tried sympathetic blocks.    Past Medical History:   Diagnosis Date    Adjustment disorder with mixed anxiety and depressed mood 02/06/2013    Anemia, B12 deficiency     Anxiety     Arthritis     Bradycardia      Past Surgical History:   Procedure Laterality Date    CHOLECYSTECTOMY  10/2012    COLONOSCOPY N/A 02/14/2020    Procedure: COLONOSCOPY;  Surgeon: Jas Moore MD;  Location: [facility] (4TH FLR);  Service: Endoscopy;  Laterality: N/A;  Pt procedure time changed to 0800 with 0715 - second half prep completed from [id number]- Pt verbalized understanding- ERW2/13/20@1022    COLONOSCOPY N/A 03/10/2023    Procedure: COLONOSCOPY;  Surgeon: HORACE Moore MD;  Location: [facility] (4TH FLR);  Service: Endoscopy;  Laterality: N/A;  inst emailed-RB    DECOMPRESSION, NERVE, ULNAR Left 2/15/2024    Procedure: DECOMPRESSION, NERVE, ULNAR;  Surgeon: Jas Larose MD;  Location: [facility];  Service: Orthopedics;  Laterality: Left;  ulnar nerve decompression left elbow with cubital tunnel release and possible anterior transposition ulnar nerve    DILATION AND CURETTAGE OF UTERUS      ESOPHAGOGASTRODUODENOSCOPY N/A 07/20/2018    Procedure: ESOPHAGOGASTRODUODENOSCOPY (EGD);  Surgeon: Darwin Hansen MD;  Location: [facility] (4TH FLR);  Service: Endoscopy;  Laterality: N/A;  Please measure pouch and limbs    GASTRIC BYPASS  2008    Revision August 2019    INJECTION OF  ANESTHETIC AGENT AROUND LATERAL BRANCH NERVES OF SACROILIAC JOINT      LAPAROSCOPIC REPAIR OF HIATAL HERNIA  2019    Liver scope  10/2012    LYSIS OF ADHESIONS      ULNAR TUNNEL RELEASE Left 2/15/2024    Procedure: RELEASE, CUBITAL TUNNEL;  Surgeon: Jas Larose MD;  Location: [facility];  Service: Orthopedics;  Laterality: Left;  ulnar nerve decompression left elbow with cubital tunnel release and possible anterior transposition ulnar nerve     Family History   Problem Relation Age of Onset    Hypertension Father     Heart disease Father     Breast cancer Neg Hx     Colon cancer Neg Hx     Ovarian cancer Neg Hx     Diabetes Neg Hx     Stroke Neg Hx      Social History     Socioeconomic History    Marital status: Single   Occupational History    Occupation: works as      Employer: [employer]     Employer: [id number]   Tobacco Use    Smoking status: Never    Smokeless tobacco: Never   Substance and Sexual Activity    Alcohol use: Never     Comment: Alcohol use rarely    Drug use: No     Comment: Mirena    Sexual activity: Yes     Partners: Male     Birth control/protection: I.U.D.   Social History Narrative    Born and raised in [place]    Went to [employer] and got BS in bio with minor in chem    1 child    Has boyfriend    A fewnot close friends    Likes to read and go to movies     Social Determinants of Health     Stress: No Stress Concern Present (9/26/2019)    [facility] of Occupational Health - Occupational Stress Questionnaire     Feeling of Stress : Not at all     Medication List with Changes/Refills   Current Medications    ACETAMINOPHEN (TYLENOL) 650 MG TBSR    Take 650 mg by mouth every 8 (eight) hours.    ACETYLCYSTEINE (N-ACETYL-L-CYSTEINE MISC)    by Misc.(Non-Drug; Combo Route) route.    ALBUTEROL (PROVENTIL/VENTOLIN HFA) 90 MCG/ACTUATION INHALER    Inhale 1-2 puffs into the lungs every 6 (six) hours as needed for Wheezing.    CYANOCOBALAMIN 1,000 MCG/ML INJECTION    ONE  INJECTION AS DIRECTED EVERY 28 DAYS    FLUDROCORTISONE (FLORINEF) 0.1 MG TAB    Take 1 tablet (100 mcg total) by mouth once daily.    FLUTICASONE PROPIONATE (FLONASE) 50 MCG/ACTUATION NASAL SPRAY    1 spray by Each Nostril route once daily.    GABAPENTIN (NEURONTIN) 300 MG CAPSULE    Take 300 mg by mouth 3 (three) times daily.    HYDROCODONE-ACETAMINOPHEN (NORCO) 5-325 MG PER TABLET    Take 1 tablet by mouth every 8 (eight) hours as needed for Pain.    KETOROLAC (TORADOL) 10 MG TABLET    Take by mouth.    LINZESS 290 MCG CAP CAPSULE    TAKE ONE CAPSULE BY MOUTH EVERY DAY    LINZESS 290 MCG CAP CAPSULE    TAKE 1 CAPSULE(290 MCG) BY MOUTH BEFORE BREAKFAST    MAGNESIUM CARB,CITRATE,OXIDE (MAGNESIUM COMPLEX ORAL)    Take by mouth.    MODAFINIL (PROVIGIL) 200 MG TAB    Take 200 mg by mouth once daily.    NATREXONE TABLET 3 MG    Take 2 tablets (6 mg total) by mouth every evening. Do not take with opitates    PANTOPRAZOLE (PROTONIX) 40 MG TABLET    Take 1 tablet by mouth every morning.    TIZANIDINE 4 MG CAP    Take by mouth.    TRAMADOL (ULTRAM) 50 MG TABLET    Take 50 mg by mouth every 6 (six) hours.    UNABLE TO FIND    medication name: thymoquinone/black seed     Review of patient's allergies indicates:  No Known Allergies  Review of Systems   Constitutional: Negative for malaise/fatigue.   HENT:  Negative for hearing loss.    Eyes:  Negative for double vision and visual disturbance.   Cardiovascular:  Negative for chest pain.   Respiratory:  Negative for shortness of breath.    Endocrine: Negative for cold intolerance.   Hematologic/Lymphatic: Does not bruise/bleed easily.   Skin:  Negative for poor wound healing and suspicious lesions.   Musculoskeletal:  Positive for stiffness. Negative for gout, joint pain and joint swelling.   Gastrointestinal:  Negative for nausea and vomiting.   Genitourinary:  Positive for pelvic pain. Negative for dysuria.   Neurological:  Positive for focal weakness, numbness, paresthesias  and sensory change.   Psychiatric/Behavioral:  Negative for depression, memory loss and substance abuse. The patient is not nervous/anxious.    Allergic/Immunologic: Negative for persistent infections.       Objective:   Body mass index is 37.51 kg/m².  There were no vitals filed for this visit.             General    Constitutional: She is oriented to person, place, and time. She appears well-developed and well-nourished. No distress.   HENT:   Head: Normocephalic.   Eyes: EOM are normal.   Pulmonary/Chest: Effort normal.   Neurological: She is oriented to person, place, and time.   Psychiatric: She has a normal mood and affect.         Left Hand/Wrist Exam     Inspection   Scars: Wrist - absent   Effusion: Wrist - absent     Other     Sensory Exam  Median Distribution: normal  Ulnar Distribution: normal  Radial Distribution: normal      Left Elbow Exam     Inspection   Scars: present  Effusion: present    Pain   The patient exhibits pain of the anterior joint line    Other   Sensation: decreased    Comments:  The patient has pain from the well-healed surgical scar medial elbow to the ulnar side of the wrist.  There is no thenar or intrinsic atrophy noted.  She has no motor deficits        Vascular Exam       Capillary Refill  Left Hand: normal capillary refill           radiographs were not obtained today  Assessment:     Encounter Diagnoses   Name Primary?    S/P cubital tunnel release Yes    Cubital tunnel syndrome on left     Complex regional pain syndrome i of left lower limb         Plan:     The patient is [facility] at her request.  I think sympathetic blocks may be helpful for her.  She will continue physical therapy.  She was given a note for no use left hand to start 04/12/2024 and last for 3 months and only do regular schedule without over time.                Disclaimer: This note was prepared using a voice recognition system and is likely to have sound alike errors within the  text.

## 2024-04-03 DIAGNOSIS — S54.02XA NEURAPRAXIA OF LEFT ULNAR NERVE, INITIAL ENCOUNTER: ICD-10-CM

## 2024-04-03 DIAGNOSIS — G90.522 COMPLEX REGIONAL PAIN SYNDROME I OF LEFT LOWER LIMB: Primary | ICD-10-CM

## 2024-04-03 DIAGNOSIS — R29.898 WEAKNESS OF LEFT HAND: ICD-10-CM

## 2024-04-09 ENCOUNTER — CLINICAL SUPPORT (OUTPATIENT)
Dept: REHABILITATION | Facility: HOSPITAL | Age: 39
End: 2024-04-09
Payer: COMMERCIAL

## 2024-04-09 DIAGNOSIS — M25.532 LEFT WRIST PAIN: Primary | ICD-10-CM

## 2024-04-09 DIAGNOSIS — M25.622 STIFFNESS OF LEFT ELBOW JOINT: ICD-10-CM

## 2024-04-09 DIAGNOSIS — M25.522 LEFT ELBOW PAIN: ICD-10-CM

## 2024-04-09 DIAGNOSIS — M62.81 MUSCLE WEAKNESS: ICD-10-CM

## 2024-04-09 PROCEDURE — 97112 NEUROMUSCULAR REEDUCATION: CPT

## 2024-04-09 PROCEDURE — 97022 WHIRLPOOL THERAPY: CPT

## 2024-04-09 NOTE — PROGRESS NOTES
[facility]  Occupational Therapy Treatment Note     Date: 4/9/2024  Name: Johanna Bullock  Clinic Number: [id number]    Therapy Diagnosis:   Encounter Diagnoses   Name Primary?    Left wrist pain Yes    Left elbow pain     Stiffness of left elbow joint     Muscle weakness      Physician: Hanna Larsen PA-C    Physician Orders: S/p cubital tunnel release on 2/15/24  Eval and treat, modalities as needed  Medical Diagnosis: Z98.890 (ICD-10-CM) - S/P cubital tunnel release  Surgical Procedure and Date: Left cubital tunnel release, 02/15/24  Evaluation Date: 3/5/2024  Insurance Authorization Period Expiration: 02/27/2025  Plan of Care Certification Period: 03/05/24 to 05/27/24  Progress Note due: 04/04/24  Date of Return to MD: 03/26/24     Visit # / Visits authorized: 5 / 20  FOTO: 1/ 3  Initial=31% / Goal=62%       Precautions:  Standard and Weightbearing     Time In: 10:00 am  Time Out: 10:35 pm  Total Billable Time: 35 minutes  (billing reflects skilled 1:1 time)      Subjective     Patient reports:pain intensity remains about the same, but is less often in LUE.  Patient also reports she has a conflicting appointment and can only do 30 minutes of OT today.  She has a Pain Management appointment immediately after OT.  She was compliant with home exercise program given last session.   Response to previous treatment: less pain & tremors in L hand, increased tolerance to tactile stimulation over hypersensitive area of LUE   Functional change: able to hold light objects in L hand with some difficulty; Able to oppose L thumb to RF & SF with some difficulty     Pain: 6-7/10  Location: left elbow      Objective     Objective Measures updated at progress report unless specified.    Treatment     Johanna received the treatments listed below:      Patient is post op 7 weeks 6 days (Cubital Tunnel Release, (02/15/24)    Supervised modalities after being cleared for contradictions for 10 minutes:     Fluidotherapy:  to L elbow/forearm for 12 min, continuous air, 98 deg, air speed 100 to decrease pain, edema, hypersensitivity & scar tissue and increased tissue extensibility.       .     Neuromuscular re-education activities to improve Posture and Coordination for 25 minutes. The following activities were included:  Coordination:        Overhead Triceps stretch 3 reps   Full UE extension stretch 3 reps       Desensitization Program:     Scrub & Carry protocol 3 minutes each, w/ 4# wt   Weight-bearing/Shifting Exercises:        Ulnar Nerve Glides 3 reps       HEP Training:    UE stretches, including forearm & prayer See Patient Instructions handout   Soft tissue/Scar mobilization Demonstration provided   Weight-bearing: easy chair push-ups        Therapeutic exercises to develop ROM and flexibility for 0 minutes, including:        Patient Education and Home Exercises     Education provided:   - Discussed importance of performing desensitization program, including scrub and carry, to address CRPS symptoms.    - Updated HEP  - Reviewed Ulnar Nerve Glides to HEP  - Reiterated recommendations for follow up with Pain Management/PCP regarding ongoing symptoms of Depression, High pain focus and self-limiting behavior.    - Progress towards goals     Written Home Exercises Provided: Patient instructed to cont prior HEP.  Exercises were reviewed and Johanna was able to demonstrate them prior to the end of the session.  Johanna demonstrated good  understanding of the home exercise program provided. See electronic medical record under Patient Instructions for exercises provided during therapy sessions.       Assessment     Patient has missed several scheduled OT treatment sessions due to being sick with Covid/pneumonia.  Pain intensity and frequency has decreased since last OT treatment, however, she continues with a high pain focus and self-limiting behaviors.  Patient was able to perform all above listed therapeutic  exercises with some increased pain & difficulty today. She continues to display lability during OT treatment.      Johanna is progressing fairly well towards her goals and there are no updates to goals at this time. Pt prognosis is Good to Fair.     Patient will continue to benefit from skilled outpatient occupational therapy to address the deficits listed in the problem list on initial evaluation provide patient/family education and to maximize patient's level of independence in the home and community environment.     Patient's spiritual, cultural and educational needs considered and patient agreeable to plan of care and goals.    Anticipated barriers to occupational therapy: comorbid diagnosis, compliance with HEP and attendance to therapy as recommended     Goals:  Short Term Goals (to be met in 4 weeks) Goal Status:   1) Establish HEP with patient independent in performing accurately [] Goal Met  [] Part Met   2) Patient will report PAIN of 2-3 levels lower than initial measures on the pain scale for worst pain in affected elbow/forearm [] Goal Met  [] Part Met   3) Patient will increase AROM of affected elbow/forearm/wrist, in limited planes of mobility documented at initial evaluation, by at lease 5-8 degrees for improved performance with ADL's [] Goal Met  [] Part Met   4) Increase STRENGTH of affected elbow/forearm/wrist by 1/2 MMT grade, in all planes of mobility, to improve functioning in ADL/IADL's [] Goal Met  [] Part Met   5) EDEMA in affected elbow/forearm is at least .2-.3 cm less than initial measures at evaluation [] Goal Met  [] Part Met   6) Patient will be able to achieve less than or equal to a 15-20% improvement on Elbow FOTO survey demonstrating overall improved functional ability with upper extremity.  [] Goal Met  [] Part Met   7) Assess or Increase  STRENGTH in affected upper extremity by 5-8 psi for improved functioning in ADL/IADL's [] Goal Met  [] Part Met         Long Term Goals  (to be met by Discharge) Goal Status:   1) PAIN in affected elbow/forearm will be no greater than 1-2/10 while performing ADL's/IADL's [] Goal Met  [] Part Met   2) Patient will demonstrate WFL AROM in affected elbow/forearm, in all functional planes of mobility, for improved functioning in ADL/IADL's  [] Goal Met  [] Part Met   3) Increase STRENGTH in affected elbow/forearm to at lease 4/5 in all functional planes of mobility for improved performance in ADL/IADL's [] Goal Met  [] Part Met   4) Edema in affected elbow/forearm is trace to none [] Goal Met  [] Part Met   5) Patient will be able to achieve less than or equal to 68% on Wrist/Hand FOTO survey demonstrating overall improved functional ability with upper extremity.  [] Goal Met  [] Part Met          Plan     Plan of Care Certification: 03/05/24 to 05/27/24.      Outpatient Occupational Therapy 2-3 times weekly for 12 weeks to include the following interventions: Fluidotherapy, Manual therapy/joint mobilizations, Modalities for pain management, US 3 mhz, Therapeutic exercises/activities., Strengthening, Orthotic Fabrication/Fit/Training, Edema Control, Scar Management, Electrical Modalities, and Joint Protection.  Updates/Grading for next session: progress ROM, as tolerated    Abelino Worthington OT   4/9/2024

## 2024-04-10 ENCOUNTER — PATIENT MESSAGE (OUTPATIENT)
Dept: ORTHOPEDICS | Facility: CLINIC | Age: 39
End: 2024-04-10
Payer: COMMERCIAL

## 2024-04-11 ENCOUNTER — PATIENT MESSAGE (OUTPATIENT)
Dept: ORTHOPEDICS | Facility: CLINIC | Age: 39
End: 2024-04-11
Payer: COMMERCIAL

## 2024-04-11 ENCOUNTER — CLINICAL SUPPORT (OUTPATIENT)
Dept: REHABILITATION | Facility: HOSPITAL | Age: 39
End: 2024-04-11
Payer: COMMERCIAL

## 2024-04-11 DIAGNOSIS — M62.81 MUSCLE WEAKNESS: ICD-10-CM

## 2024-04-11 DIAGNOSIS — M25.532 LEFT WRIST PAIN: Primary | ICD-10-CM

## 2024-04-11 DIAGNOSIS — M25.622 STIFFNESS OF LEFT ELBOW JOINT: ICD-10-CM

## 2024-04-11 DIAGNOSIS — M25.522 LEFT ELBOW PAIN: ICD-10-CM

## 2024-04-11 PROCEDURE — 97110 THERAPEUTIC EXERCISES: CPT

## 2024-04-11 PROCEDURE — 97022 WHIRLPOOL THERAPY: CPT | Mod: 59

## 2024-04-11 PROCEDURE — 97112 NEUROMUSCULAR REEDUCATION: CPT

## 2024-04-11 NOTE — PROGRESS NOTES
[facility]  Occupational Therapy Treatment Note     Date: 4/11/2024  Name: Johanna Bullock  Clinic Number: [id number]    Therapy Diagnosis:   Encounter Diagnoses   Name Primary?    Left wrist pain Yes    Left elbow pain     Stiffness of left elbow joint     Muscle weakness      Physician: Hanna Larsen PA-C    Physician Orders: S/p cubital tunnel release on 2/15/24  Eval and treat, modalities as needed  Medical Diagnosis: Z98.890 (ICD-10-CM) - S/P cubital tunnel release  Surgical Procedure and Date: Left cubital tunnel release, 02/15/24  Evaluation Date: 3/5/2024  Insurance Authorization Period Expiration: 02/27/2025  Plan of Care Certification Period: 03/05/24 to 05/27/24  Progress Note due: 04/04/24  Date of Return to MD: 03/26/24     Visit # / Visits authorized: 6 / 20  FOTO: 1/ 3  Initial=31% / Goal=62%/ Current=45%       Precautions:  Standard and Weightbearing     Time In: 10:30 am  Time Out: 11:30 am  Total Billable Time: 60 minutes  (billing reflects skilled 1:1 time)      Subjective     Patient reports: She completed a visit with Pain Management and nerve blocks were discussed and appointments made to perform the procedure.   She was compliant with home exercise program given last session.   Response to previous treatment: less pain & tremors in L hand, increased tolerance to tactile stimulation over hypersensitive area of LUE   Functional change: able to hold light objects in L hand with some difficulty; Able to oppose L thumb to RF & SF with some difficulty     Pain: 6-7/10  Location: left elbow      Objective     Objective Measures updated at progress report unless specified.    Treatment     Johanna received the treatments listed below:      Patient is post op 8 weeks (Cubital Tunnel Release, (02/15/24); Diagnosed with CRPS on 04/02/24    Supervised modalities after being cleared for contradictions for 10 minutes:    Fluidotherapy:  to L elbow/forearm for 12 min,  continuous air, 98 deg, air speed 100 to decrease pain, edema, hypersensitivity & scar tissue and increased tissue extensibility.     .     Neuromuscular re-education activities to improve Posture and Coordination for 30 minutes. The following activities were included:  Coordination:    Dexterciser 3 min   Isospheres  3 min       Desensitization Program:     Scrub & Carry protocol 3 minutes each, w/ 4# wt   Weight-bearing/Shifting Exercises:        Ulnar Nerve Glides 3 reps       HEP Training:    UE stretches, including forearm & prayer Reviewed   Soft tissue/Scar mobilization Reviewed   Weight-bearing: easy chair push-ups Reviewed       Therapeutic exercises to develop ROM and flexibility for 18 minutes, including:  Pulleys 3 min, with stretch   Strengthening:    PHG 2/10 reps, setting 1 (15#)   Digi-flex 2/10 reps, yellow   Resistive clothespin w/ tripod & lateral pinch 15 reps, yellow             Patient Education and Home Exercises     Education provided:   - Reiterated importance of performing desensitization program, including scrub and carry, to address CRPS symptoms.    - Reviewed HEP, including Ulnar Nerve Glides  - Reiterated recommendations for follow up with Pain Management/PCP regarding ongoing symptoms of Depression, High pain focus and self-limiting behavior.    - Progress towards goals     Written Home Exercises Provided: Patient instructed to cont prior HEP.  Exercises were reviewed and Johanna was able to demonstrate them prior to the end of the session.  Johanna demonstrated good  understanding of the home exercise program provided. See electronic medical record under Patient Instructions for exercises provided during therapy sessions.       Assessment     Patient's pain remains high with a high pain focus in her LUE.  Initiated light strengthening today with patient cooperative and minimal self-limiting behavior.  Recommend to continue with desensitization program and progress strengthening  further, as tolerated.       Johanna is progressing fairly well towards her goals and there are no updates to goals at this time. Pt prognosis is Good to Fair.     Patient will continue to benefit from skilled outpatient occupational therapy to address the deficits listed in the problem list on initial evaluation provide patient/family education and to maximize patient's level of independence in the home and community environment.     Patient's spiritual, cultural and educational needs considered and patient agreeable to plan of care and goals.    Anticipated barriers to occupational therapy: comorbid diagnosis, compliance with HEP and attendance to therapy as recommended     Goals:  Short Term Goals (to be met in 4 weeks) Goal Status:   1) Establish HEP with patient independent in performing accurately [] Goal Met  [] Part Met   2) Patient will report PAIN of 2-3 levels lower than initial measures on the pain scale for worst pain in affected elbow/forearm [] Goal Met  [] Part Met   3) Patient will increase AROM of affected elbow/forearm/wrist, in limited planes of mobility documented at initial evaluation, by at lease 5-8 degrees for improved performance with ADL's [] Goal Met  [] Part Met   4) Increase STRENGTH of affected elbow/forearm/wrist by 1/2 MMT grade, in all planes of mobility, to improve functioning in ADL/IADL's [] Goal Met  [] Part Met   5) EDEMA in affected elbow/forearm is at least .2-.3 cm less than initial measures at evaluation [] Goal Met  [] Part Met   6) Patient will be able to achieve less than or equal to a 15-20% improvement on Elbow FOTO survey demonstrating overall improved functional ability with upper extremity.  [] Goal Met  [] Part Met   7) Assess or Increase  STRENGTH in affected upper extremity by 5-8 psi for improved functioning in ADL/IADL's [] Goal Met  [] Part Met         Long Term Goals (to be met by Discharge) Goal Status:   1) PAIN in affected elbow/forearm will be no  greater than 1-2/10 while performing ADL's/IADL's [] Goal Met  [] Part Met   2) Patient will demonstrate WFL AROM in affected elbow/forearm, in all functional planes of mobility, for improved functioning in ADL/IADL's  [] Goal Met  [] Part Met   3) Increase STRENGTH in affected elbow/forearm to at lease 4/5 in all functional planes of mobility for improved performance in ADL/IADL's [] Goal Met  [] Part Met   4) Edema in affected elbow/forearm is trace to none [] Goal Met  [] Part Met   5) Patient will be able to achieve less than or equal to 68% on Wrist/Hand FOTO survey demonstrating overall improved functional ability with upper extremity.  [] Goal Met  [] Part Met          Plan     Plan of Care Certification: 03/05/24 to 05/27/24.      Outpatient Occupational Therapy 2-3 times weekly for 12 weeks to include the following interventions: Fluidotherapy, Manual therapy/joint mobilizations, Modalities for pain management, US 3 mhz, Therapeutic exercises/activities., Strengthening, Orthotic Fabrication/Fit/Training, Edema Control, Scar Management, Electrical Modalities, and Joint Protection.  Updates/Grading for next session: progress ROM, as tolerated    Abelino Worthington OT   4/11/2024

## 2024-04-23 ENCOUNTER — CLINICAL SUPPORT (OUTPATIENT)
Dept: REHABILITATION | Facility: HOSPITAL | Age: 39
End: 2024-04-23
Payer: COMMERCIAL

## 2024-04-23 DIAGNOSIS — M25.622 STIFFNESS OF LEFT ELBOW JOINT: ICD-10-CM

## 2024-04-23 DIAGNOSIS — M25.532 LEFT WRIST PAIN: Primary | ICD-10-CM

## 2024-04-23 DIAGNOSIS — M25.522 LEFT ELBOW PAIN: ICD-10-CM

## 2024-04-23 DIAGNOSIS — M62.81 MUSCLE WEAKNESS: ICD-10-CM

## 2024-04-23 PROCEDURE — 97110 THERAPEUTIC EXERCISES: CPT

## 2024-04-23 PROCEDURE — 97022 WHIRLPOOL THERAPY: CPT

## 2024-04-23 PROCEDURE — 97112 NEUROMUSCULAR REEDUCATION: CPT

## 2024-04-23 NOTE — PATIENT INSTRUCTIONS
Stress loading program therapy  Guillaume / Yanely Protocol    Stress loading refers to a specific set of exercizes intended to improve the clinical course of patients who suffer from reflex sympathetic dystrophy. The following program has been adapted from STEVE Galaviz and Laura Ny: Treatment of reflex sympathetic dystrophy of the hand with an active stress loading program, J Hand Surg 1987;12A:779-785. This program may be incorporated into the treatment of any patient with a mechanically  whose condition is clearly more painful and swollen than the average patient after surgery or trauma.    During the initial visit, RSD is described to the patient as an abnormal or exaggerated response to injury or surgery. The patient is told that specific stressful exercizes are necessary to remedy this problem. Light activity or active motion is not enough. The patient is also told that increased pain and swelling may occur initially, but generally will subside within a few days.    The program is performed by the patient as follows:    Scrub:    The patient is positioned on the floor on their hands and knees, with a coarse bristled brush in the affected hand. They are told to scrub a plywood board, applying as much pressure as possible using a back and forth motion. The patient must lean on the affected arm. If possible, the shoulder should be directly over the hand for maximum pressure. If the patient is unable to get on the floor, the program can be adpted by placing the board on a table. The average home program begins with 3 minute sessions of steady scrubbing performed three times a day. A record sheet is given to the patient to take home and record the duration of each session.    Carry:    The patient is told to carry a briefcase or purse in the affected hand, with the arm extended. The amount of weight is the maximum tolerated by the patient. An initial weight is determined at the first session,  generally ranging from 1 to 5 pounds. The weight should be carried throughout the day whenever the patient is walking or standing. The amount of weight carried is recorded daily on the record sheet.    Generally, no other treatment modalities are used initially. The stress loading program is the only home program given and the only treatment modality used in supervised therapy sessions until the pain begins to subside. This allows the patient to concentrate their efforts on the program and avoids posible increase in severity of symptoms from other modalities of treatment.    A followup vist is usually scheduled within a day or two of starting treatment. The frequency of followup visits is dependent on the patient's need for reinforcement, supervision, and treatment modification. The average patient is aware of improvement of symptoms in 5 days. It is usually necessary to repeat initial explanations and instructions. The program is advanced as follows:    Scrub:    After the first several days, the program is increased to 5 minutes three times a day and generally increased to 7 minutes after two weeks. If tolerated, an alternative is a 10 minute session twice a day. The physician or therapist should have the patient go through the program during each session to make sure that adequate force is being applied and to  the patient's ability to increase the duration of each session.    Carry:    Maximum weight carried at home is comparedwith the patient's demonstrated ability in the office. Recommended increases in weight are made. The patient is also encouraged to continue increasing the weight between sessions if possible.    Additional therapy:    Once the pain has begun to subside, splinting and other modalities may be used. Joint manipulation, passive range of motion exercizes and dynamic splinting are avoided until specifically recommended by the physician.

## 2024-04-24 ENCOUNTER — PATIENT MESSAGE (OUTPATIENT)
Dept: CARDIOLOGY | Facility: CLINIC | Age: 39
End: 2024-04-24

## 2024-04-24 ENCOUNTER — OFFICE VISIT (OUTPATIENT)
Dept: CARDIOLOGY | Facility: CLINIC | Age: 39
End: 2024-04-24
Payer: COMMERCIAL

## 2024-04-24 VITALS
WEIGHT: 250.88 LBS | RESPIRATION RATE: 16 BRPM | BODY MASS INDEX: 38.15 KG/M2 | DIASTOLIC BLOOD PRESSURE: 90 MMHG | HEART RATE: 70 BPM | OXYGEN SATURATION: 99 % | SYSTOLIC BLOOD PRESSURE: 130 MMHG

## 2024-04-24 DIAGNOSIS — I10 PRIMARY HYPERTENSION: Primary | ICD-10-CM

## 2024-04-24 DIAGNOSIS — E66.01 MORBID OBESITY: ICD-10-CM

## 2024-04-24 DIAGNOSIS — Z98.84 HISTORY OF GASTRIC BYPASS: ICD-10-CM

## 2024-04-24 DIAGNOSIS — I10 HYPERTENSION, UNSPECIFIED TYPE: ICD-10-CM

## 2024-04-24 DIAGNOSIS — R20.2 PARESTHESIA OF LEFT ARM: ICD-10-CM

## 2024-04-24 DIAGNOSIS — M79.7 FIBROMYALGIA: ICD-10-CM

## 2024-04-24 PROCEDURE — 3008F BODY MASS INDEX DOCD: CPT | Mod: CPTII,S$GLB,, | Performed by: INTERNAL MEDICINE

## 2024-04-24 PROCEDURE — 99999 PR PBB SHADOW E&M-EST. PATIENT-LVL IV: CPT | Mod: PBBFAC,,, | Performed by: INTERNAL MEDICINE

## 2024-04-24 PROCEDURE — 3078F DIAST BP <80 MM HG: CPT | Mod: CPTII,S$GLB,, | Performed by: INTERNAL MEDICINE

## 2024-04-24 PROCEDURE — 99214 OFFICE O/P EST MOD 30 MIN: CPT | Mod: S$GLB,,, | Performed by: INTERNAL MEDICINE

## 2024-04-24 PROCEDURE — 1159F MED LIST DOCD IN RCRD: CPT | Mod: CPTII,S$GLB,, | Performed by: INTERNAL MEDICINE

## 2024-04-24 PROCEDURE — 3075F SYST BP GE 130 - 139MM HG: CPT | Mod: CPTII,S$GLB,, | Performed by: INTERNAL MEDICINE

## 2024-04-24 RX ORDER — AMLODIPINE BESYLATE 5 MG/1
5 TABLET ORAL DAILY
Qty: 30 TABLET | Refills: 11 | Status: SHIPPED | OUTPATIENT
Start: 2024-04-24 | End: 2024-05-15

## 2024-04-24 NOTE — PROGRESS NOTES
Subjective:   Patient ID:  Johanna Bullock is a 38 y.o. female who presents for evaluation of No chief complaint on file.      HPI  4.24.2024  States bp high at home and despite being off florinef also does not take modafinil daily as she states.    She had a recent surgery on her left arm and she is dealing with pain with that.  Following with pain management.  Going for sympathetic block.    She states that the Florinef has helped her hypotension initially.  But then she was taking some inhalers and she noticed that her pulse and her blood pressure were high.  She stopped it for sometime.    Lately she has been off that to and her pressure is still elevated she states at home she gets 150 over 100.  The manual check was 130/90.  And her home machine read 135/95.      10.2023  38-year-old female, past medical history below.  Comes in for multiple complaints.  History of hypotension.  Near-syncope.  Orthostatic dizziness.  Chest tightness and dyspnea on exertion.    Multiple stress echo in the past normal.  Past Medical History:   Diagnosis Date    Adjustment disorder with mixed anxiety and depressed mood 02/06/2013    Anemia, B12 deficiency     Anxiety     Arthritis     Bradycardia        Past Surgical History:   Procedure Laterality Date    CHOLECYSTECTOMY  10/2012    COLONOSCOPY N/A 02/14/2020    Procedure: COLONOSCOPY;  Surgeon: Jas Moore MD;  Location: [facility] ([address] [place] [address]);  Service: Endoscopy;  Laterality: N/A;  Pt procedure time changed to 0800 with 0715 - second half prep completed from [id number]- Pt verbalized understanding- ERW2/13/20@1022    COLONOSCOPY N/A 03/10/2023    Procedure: COLONOSCOPY;  Surgeon: HORACE Moore MD;  Location: [facility] ([address] [place] [address]);  Service: Endoscopy;  Laterality: N/A;  inst emailed-RB    DECOMPRESSION, NERVE, ULNAR Left 2/15/2024    Procedure: DECOMPRESSION, NERVE, ULNAR;  Surgeon: Jas Larose MD;  Location: [facility];  Service: Orthopedics;   Laterality: Left;  ulnar nerve decompression left elbow with cubital tunnel release and possible anterior transposition ulnar nerve    DILATION AND CURETTAGE OF UTERUS      ESOPHAGOGASTRODUODENOSCOPY N/A 07/20/2018    Procedure: ESOPHAGOGASTRODUODENOSCOPY (EGD);  Surgeon: Darwin Hansen MD;  Location: [facility] (4TH FLR);  Service: Endoscopy;  Laterality: N/A;  Please measure pouch and limbs    GASTRIC BYPASS  2008    Revision August 2019    INJECTION OF ANESTHETIC AGENT AROUND LATERAL BRANCH NERVES OF SACROILIAC JOINT      LAPAROSCOPIC REPAIR OF HIATAL HERNIA  2019    Liver scope  10/2012    LYSIS OF ADHESIONS      ULNAR TUNNEL RELEASE Left 2/15/2024    Procedure: RELEASE, CUBITAL TUNNEL;  Surgeon: Jas Larose MD;  Location: [facility];  Service: Orthopedics;  Laterality: Left;  ulnar nerve decompression left elbow with cubital tunnel release and possible anterior transposition ulnar nerve       Social History     Tobacco Use    Smoking status: Never    Smokeless tobacco: Never   Substance Use Topics    Alcohol use: Never     Comment: Alcohol use rarely    Drug use: No     Comment: Mirena       Family History   Problem Relation Name Age of Onset    Hypertension Father      Heart disease Father      Breast cancer Neg Hx      Colon cancer Neg Hx      Ovarian cancer Neg Hx      Diabetes Neg Hx      Stroke Neg Hx         Review of Systems   Constitutional: Positive for malaise/fatigue.   Cardiovascular:  Positive for chest pain, dyspnea on exertion and near-syncope.       Current Outpatient Medications   Medication Sig Dispense Refill    acetaminophen (TYLENOL) 650 MG TbSR Take 650 mg by mouth every 8 (eight) hours.      acetylcysteine (N-ACETYL-L-CYSTEINE MISC) by Misc.(Non-Drug; Combo Route) route.      cyanocobalamin 1,000 mcg/mL injection ONE INJECTION AS DIRECTED EVERY 28 DAYS 4 mL 3    fluticasone propionate (FLONASE) 50 mcg/actuation nasal spray 1 spray by Each Nostril route once daily.       gabapentin (NEURONTIN) 300 MG capsule Take 300 mg by mouth 3 (three) times daily.      HYDROcodone-acetaminophen (NORCO) 5-325 mg per tablet Take 1 tablet by mouth every 8 (eight) hours as needed for Pain. 24 tablet 0    ketorolac (TORADOL) 10 mg tablet Take by mouth.      LINZESS 290 mcg Cap capsule TAKE ONE CAPSULE BY MOUTH EVERY DAY 30 capsule 11    LINZESS 290 mcg Cap capsule TAKE 1 CAPSULE(290 MCG) BY MOUTH BEFORE BREAKFAST 90 capsule 3    magnesium carb,citrate,oxide (MAGNESIUM COMPLEX ORAL) Take by mouth.      modafiniL (PROVIGIL) 200 MG Tab Take 200 mg by mouth once daily.      natrexone tablet 3 mg Take 2 tablets (6 mg total) by mouth every evening. Do not take with opitates 180 tablet 1    pantoprazole (PROTONIX) 40 MG tablet Take 1 tablet by mouth every morning.      tiZANidine 4 mg Cap Take by mouth.      traMADoL (ULTRAM) 50 mg tablet Take 50 mg by mouth every 6 (six) hours.      UNABLE TO FIND medication name: thymoquinone/black seed      albuterol (PROVENTIL/VENTOLIN HFA) 90 mcg/actuation inhaler Inhale 1-2 puffs into the lungs every 6 (six) hours as needed for Wheezing. 6.7 g 0    amLODIPine (NORVASC) 5 MG tablet Take 1 tablet (5 mg total) by mouth once daily. 30 tablet 11     No current facility-administered medications for this visit.     Facility-Administered Medications Ordered in Other Visits   Medication Dose Route Frequency Provider Last Rate Last Admin    ceFAZolin 2 g in dextrose 5 % in water (D5W) 50 mL IVPB (MB+)  2 g Intravenous On Call Procedure Hanna Larsen PA-C        chlorhexidine 0.12 % solution 10 mL  10 mL Mouth/Throat On Call Procedure Hanna Larsen PA-C           Objective:   Objective:  Wt Readings from Last 3 Encounters:   04/24/24 113.8 kg (250 lb 14.1 oz)   04/02/24 111.9 kg (246 lb 11.1 oz)   03/07/24 111.9 kg (246 lb 11.1 oz)     Temp Readings from Last 3 Encounters:   02/15/24 98.1 °F (36.7 °C) (Temporal)   03/10/23 (!) 80 °F (26.7 °C) (Temporal)   10/25/22 97.2 °F  (36.2 °C)     BP Readings from Last 3 Encounters:   04/24/24 (!) 130/90   03/07/24 115/70   02/15/24 (!) 105/50     Pulse Readings from Last 3 Encounters:   04/24/24 70   03/07/24 60   02/15/24 64       Physical Exam  Vitals reviewed.   Constitutional:       Appearance: She is well-developed.   Neck:      Vascular: No carotid bruit.   Cardiovascular:      Rate and Rhythm: Normal rate and regular rhythm.      Pulses: Intact distal pulses.      Heart sounds: Normal heart sounds. No murmur heard.  Pulmonary:      Breath sounds: Normal breath sounds.   Neurological:      Mental Status: She is oriented to person, place, and time.         Lab Results   Component Value Date    CHOL 181 10/27/2022    CHOL 161 03/11/2015     Lab Results   Component Value Date    HDL 55 10/27/2022    HDL 53 03/11/2015     Lab Results   Component Value Date    LDLCALC 119.8 10/27/2022    LDLCALC 100.4 03/11/2015     Lab Results   Component Value Date    TRIG 31 10/27/2022    TRIG 38 03/11/2015     Lab Results   Component Value Date    CHOLHDL 30.4 10/27/2022    CHOLHDL 32.9 03/11/2015       Chemistry        Component Value Date/Time     02/08/2024 1511    K 4.2 02/08/2024 1511     02/08/2024 1511    CO2 20 (L) 02/08/2024 1511    BUN 9 02/08/2024 1511    CREATININE 0.8 02/08/2024 1511    GLU 72 02/08/2024 1511        Component Value Date/Time    CALCIUM 9.2 02/08/2024 1511    ALKPHOS 61 02/08/2024 1511    AST 15 02/08/2024 1511    ALT 12 02/08/2024 1511    BILITOT 0.3 02/08/2024 1511    ESTGFRAFRICA >60 05/26/2021 1637    EGFRNONAA >60 05/26/2021 1637          Lab Results   Component Value Date    TSH 1.945 10/27/2022     Lab Results   Component Value Date    INR 1.0 10/15/2012    INR 1.0 10/14/2012    INR 1.1 10/13/2012     Lab Results   Component Value Date    WBC 5.24 02/08/2024    HGB 11.9 (L) 02/08/2024    HCT 36.5 (L) 02/08/2024    MCV 87 02/08/2024     02/08/2024     BNP  @LABRCNTIP(BNP,BNPTRIAGEBLO)@  CrCl cannot be  calculated (Patient's most recent lab result is older than the maximum 7 days allowed.).     Imaging:  ======    No results found for this or any previous visit.    No results found for this or any previous visit.    Results for orders placed in visit on 03/14/20    XR CHEST PA AND LATERAL    Narrative  EXAMINATION:  XR CHEST PA AND LATERAL    CLINICAL HISTORY:  Cough    TECHNIQUE:  PA and lateral views of the chest were performed.    COMPARISON:  Chest radiograph 8/30/16    FINDINGS:  No detrimental change.The lungs are clear, with normal appearance of pulmonary vasculature and no pleural effusion or pneumothorax.    The cardiac silhouette is normal in size. The hilar and mediastinal contours are unremarkable.    Bones are intact.    Impression  Normal radiographic appearance of the chest.      Electronically signed by: Nate Richardson MD  Date:    03/14/2020  Time:    15:08    No results found for this or any previous visit.    No valid procedures specified.    No results found for this or any previous visit.      No results found for this or any previous visit.      No results found for this or any previous visit.      Diagnostic Results:  ECG: Reviewed    The ASCVD Risk score (Donna DK, et al., 2019) failed to calculate for the following reasons:    The 2019 ASCVD risk score is only valid for ages 40 to 79        Assessment and Plan:   Primary hypertension  -     amLODIPine (NORVASC) 5 MG tablet; Take 1 tablet (5 mg total) by mouth once daily.  Dispense: 30 tablet; Refill: 11    Paresthesia of left arm    Fibromyalgia    Hypertension, unspecified type    Morbid obesity    History of gastric bypass          Reviewed all tests and above medical conditions with patient in detail and formulated treatment plan.  Multiple stress test in the past.  An echocardiogram normal.    Suffer for long time with hypotension even with trying proper hydration now hypertension.    Had her cortisol level and TSH checked all normal.     DC Florinef.  Although already not taking.    Start amlodipine.    Monitor blood pressure.  Discussed also side effects of modafinil.  Continue to follow with her primary care doctor for her symptoms of chronic fatigue and fibromyalgia.    Follow up in  6 months

## 2024-04-25 ENCOUNTER — CLINICAL SUPPORT (OUTPATIENT)
Dept: REHABILITATION | Facility: HOSPITAL | Age: 39
End: 2024-04-25
Payer: COMMERCIAL

## 2024-04-25 DIAGNOSIS — M25.622 STIFFNESS OF LEFT ELBOW JOINT: ICD-10-CM

## 2024-04-25 DIAGNOSIS — M62.81 MUSCLE WEAKNESS: ICD-10-CM

## 2024-04-25 DIAGNOSIS — M25.522 LEFT ELBOW PAIN: ICD-10-CM

## 2024-04-25 DIAGNOSIS — M25.532 LEFT WRIST PAIN: Primary | ICD-10-CM

## 2024-04-25 PROCEDURE — 97022 WHIRLPOOL THERAPY: CPT | Mod: 59

## 2024-04-25 PROCEDURE — 97110 THERAPEUTIC EXERCISES: CPT

## 2024-04-25 PROCEDURE — 97112 NEUROMUSCULAR REEDUCATION: CPT

## 2024-04-25 NOTE — PATIENT INSTRUCTIONS
[facility]  OCCUPATIONAL THERAPY  HOME EXERCISE PROGRAM     Complete the following strengthening program 1x/day.                       _                     Abelino Worthington OTL  Occupational Therapist

## 2024-04-25 NOTE — PROGRESS NOTES
[facility]  Occupational Therapy Treatment Note     Date: 4/25/2024  Name: Johanna Bullock  Clinic Number: [id number]    Therapy Diagnosis:   Encounter Diagnoses   Name Primary?    Left wrist pain Yes    Left elbow pain     Stiffness of left elbow joint     Muscle weakness      Physician: Hanna Larsen PA-C    Physician Orders: S/p cubital tunnel release on 2/15/24  Eval and treat, modalities as needed  Medical Diagnosis: Z98.890 (ICD-10-CM) - S/P cubital tunnel release  Surgical Procedure and Date: Left cubital tunnel release, 02/15/24  Evaluation Date: 3/5/2024  Insurance Authorization Period Expiration: 02/27/2025  Plan of Care Certification Period: 03/05/24 to 05/27/24  Progress Note due: 04/04/24  Date of Return to MD: 03/26/24     Visit # / Visits authorized: 8 / 20  FOTO: 1/ 3  Initial=31% / Goal=62%/ Current=45%       Precautions:  Standard and Weightbearing     Time In: 10:30 am  Time Out: 1130 am  Total Billable Time: 60 minutes  (billing reflects skilled 1:1 time)      Subjective     Patient reports:no change in pain, but feels scar gel sheet is helping address the hypertrophic surgical scar.  She also reports only occasionally performing desensitization program at home with sensory stick due to ongoing pain.   She was compliant with home exercise program given last session.   Response to previous treatment: less localized edema in L elbow/forearm; increased AROM & activity tolerance    Functional change: able to hold light objects in L hand with some difficulty; able to oppose L thumb to RF & SF with some difficulty     Pain: 6-7/10  Location: left elbow      Objective     Objective Measures updated at progress report unless specified.    Treatment     Johanna received the treatments listed below:      Patient is post op 9 weeks 6 days (Cubital Tunnel Release, (02/15/24); Diagnosed with CRPS on 04/02/24    Supervised modalities after being cleared for contradictions for  10 minutes:    Fluidotherapy:  to L elbow/forearm for 12 min, continuous air, 112 deg, air speed 100 to decrease pain, edema, hypersensitivity & scar tissue and increased tissue extensibility.     .     Neuromuscular re-education activities to improve Posture and Coordination for 20 minutes. The following activities were included:  Coordination:    Dexterciser 3 min   Isospheres  3 min       Desensitization Program:     Scrub & Carry protocol 3 minutes each, w/ 4# wt   Weight-bearing/Shifting Exercises:        UE Nerve Decompression Series 1 trial       HEP Training:    UE stretches, including forearm & prayer Reviewed   Soft tissue/Scar mobilization Initiated scar gel sheet   Weight-bearing: easy chair push-ups Reviewed   T-putty for strengthening Initiated       Therapeutic exercises to develop ROM and flexibility for 30 minutes, including:  Pulleys 3 min, with stretch   Strengthening:    PHG 3/10 reps, setting 2 (22#)   Digi-flex 2/10 reps, yellow   Isometrics-Elbow & Forearm 10 reps, 5 sec hold each     T-putty Yellow/red   -Molding 2 min   -Grasp/Manipulation 3 reps, 5 sec hold   -Log rolls w/ tripod & lateral pinch 1 log each   -Pancakes & Extension Blooms 1 trial       Add Next Visit:    -dowel digs 2 minutes         Patient Education and Home Exercises     Education provided:   - Updated HEP to include T-putty for strengthening;  - Patient was provided with a supply of yellow/red T-putty today for personal use in therapy and in HEP   - Reiterated importance of performing desensitization program, including scrub and carry, to address CRPS symptoms.    - Reviewed HEP, including Ulnar Nerve Glides  - Reiterated recommendations for follow up with Pain Management/PCP regarding ongoing symptoms of Depression, High pain focus and self-limiting behavior.    - Added Scar management to HEP with silicone scar gel sheet; Patient was provided with a supply of silicone gel sheet for scar management due to raised,  hypertrophic scarring on L elbow.  Patient verbalized understanding of instructions provided on wear schedule, care and precautions to monitor.  - Progress towards goals     Written Home Exercises Provided: Patient instructed to cont prior HEP.  Exercises were reviewed and Johanna was able to demonstrate them prior to the end of the session.  Johanna demonstrated good  understanding of the home exercise program provided. See electronic medical record under Patient Instructions for exercises provided during therapy sessions.       Assessment     No change in pain in LUE.  Continued light strengthening program today. Muscle fatigue continues, but patient is participating in all exercises, as recommended.  Recommend to continue with desensitization program and progress strengthening further, as tolerated.       Johanna is progressing fairly well towards her goals and there are no updates to goals at this time. Pt prognosis is Good to Fair.     Patient will continue to benefit from skilled outpatient occupational therapy to address the deficits listed in the problem list on initial evaluation provide patient/family education and to maximize patient's level of independence in the home and community environment.     Patient's spiritual, cultural and educational needs considered and patient agreeable to plan of care and goals.    Anticipated barriers to occupational therapy: comorbid diagnosis, compliance with HEP and attendance to therapy as recommended     Goals:  Short Term Goals (to be met in 4 weeks) Goal Status:   1) Establish HEP with patient independent in performing accurately [] Goal Met  [] Part Met   2) Patient will report PAIN of 2-3 levels lower than initial measures on the pain scale for worst pain in affected elbow/forearm [] Goal Met  [] Part Met   3) Patient will increase AROM of affected elbow/forearm/wrist, in limited planes of mobility documented at initial evaluation, by at lease 5-8 degrees for  improved performance with ADL's [] Goal Met  [] Part Met   4) Increase STRENGTH of affected elbow/forearm/wrist by 1/2 MMT grade, in all planes of mobility, to improve functioning in ADL/IADL's [] Goal Met  [] Part Met   5) EDEMA in affected elbow/forearm is at least .2-.3 cm less than initial measures at evaluation [] Goal Met  [] Part Met   6) Patient will be able to achieve less than or equal to a 15-20% improvement on Elbow FOTO survey demonstrating overall improved functional ability with upper extremity.  [] Goal Met  [] Part Met   7) Assess or Increase  STRENGTH in affected upper extremity by 5-8 psi for improved functioning in ADL/IADL's [] Goal Met  [] Part Met         Long Term Goals (to be met by Discharge) Goal Status:   1) PAIN in affected elbow/forearm will be no greater than 1-2/10 while performing ADL's/IADL's [] Goal Met  [] Part Met   2) Patient will demonstrate WFL AROM in affected elbow/forearm, in all functional planes of mobility, for improved functioning in ADL/IADL's  [] Goal Met  [] Part Met   3) Increase STRENGTH in affected elbow/forearm to at lease 4/5 in all functional planes of mobility for improved performance in ADL/IADL's [] Goal Met  [] Part Met   4) Edema in affected elbow/forearm is trace to none [] Goal Met  [] Part Met   5) Patient will be able to achieve less than or equal to 68% on Wrist/Hand FOTO survey demonstrating overall improved functional ability with upper extremity.  [] Goal Met  [] Part Met          Plan     Plan of Care Certification: 03/05/24 to 05/27/24.      Outpatient Occupational Therapy 2-3 times weekly for 12 weeks to include the following interventions: Fluidotherapy, Manual therapy/joint mobilizations, Modalities for pain management, US 3 mhz, Therapeutic exercises/activities., Strengthening, Orthotic Fabrication/Fit/Training, Edema Control, Scar Management, Electrical Modalities, and Joint Protection.  Updates/Grading for next session: progress ROM, as  tolerated    Abelino Worthington, OT   4/25/2024

## 2024-04-30 ENCOUNTER — CLINICAL SUPPORT (OUTPATIENT)
Dept: REHABILITATION | Facility: HOSPITAL | Age: 39
End: 2024-04-30
Payer: COMMERCIAL

## 2024-04-30 DIAGNOSIS — M62.81 MUSCLE WEAKNESS: ICD-10-CM

## 2024-04-30 DIAGNOSIS — M25.622 STIFFNESS OF LEFT ELBOW JOINT: ICD-10-CM

## 2024-04-30 DIAGNOSIS — M25.532 LEFT WRIST PAIN: Primary | ICD-10-CM

## 2024-04-30 DIAGNOSIS — M25.522 LEFT ELBOW PAIN: ICD-10-CM

## 2024-04-30 PROCEDURE — 97112 NEUROMUSCULAR REEDUCATION: CPT

## 2024-04-30 NOTE — PROGRESS NOTES
[facility]  Occupational Therapy Treatment Note     Date: 4/30/2024  Name: Johanna Bullock  Clinic Number: [id number]    Therapy Diagnosis:   Encounter Diagnoses   Name Primary?    Left wrist pain Yes    Left elbow pain     Stiffness of left elbow joint     Muscle weakness        Physician: Hanna Larsen PA-C    Physician Orders: S/p cubital tunnel release on 2/15/24  Eval and treat, modalities as needed  Medical Diagnosis: Z98.890 (ICD-10-CM) - S/P cubital tunnel release  Surgical Procedure and Date: Left cubital tunnel release, 02/15/24  Evaluation Date: 3/5/2024   Insurance Authorization Period Expiration: 02/27/2025  Plan of Care Certification Period: 03/05/24 to 05/27/24  Progress Note due: 04/04/24  Date of Return to MD: 03/26/24     Visit # / Visits authorized: 9 / 20  FOTO: 1/ 3  Initial=31% / Goal=62%/ Current=45%       Precautions:  Standard and Weightbearing     Time In: 10:51 am (10:30 appointment time)   Time Out: 11:15 am   Total Billable Time: 24 minutes  (1NMRE, billing reflects skilled 1:1 time)       Subjective     Patient reports: no change in pain.  She feels scar gel sheet is helping address the hypertrophic surgical scar, but she thinks that she has lost it.  She also reports only occasionally performing desensitization program at home with sensory stick due to ongoing pain. Today, she reports that she thinks heat therapies may make her arm pain worse, so she would like to try performing session without fluidotherapy.     She was compliant with home exercise program given last session.   Response to previous treatment: less localized edema in L elbow/forearm; increased AROM & activity tolerance    Functional change: able to hold light objects in L hand with some difficulty; able to oppose L thumb to RF & SF with some difficulty     Pain: 6-7/10   Location: left elbow, ulnar side of forearm      Objective     Objective Measures updated at progress report unless  specified.    Treatment     Johanna received the treatments listed below:      Patient is post op 9 weeks 6 days (Cubital Tunnel Release, (02/15/24); Diagnosed with CRPS on 04/02/24    Supervised modalities after being cleared for contradictions for 10 minutes:       .     Neuromuscular re-education activities to improve Posture and Coordination for 38 minutes. The following activities were included:  Coordination:    Dexterciser 3 min     Isospheres  3 min         Desensitization Program:     Scrub & Carry protocol 3 minutes each, w/ 4# wt    Vibrator held in palm  1 min     Weight-bearing/Shifting Exercises:            HEP Training:    UE stretches, including forearm & prayer Reviewed   Soft tissue/Scar mobilization Initiated scar gel sheet   Weight-bearing: easy chair push-ups Reviewed   T-putty for strengthening Initiated       Therapeutic exercises to develop ROM and flexibility for 0 minutes, including:  Pulleys 3 min, with stretch    Strengthening:    PHG 3/10 reps, setting 2 (22#)   Digi-flex 2/10 reps, yellow   Isometrics-Elbow & Forearm 10 reps, 5 sec hold each     T-putty Yellow/red   -Molding 2 min   -Grasp/Manipulation 3 reps, 5 sec hold   -Log rolls w/ tripod & lateral pinch 1 log each   -Pancakes & Extension Blooms 1 trial       Add Next Visit:    -dowel digs 2 minutes         Patient Education and Home Exercises     Education provided:   - Updated HEP to include T-putty for strengthening;  - Patient was provided with a supply of yellow/red T-putty today for personal use in therapy and in HEP   - Reiterated importance of performing desensitization program, including scrub and carry, to address CRPS symptoms.    - Reviewed HEP, including Ulnar Nerve Glides  - Reiterated recommendations for follow up with Pain Management/PCP regarding ongoing symptoms of Depression, High pain focus and self-limiting behavior.    - Added Scar management to HEP with silicone scar gel sheet; Patient was provided with a  supply of silicone gel sheet for scar management due to raised, hypertrophic scarring on L elbow.  Patient verbalized understanding of instructions provided on wear schedule, care and precautions to monitor.  - Progress towards goals     Written Home Exercises Provided: Patient instructed to cont prior HEP.  Exercises were reviewed and Johanna was able to demonstrate them prior to the end of the session.  Johanna demonstrated good  understanding of the home exercise program provided. See electronic medical record under Patient Instructions for exercises provided during therapy sessions.       Assessment   Session abbreviated due to patient being late for scheduled therapy appointment.  Session focused on desensitization.  No change in pain in LUE.   Patient participated in all therapeutic activities, but required frequent rest breaks due to difficulty with tolerance due to reports of hypersensitivity.  Recommend to continue with desensitization program and progress strengthening further, as tolerated.        Johanna is progressing fairly well towards her goals and there are no updates to goals at this time. Pt prognosis is Good to Fair.     Patient will continue to benefit from skilled outpatient occupational therapy to address the deficits listed in the problem list on initial evaluation provide patient/family education and to maximize patient's level of independence in the home and community environment.     Patient's spiritual, cultural and educational needs considered and patient agreeable to plan of care and goals.    Anticipated barriers to occupational therapy: comorbid diagnosis, compliance with HEP and attendance to therapy as recommended     Goals:  Short Term Goals (to be met in 4 weeks) Goal Status:   1) Establish HEP with patient independent in performing accurately [] Goal Met  [] Part Met   2) Patient will report PAIN of 2-3 levels lower than initial measures on the pain scale for worst pain in  affected elbow/forearm [] Goal Met  [] Part Met   3) Patient will increase AROM of affected elbow/forearm/wrist, in limited planes of mobility documented at initial evaluation, by at lease 5-8 degrees for improved performance with ADL's [] Goal Met  [] Part Met   4) Increase STRENGTH of affected elbow/forearm/wrist by 1/2 MMT grade, in all planes of mobility, to improve functioning in ADL/IADL's [] Goal Met  [] Part Met   5) EDEMA in affected elbow/forearm is at least .2-.3 cm less than initial measures at evaluation [] Goal Met  [] Part Met   6) Patient will be able to achieve less than or equal to a 15-20% improvement on Elbow FOTO survey demonstrating overall improved functional ability with upper extremity.  [] Goal Met  [] Part Met   7) Assess or Increase  STRENGTH in affected upper extremity by 5-8 psi for improved functioning in ADL/IADL's [] Goal Met  [] Part Met         Long Term Goals (to be met by Discharge) Goal Status:   1) PAIN in affected elbow/forearm will be no greater than 1-2/10 while performing ADL's/IADL's [] Goal Met  [] Part Met   2) Patient will demonstrate WFL AROM in affected elbow/forearm, in all functional planes of mobility, for improved functioning in ADL/IADL's  [] Goal Met  [] Part Met   3) Increase STRENGTH in affected elbow/forearm to at lease 4/5 in all functional planes of mobility for improved performance in ADL/IADL's [] Goal Met  [] Part Met   4) Edema in affected elbow/forearm is trace to none [] Goal Met  [] Part Met   5) Patient will be able to achieve less than or equal to 68% on Wrist/Hand FOTO survey demonstrating overall improved functional ability with upper extremity.  [] Goal Met  [] Part Met          Plan     Plan of Care Certification: 03/05/24 to 05/27/24.      Outpatient Occupational Therapy 2-3 times weekly for 12 weeks to include the following interventions: Fluidotherapy, Manual therapy/joint mobilizations, Modalities for pain management, US 3 mhz,  Therapeutic exercises/activities., Strengthening, Orthotic Fabrication/Fit/Training, Edema Control, Scar Management, Electrical Modalities, and Joint Protection.  Updates/Grading for next session: progress ROM, as tolerated    Jameel Muniz OT   4/30/2024

## 2024-05-02 ENCOUNTER — CLINICAL SUPPORT (OUTPATIENT)
Dept: REHABILITATION | Facility: HOSPITAL | Age: 39
End: 2024-05-02
Payer: COMMERCIAL

## 2024-05-02 DIAGNOSIS — M25.511 ACUTE PAIN OF RIGHT SHOULDER: Primary | ICD-10-CM

## 2024-05-02 DIAGNOSIS — R29.3 POSTURAL INSTABILITY: ICD-10-CM

## 2024-05-02 DIAGNOSIS — R29.898 WEAKNESS OF RIGHT SHOULDER: ICD-10-CM

## 2024-05-02 PROCEDURE — 97162 PT EVAL MOD COMPLEX 30 MIN: CPT | Performed by: PHYSICAL THERAPIST

## 2024-05-02 PROCEDURE — 97140 MANUAL THERAPY 1/> REGIONS: CPT | Performed by: PHYSICAL THERAPIST

## 2024-05-03 ENCOUNTER — TELEPHONE (OUTPATIENT)
Dept: ORTHOPEDICS | Facility: CLINIC | Age: 39
End: 2024-05-03
Payer: COMMERCIAL

## 2024-05-03 NOTE — TELEPHONE ENCOUNTER
----- Message from Francine Patricia sent at 5/3/2024  1:02 PM CDT -----  Contact: Olga/ [facility]  Olga is needing a call back in regards to confirming if the patient has any restrictions after the procedure on 2/15. Please give her a call back at [phone]

## 2024-05-03 NOTE — TELEPHONE ENCOUNTER
Returned Olga/ [facility] phone call in regards to their message on the patient. No answer left a message for them to give the office a call back.

## 2024-05-07 ENCOUNTER — CLINICAL SUPPORT (OUTPATIENT)
Dept: REHABILITATION | Facility: HOSPITAL | Age: 39
End: 2024-05-07
Payer: COMMERCIAL

## 2024-05-07 DIAGNOSIS — M25.522 LEFT ELBOW PAIN: ICD-10-CM

## 2024-05-07 DIAGNOSIS — M25.622 STIFFNESS OF LEFT ELBOW JOINT: ICD-10-CM

## 2024-05-07 DIAGNOSIS — M25.532 LEFT WRIST PAIN: Primary | ICD-10-CM

## 2024-05-07 DIAGNOSIS — M62.81 MUSCLE WEAKNESS: ICD-10-CM

## 2024-05-07 PROCEDURE — 97112 NEUROMUSCULAR REEDUCATION: CPT

## 2024-05-07 PROCEDURE — 97110 THERAPEUTIC EXERCISES: CPT

## 2024-05-07 PROCEDURE — 97022 WHIRLPOOL THERAPY: CPT | Mod: 59

## 2024-05-07 NOTE — PROGRESS NOTES
[facility]  Occupational Therapy Treatment Note     Date: 5/7/2024  Name: Johanna Bullock  Clinic Number: [id number]    Therapy Diagnosis:   Encounter Diagnoses   Name Primary?    Left wrist pain Yes    Left elbow pain     Stiffness of left elbow joint     Muscle weakness        Physician: Hanna Larsen PA-C    Physician Orders: S/p cubital tunnel release on 2/15/24  Eval and treat, modalities as needed  Medical Diagnosis: Z98.890 (ICD-10-CM) - S/P cubital tunnel release  Surgical Procedure and Date: Left cubital tunnel release, 02/15/24  Evaluation Date: 3/5/2024   Insurance Authorization Period Expiration: 02/27/2025  Plan of Care Certification Period: 03/05/24 to 05/27/24  Progress Note due: 04/04/24  Date of Return to MD: 03/26/24     Visit # / Visits authorized: 10 / 20  FOTO: 1/ 3  Initial=31% / Goal=62%/ Current=45%       Precautions:  Standard and Weightbearing     Time In: 10:10 am   Time Out: 11:00 am   Total Billable Time: 50 minutes  (billing reflects skilled 1:1 time)       Subjective     Patient reports: pain quality has changed since patient received the nerve block.  She currently does not feel the nerve block is helping decrease the pain in her LUE.  She feels scar gel sheet is helping address the hypertrophic surgical scar, but she thinks that she has lost it.  She also reports only occasionally performing desensitization program at home with sensory stick due to ongoing pain. Today, she reports that she thinks heat therapies may make her arm pain worse, so she would like to try performing session without fluidotherapy.     She was compliant with home exercise program given last session.   Response to previous treatment: less localized edema in L elbow/forearm; increased AROM & activity tolerance    Functional change: able to hold light objects in L hand with some difficulty; able to oppose L thumb to RF & SF with some difficulty     Pain: 7-8/10 during  therapy  Location: left elbow, ulnar side of forearm      Objective     Objective Measures updated at progress report unless specified.    Treatment     Johanna received the treatments listed below:      Patient is post op ~12 weeks (Cubital Tunnel Release, (02/15/24); Diagnosed with CRPS on 04/02/24    Supervised modalities after being cleared for contradictions for 10 minutes:    Fluidotherapy:  to L elbow/forearm for 10 min, continuous air, 112 deg, air speed 100 to decrease pain, edema, hypersensitivity & scar tissue and increased tissue extensibility.       .     Neuromuscular re-education activities to improve Posture and Coordination for 28 minutes. The following activities were included:  Coordination:    Dexterciser 3 min     Isospheres  3 min         Desensitization Program:     Scrub & Carry protocol 3 minutes each, w/ 4# wt    Vibrator held in palm  1 min     Weight-bearing/Shifting Exercises:            HEP Training:    UE stretches, including forearm & prayer Reviewed   Soft tissue/Scar mobilization Initiated scar gel sheet   Weight-bearing: easy chair push-ups Reviewed           Therapeutic exercises to develop ROM and flexibility for 8 minutes, including:  UBE-forward/backwards 3 min each way, lvl 1.0     T-putty Yellow/red   -dowel digs 2 minutes         Patient Education and Home Exercises     Education provided:   - Reviewed T-putty for strengthening HEP;  - Reiterated importance of performing desensitization program, including scrub and carry, to address CRPS symptoms.    - Reiterated recommendations for continued follow up with Pain Management/PCP regarding ongoing symptoms of Depression, High pain focus and self-limiting behavior.    - Progress towards goals     Written Home Exercises Provided: Patient instructed to cont prior HEP.  Exercises were reviewed and Johanna was able to demonstrate them prior to the end of the session.  Johanna demonstrated good  understanding of the home  exercise program provided. See electronic medical record under Patient Instructions for exercises provided during therapy sessions.       Assessment     Patient continues to participate in above listed therapeutic activities, but requires frequent rest breaks due to difficulty with tolerance due to reports of hypersensitivity.  Recommend to continue with desensitization program and progress strengthening further, as tolerated.        Johanna is progressing fairly well towards her goals and there are no updates to goals at this time. Pt prognosis is Good to Fair.     Patient will continue to benefit from skilled outpatient occupational therapy to address the deficits listed in the problem list on initial evaluation provide patient/family education and to maximize patient's level of independence in the home and community environment.     Patient's spiritual, cultural and educational needs considered and patient agreeable to plan of care and goals.    Anticipated barriers to occupational therapy: comorbid diagnosis, compliance with HEP and attendance to therapy as recommended     Goals:  Short Term Goals (to be met in 4 weeks) Goal Status:   1) Establish HEP with patient independent in performing accurately [] Goal Met  [] Part Met   2) Patient will report PAIN of 2-3 levels lower than initial measures on the pain scale for worst pain in affected elbow/forearm [] Goal Met  [] Part Met   3) Patient will increase AROM of affected elbow/forearm/wrist, in limited planes of mobility documented at initial evaluation, by at lease 5-8 degrees for improved performance with ADL's [] Goal Met  [] Part Met   4) Increase STRENGTH of affected elbow/forearm/wrist by 1/2 MMT grade, in all planes of mobility, to improve functioning in ADL/IADL's [] Goal Met  [] Part Met   5) EDEMA in affected elbow/forearm is at least .2-.3 cm less than initial measures at evaluation [] Goal Met  [] Part Met   6) Patient will be able to achieve less  than or equal to a 15-20% improvement on Elbow FOTO survey demonstrating overall improved functional ability with upper extremity.  [] Goal Met  [] Part Met   7) Assess or Increase  STRENGTH in affected upper extremity by 5-8 psi for improved functioning in ADL/IADL's [] Goal Met  [] Part Met         Long Term Goals (to be met by Discharge) Goal Status:   1) PAIN in affected elbow/forearm will be no greater than 1-2/10 while performing ADL's/IADL's [] Goal Met  [] Part Met   2) Patient will demonstrate WFL AROM in affected elbow/forearm, in all functional planes of mobility, for improved functioning in ADL/IADL's  [] Goal Met  [] Part Met   3) Increase STRENGTH in affected elbow/forearm to at lease 4/5 in all functional planes of mobility for improved performance in ADL/IADL's [] Goal Met  [] Part Met   4) Edema in affected elbow/forearm is trace to none [] Goal Met  [] Part Met   5) Patient will be able to achieve less than or equal to 68% on Wrist/Hand FOTO survey demonstrating overall improved functional ability with upper extremity.  [] Goal Met  [] Part Met          Plan     Plan of Care Certification: 03/05/24 to 05/27/24.      Outpatient Occupational Therapy 2-3 times weekly for 12 weeks to include the following interventions: Fluidotherapy, Manual therapy/joint mobilizations, Modalities for pain management, US 3 mhz, Therapeutic exercises/activities., Strengthening, Orthotic Fabrication/Fit/Training, Edema Control, Scar Management, Electrical Modalities, and Joint Protection.    Updates/Grading for next session: progress ROM, as tolerated    Abelino Worthington OT   5/7/2024

## 2024-05-08 PROBLEM — M25.511 RIGHT SHOULDER PAIN: Status: ACTIVE | Noted: 2024-05-08

## 2024-05-08 PROBLEM — R29.3 POSTURAL INSTABILITY: Status: ACTIVE | Noted: 2024-05-08

## 2024-05-08 PROBLEM — R29.898 WEAKNESS OF RIGHT SHOULDER: Status: ACTIVE | Noted: 2024-05-08

## 2024-05-09 ENCOUNTER — PATIENT MESSAGE (OUTPATIENT)
Dept: PHYSICAL MEDICINE AND REHAB | Facility: CLINIC | Age: 39
End: 2024-05-09
Payer: COMMERCIAL

## 2024-05-09 ENCOUNTER — CLINICAL SUPPORT (OUTPATIENT)
Dept: REHABILITATION | Facility: HOSPITAL | Age: 39
End: 2024-05-09
Payer: COMMERCIAL

## 2024-05-09 DIAGNOSIS — R29.3 POSTURAL INSTABILITY: ICD-10-CM

## 2024-05-09 DIAGNOSIS — R29.898 WEAKNESS OF RIGHT SHOULDER: ICD-10-CM

## 2024-05-09 DIAGNOSIS — M25.511 ACUTE PAIN OF RIGHT SHOULDER: Primary | ICD-10-CM

## 2024-05-09 PROCEDURE — 97112 NEUROMUSCULAR REEDUCATION: CPT | Performed by: PHYSICAL THERAPIST

## 2024-05-09 PROCEDURE — 97140 MANUAL THERAPY 1/> REGIONS: CPT | Performed by: PHYSICAL THERAPIST

## 2024-05-09 NOTE — PLAN OF CARE
[facility]   Physical Therapy Initial Evaluation      Date: 5/2/2024   Name: Johanna Bullock  Clinic Number: [id number]    Therapy Diagnosis:    Encounter Diagnoses   Name Primary?    Acute pain of right shoulder Yes    Weakness of right shoulder     Postural instability       Physician: Self, Aaareferral     Physician Orders: PT Eval and Treat  Medical Diagnosis from Referral: M25.511 (ICD-10-CM) - Right shoulder pain   Evaluation Date: 5/2/2024  Authorization Period Expiration: 12/31/2024  Plan of Care Expiration: 6/2/2024  Progress Note Due: 6/2/2024  Visit # / Visits authorized: 1/1   FOTO: 1/3 (last performed on 5/2/2024)    Precautions: Standard    Time In: 1:30 pm   Time Out: 2:10 pm   Total Billable Time (timed & untimed codes): 40 minutes    Subjective     Date of onset: Monday 4/29/2024    History of current condition - Johanna reports she had a procedure on Monday and she feels that the position she was in caused her to have increased pain in her right neck and shoulder area.  Patient has been unable to lift her Right arm and limited with head cervical motion.    Imaging: [] Xray [] MRI [] CT: Performed on: N/A    Pain:  Current 7/10, worst 10/10, best 5/10   Location: [x] Right   [] Left:  shoulder/neck  Description: sharp and spasm  Aggravating Factors: lifting arm   Easing Factors: activity avoidance, rest, minimal changes    Prior Therapy:   [] N/A    [] Yes:   Social History: Pt lives with their family  Occupation: Pt is chemical   Prior Level of Function: Independent and pain free with all ADL, IADL, community mobility and functional activities.   Current Level of Function: Independent with all ADL, IADL, community mobility and functional activities with reports of increased pain and need for increased time and frequent breaks.      Dominant Extremity:    [x] Right    [] Left    Pts goals: Pt reported goals are to decrease overall pain levels in order to  return to prior functional level.     Medical History:   Past Medical History:   Diagnosis Date    Adjustment disorder with mixed anxiety and depressed mood 02/06/2013    Anemia, B12 deficiency     Anxiety     Arthritis     Bradycardia        Surgical History:   Johanna Bullock  has a past surgical history that includes Dilation and curettage of uterus; Gastric bypass (2008); Cholecystectomy (10/2012); Liver scope (10/2012); Esophagogastroduodenoscopy (N/A, 07/20/2018); Lysis of adhesions; Colonoscopy (N/A, 02/14/2020); Laparoscopic repair of hiatal hernia (2019); Colonoscopy (N/A, 03/10/2023); Injection of anesthetic agent around lateral branch nerves of sacroiliac joint; Ulnar tunnel release (Left, 2/15/2024); and decompression, nerve, ulnar (Left, 2/15/2024).    Medications:   Johanna has a current medication list which includes the following prescription(s): acetaminophen, acetylcysteine, albuterol, amlodipine, cyanocobalamin, fluticasone propionate, gabapentin, hydrocodone-acetaminophen, ketorolac, linzess, linzess, magnesium carb,citrate,oxide, modafinil, naltrexone hcl, pantoprazole, tizanidine, tramadol, and UNABLE TO FIND, and the following Facility-Administered Medications: ceFAZolin 2 g in dextrose 5 % in water (D5W) 50 mL IVPB (MB+) and chlorhexidine.    Allergies:   Review of patient's allergies indicates:  No Known Allergies     Objective      Top Tier Notes/Goals   Cervical Flexion Dysfunctional - Painful Functional - Nonpainful   Cervical Extension Dysfunctional - Painful Functional - Nonpainful   Cervical Rotation Dysfunctional - Painful Functional - Nonpainful   Upper Extremity One (Medial Rotation) Dysfunctional - Painful Functional - Nonpainful   Upper Extremity Two (External Rotation) Dysfunctional - Painful Functional - Nonpainful       STRENGTH:   U/E MMT Right Left Pain/Dysfunction with Movement Goal   Shoulder Flexion 3/5 Not Tested  4+/5 B   Shoulder Abduction 3+/5 Not Tested  4+/5 B    Shoulder IR 4-/5 Not Tested  4+/5 B   Shoulder ER 3+/5 Not Tested  4+/5 B   Middle Trapezius 3/5 Not Tested  4+/5 B   Lower Trapezius 3/5 3/5  4+/5 B          MUSCLE LENGTH:   Muscle Tested  Right Left  Limitation Goal   Upper Trapezius [] Normal  [x] Limited [] Normal  [x] Limited  Normal B   Levator Scapular  [] Normal  [x] Limited [] Normal  [x] Limited  Normal B   Pectoralis Minor [] Normal  [x] Limited [] Normal  [x] Limited  Normal B   Pectoralis Major [] Normal  [x] Limited [] Normal  [x] Limited  Normal B       JOINT MOBILITY:   Joint Motion Right Mobility  (spine) Left Mobility Goal   Subcranial:  [] Hypo     [x] Normal     [] Hyper [] Hypo     [x] Normal     [] Hyper Normal    Cervical Upglides [] Hypo     [x] Normal     [] Hyper [] Hypo     [x] Normal     [] Hyper Normal    Cervical Downglides  [] Hypo     [x] Normal     [] Hyper [] Hypo     [x] Normal     [] Hyper Normal    1st Rib  [] Hypo     [x] Normal     [] Hyper [] Hypo     [x] Normal     [] Hyper Normal    Thoracic PA Gap [] Hypo     [x] Normal     [] Hyper --- Normal    Costotrasnverse  [] Hypo     [x] Normal     [] Hyper [] Hypo     [x] Normal     [] Hyper Normal         SPECIAL TESTS:    Right  (spine) Left  Goal   Cervical Distraction [x] Positive    [] Negative [] Positive    [] Negative Negative B       SENSATION  [x] Intact to Light Touch   [] Impaired:      PALPATION: Muscles: Increased tone and tenderness to palpation of: right cervical and shoulder complex      POSTURE:  Pt presents with postural abnormalities which include:    [x] Forward Head   [] Increased Lumbar Lordosis   [x] Rounded Shoulder   [] Genu Recurvatum   [] Increased Thoracic Kyphosis [] Genu Valgus   [] Trunk Deviated    [] Pes Planus   [] Scapular Winging    [] Other:     Function:     Intake Outcome Measure for FOTO Shoulder Survey    Therapist reviewed FOTO scores for Joahnna on 5/2/2024.   FOTO report - see Media section or FOTO account for episode  details    Intake Score: NA%         Treatment     Total Treatment time (time-based codes) separate from Evaluation: (10 minutes) minutes     Johanna received the treatments listed below:      MANUAL THERAPY TECHNIQUES were applied for (10 minutes) minutes, including:    Manual Intervention Performed Today    Soft Tissue Mobilization [x] right paraspinals, upper trapezius, levator scapulae , periscapular musculature, rhomboids     Joint Mobilizations []     []     []    Functional Dry Needling  []      Plan for Next Visit: Continue as needed         Patient Education and Home Exercises     Education provided: (with treatment and objective above) minutes  PURPOSE: Patient educated on the impairments noted above and the effects of physical therapy intervention to improve overall condition and QOL.   EXERCISE: Patient was educated on all the above exercise prior/during/after for proper posture, positioning, and execution for safe performance with home exercise program.   STRENGTH: Patient educated on the importance of improved core and extremity strength in order to improve alignment of the spine and extremities with static positions and dynamic movement.   POSTURE: Patient educated on postural awareness to reduce stress and maintain optimal alignment of the spine with static positions and dynamic movement   ERGONOMICS: Patient educated on proper ergonomics at the work station in order to maintain optimal alignment of the musculoskeletal system and improve efficiency in the work environment.    Written Home Exercises Provided: yes.  Exercises were reviewed and Johanna was able to demonstrate them prior to the end of the session.  Johanna demonstrated good  understanding of the education provided. See EMR under Patient Instructions for exercises provided during therapy sessions.    Assessment     Johanna is a 38 y.o. female referred to outpatient Physical Therapy with a medical diagnosis of M25.511 (ICD-10-CM) -  "Right shoulder pain . Pt presents with impairments in the following categories: IMPAIRMENTS: ROM, strength, joint mobility, muscle length, posture, and core strength and stability    Pt prognosis is Good  Pt will benefit from skilled outpatient Physical Therapy to address the deficits stated above and in the chart below, provide pt/family education, and to maximize pt's level of independence.     Plan of care discussed with patient: Yes  Pt's spiritual, cultural and educational needs considered and patient is agreeable to the plan of care and goals as stated below:     Anticipated Barriers for therapy: co-morbidities    Medical Necessity is demonstrated by the following  History  Co-morbidities and personal factors that may impact the plan of care [] LOW: no personal factors / co-morbidities  [x] MODERATE: 1-2 personal factors / co-morbidities  [] HIGH: 3+ personal factors / co-morbidities    Moderate / High Support Documentation:   Past Medical History:   Diagnosis Date    Adjustment disorder with mixed anxiety and depressed mood 02/06/2013    Anemia, B12 deficiency     Anxiety     Arthritis     Bradycardia         Examination  Body Structures and Functions, activity limitations and participation restrictions that may impact the plan of care [] LOW: addressing 1-2 elements  [x] MODERATE: 3+ elements  [] HIGH: 4+ elements (please support below)    Moderate / High Support Documentation: See above in "Current Level of Function"      Clinical Presentation [] LOW: stable  [x] MODERATE: Evolving  [] HIGH: Unstable     Decision Making/ Complexity Score: moderate         Short Term Goals:  2 weeks Status  Date Met   PAIN: Pt will report worst pain of 5/10 in order to progress toward max functional ability and improve quality of life. [x] Progressing  [] Met  [] Not Met    MOBILITY: Patient will improve AROM to 50% of stated goals, listed in objective measures above, in order to progress towards independence with functional " activities.  [x] Progressing  [] Met  [] Not Met    STRENGTH: Patient will improve strength to 50% of stated goals, listed in objective measures above, in order to progress towards independence with functional activities. [x] Progressing  [] Met  [] Not Met    POSTURE: Patient will correct postural deviations in sitting and standing, to decrease pain and promote long term stability.  [x] Progressing  [] Met  [] Not Met    HEP: Patient will demonstrate independence with HEP in order to progress toward functional independence. [x] Progressing  [] Met  [] Not Met      Long Term Goals:  8 weeks Status Date Met   PAIN: Pt will report worst pain of 3/10 in order to progress toward max functional ability and improve quality of life [x] Progressing  [] Met  [] Not Met    MOBILITY: Patient will improve AROM to stated goals, listed in objective measures above, in order to return to maximal functional potential and improve quality of life.  [x] Progressing  [] Met  [] Not Met    STRENGTH: Patient will improve strength to stated goals, listed in objective measures above, in order to improve functional independence and quality of life.  [x] Progressing  [] Met  [] Not Met    Patient will return to normal ADL's, IADL's, community involvement, recreational activities, and work-related activities with less than or equal to 3/10 pain and maximal function.  [x] Progressing  [] Met  [] Not Met      Plan     Plan of care Certification: 5/2/2024 to 6/2/2024.    Outpatient Physical Therapy 1 times weekly for 4 weeks to include any combination of the following interventions: virtual visits, dry needling, modalities, electrical stimulation (IFC, Pre-Mod, Attended with Functional Dry Needling), Cervical/Lumbar Traction, Manual Therapy, Neuromuscular Re-ed, Patient Education, Self Care, Therapeutic Exercise, and Therapeutic Activites     Gladys Lan, PT, DPT

## 2024-05-13 ENCOUNTER — PATIENT MESSAGE (OUTPATIENT)
Dept: ORTHOPEDICS | Facility: CLINIC | Age: 39
End: 2024-05-13
Payer: COMMERCIAL

## 2024-05-14 ENCOUNTER — PATIENT MESSAGE (OUTPATIENT)
Dept: ORTHOPEDICS | Facility: CLINIC | Age: 39
End: 2024-05-14
Payer: COMMERCIAL

## 2024-05-14 ENCOUNTER — TELEPHONE (OUTPATIENT)
Dept: NEUROLOGY | Facility: CLINIC | Age: 39
End: 2024-05-14
Payer: COMMERCIAL

## 2024-05-14 DIAGNOSIS — S54.02XA NEURAPRAXIA OF LEFT ULNAR NERVE, INITIAL ENCOUNTER: ICD-10-CM

## 2024-05-14 DIAGNOSIS — G90.522 COMPLEX REGIONAL PAIN SYNDROME I OF LEFT LOWER LIMB: Primary | ICD-10-CM

## 2024-05-14 DIAGNOSIS — G54.0 THORACIC OUTLET SYNDROME OF LEFT THORACIC OUTLET: ICD-10-CM

## 2024-05-14 NOTE — TELEPHONE ENCOUNTER
Called pt about message. She said she got an alert in [email or web address] for an earlier appt but it went away as soon as she opened it. She asked if we had anything sooner. We do not. Pt verbalized understanding.

## 2024-05-14 NOTE — TELEPHONE ENCOUNTER
----- Message from Marci Robins sent at 5/14/2024  9:45 AM CDT -----  Contact: [phone]  Patient called in requesting a call back after missing a call from your office (CRPS Pain following Ulnar Nerve Decompression surgery.   PET/MRI Brain-for ME/FMS ) please call back [phone]

## 2024-05-15 ENCOUNTER — PATIENT MESSAGE (OUTPATIENT)
Dept: RHEUMATOLOGY | Facility: CLINIC | Age: 39
End: 2024-05-15
Payer: COMMERCIAL

## 2024-05-15 ENCOUNTER — OFFICE VISIT (OUTPATIENT)
Dept: NEUROLOGY | Facility: CLINIC | Age: 39
End: 2024-05-15
Payer: COMMERCIAL

## 2024-05-15 ENCOUNTER — LAB VISIT (OUTPATIENT)
Dept: LAB | Facility: HOSPITAL | Age: 39
End: 2024-05-15
Attending: STUDENT IN AN ORGANIZED HEALTH CARE EDUCATION/TRAINING PROGRAM
Payer: COMMERCIAL

## 2024-05-15 ENCOUNTER — OFFICE VISIT (OUTPATIENT)
Dept: INFECTIOUS DISEASES | Facility: CLINIC | Age: 39
End: 2024-05-15
Payer: COMMERCIAL

## 2024-05-15 VITALS
WEIGHT: 254.19 LBS | RESPIRATION RATE: 16 BRPM | OXYGEN SATURATION: 99 % | HEART RATE: 58 BPM | SYSTOLIC BLOOD PRESSURE: 110 MMHG | HEIGHT: 68 IN | DIASTOLIC BLOOD PRESSURE: 75 MMHG | BODY MASS INDEX: 38.52 KG/M2

## 2024-05-15 VITALS
BODY MASS INDEX: 38.52 KG/M2 | DIASTOLIC BLOOD PRESSURE: 72 MMHG | SYSTOLIC BLOOD PRESSURE: 124 MMHG | HEART RATE: 78 BPM | HEIGHT: 68 IN | WEIGHT: 254.19 LBS

## 2024-05-15 DIAGNOSIS — Z77.120 MOLD EXPOSURE: Primary | ICD-10-CM

## 2024-05-15 DIAGNOSIS — Z77.120 MOLD EXPOSURE: ICD-10-CM

## 2024-05-15 DIAGNOSIS — G56.22 CUBITAL TUNNEL SYNDROME ON LEFT: ICD-10-CM

## 2024-05-15 DIAGNOSIS — T78.40XS ALLERGY, SEQUELA: ICD-10-CM

## 2024-05-15 DIAGNOSIS — G90.512 COMPLEX REGIONAL PAIN SYNDROME TYPE 1 OF LEFT UPPER EXTREMITY: Primary | ICD-10-CM

## 2024-05-15 PROBLEM — T78.40XA ALLERGIES: Status: ACTIVE | Noted: 2024-05-15

## 2024-05-15 PROCEDURE — 3008F BODY MASS INDEX DOCD: CPT | Mod: CPTII,S$GLB,, | Performed by: PSYCHIATRY & NEUROLOGY

## 2024-05-15 PROCEDURE — 99999 PR PBB SHADOW E&M-EST. PATIENT-LVL IV: CPT | Mod: PBBFAC,,, | Performed by: STUDENT IN AN ORGANIZED HEALTH CARE EDUCATION/TRAINING PROGRAM

## 2024-05-15 PROCEDURE — 99214 OFFICE O/P EST MOD 30 MIN: CPT | Mod: S$GLB,,, | Performed by: PSYCHIATRY & NEUROLOGY

## 2024-05-15 PROCEDURE — 3008F BODY MASS INDEX DOCD: CPT | Mod: CPTII,S$GLB,, | Performed by: STUDENT IN AN ORGANIZED HEALTH CARE EDUCATION/TRAINING PROGRAM

## 2024-05-15 PROCEDURE — 3074F SYST BP LT 130 MM HG: CPT | Mod: CPTII,S$GLB,, | Performed by: STUDENT IN AN ORGANIZED HEALTH CARE EDUCATION/TRAINING PROGRAM

## 2024-05-15 PROCEDURE — 87449 NOS EACH ORGANISM AG IA: CPT | Performed by: STUDENT IN AN ORGANIZED HEALTH CARE EDUCATION/TRAINING PROGRAM

## 2024-05-15 PROCEDURE — 1159F MED LIST DOCD IN RCRD: CPT | Mod: CPTII,S$GLB,, | Performed by: STUDENT IN AN ORGANIZED HEALTH CARE EDUCATION/TRAINING PROGRAM

## 2024-05-15 PROCEDURE — 87449 NOS EACH ORGANISM AG IA: CPT | Mod: 91 | Performed by: STUDENT IN AN ORGANIZED HEALTH CARE EDUCATION/TRAINING PROGRAM

## 2024-05-15 PROCEDURE — 99204 OFFICE O/P NEW MOD 45 MIN: CPT | Mod: S$GLB,,, | Performed by: STUDENT IN AN ORGANIZED HEALTH CARE EDUCATION/TRAINING PROGRAM

## 2024-05-15 PROCEDURE — 86635 COCCIDIOIDES ANTIBODY: CPT | Performed by: STUDENT IN AN ORGANIZED HEALTH CARE EDUCATION/TRAINING PROGRAM

## 2024-05-15 PROCEDURE — 86403 PARTICLE AGGLUT ANTBDY SCRN: CPT | Performed by: STUDENT IN AN ORGANIZED HEALTH CARE EDUCATION/TRAINING PROGRAM

## 2024-05-15 PROCEDURE — 36415 COLL VENOUS BLD VENIPUNCTURE: CPT | Performed by: STUDENT IN AN ORGANIZED HEALTH CARE EDUCATION/TRAINING PROGRAM

## 2024-05-15 PROCEDURE — 86635 COCCIDIOIDES ANTIBODY: CPT | Mod: 91 | Performed by: STUDENT IN AN ORGANIZED HEALTH CARE EDUCATION/TRAINING PROGRAM

## 2024-05-15 PROCEDURE — 3078F DIAST BP <80 MM HG: CPT | Mod: CPTII,S$GLB,, | Performed by: PSYCHIATRY & NEUROLOGY

## 2024-05-15 PROCEDURE — 99999 PR PBB SHADOW E&M-EST. PATIENT-LVL V: CPT | Mod: PBBFAC,,, | Performed by: PSYCHIATRY & NEUROLOGY

## 2024-05-15 PROCEDURE — 3074F SYST BP LT 130 MM HG: CPT | Mod: CPTII,S$GLB,, | Performed by: PSYCHIATRY & NEUROLOGY

## 2024-05-15 PROCEDURE — 1160F RVW MEDS BY RX/DR IN RCRD: CPT | Mod: CPTII,S$GLB,, | Performed by: PSYCHIATRY & NEUROLOGY

## 2024-05-15 PROCEDURE — 3078F DIAST BP <80 MM HG: CPT | Mod: CPTII,S$GLB,, | Performed by: STUDENT IN AN ORGANIZED HEALTH CARE EDUCATION/TRAINING PROGRAM

## 2024-05-15 PROCEDURE — 1159F MED LIST DOCD IN RCRD: CPT | Mod: CPTII,S$GLB,, | Performed by: PSYCHIATRY & NEUROLOGY

## 2024-05-15 PROCEDURE — 87305 ASPERGILLUS AG IA: CPT | Performed by: STUDENT IN AN ORGANIZED HEALTH CARE EDUCATION/TRAINING PROGRAM

## 2024-05-15 RX ORDER — DULOXETIN HYDROCHLORIDE 30 MG/1
30 CAPSULE, DELAYED RELEASE ORAL NIGHTLY
Qty: 30 CAPSULE | Refills: 4 | Status: SHIPPED | OUTPATIENT
Start: 2024-05-15 | End: 2025-05-15

## 2024-05-15 NOTE — PROGRESS NOTES
"Infectious Disease Clinic Note    Patient Name: Johanna Bullock  YOB: 1985    PRESENTING HISTORY       History of Present Illness:  Ms. Johanna Bullock is a 38 y.o. female w/ significant PMHx of adjustment disorder, cubital tunnel syndrome, and anemia here for concern of toxin exposure at occupation. Per patient, had gastric bypass back in 2008 that was complicated by leak and more surgical intervention. After that health issues began. Otherwise had allergies. Developed hernia and had repair and lysis of adhesions in 2019. 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 Had mild sensitivity to chemical but never missed work. New lab Oct 2021 then missed work March 2022 due to shortness of breath, leg tingling, allergy symptoms. Doubled up OTC medications. Started using localized Benadryl it got so bad. Since then went from healthy to always worn out. Worked there for 18 months. Began to feel like throat was closing and she could almost taste mold like substance in mouth. They tested wall at lab and it was filled with mold. Patient was moved to newer lab but only allergy sx improved. End of 2023 began experiencing low HR and saw cardiologists. Then diagnosed with thyroid dysfunction. Normal sleep study. Had 3 GRETEL injections and became flushed but next day felt way better in back. Then had cubital tunnel release. Diagnosed with CRPS. Patient just looking for answers. Discussed focusing on pulmonary symptoms for now. Will check fungal serologies and sputum. Will also get CT chest.     Travel history: [place], 2021 week in [place], 36 days in [place] 2007     No farm land exposure, dog at home; recent fishing at pond; no camping, hiking, hunting, gardening     Last pulmonology note reviewed from April: "Plan   Ms. Bullock chest x-ray from March 27 shows possible developing right infiltrates after testing positive for COVID, but those of resolved as today's chest x-ray is normal. Her breathing test is " "also normal. I have encouraged her to continue using albuterol as needed when wheezing. However I also think that becoming more conditioned would also help her breathing. I am concerned that with Florinef she may gain weight and I feel as though that would only make her breathing worse.    Regarding her possible mold exposure, I have encouraged her to try taking an OTC antihistamine to treat her symptoms. Given the fact that she is on a steroid, there is concern that she is immunocompromise. She will notify me if anything changes and wanted to make sure that her lungs were okay."     Review of Systems:  Constitutional: no fever or chills  Eyes: no visual changes  ENT: occasional congestion, allergy type symptoms   Respiratory: ongoing shortness of breath and productive cough   Cardiovascular: no chest pain; persistent bradycardia   Gastrointestinal: no nausea or vomiting, no constipation, no diarrhea; ongoing abdominal discomfort, intermittent   Genitourinary: no hematuria or dysuria  Musculoskeletal: multiple arthralgias and myalgias   Skin: no rash  Neurological: no headaches, numbness, or paresthesias    The following portions of the patient's history were reviewed and updated as appropriate: allergies, current medications, past family history, past medical history, past social history, past surgical history, and problem list.    PAST HISTORY:     Immunization History   Administered Date(s) Administered    Influenza 10/27/2011    Influenza - Trivalent (ADULT) 10/27/2011    PPD Test 12/29/2009, 12/31/2009    Tdap 12/29/2009       Past Medical History:   Diagnosis Date    Adjustment disorder with mixed anxiety and depressed mood 02/06/2013    Anemia, B12 deficiency     Anxiety     Arthritis     Bradycardia        Past Surgical History:   Procedure Laterality Date    CHOLECYSTECTOMY  10/2012    COLONOSCOPY N/A 02/14/2020    Procedure: COLONOSCOPY;  Surgeon: Jas Moore MD;  Location: [facility] ([address] [place] [address]);  " Service: Endoscopy;  Laterality: N/A;  Pt procedure time changed to 0800 with 0715 - second half prep completed from [id number]- Pt verbalized understanding- ERW2/13/20@1022    COLONOSCOPY N/A 03/10/2023    Procedure: COLONOSCOPY;  Surgeon: HORACE Moore MD;  Location: [facility] (4TH FLR);  Service: Endoscopy;  Laterality: N/A;  inst emailed-RB    DECOMPRESSION, NERVE, ULNAR Left 2/15/2024    Procedure: DECOMPRESSION, NERVE, ULNAR;  Surgeon: Jas Larose MD;  Location: [facility];  Service: Orthopedics;  Laterality: Left;  ulnar nerve decompression left elbow with cubital tunnel release and possible anterior transposition ulnar nerve    DILATION AND CURETTAGE OF UTERUS      ESOPHAGOGASTRODUODENOSCOPY N/A 07/20/2018    Procedure: ESOPHAGOGASTRODUODENOSCOPY (EGD);  Surgeon: Darwin Hansen MD;  Location: [facility] (4TH FLR);  Service: Endoscopy;  Laterality: N/A;  Please measure pouch and limbs    GASTRIC BYPASS  2008    Revision August 2019    INJECTION OF ANESTHETIC AGENT AROUND LATERAL BRANCH NERVES OF SACROILIAC JOINT      LAPAROSCOPIC REPAIR OF HIATAL HERNIA  2019    Liver scope  10/2012    LYSIS OF ADHESIONS      ULNAR TUNNEL RELEASE Left 2/15/2024    Procedure: RELEASE, CUBITAL TUNNEL;  Surgeon: Jas Larose MD;  Location: [facility];  Service: Orthopedics;  Laterality: Left;  ulnar nerve decompression left elbow with cubital tunnel release and possible anterior transposition ulnar nerve       Family History   Problem Relation Name Age of Onset    Hypertension Father      Heart disease Father      Breast cancer Neg Hx      Colon cancer Neg Hx      Ovarian cancer Neg Hx      Diabetes Neg Hx      Stroke Neg Hx         Social History     Socioeconomic History    Marital status: Single   Occupational History    Occupation: works as      Employer: [employer]     Employer: [employer]   Tobacco Use    Smoking status: Never    Smokeless tobacco: Never   Substance and Sexual  Activity    Alcohol use: Never     Comment: Alcohol use rarely    Drug use: No     Comment: Mirena    Sexual activity: Yes     Partners: Male     Birth control/protection: I.U.D.   Social History Narrative    Born and raised in [place]    Went to [employer] and got BS in bio with minor in chem    1 child    Has boyfriend    A fewnot close friends    Likes to read and go to movies     Social Determinants of Health     Stress: No Stress Concern Present (9/26/2019)    [place] [employer] - Occupational Stress Questionnaire     Feeling of Stress : Not at all       MEDICATIONS & ALLERGIES:     Current Outpatient Medications on File Prior to Visit   Medication Sig    acetaminophen (TYLENOL) 650 MG TbSR Take 650 mg by mouth every 8 (eight) hours.    acetylcysteine (N-ACETYL-L-CYSTEINE MISC) by Misc.(Non-Drug; Combo Route) route.    amLODIPine (NORVASC) 5 MG tablet Take 1 tablet (5 mg total) by mouth once daily.    cyanocobalamin 1,000 mcg/mL injection ONE INJECTION AS DIRECTED EVERY 28 DAYS    fluticasone propionate (FLONASE) 50 mcg/actuation nasal spray 1 spray by Each Nostril route once daily.    gabapentin (NEURONTIN) 300 MG capsule Take 300 mg by mouth 3 (three) times daily.    HYDROcodone-acetaminophen (NORCO) 5-325 mg per tablet Take 1 tablet by mouth every 8 (eight) hours as needed for Pain.    ketorolac (TORADOL) 10 mg tablet Take by mouth.    LINZESS 290 mcg Cap capsule TAKE ONE CAPSULE BY MOUTH EVERY DAY    LINZESS 290 mcg Cap capsule TAKE 1 CAPSULE(290 MCG) BY MOUTH BEFORE BREAKFAST    magnesium carb,citrate,oxide (MAGNESIUM COMPLEX ORAL) Take by mouth.    modafiniL (PROVIGIL) 200 MG Tab Take 200 mg by mouth once daily.    natrexone tablet 3 mg Take 2 tablets (6 mg total) by mouth every evening. Do not take with opitates    pantoprazole (PROTONIX) 40 MG tablet Take 1 tablet by mouth every morning.    tiZANidine 4 mg Cap Take by mouth.    traMADoL (ULTRAM) 50 mg tablet Take 50 mg by mouth every 6  "(six) hours.    UNABLE TO FIND medication name: thymoquinone/black seed    albuterol (PROVENTIL/VENTOLIN HFA) 90 mcg/actuation inhaler Inhale 1-2 puffs into the lungs every 6 (six) hours as needed for Wheezing.     Current Facility-Administered Medications on File Prior to Visit   Medication    ceFAZolin 2 g in dextrose 5 % in water (D5W) 50 mL IVPB (MB+)    chlorhexidine 0.12 % solution 10 mL       Review of patient's allergies indicates:  No Known Allergies    OBJECTIVE:   Vital Signs:  Vitals:    05/15/24 0833   BP: 124/72   Pulse: 78   Weight: 115.3 kg (254 lb 3.1 oz)   Height: 5' 8" (1.727 m)       No results found for this or any previous visit (from the past 24 hour(s)).      Physical Exam:   General:  Well developed, well nourished, no acute distress  HEENT:  Normocephalic, atraumatic  CVS:  RRR, S1 and S2 normal, no murmurs, rubs, gallops  Resp:  Lungs clear to auscultation, no wheezes, rales, rhonchi  GI:  Abdomen soft, non-tender, non-distended, normoactive bowel sounds, no masses  MSK:  No muscle atrophy, peripheral edema, full range of motion  Skin:  No rashes, ulcers, erythema  Psych:  Alert and oriented to person, place, and time    ASSESSMENT:     Mold exposure  --Wall of lab where patient worked 18 months had confirmed mold   --Continues to have URI symptoms and episodes of black sputum coughed up   --Malodorous sputum at times   --Recommend ENT consultation for nasal endoscopy to ensure no evidence of mold in sinuses   --Will check endemic fungal serologies  --Sputum respiratory and fungal culture   --CT chest with contrast to rule out cavitary disease   --Follow up with me in 4 weeks     Allergies  Continue OTC regimen and follow up with PCP/pulmonology     Cubital tunnel syndrome on left  Continue injections as indicated and follow with pain management       PLAN:     Diagnoses and all orders for this visit:    Mold exposure  -     Aspergillus Antigen; Future  -     Blastomyces Antigen, Urine; " Future  -     Blastomyces Dermatitidis Ag, Blood; Future  -     Cryptococcal antigen, blood; Future  -     Fungal Immunodiffusion - Blood; Future  -     Histoplasma antigen, serum; Future  -     Histoplasma antigen, urine; Future  -     Fungitell Assay For (1.3)-B-D-Glucans; Future  -     Culture, Respiratory with Gram Stain  -     CULTURE, FUNGUS  -     Coccidioides Ab with Reflex; Future  -     CT Chest With Contrast; Future  -     Ambulatory referral/consult to ENT; Future    Allergy, sequela    Cubital tunnel syndrome on left          The total time for evaluation and management services performed on 5/15/24 was greater than 45 minutes.        Edgar Sagastume, DO   Infectious Diseases

## 2024-05-15 NOTE — PROGRESS NOTES
"  Subjective:       Patient ID: Johanna Bullock is a 38 y.o. female.    Chief Complaint: CRPS Pain    HPI    The patient presented on  05/2024 to establish Neuro follow up and care of "CRPS Pain" came by Herself.    Started: Patient reports initial injury June 16th 2023 caused from IV infiltration with 200 CC of IV Contrast for Abdominal CT scan. A hiatal hernia was found.   Describes: Patient reports severe swelling to left arm, weakness to left hand, C4-5 tremors to 4th and 5th digits.   Timing: Worse at night, Duration is of a few seconds of intense pain with shooting pain  Frequency: Constant   Pain: 7-10/10  Location: left arm   Family: No known family history of CTS or CRPS.   Medications: OTC Advil / Tylenol / Norco / Toradol / Zanaflex / Tramadol   Patient reports she stopped taking Gabapentin due to lack of improvement in symptoms.  Worsen: arm movement (holding left arm in a downward position worsens), touch, exposure to heat (makes noticeable color change   (erythema) to left arm, increased pain, and swelling)   Alleviated: Advil and muscle relaxer's helps with swelling and movement, but not pain per patient  Associated symptoms: increased hair growth to left arm / decreased left hand  strength / colder temperature to left fingers /   sensitivity to touch / swelling to left elbow and left hand / tremors  /   color changes to finger tips from red/pink/white/ with swelling to left hand / severe increase sensitivity with small fine muscles use.   Triggers: fine motor movements aggravate tremors  Prodrome symptoms: None    EMG Dec 2023- confirmed Cubital tunnel syndrome  Surgery Feb 15th 2024- Tremor to C4-5 improved after surgery, but pain worsened. Patient reports cool/tingling/stabbing/shocking pain starting from left wrist and radiating to left elbow on the ulnar aspect of the arm.   Patient reports that it is starting to spread a few inches above the elbow. Patient reports the pain can vary from " stabbing shooting pain to cold and tingling pain. She reports sensitivity to touch, temperature, texture.     2 Nerve blocks, last one on Monday that were unsuccessful.     Patient reports attending PT outpatient x 3 months and reports improvement in left upper extremity strength, but no change in pain.    Review of Systems   Neurological:  Positive for weakness and numbness.   All other systems reviewed and are negative.          Current Outpatient Medications:     acetaminophen (TYLENOL) 650 MG TbSR, Take 650 mg by mouth every 8 (eight) hours., Disp: , Rfl:     acetylcysteine (N-ACETYL-L-CYSTEINE MISC), by Misc.(Non-Drug; Combo Route) route., Disp: , Rfl:     cyanocobalamin 1,000 mcg/mL injection, ONE INJECTION AS DIRECTED EVERY 28 DAYS, Disp: 4 mL, Rfl: 3    fluticasone propionate (FLONASE) 50 mcg/actuation nasal spray, 1 spray by Each Nostril route once daily., Disp: , Rfl:     HYDROcodone-acetaminophen (NORCO) 5-325 mg per tablet, Take 1 tablet by mouth every 8 (eight) hours as needed for Pain., Disp: 24 tablet, Rfl: 0    ketorolac (TORADOL) 10 mg tablet, Take by mouth., Disp: , Rfl:     LINZESS 290 mcg Cap capsule, TAKE ONE CAPSULE BY MOUTH EVERY DAY, Disp: 30 capsule, Rfl: 11    LINZESS 290 mcg Cap capsule, TAKE 1 CAPSULE(290 MCG) BY MOUTH BEFORE BREAKFAST, Disp: 90 capsule, Rfl: 3    magnesium carb,citrate,oxide (MAGNESIUM COMPLEX ORAL), Take by mouth., Disp: , Rfl:     modafiniL (PROVIGIL) 200 MG Tab, Take 200 mg by mouth once daily., Disp: , Rfl:     tiZANidine 4 mg Cap, Take by mouth., Disp: , Rfl:     traMADoL (ULTRAM) 50 mg tablet, Take 50 mg by mouth every 6 (six) hours., Disp: , Rfl:     UNABLE TO FIND, medication name: thymoquinone/black seed, Disp: , Rfl:     albuterol (PROVENTIL/VENTOLIN HFA) 90 mcg/actuation inhaler, Inhale 1-2 puffs into the lungs every 6 (six) hours as needed for Wheezing., Disp: 6.7 g, Rfl: 0  No current facility-administered medications for this visit.    Facility-Administered  Medications Ordered in Other Visits:     ceFAZolin 2 g in dextrose 5 % in water (D5W) 50 mL IVPB (MB+), 2 g, Intravenous, On Call Procedure, Hanna Larsen PA-C    chlorhexidine 0.12 % solution 10 mL, 10 mL, Mouth/Throat, On Call Procedure, Hanna Larsen PA-C    Past Medical History:   Diagnosis Date    Adjustment disorder with mixed anxiety and depressed mood 02/06/2013    Anemia, B12 deficiency     Anxiety     Arthritis     Bradycardia        Past Surgical History:   Procedure Laterality Date    CHOLECYSTECTOMY  10/2012    COLONOSCOPY N/A 02/14/2020    Procedure: COLONOSCOPY;  Surgeon: Jas Moore MD;  Location: [facility] (4TH FLR);  Service: Endoscopy;  Laterality: N/A;  Pt procedure time changed to 0800 with 0715 - second half prep completed from [id number]- Pt verbalized understanding- ERW2/13/20@1022    COLONOSCOPY N/A 03/10/2023    Procedure: COLONOSCOPY;  Surgeon: HORACE Moore MD;  Location: [facility] (4TH FLR);  Service: Endoscopy;  Laterality: N/A;  inst emailed-RB    DECOMPRESSION, NERVE, ULNAR Left 2/15/2024    Procedure: DECOMPRESSION, NERVE, ULNAR;  Surgeon: Jas Larose MD;  Location: [facility] OR;  Service: Orthopedics;  Laterality: Left;  ulnar nerve decompression left elbow with cubital tunnel release and possible anterior transposition ulnar nerve    DILATION AND CURETTAGE OF UTERUS      ESOPHAGOGASTRODUODENOSCOPY N/A 07/20/2018    Procedure: ESOPHAGOGASTRODUODENOSCOPY (EGD);  Surgeon: Darwin Hansen MD;  Location: [facility] (4TH FLR);  Service: Endoscopy;  Laterality: N/A;  Please measure pouch and limbs    GASTRIC BYPASS  2008    Revision August 2019    INJECTION OF ANESTHETIC AGENT AROUND LATERAL BRANCH NERVES OF SACROILIAC JOINT      LAPAROSCOPIC REPAIR OF HIATAL HERNIA  2019    Liver scope  10/2012    LYSIS OF ADHESIONS      ULNAR TUNNEL RELEASE Left 2/15/2024    Procedure: RELEASE, CUBITAL TUNNEL;  Surgeon: Jas Larose MD;  Location: [facility] OR;  Service:  Orthopedics;  Laterality: Left;  ulnar nerve decompression left elbow with cubital tunnel release and possible anterior transposition ulnar nerve       Social History     Socioeconomic History    Marital status: Single   Occupational History    Occupation: works as      Employer: [employer]     Employer: [employer]   Tobacco Use    Smoking status: Never    Smokeless tobacco: Never   Substance and Sexual Activity    Alcohol use: Never     Comment: Alcohol use rarely    Drug use: No     Comment: Mirena    Sexual activity: Yes     Partners: Male     Birth control/protection: I.U.D.   Social History Narrative    Born and raised in [facility]    Went to [employer] and got BS in bio with minor in chem    1 child    Has boyfriend    A fewnot close friends    Likes to read and go to [employer]     Social Determinants of Health     Stress: No Stress Concern Present (9/26/2019)    [employer] - Occupational Stress Questionnaire     Feeling of Stress : Not at all     Past/Current Medical/Surgical History, Past/Current Social History, Past/Current Family History and Past/Current Medications were reviewed in detail.    Objective:     VITAL SIGNS WERE REVIEWED    GENERAL APPEARANCE:     The patient looks comfortable.    BMI    No signs of respiratory distress.    Normal breathing pattern.    No dysmorphic features    Normal eye contact.       GENERAL MEDICAL EXAM:    HEENT:  Head is atraumatic normocephalic.     FUNDUSCOPIC (OPHTHALMOSCOPIC) EXAMINATION showed no disc edema (papilledema).      NECK: No JVD. No visible lesions or goiters.     CHEST-CARDIOPULMONARY: No cyanosis. No tachypnea. Normal respiratory effort.    SCEALPL-[id number]-VCUMYWVMMO: No jaundice. No stomas or lesions. No visible hernias. No catheters.     SKIN, HAIR, NAILS: No pathognomonic skin rash.No neurofibromatosis. No visible lesions.No stigmata of autoimmune disease. No clubbing.    LIMBS: No varicose veins. No  visible swelling.    MUSCULOSKELETAL: No visible deformities.No visible lesions.    Neurologic Exam     Mental Status   Oriented to person, place, and time.   Oriented to person.   Oriented to place. Oriented to country, city and area.   Oriented to time. Oriented to year, month, date, day and season.   Attention: normal. Concentration: normal.   Speech: speech is normal   Level of consciousness: alert  Knowledge: good. Able to perform simple calculations.   Able to name object. Able to read. Able to repeat. Normal comprehension.     Cranial Nerves     CN II   Visual fields full to confrontation.   Visual acuity: normal  Right visual field deficit: none  Left visual field deficit: none     CN III, IV, VI   Pupils are equal, round, and reactive to light.  Extraocular motions are normal.   Right pupil: Size: 2 mm. Shape: regular. Reactivity: brisk. Consensual response: intact. Accommodation: intact.   Left pupil: Size: 2 mm. Shape: regular. Reactivity: brisk. Consensual response: intact. Accommodation: intact.   CN III: no CN III palsy  CN VI: no CN VI palsy  Nystagmus: none   Diplopia: none  Ophthalmoparesis: none  Upgaze: normal  Downgaze: normal  Conjugate gaze: present  Vestibulo-ocular reflex: present    CN V   Facial sensation intact.   Right facial sensation deficit: none  Left facial sensation deficit: none    CN VII   Facial expression full, symmetric.   Right facial weakness: none  Left facial weakness: none    CN VIII   CN VIII normal.   Hearing: intact    CN IX, X   CN IX normal.   CN X normal.   Palate: symmetric    CN XI   CN XI normal.   Right sternocleidomastoid strength: normal  Left sternocleidomastoid strength: normal  Right trapezius strength: normal  Left trapezius strength: normal    CN XII   CN XII normal.   Tongue: not atrophic  Fasciculations: absent  Tongue deviation: none    Motor Exam   Muscle bulk: normal  Overall muscle tone: normal  Right arm pronator drift: absent  Left arm pronator  drift: absent    Strength   Strength 5/5 except as noted.   Right neck flexion: 5/5  Left neck flexion: 5/5  Right neck extension: 5/5  Left neck extension: 5/5  Right deltoid: 5/5  Left deltoid: 3/5  Right biceps: 5/5  Left biceps: 3/5  Right triceps: 5/5  Left triceps: 3/5  Right wrist flexion: 5/5  Left wrist flexion: 2/5  Right wrist extension: 5/5  Left wrist extension: 2/5  Right interossei: 5/5  Left interossei: 5/5  Right abdominals: 5/5  Left abdominals: 5/5  Right iliopsoas: 5/5  Left iliopsoas: 5/5  Right quadriceps: 5/5  Left quadriceps: 5/5  Right hamstrin/5  Left hamstrin/5  Right glutei: 5/5  Left glutei: 5/5  Right anterior tibial: 5/5  Left anterior tibial: 5/5  Right posterior tibial: 5/5  Left posterior tibial: 5/5  Right peroneal: 5/5  Left peroneal: 5/5  Right gastroc: 5/5  Left gastroc: 5/5    Mild tremor noted to 4th and 5th digits with purposeful movement.     Patient is unable to touch/tap 4th and 5th finger on left hand to thumb.     Decreased  strength to left hand noted.      Sensory Exam   Right arm light touch: normal  Left arm light touch: decreased from elbow  Right leg light touch: normal  Left leg light touch: normal  Right arm vibration: normal  Left arm vibration: Unable to test due to increase sensitivity to touch.  Right leg vibration: normal  Left leg vibration: normal  Proprioception normal.   Pinprick normal.   Graphesthesia: normal  Stereognosis: normal      Patient reports severe shocking shooting pain with light touch to left wrist-left elbow.     Slight coolness to finger tips noted on the left hand compared to the right.      Gait, Coordination, and Reflexes     Gait  Gait: normal    Coordination   Romberg: negative  Finger to nose coordination: normal  Heel to shin coordination: normal  Tandem walking coordination: normal    Tremor   Resting tremor: absent  Intention tremor: absent  Action tremor: left arm    Reflexes   Reflexes 2+ except as noted.   Right  brachioradialis: 2+  Left brachioradialis: 2+  Right biceps: 2+  Left biceps: 2+  Right triceps: 2+  Left triceps: 2+  Right patellar: 2+  Left patellar: 2+  Right achilles: 2+  Left achilles: 2+  Right : 2+  Left : 2+  Right plantar: normal  Left plantar: normal  Right Momin: absent  Left Momin: absent  Right ankle clonus: absent  Left ankle clonus: absent  Right pendular knee jerk: absent  Left pendular knee jerk: absent    Mild tremor noted to 4th and 5th digits with purposeful movement.          Lab Results   Component Value Date    WBC 5.24 02/08/2024    HGB 11.9 (L) 02/08/2024    HCT 36.5 (L) 02/08/2024    MCV 87 02/08/2024     02/08/2024       Sodium   Date Value Ref Range Status   02/08/2024 136 136 - 145 mmol/L Final     Potassium   Date Value Ref Range Status   02/08/2024 4.2 3.5 - 5.1 mmol/L Final     Chloride   Date Value Ref Range Status   02/08/2024 109 95 - 110 mmol/L Final     CO2   Date Value Ref Range Status   02/08/2024 20 (L) 23 - 29 mmol/L Final     Glucose   Date Value Ref Range Status   02/08/2024 72 70 - 110 mg/dL Final     BUN   Date Value Ref Range Status   02/08/2024 9 6 - 20 mg/dL Final     Creatinine   Date Value Ref Range Status   02/08/2024 0.8 0.5 - 1.4 mg/dL Final     Calcium   Date Value Ref Range Status   02/08/2024 9.2 8.7 - 10.5 mg/dL Final     Total Protein   Date Value Ref Range Status   02/08/2024 7.1 6.0 - 8.4 g/dL Final     Albumin   Date Value Ref Range Status   02/08/2024 3.6 3.5 - 5.2 g/dL Final     Total Bilirubin   Date Value Ref Range Status   02/08/2024 0.3 0.1 - 1.0 mg/dL Final     Comment:     For infants and newborns, interpretation of results should be based  on gestational age, weight and in agreement with clinical  observations.    Premature Infant recommended reference ranges:  Up to 24 hours.............<8.0 mg/dL  Up to 48 hours............<12.0 mg/dL  3-5 days..................<15.0 mg/dL  6-29 days.................<15.0 mg/dL        Alkaline Phosphatase   Date Value Ref Range Status   02/08/2024 61 55 - 135 U/L Final     AST   Date Value Ref Range Status   02/08/2024 15 10 - 40 U/L Final     ALT   Date Value Ref Range Status   02/08/2024 12 10 - 44 U/L Final     Anion Gap   Date Value Ref Range Status   02/08/2024 7 (L) 8 - 16 mmol/L Final     eGFR if    Date Value Ref Range Status   05/26/2021 >60 >60 mL/min/1.73 m^2 Final     eGFR if non    Date Value Ref Range Status   05/26/2021 >60 >60 mL/min/1.73 m^2 Final     Comment:     Calculation used to obtain the estimated glomerular filtration  rate (eGFR) is the CKD-EPI equation.          Lab Results   Component Value Date    [id number] 219 10/27/2022       Lab Results   Component Value Date    TSH 1.945 10/27/2022    FREET4 0.76 07/17/2018     No results found in the last 24 hours.    No results found in the last 24 hours.      LABORATORY EVALUATION    RADIOLOGY EVALUATION     NEUROPHYSIOLOGY EVALUATION     PATHOLOGY EVALUATION      NEUROCOGNITIVE AND NEUROPSYCHOLOGY EVALUATION     Reviewed the neuroimaging independently     Assessment:   38 years old AA Female with PMHx as above came to establish Neuro follow up and evaluation/recommendation on left arm CRPS.   - Left Forearm CRPS.     Plan:   Patient Neurological Assessment is remarkable for severe hypersensitivity and signs of CRPS.  She is being follow and treat by Pain Management - at least two Ganglion blockage / possible nerve stimulator ) according to Patient ).   Has tried Neurontin - no much helps according to Patient.   We recommended Cymbalta.      Labs: CBC / CMP / HIV / Acute Hep B panel / RPR / HSV - 2023 - non significant abnormalities.      12/13/2023 EMG - There is electrodiagnostic evidence of a mild demyelinating ulnar neuropathy (Cubital tunnel syndrome) across the left elbow.      After ulnar inching, irritation localized between 2-3cm segment proximal to the elbow.  There was no evidence  of a cervical radiculopathy of the C5-T1 nerve roots      01/2024 CV U/S Arterial left arm  Triphasic waveforms noted. No obstruction/stenosis noted.     MEDICAL/SURGICAL COMORBIDITIES     All relevant medical comorbidities noted and managed by primary care physician and medical care team.      HEALTHY LIFESTYLE AND PREVENTATIVE CARE    The patient to adhere to the age-appropriate health maintenance guidelines including screening tests and vaccinations.   The patient to adhere to  healthy lifestyle, optimal weight, exercise, healthy diet, good sleep hygiene and avoiding drugs including smoking, alcohol and recreational drugs.    Please do not hesitate to contact me with any updates, questions or concerns.    Helder Calderon, MSN, FNP-C    General Neurology.

## 2024-05-15 NOTE — ASSESSMENT & PLAN NOTE
--Wall of lab where patient worked 18 months had confirmed mold   --Continues to have URI symptoms and episodes of black sputum coughed up   --Malodorous sputum at times   --Recommend ENT consultation for nasal endoscopy to ensure no evidence of mold in sinuses   --Will check endemic fungal serologies  --Sputum respiratory and fungal culture   --CT chest with contrast to rule out cavitary disease   --Follow up with me in 4 weeks

## 2024-05-16 LAB
1,3 BETA GLUCAN SER-MCNC: <31 PG/ML
C IMMITIS AB SER QL IA: REACTIVE
CRYPTOC AG SER QL LA: NEGATIVE
FUNGITELL COMMENTS: NEGATIVE

## 2024-05-17 LAB
GALACTOMANNAN AG SERPL IA-ACNC: <0.5 INDEX
HISTOPLASMA/BLASTOMYCES AG VALUE: NOT DETECTED NG/ML
HISTOPLASMA/BLASTOMYCES RESULT: NOT DETECTED

## 2024-05-20 ENCOUNTER — TELEPHONE (OUTPATIENT)
Dept: INFECTIOUS DISEASES | Facility: CLINIC | Age: 39
End: 2024-05-20
Payer: COMMERCIAL

## 2024-05-20 ENCOUNTER — PATIENT MESSAGE (OUTPATIENT)
Dept: INFECTIOUS DISEASES | Facility: CLINIC | Age: 39
End: 2024-05-20
Payer: COMMERCIAL

## 2024-05-20 DIAGNOSIS — Z77.120 MOLD EXPOSURE: Primary | ICD-10-CM

## 2024-05-20 NOTE — PROGRESS NOTES
[facility]   Physical Therapy Treatment Note        Name: Johanna Bullock  Clinic Number: [id number]    Therapy Diagnosis:   Encounter Diagnoses   Name Primary?    Acute pain of right shoulder Yes    Weakness of right shoulder     Postural instability      Physician: Self, Aaareferral    Visit Date: 5/9/2024    Physician Orders: PT Eval and Treat  Medical Diagnosis from Referral: M25.511 (ICD-10-CM) - Right shoulder pain   Evaluation Date: 5/2/2024  Authorization Period Expiration: 12/31/2024  Plan of Care Expiration: 6/2/2024  Progress Note Due: 6/2/2024  Visit # / Visits authorized: 1/1   FOTO: 1/3 (last performed on 5/2/2024)     Precautions: Standard  PTA Visit #: 0/5     Time In: 1140 am   Time Out: 1210 pm   Total Billable Time: 30 minutes (Billing reflects 1 on 1 treatment time spent with patient)    Subjective     Patient reports: she was better after last session but is still feeling some of the tension.     He/She was compliant with home exercise program.  Response to previous treatment: good   Functional change: able to move arm more.     Pain: 4/10     Location: right shoulder     Objective      Objective Measures updated at progress report or POC update only unless otherwise noted.       Treatment     Johanna received the treatments listed below:         MANUAL THERAPY TECHNIQUES were applied for (15) minutes, including:     Manual Intervention Performed Today     Soft Tissue Mobilization [x]  right paraspinals, upper trapezius, levator scapulae , periscapular musculature, rhomboids     Joint Mobilizations []        []        []      Functional Dry Needling  []         Plan for Next Visit: Continue as needed           NEUROMUSCULAR RE-EDUCATION ACTIVITIES to improve Balance, Coordination, Kinesthetic, Sense, Proprioception, and Posture for (15) minutes.  The following were included:    Intervention Performed Today    Chin tucks  x 20x    Series 6 on 1/2 foam on mat x 9  minutes total                                    Plan for Next Visit: Progress          Patient Education and Home Exercises       Home Exercises Provided and Patient Education Provided     Education provided: (with treatment above ) minutes  PURPOSE: Patient educated on the impairments noted above and the effects of physical therapy intervention to improve overall condition and QOL.   EXERCISE: Patient was educated on all the above exercise prior/during/after for proper posture, positioning, and execution for safe performance with home exercise program.   STRENGTH: Patient educated on the importance of improved core and extremity strength in order to improve alignment of the spine and extremities with static positions and dynamic movement.   GAIT & BALANCE: Patient educated on the importance of strong core and lower extremity musculature in order to improve both static and dynamic balance, improve gait mechanics, reduce fall risk and improve household and community mobility.   POSTURE: Patient educated on postural awareness to reduce stress and maintain optimal alignment of the spine with static positions and dynamic movement     Written Home Exercises Provided: yes.  Exercises were reviewed and Johanna was able to demonstrate them prior to the end of the session.  Johanna demonstrated good  understanding of the education provided. See EMR under Patient Instructions for exercises provided during therapy sessions.    Assessment     Patient with good tolerance for manual release as well as series 6 on 1/2 foam to increase her mobility.      Johanna is progressing well towards her goals.   Patient prognosis is Good.     Patient will continue to benefit from skilled outpatient physical therapy to address the deficits listed in the problem list box on initial evaluation, provide pt/family education and to maximize patient's level of independence in the home and community environment.     Patient's spiritual,  cultural and educational needs considered and pt agreeable to plan of care and goals.     Anticipated Barriers for therapy: co-morbidities       Short Term Goals:  2 weeks Status  Date Met   PAIN: Pt will report worst pain of 5/10 in order to progress toward max functional ability and improve quality of life. [x] Progressing  [] Met  [] Not Met     MOBILITY: Patient will improve AROM to 50% of stated goals, listed in objective measures above, in order to progress towards independence with functional activities.  [x] Progressing  [] Met  [] Not Met     STRENGTH: Patient will improve strength to 50% of stated goals, listed in objective measures above, in order to progress towards independence with functional activities. [x] Progressing  [] Met  [] Not Met     POSTURE: Patient will correct postural deviations in sitting and standing, to decrease pain and promote long term stability.  [x] Progressing  [] Met  [] Not Met     HEP: Patient will demonstrate independence with HEP in order to progress toward functional independence. [x] Progressing  [] Met  [] Not Met        Long Term Goals:  8 weeks Status Date Met   PAIN: Pt will report worst pain of 3/10 in order to progress toward max functional ability and improve quality of life [x] Progressing  [] Met  [] Not Met     MOBILITY: Patient will improve AROM to stated goals, listed in objective measures above, in order to return to maximal functional potential and improve quality of life.  [x] Progressing  [] Met  [] Not Met     STRENGTH: Patient will improve strength to stated goals, listed in objective measures above, in order to improve functional independence and quality of life.  [x] Progressing  [] Met  [] Not Met     Patient will return to normal ADL's, IADL's, community involvement, recreational activities, and work-related activities with less than or equal to 3/10 pain and maximal function.  [x] Progressing  [] Met  [] Not Met             Plan     Continue Plan of  Care (POC) and progress per patient tolerance. See treatment section for details on planned progressions next session.      Gladys Lan, PT

## 2024-05-21 ENCOUNTER — HOSPITAL ENCOUNTER (OUTPATIENT)
Dept: RADIOLOGY | Facility: HOSPITAL | Age: 39
Discharge: HOME | End: 2024-05-21
Attending: STUDENT IN AN ORGANIZED HEALTH CARE EDUCATION/TRAINING PROGRAM
Payer: COMMERCIAL

## 2024-05-21 ENCOUNTER — PATIENT MESSAGE (OUTPATIENT)
Dept: INFECTIOUS DISEASES | Facility: CLINIC | Age: 39
End: 2024-05-21
Payer: COMMERCIAL

## 2024-05-21 ENCOUNTER — CLINICAL SUPPORT (OUTPATIENT)
Dept: REHABILITATION | Facility: HOSPITAL | Age: 39
End: 2024-05-21
Attending: ORTHOPAEDIC SURGERY
Payer: COMMERCIAL

## 2024-05-21 DIAGNOSIS — G90.522 COMPLEX REGIONAL PAIN SYNDROME I OF LEFT LOWER LIMB: ICD-10-CM

## 2024-05-21 DIAGNOSIS — M25.532 LEFT WRIST PAIN: Primary | ICD-10-CM

## 2024-05-21 DIAGNOSIS — Z77.120 MOLD EXPOSURE: ICD-10-CM

## 2024-05-21 DIAGNOSIS — S54.02XA NEURAPRAXIA OF LEFT ULNAR NERVE, INITIAL ENCOUNTER: ICD-10-CM

## 2024-05-21 DIAGNOSIS — G54.0 THORACIC OUTLET SYNDROME OF LEFT THORACIC OUTLET: ICD-10-CM

## 2024-05-21 DIAGNOSIS — M62.81 MUSCLE WEAKNESS: ICD-10-CM

## 2024-05-21 DIAGNOSIS — M25.522 LEFT ELBOW PAIN: ICD-10-CM

## 2024-05-21 DIAGNOSIS — M25.622 STIFFNESS OF LEFT ELBOW JOINT: ICD-10-CM

## 2024-05-21 LAB
ASPERGILLUS AB SER QL ID: NOT DETECTED
B DERMAT AB SER QL ID: NOT DETECTED
C IMMITIS AB SER QL ID: NOT DETECTED
COCCIDIOIDES AB SER QL CF: NEGATIVE
COCCIDIOIDES IGG SER QL: NEGATIVE
COCCIDIOIDES IGM SER QL: NEGATIVE
H CAPSUL AB SER QL ID: NOT DETECTED

## 2024-05-21 PROCEDURE — A9585 GADOBUTROL INJECTION: HCPCS | Performed by: STUDENT IN AN ORGANIZED HEALTH CARE EDUCATION/TRAINING PROGRAM

## 2024-05-21 PROCEDURE — 25500020 PHARM REV CODE 255: Performed by: STUDENT IN AN ORGANIZED HEALTH CARE EDUCATION/TRAINING PROGRAM

## 2024-05-21 PROCEDURE — 70553 MRI BRAIN STEM W/O & W/DYE: CPT | Mod: 26,,, | Performed by: RADIOLOGY

## 2024-05-21 PROCEDURE — 97110 THERAPEUTIC EXERCISES: CPT

## 2024-05-21 PROCEDURE — 70553 MRI BRAIN STEM W/O & W/DYE: CPT | Mod: TC

## 2024-05-21 PROCEDURE — 71260 CT THORAX DX C+: CPT | Mod: TC

## 2024-05-21 PROCEDURE — 71260 CT THORAX DX C+: CPT | Mod: 26,,, | Performed by: RADIOLOGY

## 2024-05-21 PROCEDURE — 97140 MANUAL THERAPY 1/> REGIONS: CPT

## 2024-05-21 RX ORDER — GADOBUTROL 604.72 MG/ML
10 INJECTION INTRAVENOUS
Status: COMPLETED | OUTPATIENT
Start: 2024-05-21 | End: 2024-05-21

## 2024-05-21 RX ADMIN — GADOBUTROL 10 ML: 604.72 INJECTION INTRAVENOUS at 08:05

## 2024-05-21 RX ADMIN — IOHEXOL 100 ML: 350 INJECTION, SOLUTION INTRAVENOUS at 08:05

## 2024-05-21 NOTE — PLAN OF CARE
[facility]  Occupational Therapy Plan of Care Note     Name: Johanna Bullock  Clinic Number: [id number]    Therapy Diagnosis:   Encounter Diagnoses   Name Primary?    Complex regional pain syndrome i of left lower limb     Neurapraxia of left ulnar nerve, initial encounter     Thoracic outlet syndrome of left thoracic outlet     Left wrist pain Yes    Left elbow pain     Stiffness of left elbow joint     Muscle weakness      Physician: Jas Larose,*    Visit Date: 5/21/2024    Physician Orders: Eval and Treat   Medical Diagnosis from Referral: G90.522 (ICD-10-CM) - Complex regional pain syndrome i of left lower limb S54.02XA (ICD-10-CM) - Neurapraxia of left ulnar nerve, initial encounter G54.0 (ICD-10-CM) - Thoracic outlet syndrome of left thoracic outlet Z98.890 (ICD-10-CM) - S/P cubital tunnel release  Physician Orders: S/p cubital tunnel release on 2/15/24  Eval and treat, modalities as needed  Medical Diagnosis: Z98.890 (ICD-10-CM) - S/P cubital tunnel release  Surgical Procedure and Date: Left cubital tunnel release, 02/15/24  Evaluation Date: 3/5/2024  Insurance Authorization Period Expiration: 02/27/2025  Plan of Care Certification Period: Initial=03/05/24 to 05/27/24  Progress Note due: 04/04/24  Date of Return to MD: To be determined     Visit # / Visits authorized: 12 / pending  FOTO: 1/ 3  Initial=31% / Goal=62% / Current=47%     Precautions:  Standard and Weightbearing     Time In: 2:05 pm  Time Out: 3:00 pm  Total Billable Time: 55 minutes    Precautions: Standard and Weightbearing  Functional Level Prior to Evaluation:  independent    Subjective     Update: Patient reports she has received 2 nerve blocks that did not provide any relief of symptoms.  Patient is being followed by Pain Management and Neurology for management of symptoms.  She continues to report high pain levels throughout her LUE.     Objective      Update:   Observations: [] Skin intact []  Sutures intact [x] Surgical incision Healed [] No Signs of Infection [x] Hypertrophic scarring [x] Hypersensitivity to touch; Significant to any stimulation of L ulnar sided elbow/forearm;  patient is positive for symptoms of CRPS  [x]Edema present [] Deformities noted: None  [x] Myofascial tightness noted: Moderate through L forearm, dorsal to volar aspects  [] Other:  Vascular changes noted: mild hyper vascularization and shininess to hand noted.        3/5/2024 3/5/2024 05/21/24     Left Right Left   Above Elbow (2 in) 34.0 33.0 33.5   Elbow Crease  27.9 27.8 27.5   Below Elbow (2 in) 27.1 28.0 28.3   Wrist crease 16.0 16.5 16.5         Physical Performance Assessment: (ROM measured in degrees)           03/05/24 05/21/24      Left  Left     Elbow AROM  MMT    Pain/Dysfunction with Movement   Extension -15 3+/5 -15 3+5 [x] Pain [] Abnormal Movement   Flexion  134 3+/5 148 4-/5 [] Pain [] Abnormal Movement   Forearm           Supination 75 4/5 75 4-/5 [] Pain [] Abnormal Movement   Pronation 72 4/5 75 4/5 [] Pain [] Abnormal Movement   Wrist           Extension 51 3+/5 66 4-/5 [x] Pain [] Abnormal Movement   Flexion 58 3+/5 75 4-/5 [x] Pain [] Abnormal Movement   Radial Deviation WFL 3+/5 19 3+/5 [] Pain [] Abnormal Movement   Ulnar Deviation WFL 3+/5 39 3+/5 [] Pain [] Abnormal Movement      Palpation:  Patient remains slightly guarded with stimulation of the L elbow to wrist, ulnar aspect          Strength (measured in pounds/square inch):    05/24/24 05/21/24     Left Right    II 38, 36, 40 74   Lateral 10 15   Tripod 5 16   Tip 4 9       Assessment     Update: Patient has made some progress towards all goals set at initial evaluation for decreased pain, edema and scar tissue, with increased ROM, strength and coordination of L elbow/forearm/wrist/hand.  Deficits remain in LUE, including pain, unresolved scar tissue, hypersensitivity to stimuli to UE and strength deficits, that are effecting functioning in  ADL/IADL's.      Previous Short Term Goals Status:   5/7 STG's met  New Short Term Goals Status:   continue with unmet STG's and progress to LTG's  Long Term Goal Status: continue per initial plan of care.  Reasons for Recertification of Therapy:   Updated Plan of Care needed after return visit with referring Ortho MD    GOALS  Short Term Goals (to be met in 4 weeks) Goal Status:   1) Establish HEP with patient independent in performing accurately [x] Goal Met  [] Part Met   2) Patient will report PAIN of 2-3 levels lower than initial measures on the pain scale for worst pain in affected elbow/forearm [] Goal Met  [x] Part Met   3) Patient will increase AROM of affected elbow/forearm/wrist, in limited planes of mobility documented at initial evaluation, by at lease 5-8 degrees for improved performance with ADL's [x] Goal Met  [] Part Met   4) Increase STRENGTH of affected elbow/forearm/wrist by 1/2 MMT grade, in all planes of mobility, to improve functioning in ADL/IADL's [x] Goal Met  [] Part Met   5) EDEMA in affected elbow/forearm is at least .2-.3 cm less than initial measures at evaluation [x] Goal Met  [] Part Met   6) Patient will be able to achieve less than or equal to a 15-20% improvement on Elbow FOTO survey demonstrating overall improved functional ability with upper extremity.  [] Goal Met  [x] Part Met   7) Assess or Increase  STRENGTH in affected upper extremity by 5-8 psi for improved functioning in ADL/IADL's [x] Goal Met  [] Part Met         Long Term Goals (to be met by Discharge) Goal Status:   1) PAIN in affected elbow/forearm will be no greater than 1-2/10 while performing ADL's/IADL's [] Goal Met  [] Part Met   2) Patient will demonstrate WFL AROM in affected elbow/forearm, in all functional planes of mobility, for improved functioning in ADL/IADL's  [] Goal Met  [] Part Met   3) Increase STRENGTH in affected elbow/forearm to at lease 4/5 in all functional planes of mobility for improved  performance in ADL/IADL's [] Goal Met  [] Part Met   4) Edema in affected elbow/forearm is trace to none [] Goal Met  [] Part Met   5) Patient will be able to achieve less than or equal to 68% on Wrist/Hand FOTO survey demonstrating overall improved functional ability with upper extremity.  [] Goal Met  [] Part Met         Plan     Updated Certification Period: 05/21/24 to 07/15/24     Recommended Treatment Plan: 2-3 times per week for 8 weeks:  Fluidotherapy, Manual Therapy, Moist Heat/ Ice, Neuromuscular Re-ed, Paraffin, Patient Education, Self Care, Therapeutic Activities, Therapeutic Exercise, Ultrasound, and Desensitization Training    Other Recommendations: Continue with CRPS Scrub/Carry Protocol at next visit    Abelino Worthington, OT

## 2024-05-24 ENCOUNTER — CLINICAL SUPPORT (OUTPATIENT)
Dept: REHABILITATION | Facility: HOSPITAL | Age: 39
End: 2024-05-24
Payer: COMMERCIAL

## 2024-05-24 DIAGNOSIS — M25.532 LEFT WRIST PAIN: Primary | ICD-10-CM

## 2024-05-24 DIAGNOSIS — M25.622 STIFFNESS OF LEFT ELBOW JOINT: ICD-10-CM

## 2024-05-24 DIAGNOSIS — M25.522 LEFT ELBOW PAIN: ICD-10-CM

## 2024-05-24 DIAGNOSIS — M62.81 MUSCLE WEAKNESS: ICD-10-CM

## 2024-05-24 PROCEDURE — 97112 NEUROMUSCULAR REEDUCATION: CPT

## 2024-05-24 PROCEDURE — 97022 WHIRLPOOL THERAPY: CPT | Mod: 59

## 2024-05-24 PROCEDURE — 97140 MANUAL THERAPY 1/> REGIONS: CPT

## 2024-05-24 PROCEDURE — 97110 THERAPEUTIC EXERCISES: CPT

## 2024-05-24 NOTE — PROGRESS NOTES
[facility]  Occupational Therapy Treatment Note     Date: 5/24/2024  Name: Johanna Bullock  Clinic Number: [id number]    Therapy Diagnosis:   Encounter Diagnoses   Name Primary?    Left wrist pain Yes    Left elbow pain     Stiffness of left elbow joint     Muscle weakness        Physician: Jas Larose,*    Physician Orders: S/p cubital tunnel release on 2/15/24  Eval and treat, modalities as needed  Medical Diagnosis: Z98.890 (ICD-10-CM) - S/P cubital tunnel release  Surgical Procedure and Date: Left cubital tunnel release, 02/15/24  Evaluation Date: 3/5/2024   Insurance Authorization Period Expiration: 02/27/2025  Plan of Care Certification Period: 03/05/24 to 05/27/24  Progress Note due: 04/04/24  Date of Return to MD: 03/26/24     Visit # / Visits authorized: 10 / 20  FOTO: 1/ 3  Initial=31% / Goal=62%/ Current=45%       Precautions:  Standard and Weightbearing     Time In: 10:35 am   Time Out: 11:30 am   Total Billable Time: 55 minutes  (billing reflects skilled 1:1 time)       Subjective     Patient reports: she was able to tolerate the kinesiotape on her LUE and feels it may have helped some with the pain.   She was compliant with home exercise program given last session.   Response to previous treatment: less localized edema in L elbow/forearm; increased AROM & activity tolerance    Functional change: able to hold light objects in L hand with some difficulty; able to oppose L thumb to RF & SF with some difficulty     Pain: 7-8/10 during therapy  Location: left elbow, ulnar side of forearm      Objective     Objective Measures updated at progress report unless specified.    Treatment     Johanna received the treatments listed below:      Patient is post op 12+ weeks (Cubital Tunnel Release, (02/15/24); Diagnosed with CRPS on 04/02/24    Supervised modalities after being cleared for contradictions for 10 minutes:    Fluidotherapy:  to L elbow/forearm for 12 min,  continuous air, 112 deg, air speed 100 to decrease pain, edema, hypersensitivity & scar tissue and increased tissue extensibility.       Manual therapy techniques: Myofacial release, Manual Lymphatic Drainage, Soft tissue Mobilization, and Scar Management were applied to the: L upper arm/elbow for 10 minutes, including:   - - Kinesiotaping: ulnar nerve taping pattern applied to LUE for pain and soft tissue management . Patient instructed on purpose, wear, care, precautions to monitor and removal of KT. Patient verbalized understanding of all instructions provided.      Neuromuscular re-education activities to improve Posture and Coordination for 25 minutes. The following activities were included:  Coordination:    Dexterciser 3 min     Isospheres  3 min         Desensitization Program:     Scrub & Carry protocol 3 minutes each, w/ 4# wt    Vibrator held in palm  3 min     Octorox Spiky massage ball stim over volar forearm 3 min   Weight-bearing/Shifting Exercises:            HEP Training:    UE stretches, including forearm & prayer Reviewed   Soft tissue/Scar mobilization Initiated scar gel sheet   Weight-bearing: easy chair push-ups Reviewed           Therapeutic exercises to develop ROM and flexibility for 8 minutes, including:  UBE-forward/backwards 3 min each way, lvl 1.0   Pulleys 3 min, with stretch      T-putty Yellow/red   -dowel digs 2 minutes         Patient Education and Home Exercises     Education provided:   - Reviewed T-putty for strengthening HEP;  - Reiterated importance of performing desensitization program, including scrub and carry, to address CRPS symptoms.    - Reiterated recommendations for continued follow up with Pain Management/PCP regarding ongoing symptoms of Depression, High pain focus and self-limiting behavior.    - Progress towards goals     Written Home Exercises Provided: Patient instructed to cont prior HEP.  Exercises were reviewed and Johanna was able to demonstrate them prior  to the end of the session.  Johanna demonstrated good  understanding of the home exercise program provided. See electronic medical record under Patient Instructions for exercises provided during therapy sessions.       Assessment     Patient continues to participate in above listed therapeutic activities, however, requires frequent rest breaks due to decreased tolerance and reports of hypersensitivity.  Recommend to continue with desensitization program and progress strengthening further, as tolerated.        Johanna is progressing fairly well towards her goals and there are no updates to goals at this time. Pt prognosis is Good to Fair.     Patient will continue to benefit from skilled outpatient occupational therapy to address the deficits listed in the problem list on initial evaluation provide patient/family education and to maximize patient's level of independence in the home and community environment.     Patient's spiritual, cultural and educational needs considered and patient agreeable to plan of care and goals.    Anticipated barriers to occupational therapy: comorbid diagnosis, compliance with HEP and attendance to therapy as recommended     Goals:  Short Term Goals (to be met in 4 weeks) Goal Status:   1) Establish HEP with patient independent in performing accurately [] Goal Met  [] Part Met   2) Patient will report PAIN of 2-3 levels lower than initial measures on the pain scale for worst pain in affected elbow/forearm [] Goal Met  [] Part Met   3) Patient will increase AROM of affected elbow/forearm/wrist, in limited planes of mobility documented at initial evaluation, by at lease 5-8 degrees for improved performance with ADL's [] Goal Met  [] Part Met   4) Increase STRENGTH of affected elbow/forearm/wrist by 1/2 MMT grade, in all planes of mobility, to improve functioning in ADL/IADL's [] Goal Met  [] Part Met   5) EDEMA in affected elbow/forearm is at least .2-.3 cm less than initial measures at  evaluation [] Goal Met  [] Part Met   6) Patient will be able to achieve less than or equal to a 15-20% improvement on Elbow FOTO survey demonstrating overall improved functional ability with upper extremity.  [] Goal Met  [] Part Met   7) Assess or Increase  STRENGTH in affected upper extremity by 5-8 psi for improved functioning in ADL/IADL's [] Goal Met  [] Part Met         Long Term Goals (to be met by Discharge) Goal Status:   1) PAIN in affected elbow/forearm will be no greater than 1-2/10 while performing ADL's/IADL's [] Goal Met  [] Part Met   2) Patient will demonstrate WFL AROM in affected elbow/forearm, in all functional planes of mobility, for improved functioning in ADL/IADL's  [] Goal Met  [] Part Met   3) Increase STRENGTH in affected elbow/forearm to at lease 4/5 in all functional planes of mobility for improved performance in ADL/IADL's [] Goal Met  [] Part Met   4) Edema in affected elbow/forearm is trace to none [] Goal Met  [] Part Met   5) Patient will be able to achieve less than or equal to 68% on Wrist/Hand FOTO survey demonstrating overall improved functional ability with upper extremity.  [] Goal Met  [] Part Met          Plan     Plan of Care Certification: 03/05/24 to 05/27/24.      Outpatient Occupational Therapy 2-3 times weekly for 12 weeks to include the following interventions: Fluidotherapy, Manual therapy/joint mobilizations, Modalities for pain management, US 3 mhz, Therapeutic exercises/activities., Strengthening, Orthotic Fabrication/Fit/Training, Edema Control, Scar Management, Electrical Modalities, and Joint Protection.    Updates/Grading for next session: progress ROM, as tolerated    Abelino Worthington OT   5/24/2024

## 2024-05-28 ENCOUNTER — CLINICAL SUPPORT (OUTPATIENT)
Dept: REHABILITATION | Facility: HOSPITAL | Age: 39
End: 2024-05-28
Payer: COMMERCIAL

## 2024-05-28 DIAGNOSIS — R29.3 POSTURAL INSTABILITY: ICD-10-CM

## 2024-05-28 DIAGNOSIS — M25.511 ACUTE PAIN OF RIGHT SHOULDER: Primary | ICD-10-CM

## 2024-05-28 DIAGNOSIS — R29.898 WEAKNESS OF RIGHT SHOULDER: ICD-10-CM

## 2024-05-28 PROCEDURE — 97140 MANUAL THERAPY 1/> REGIONS: CPT | Performed by: PHYSICAL THERAPIST

## 2024-05-28 PROCEDURE — 97112 NEUROMUSCULAR REEDUCATION: CPT | Performed by: PHYSICAL THERAPIST

## 2024-06-01 ENCOUNTER — PATIENT MESSAGE (OUTPATIENT)
Dept: NEUROLOGY | Facility: CLINIC | Age: 39
End: 2024-06-01
Payer: COMMERCIAL

## 2024-06-01 NOTE — PROGRESS NOTES
[facility]   Physical Therapy Treatment Note        Name: Johanna Bullock  Clinic Number: [id number]    Therapy Diagnosis:   Encounter Diagnoses   Name Primary?    Acute pain of right shoulder Yes    Weakness of right shoulder     Postural instability      Physician: Hanna Larsen PA-C    Visit Date: 5/28/2024    Physician Orders: PT Eval and Treat  Medical Diagnosis from Referral: M25.511 (ICD-10-CM) - Right shoulder pain   Evaluation Date: 5/2/2024  Authorization Period Expiration: 12/31/2024  Plan of Care Expiration: 6/2/2024  Progress Note Due: 6/2/2024  Visit # / Visits authorized: 2/20 + eval   FOTO: 1/3 (last performed on 5/2/2024)     Precautions: Standard  PTA Visit #: 0/5     Time In: 4:05 pm   Time Out: 4:45 pm   Total Billable Time: 40 minutes (Billing reflects 1 on 1 treatment time spent with patient)    Subjective     Patient reports: she is still feeling tension in both shoulders.     He/She was compliant with home exercise program.  Response to previous treatment: good   Functional change: able to move arm more.     Pain: 4/10     Location: right shoulder     Objective      Objective Measures updated at progress report or POC update only unless otherwise noted.       Treatment     Johanna received the treatments listed below:         MANUAL THERAPY TECHNIQUES were applied for (25) minutes, including:     Manual Intervention Performed Today     Soft Tissue Mobilization [x]  right paraspinals, upper trapezius, levator scapulae , periscapular musculature, rhomboids     Joint Mobilizations []        []        []      Functional Dry Needling  [x]   right upper trap       Plan for Next Visit: Continue as needed           NEUROMUSCULAR RE-EDUCATION ACTIVITIES to improve Balance, Coordination, Kinesthetic, Sense, Proprioception, and Posture for (15) minutes.  The following were included:    Intervention Performed Today    Chin tucks  x 20x    Series 6 on 1/2 foam on mat x 9  minutes total                                    Plan for Next Visit: Progress          Patient Education and Home Exercises       Home Exercises Provided and Patient Education Provided     Education provided: (with treatment above ) minutes  PURPOSE: Patient educated on the impairments noted above and the effects of physical therapy intervention to improve overall condition and QOL.   EXERCISE: Patient was educated on all the above exercise prior/during/after for proper posture, positioning, and execution for safe performance with home exercise program.   STRENGTH: Patient educated on the importance of improved core and extremity strength in order to improve alignment of the spine and extremities with static positions and dynamic movement.   GAIT & BALANCE: Patient educated on the importance of strong core and lower extremity musculature in order to improve both static and dynamic balance, improve gait mechanics, reduce fall risk and improve household and community mobility.   POSTURE: Patient educated on postural awareness to reduce stress and maintain optimal alignment of the spine with static positions and dynamic movement     Written Home Exercises Provided: yes.  Exercises were reviewed and Johanna was able to demonstrate them prior to the end of the session.  Johanna demonstrated good  understanding of the education provided. See EMR under Patient Instructions for exercises provided during therapy sessions.    Assessment     Patient able to tolerate Functional Dry Needling to the right upper trap today.  Good release of tension following instrument assisted soft tissue mobilization to Bilateral upper trap and periscapular region.      Johanna is progressing well towards her goals.   Patient prognosis is Good.     Patient will continue to benefit from skilled outpatient physical therapy to address the deficits listed in the problem list box on initial evaluation, provide pt/family education and to  maximize patient's level of independence in the home and community environment.     Patient's spiritual, cultural and educational needs considered and pt agreeable to plan of care and goals.     Anticipated Barriers for therapy: co-morbidities       Short Term Goals:  2 weeks Status  Date Met   PAIN: Pt will report worst pain of 5/10 in order to progress toward max functional ability and improve quality of life. [x] Progressing  [] Met  [] Not Met     MOBILITY: Patient will improve AROM to 50% of stated goals, listed in objective measures above, in order to progress towards independence with functional activities.  [x] Progressing  [] Met  [] Not Met     STRENGTH: Patient will improve strength to 50% of stated goals, listed in objective measures above, in order to progress towards independence with functional activities. [x] Progressing  [] Met  [] Not Met     POSTURE: Patient will correct postural deviations in sitting and standing, to decrease pain and promote long term stability.  [x] Progressing  [] Met  [] Not Met     HEP: Patient will demonstrate independence with HEP in order to progress toward functional independence. [x] Progressing  [] Met  [] Not Met        Long Term Goals:  8 weeks Status Date Met   PAIN: Pt will report worst pain of 3/10 in order to progress toward max functional ability and improve quality of life [x] Progressing  [] Met  [] Not Met     MOBILITY: Patient will improve AROM to stated goals, listed in objective measures above, in order to return to maximal functional potential and improve quality of life.  [x] Progressing  [] Met  [] Not Met     STRENGTH: Patient will improve strength to stated goals, listed in objective measures above, in order to improve functional independence and quality of life.  [x] Progressing  [] Met  [] Not Met     Patient will return to normal ADL's, IADL's, community involvement, recreational activities, and work-related activities with less than or equal to  3/10 pain and maximal function.  [x] Progressing  [] Met  [] Not Met             Plan     Continue Plan of Care (POC) and progress per patient tolerance. See treatment section for details on planned progressions next session.      Gladys Lan, PT

## 2024-06-05 ENCOUNTER — CLINICAL SUPPORT (OUTPATIENT)
Dept: REHABILITATION | Facility: HOSPITAL | Age: 39
End: 2024-06-05
Payer: COMMERCIAL

## 2024-06-05 ENCOUNTER — TELEPHONE (OUTPATIENT)
Dept: ORTHOPEDICS | Facility: CLINIC | Age: 39
End: 2024-06-05
Payer: COMMERCIAL

## 2024-06-05 DIAGNOSIS — M62.81 MUSCLE WEAKNESS: ICD-10-CM

## 2024-06-05 DIAGNOSIS — M25.532 LEFT WRIST PAIN: Primary | ICD-10-CM

## 2024-06-05 DIAGNOSIS — M25.522 LEFT ELBOW PAIN: ICD-10-CM

## 2024-06-05 DIAGNOSIS — M25.622 STIFFNESS OF LEFT ELBOW JOINT: ICD-10-CM

## 2024-06-05 DIAGNOSIS — M25.511 ACUTE PAIN OF RIGHT SHOULDER: Primary | ICD-10-CM

## 2024-06-05 DIAGNOSIS — R29.3 POSTURAL INSTABILITY: ICD-10-CM

## 2024-06-05 DIAGNOSIS — R29.898 WEAKNESS OF RIGHT SHOULDER: ICD-10-CM

## 2024-06-05 PROCEDURE — 97140 MANUAL THERAPY 1/> REGIONS: CPT

## 2024-06-05 PROCEDURE — 97112 NEUROMUSCULAR REEDUCATION: CPT

## 2024-06-05 PROCEDURE — 97110 THERAPEUTIC EXERCISES: CPT

## 2024-06-05 PROCEDURE — 97530 THERAPEUTIC ACTIVITIES: CPT

## 2024-06-05 NOTE — PROGRESS NOTES
[facility]  Occupational Therapy Treatment Note     Date: 6/5/2024  Name: Johanna Bullock  Clinic Number: [id number]    Therapy Diagnosis:   Encounter Diagnoses   Name Primary?    Left wrist pain Yes    Left elbow pain     Stiffness of left elbow joint     Muscle weakness        Physician: Jas Larose,*    Physician Orders: S/p cubital tunnel release on 2/15/24  Eval and treat, modalities as needed  Medical Diagnosis: Z98.890 (ICD-10-CM) - S/P cubital tunnel release  Surgical Procedure and Date: Left cubital tunnel release, 02/15/24  Evaluation Date: 3/5/2024   Insurance Authorization Period Expiration: 02/27/2025  Plan of Care Certification Period: 03/05/24 to 05/27/24  Progress Note due: 04/04/24  Date of Return to MD: 03/26/24     Visit # / Visits authorized: 10 / 20  Re-assess and Update Plan of Care next visit    FOTO: 1/ 3  Initial=31% / Goal=62%/ Current=45%       Precautions:  Standard and Weightbearing     Time In: 3:20 am   Time Out: 4:15 am   Total Billable Time: 55 minutes  (billing reflects skilled 1:1 time)       Subjective     Patient reports: the kinesiotape seemed to help with the pain in her LUE, but removal of the tape was too painful.     She was compliant with home exercise program given last session.   Response to previous treatment: less localized edema in L elbow/forearm; increased AROM & activity tolerance    Functional change: able to hold light objects in L hand with some difficulty; able to oppose L thumb to RF & SF with some difficulty     Pain: 7-8/10 during therapy  Location: left elbow, ulnar side of forearm      Objective     Objective Measures updated at progress report unless specified.    Treatment     Johanna received the treatments listed below:      Patient is post op 12+ weeks (Cubital Tunnel Release, (02/15/24); Diagnosed with CRPS on 04/02/24    Manual therapy techniques: Myofacial release, Manual Lymphatic Drainage, Soft tissue  Mobilization, and Scar Management were applied to the: L upper arm/elbow for 18 minutes, including:   - use of hand techniques, cupping and IASTM tools to increase blood flow/circulation, improve soft tissue pliability and decrease pain.     Neuromuscular re-education activities to improve Posture and Coordination for 29 minutes. The following activities were included:  Coordination:    Dexterciser 3 min     Isospheres  3 min         Desensitization Program:     Scrub & Carry protocol 3 minutes each, w/ 4# wt    Vibrator held in palm  3 min     Weight-bearing/Shifting Exercises:        Ulnar Nerve Glides 5 reps       HEP Training:    UE stretches, including forearm & prayer Reviewed   Soft tissue/Scar mobilization Reviewed           Therapeutic exercises to develop ROM and flexibility for 8 minutes, including:  Pulleys 3 min, with stretch    Strengthening:    PHG 3/10 reps, setting 2 (22#)   Digi-flex 2/10 reps, yellow     -dowel digs 2 minutes         Patient Education and Home Exercises     Education provided:   - Reviewed T-putty for strengthening HEP;  - Reiterated importance of performing desensitization program, including scrub and carry, to address CRPS symptoms.    - Reiterated recommendations for continued follow up with Pain Management/PCP regarding ongoing symptoms of Depression, High pain focus and self-limiting behavior.    - Progress towards goals     Written Home Exercises Provided: Patient instructed to cont prior HEP.  Exercises were reviewed and Johanna was able to demonstrate them prior to the end of the session.  Johanna demonstrated good  understanding of the home exercise program provided. See electronic medical record under Patient Instructions for exercises provided during therapy sessions.       Assessment     Patient is noted to have hypertrophic and adherent scar at the L elbow surgical site with localized lipoma at the medial aspect.  Patient remains very hypersensitive to touch.   Recommend to continue with desensitization program and progress strengthening further, as tolerated.  Patient would benefit from a follow up appointment with referring MD or Ortho for medical management of condition, as therapy is reaching a plateau in progress to wards goals.     Johanna is progressing fairly well towards her goals and there are no updates to goals at this time. Pt prognosis is Good to Fair.     Patient will continue to benefit from skilled outpatient occupational therapy to address the deficits listed in the problem list on initial evaluation provide patient/family education and to maximize patient's level of independence in the home and community environment.     Patient's spiritual, cultural and educational needs considered and patient agreeable to plan of care and goals.    Anticipated barriers to occupational therapy: comorbid diagnosis, compliance with HEP and attendance to therapy as recommended     Goals:  Short Term Goals (to be met in 4 weeks) Goal Status:   1) Establish HEP with patient independent in performing accurately [] Goal Met  [] Part Met   2) Patient will report PAIN of 2-3 levels lower than initial measures on the pain scale for worst pain in affected elbow/forearm [] Goal Met  [] Part Met   3) Patient will increase AROM of affected elbow/forearm/wrist, in limited planes of mobility documented at initial evaluation, by at lease 5-8 degrees for improved performance with ADL's [] Goal Met  [] Part Met   4) Increase STRENGTH of affected elbow/forearm/wrist by 1/2 MMT grade, in all planes of mobility, to improve functioning in ADL/IADL's [] Goal Met  [] Part Met   5) EDEMA in affected elbow/forearm is at least .2-.3 cm less than initial measures at evaluation [] Goal Met  [] Part Met   6) Patient will be able to achieve less than or equal to a 15-20% improvement on Elbow FOTO survey demonstrating overall improved functional ability with upper extremity.  [] Goal Met  [] Part Met    7) Assess or Increase  STRENGTH in affected upper extremity by 5-8 psi for improved functioning in ADL/IADL's [] Goal Met  [] Part Met         Long Term Goals (to be met by Discharge) Goal Status:   1) PAIN in affected elbow/forearm will be no greater than 1-2/10 while performing ADL's/IADL's [] Goal Met  [] Part Met   2) Patient will demonstrate WFL AROM in affected elbow/forearm, in all functional planes of mobility, for improved functioning in ADL/IADL's  [] Goal Met  [] Part Met   3) Increase STRENGTH in affected elbow/forearm to at lease 4/5 in all functional planes of mobility for improved performance in ADL/IADL's [] Goal Met  [] Part Met   4) Edema in affected elbow/forearm is trace to none [] Goal Met  [] Part Met   5) Patient will be able to achieve less than or equal to 68% on Wrist/Hand FOTO survey demonstrating overall improved functional ability with upper extremity.  [] Goal Met  [] Part Met          Plan     Plan of Care Certification: 03/05/24 to 05/27/24.      Outpatient Occupational Therapy 2-3 times weekly for 12 weeks to include the following interventions: Fluidotherapy, Manual therapy/joint mobilizations, Modalities for pain management, US 3 mhz, Therapeutic exercises/activities., Strengthening, Orthotic Fabrication/Fit/Training, Edema Control, Scar Management, Electrical Modalities, and Joint Protection.    Updates/Grading for next session: progress ROM, as tolerated    Abelino Worthington OT   6/5/2024

## 2024-06-05 NOTE — PROGRESS NOTES
[facility] OUTPATIENT THERAPY AND WELLNESS   Physical Therapy Treatment Note        Name: Johanna Bullock  Clinic Number: [id number]    Therapy Diagnosis:   Encounter Diagnoses   Name Primary?    Acute pain of right shoulder Yes    Weakness of right shoulder     Postural instability      Physician: Hanna Larsen PA-C    Visit Date: 6/5/2024    Physician Orders: PT Eval and Treat  Medical Diagnosis from Referral: M25.511 (ICD-10-CM) - Right shoulder pain   Evaluation Date: 5/2/2024  Authorization Period Expiration: 12/31/2024  Plan of Care Expiration: 6/2/2024  Progress Note Due: 6/2/2024  Visit # / Visits authorized: 2/20 + eval   FOTO: 1/3 (last performed on 5/2/2024)     Precautions: Standard  PTA Visit #: 0/5     Time In: 2:30 pm   Time Out: 3:15 pm   Total Billable Time: 43 minutes (Billing reflects 1 on 1 treatment time spent with patient)    Subjective     Patient reports: her left arm is really bothering her today. Soft tissue work helped improved pain in right shoulder.     She was compliant with home exercise program.  Response to previous treatment: good   Functional change: able to move arm more.     Pain: 4/10     Location: right shoulder     Objective      Objective Measures updated at progress report or POC update only unless otherwise noted.     Treatment     Johanna received the treatments listed below:     MANUAL THERAPY TECHNIQUES were applied for (12) minutes, including:     Manual Intervention Performed Today     Soft Tissue Mobilization [x]  right paraspinals, upper trapezius, levator scapulae , periscapular musculature, rhomboids     Joint Mobilizations []        []        []      Functional Dry Needling  []   right upper trap       Plan for Next Visit: Continue as needed       NEUROMUSCULAR RE-EDUCATION ACTIVITIES to improve Balance, Coordination, Kinesthetic, Sense, Proprioception, and Posture for (8) minutes.  The following were included:    Intervention Performed Today    Chin tucks   20x     Series 6 on 1/2 foam on mat x 1 minute each direction and 2 minutes pec stretch                                   Plan for Next Visit: Progress      THERAPEUTIC ACTIVITIES to improve dynamic and functional  performance for (23) minutes including:    Intervention Performed Today    UBE  x 6 minutes forward   Prone Scapular Series x Retractions, extension, modified abduction 2x10 reps each direction   Rows Unilateral x Green theraband 3x10 reps   Shoulder Extension Unilateral x Red theraband 3x10 reps   Internal Rotation Right x Red theraband 3x10 reps   External Rotation Right x Red theraband 3x10 reps                Plan for Next Visit: Adduction, prone row     Patient Education and Home Exercises       Home Exercises Provided and Patient Education Provided     Education provided: (with treatment above ) minutes  PURPOSE: Patient educated on the impairments noted above and the effects of physical therapy intervention to improve overall condition and QOL.   EXERCISE: Patient was educated on all the above exercise prior/during/after for proper posture, positioning, and execution for safe performance with home exercise program.   STRENGTH: Patient educated on the importance of improved core and extremity strength in order to improve alignment of the spine and extremities with static positions and dynamic movement.   GAIT & BALANCE: Patient educated on the importance of strong core and lower extremity musculature in order to improve both static and dynamic balance, improve gait mechanics, reduce fall risk and improve household and community mobility.   POSTURE: Patient educated on postural awareness to reduce stress and maintain optimal alignment of the spine with static positions and dynamic movement     Written Home Exercises Provided: yes.  Exercises were reviewed and Johanna was able to demonstrate them prior to the end of the session.  Johanna demonstrated good  understanding of the education provided. See  EMR under Patient Instructions for exercises provided during therapy sessions.    Assessment     Patient tolerated treatment well today. Incorporated scapular strengthening in prone and with resistance bands. Emphasis on proper muscle engagement and posture throughout. Focused on right upper extremity as left upper extremity was extremely painful and is being treated by OT.     Johanna is progressing well towards her goals.   Patient prognosis is Good.     Patient will continue to benefit from skilled outpatient physical therapy to address the deficits listed in the problem list box on initial evaluation, provide pt/family education and to maximize patient's level of independence in the home and community environment.     Patient's spiritual, cultural and educational needs considered and pt agreeable to plan of care and goals.     Anticipated Barriers for therapy: co-morbidities       Short Term Goals:  2 weeks Status  Date Met   PAIN: Pt will report worst pain of 5/10 in order to progress toward max functional ability and improve quality of life. [x] Progressing  [] Met  [] Not Met     MOBILITY: Patient will improve AROM to 50% of stated goals, listed in objective measures above, in order to progress towards independence with functional activities.  [x] Progressing  [] Met  [] Not Met     STRENGTH: Patient will improve strength to 50% of stated goals, listed in objective measures above, in order to progress towards independence with functional activities. [x] Progressing  [] Met  [] Not Met     POSTURE: Patient will correct postural deviations in sitting and standing, to decrease pain and promote long term stability.  [x] Progressing  [] Met  [] Not Met     HEP: Patient will demonstrate independence with HEP in order to progress toward functional independence. [x] Progressing  [] Met  [] Not Met        Long Term Goals:  8 weeks Status Date Met   PAIN: Pt will report worst pain of 3/10 in order to progress toward  max functional ability and improve quality of life [x] Progressing  [] Met  [] Not Met     MOBILITY: Patient will improve AROM to stated goals, listed in objective measures above, in order to return to maximal functional potential and improve quality of life.  [x] Progressing  [] Met  [] Not Met     STRENGTH: Patient will improve strength to stated goals, listed in objective measures above, in order to improve functional independence and quality of life.  [x] Progressing  [] Met  [] Not Met     Patient will return to normal ADL's, IADL's, community involvement, recreational activities, and work-related activities with less than or equal to 3/10 pain and maximal function.  [x] Progressing  [] Met  [] Not Met             Plan     Continue Plan of Care (POC) and progress per patient tolerance. See treatment section for details on planned progressions next session.      Keira Romero, PT

## 2024-06-05 NOTE — TELEPHONE ENCOUNTER
Spoke to a rep. At [facility] an explained to her that I do not have authorization to release any information to them an also call the patient to make her aware as well of this an there was no answer form the patient so I left a very detailed message for her.         ----- Message from Meenu Currie sent at 6/5/2024  8:27 AM CDT -----  Contact: Von[facility]Sun Longoria [facility] is calling to speak with someone regarding care. Reports patient has stated they are currently not working and request to confirm an estimated return to work date and treatment plan for patient. Claims number provided is [id number]. Please give [facility] a call back at [phone] when possible.  Thank you,  GH

## 2024-06-07 ENCOUNTER — CLINICAL SUPPORT (OUTPATIENT)
Dept: REHABILITATION | Facility: HOSPITAL | Age: 39
End: 2024-06-07
Payer: COMMERCIAL

## 2024-06-07 DIAGNOSIS — M25.622 STIFFNESS OF LEFT ELBOW JOINT: ICD-10-CM

## 2024-06-07 DIAGNOSIS — M25.532 LEFT WRIST PAIN: Primary | ICD-10-CM

## 2024-06-07 DIAGNOSIS — M62.81 MUSCLE WEAKNESS: ICD-10-CM

## 2024-06-07 DIAGNOSIS — M25.522 LEFT ELBOW PAIN: ICD-10-CM

## 2024-06-07 PROCEDURE — 97530 THERAPEUTIC ACTIVITIES: CPT

## 2024-06-07 NOTE — PROGRESS NOTES
[facility]  Occupational Therapy Treatment Note     Date: 6/7/2024  Name: Johanna Bullock  Clinic Number: [id number]    Therapy Diagnosis:   Encounter Diagnoses   Name Primary?    Left wrist pain Yes    Left elbow pain     Stiffness of left elbow joint     Muscle weakness        Physician: Jas Larose,*    Physician Orders: S/p cubital tunnel release on 2/15/24  Eval and treat, modalities as needed  Medical Diagnosis: Z98.890 (ICD-10-CM) - S/P cubital tunnel release  Surgical Procedure and Date: Left cubital tunnel release, 02/15/24  Evaluation Date: 3/5/2024   Insurance Authorization Period Expiration: 02/27/2025  Plan of Care Certification Period: 03/05/24 to 05/27/24  Progress Note due: 04/04/24  Date of Return to MD: 03/26/24     Visit # / Visits authorized: 3 / 20  (Total visits=15)  FOTO: 1/ 3  Initial=31% / Goal=62%/ Current=45%       Precautions:  Standard and Weightbearing     Time In: 10:40   Time Out: 11:30   Total Billable Time: 50 minutes  (billing reflects skilled 1:1 time)       Subjective     Patient reports: she continues to have pain that affects her ability to perform therapeutic activities/exercises and ADL/IADL's.      She was compliant with home exercise program given last session.   Response to previous treatment: less localized edema in L elbow/forearm; increased AROM & activity tolerance    Functional change: able to hold light objects in L hand with some difficulty; able to oppose L thumb to RF & SF with some difficulty     Pain: 7-8/10 during therapy  Location: left elbow, ulnar side of forearm      Objective     Objective Measures updated at progress report unless specified.    Treatment     Johanna received the treatments listed below:      Patient is post op 12+ weeks (Cubital Tunnel Release, (02/15/24); Diagnosed with CRPS on 04/02/24         Neuromuscular re-education activities to improve Posture and Coordination for 35 minutes. The  following activities were included:  Coordination:    Dexterciser 3 min     Isospheres  3 min         Desensitization Program:     Scrub & Carry protocol 3 minutes each, w/ 4# wt    Vibrator held in palm  3 min     Weight-bearing/Shifting Exercises:        Ulnar Nerve Glides 5 reps       HEP Training:    UE stretches, including forearm & prayer Reviewed   Soft tissue/Scar mobilization Reviewed           Therapeutic exercises to develop ROM and flexibility for 15 minutes, including:  UBE-forward/backwards 4 min each way, lvl 1.0   Pulleys 3 min, with stretch    Strengthening:     reps, setting 2 (22#)   Digi-flex 10 reps, red   Resistive clothespin w/ tripod & lateral pinch 10 reps, red   Biceps-short arc 10 reps, 4# wt     T-putty Yellow/red   -dowel digs 2 minutes         Patient Education and Home Exercises     Education provided:   - Reviewed T-putty for strengthening HEP;  - Reiterated importance of performing desensitization program, including scrub and carry, to address CRPS symptoms.    - Reiterated recommendations for continued follow up with Pain Management/PCP regarding ongoing symptoms of Depression, High pain focus and self-limiting behavior.    - Progress towards goals     Written Home Exercises Provided: Patient instructed to cont prior HEP.  Exercises were reviewed and Johanna was able to demonstrate them prior to the end of the session.  Johanna demonstrated good  understanding of the home exercise program provided. See electronic medical record under Patient Instructions for exercises provided during therapy sessions.       Assessment     No change in pain or hypersensitivity in LUE.  Recommend to continue with desensitization program and progress strengthening further, as tolerated.  Patient would benefit from a follow up appointment with referring MD or Ortho for medical management of condition, as therapy is reaching a plateau in progress to wards goals.     Joahnna is progressing  fairly well towards her goals and there are no updates to goals at this time. Pt prognosis is Good to Fair.     Patient will continue to benefit from skilled outpatient occupational therapy to address the deficits listed in the problem list on initial evaluation provide patient/family education and to maximize patient's level of independence in the home and community environment.     Patient's spiritual, cultural and educational needs considered and patient agreeable to plan of care and goals.    Anticipated barriers to occupational therapy: comorbid diagnosis, compliance with HEP and attendance to therapy as recommended     Goals:  Short Term Goals (to be met in 4 weeks) Goal Status:   1) Establish HEP with patient independent in performing accurately [] Goal Met  [] Part Met   2) Patient will report PAIN of 2-3 levels lower than initial measures on the pain scale for worst pain in affected elbow/forearm [] Goal Met  [] Part Met   3) Patient will increase AROM of affected elbow/forearm/wrist, in limited planes of mobility documented at initial evaluation, by at lease 5-8 degrees for improved performance with ADL's [] Goal Met  [] Part Met   4) Increase STRENGTH of affected elbow/forearm/wrist by 1/2 MMT grade, in all planes of mobility, to improve functioning in ADL/IADL's [] Goal Met  [] Part Met   5) EDEMA in affected elbow/forearm is at least .2-.3 cm less than initial measures at evaluation [] Goal Met  [] Part Met   6) Patient will be able to achieve less than or equal to a 15-20% improvement on Elbow FOTO survey demonstrating overall improved functional ability with upper extremity.  [] Goal Met  [] Part Met   7) Assess or Increase  STRENGTH in affected upper extremity by 5-8 psi for improved functioning in ADL/IADL's [] Goal Met  [] Part Met         Long Term Goals (to be met by Discharge) Goal Status:   1) PAIN in affected elbow/forearm will be no greater than 1-2/10 while performing ADL's/IADL's []  Goal Met  [] Part Met   2) Patient will demonstrate WFL AROM in affected elbow/forearm, in all functional planes of mobility, for improved functioning in ADL/IADL's  [] Goal Met  [] Part Met   3) Increase STRENGTH in affected elbow/forearm to at lease 4/5 in all functional planes of mobility for improved performance in ADL/IADL's [] Goal Met  [] Part Met   4) Edema in affected elbow/forearm is trace to none [] Goal Met  [] Part Met   5) Patient will be able to achieve less than or equal to 68% on Wrist/Hand FOTO survey demonstrating overall improved functional ability with upper extremity.  [] Goal Met  [] Part Met          Plan     Updated Certification Period: 05/21/24 to 07/15/24     Recommended Treatment Plan: 2-3 times per week for 8 weeks:  Fluidotherapy, Manual Therapy, Moist Heat/ Ice, Neuromuscular Re-ed, Paraffin, Patient Education, Self Care, Therapeutic Activities, Therapeutic Exercise, Ultrasound, and Desensitization Training     Other Recommendations: Continue with CRPS Scrub/Carry Protocol at next visit    Abelino Worthington OT   6/7/2024                     [facility]

## 2024-06-10 ENCOUNTER — PATIENT MESSAGE (OUTPATIENT)
Dept: REHABILITATION | Facility: HOSPITAL | Age: 39
End: 2024-06-10
Payer: COMMERCIAL

## 2024-06-12 ENCOUNTER — CLINICAL SUPPORT (OUTPATIENT)
Dept: REHABILITATION | Facility: HOSPITAL | Age: 39
End: 2024-06-12
Payer: COMMERCIAL

## 2024-06-12 DIAGNOSIS — M25.522 LEFT ELBOW PAIN: ICD-10-CM

## 2024-06-12 DIAGNOSIS — M25.532 LEFT WRIST PAIN: Primary | ICD-10-CM

## 2024-06-12 DIAGNOSIS — M25.622 STIFFNESS OF LEFT ELBOW JOINT: ICD-10-CM

## 2024-06-12 DIAGNOSIS — M62.81 MUSCLE WEAKNESS: ICD-10-CM

## 2024-06-12 PROCEDURE — 97110 THERAPEUTIC EXERCISES: CPT

## 2024-06-12 PROCEDURE — 97112 NEUROMUSCULAR REEDUCATION: CPT

## 2024-06-12 NOTE — PROGRESS NOTES
[facility]  Occupational Therapy Treatment Note     Date: 6/12/2024  Name: Johanna Bullock  Clinic Number: [id number]    Therapy Diagnosis:   Encounter Diagnoses   Name Primary?    Left wrist pain Yes    Left elbow pain     Stiffness of left elbow joint     Muscle weakness        Physician: Jas Larose,*    Physician Orders: S/p cubital tunnel release on 2/15/24  Eval and treat, modalities as needed  Medical Diagnosis: Z98.890 (ICD-10-CM) - S/P cubital tunnel release  Surgical Procedure and Date: Left cubital tunnel release, 02/15/24  Evaluation Date: 3/5/2024   Insurance Authorization Period Expiration: 02/27/2025  Plan of Care Certification Period: 03/05/24 to 05/27/24  Progress Note due: 04/04/24  Date of Return to MD: 03/26/24     Visit # / Visits authorized: 4 / 20  (Total visits=15)  FOTO: 1/ 3  Initial=31% / Goal=62%/ Current=45%       Precautions:  Standard and Weightbearing     Time In: 1:00   Time Out: 2:00   Total Billable Time: 60 minutes  (billing reflects skilled 1:1 time)       Subjective     Patient reports: pain remains the same intensity, but is having more shooting pains radiating up her arm.       She was compliant with home exercise program given last session.   Response to previous treatment: less localized edema in L elbow/forearm; increased AROM & activity tolerance    Functional change: able to hold light objects in L hand with some difficulty; able to oppose L thumb to RF & SF with some difficulty     Pain: 7-8/10 during therapy  Location: left elbow, ulnar side of forearm      Objective     Objective Measures updated at progress report unless specified.    Treatment     Johanna received the treatments listed below:      Patient is post op 12+ weeks (Cubital Tunnel Release, (02/15/24); Diagnosed with CRPS on 04/02/24    Neuromuscular re-education activities to improve Posture and Coordination for 35 minutes. The following activities were  included:  Coordination:    Dexterciser 3 min     Isospheres  3 min         Desensitization Program:     Scrub & Carry protocol 3 minutes each, w/ 4# wt    Weight-bearing/Shifting Exercises:        Ulnar Nerve Glides 5 reps       HEP Training:    UE stretches, including forearm & prayer Reviewed   Soft tissue/Scar mobilization Reviewed           Therapeutic exercises to develop ROM and flexibility for 25 minutes, including:  UBE-forward/backwards 4 min each way, lvl 2.0   Pulleys-2 ways 3 min, with stretch    Strengthening:    PHG 2/15 reps, setting 2 (22#)   Digi-flex 10 reps, red   Resistive clothespin w/ tripod & lateral pinch 10 reps, red   Biceps-short arc 10 reps, 4# wt     T-putty Yellow/red   -dowel digs 2 minutes         Patient Education and Home Exercises     Education provided:   - Reviewed T-putty for strengthening HEP;  - Reiterated importance of performing desensitization program, including scrub and carry, to address CRPS symptoms.    - Reiterated recommendations for continued follow up with Pain Management/PCP regarding ongoing symptoms of Depression, High pain focus and self-limiting behavior.    - Progress towards goals     Written Home Exercises Provided: Patient instructed to cont prior HEP.  Exercises were reviewed and Johanna was able to demonstrate them prior to the end of the session.  Johanna demonstrated good  understanding of the home exercise program provided. See electronic medical record under Patient Instructions for exercises provided during therapy sessions.       Assessment     Patient continues to have no change in pain or hypersensitivity in LUE.  Patient participates in above therapeutic activities/exercises, however, demonstrates aversion with resistive tasks, due to complaints of significant pain.   Patient may be approaching maximum benefit from OT services at this time.  Recommend patient follow up with pain management and/or PCP for further medical management of  condition.  Also recommend 1-2 more OT visits to take objective measures for re-assessment and finalize HEP for discharge.      Johanna is not progressing well towards her goals and there are no updates to goals at this time. Pt prognosis is Good to Fair.     Patient will continue to benefit from skilled outpatient occupational therapy to address the deficits listed in the problem list on initial evaluation provide patient/family education and to maximize patient's level of independence in the home and community environment.     Patient's spiritual, cultural and educational needs considered and patient agreeable to plan of care and goals.    Anticipated barriers to occupational therapy: comorbid diagnosis, compliance with HEP and attendance to therapy as recommended     Goals:  Short Term Goals (to be met in 4 weeks) Goal Status:   1) Establish HEP with patient independent in performing accurately [] Goal Met  [] Part Met   2) Patient will report PAIN of 2-3 levels lower than initial measures on the pain scale for worst pain in affected elbow/forearm [] Goal Met  [] Part Met   3) Patient will increase AROM of affected elbow/forearm/wrist, in limited planes of mobility documented at initial evaluation, by at lease 5-8 degrees for improved performance with ADL's [] Goal Met  [] Part Met   4) Increase STRENGTH of affected elbow/forearm/wrist by 1/2 MMT grade, in all planes of mobility, to improve functioning in ADL/IADL's [] Goal Met  [] Part Met   5) EDEMA in affected elbow/forearm is at least .2-.3 cm less than initial measures at evaluation [] Goal Met  [] Part Met   6) Patient will be able to achieve less than or equal to a 15-20% improvement on Elbow FOTO survey demonstrating overall improved functional ability with upper extremity.  [] Goal Met  [] Part Met   7) Assess or Increase  STRENGTH in affected upper extremity by 5-8 psi for improved functioning in ADL/IADL's [] Goal Met  [] Part Met         Long  Term Goals (to be met by Discharge) Goal Status:   1) PAIN in affected elbow/forearm will be no greater than 1-2/10 while performing ADL's/IADL's [] Goal Met  [] Part Met   2) Patient will demonstrate WFL AROM in affected elbow/forearm, in all functional planes of mobility, for improved functioning in ADL/IADL's  [] Goal Met  [] Part Met   3) Increase STRENGTH in affected elbow/forearm to at lease 4/5 in all functional planes of mobility for improved performance in ADL/IADL's [] Goal Met  [] Part Met   4) Edema in affected elbow/forearm is trace to none [] Goal Met  [] Part Met   5) Patient will be able to achieve less than or equal to 68% on Wrist/Hand FOTO survey demonstrating overall improved functional ability with upper extremity.  [] Goal Met  [] Part Met          Plan     Updated Certification Period: 05/21/24 to 07/15/24     Recommended Treatment Plan: 2-3 times per week for 8 weeks:  Fluidotherapy, Manual Therapy, Moist Heat/ Ice, Neuromuscular Re-ed, Paraffin, Patient Education, Self Care, Therapeutic Activities, Therapeutic Exercise, Ultrasound, and Desensitization Training     Other Recommendations: Continue with CRPS Scrub/Carry Protocol at next visit    Abelino Worthington OT   6/12/2024

## 2024-07-06 PROCEDURE — 93010 ELECTROCARDIOGRAM REPORT: CPT | Mod: ,,, | Performed by: INTERNAL MEDICINE

## 2024-07-06 PROCEDURE — 96375 TX/PRO/DX INJ NEW DRUG ADDON: CPT

## 2024-07-06 PROCEDURE — 99284 EMERGENCY DEPT VISIT MOD MDM: CPT | Mod: 25

## 2024-07-06 PROCEDURE — 96374 THER/PROPH/DIAG INJ IV PUSH: CPT

## 2024-07-06 PROCEDURE — 96361 HYDRATE IV INFUSION ADD-ON: CPT

## 2024-07-06 PROCEDURE — 93005 ELECTROCARDIOGRAM TRACING: CPT

## 2024-07-07 ENCOUNTER — HOSPITAL ENCOUNTER (EMERGENCY)
Facility: HOSPITAL | Age: 39
Discharge: HOME | End: 2024-07-07
Attending: STUDENT IN AN ORGANIZED HEALTH CARE EDUCATION/TRAINING PROGRAM

## 2024-07-07 VITALS
WEIGHT: 253.06 LBS | DIASTOLIC BLOOD PRESSURE: 63 MMHG | SYSTOLIC BLOOD PRESSURE: 99 MMHG | BODY MASS INDEX: 38.48 KG/M2 | TEMPERATURE: 98 F | RESPIRATION RATE: 16 BRPM | OXYGEN SATURATION: 99 % | HEART RATE: 67 BPM

## 2024-07-07 DIAGNOSIS — R06.02 SHORTNESS OF BREATH: ICD-10-CM

## 2024-07-07 LAB
ALBUMIN SERPL BCP-MCNC: 3.6 G/DL (ref 3.5–5.2)
ALP SERPL-CCNC: 71 U/L (ref 55–135)
ALT SERPL W/O P-5'-P-CCNC: 28 U/L (ref 10–44)
ANION GAP SERPL CALC-SCNC: 8 MMOL/L (ref 8–16)
AST SERPL-CCNC: 17 U/L (ref 10–40)
B-HCG UR QL: NEGATIVE
BASOPHILS # BLD AUTO: 0.07 K/UL (ref 0–0.2)
BASOPHILS NFR BLD: 0.9 % (ref 0–1.9)
BILIRUB SERPL-MCNC: 0.2 MG/DL (ref 0.1–1)
BILIRUB UR QL STRIP: NEGATIVE
BNP SERPL-MCNC: 21 PG/ML (ref 0–99)
BUN SERPL-MCNC: 13 MG/DL (ref 6–20)
CALCIUM SERPL-MCNC: 9 MG/DL (ref 8.7–10.5)
CHLORIDE SERPL-SCNC: 109 MMOL/L (ref 95–110)
CLARITY UR: CLEAR
CO2 SERPL-SCNC: 22 MMOL/L (ref 23–29)
COLOR UR: YELLOW
CREAT SERPL-MCNC: 0.7 MG/DL (ref 0.5–1.4)
DIFFERENTIAL METHOD BLD: ABNORMAL
EOSINOPHIL # BLD AUTO: 0.1 K/UL (ref 0–0.5)
EOSINOPHIL NFR BLD: 1.4 % (ref 0–8)
ERYTHROCYTE [DISTWIDTH] IN BLOOD BY AUTOMATED COUNT: 13.9 % (ref 11.5–14.5)
EST. GFR  (NO RACE VARIABLE): >60 ML/MIN/1.73 M^2
GLUCOSE SERPL-MCNC: 81 MG/DL (ref 70–110)
GLUCOSE UR QL STRIP: NEGATIVE
HCT VFR BLD AUTO: 35.5 % (ref 37–48.5)
HGB BLD-MCNC: 11.8 G/DL (ref 12–16)
HGB UR QL STRIP: NEGATIVE
IMM GRANULOCYTES # BLD AUTO: 0.03 K/UL (ref 0–0.04)
IMM GRANULOCYTES NFR BLD AUTO: 0.4 % (ref 0–0.5)
KETONES UR QL STRIP: NEGATIVE
LEUKOCYTE ESTERASE UR QL STRIP: NEGATIVE
LYMPHOCYTES # BLD AUTO: 2.5 K/UL (ref 1–4.8)
LYMPHOCYTES NFR BLD: 32.3 % (ref 18–48)
MCH RBC QN AUTO: 28.6 PG (ref 27–31)
MCHC RBC AUTO-ENTMCNC: 33.2 G/DL (ref 32–36)
MCV RBC AUTO: 86 FL (ref 82–98)
MONOCYTES # BLD AUTO: 0.7 K/UL (ref 0.3–1)
MONOCYTES NFR BLD: 9.2 % (ref 4–15)
NEUTROPHILS # BLD AUTO: 4.4 K/UL (ref 1.8–7.7)
NEUTROPHILS NFR BLD: 55.8 % (ref 38–73)
NITRITE UR QL STRIP: NEGATIVE
NRBC BLD-RTO: 0 /100 WBC
OHS QRS DURATION: 74 MS
OHS QTC CALCULATION: 411 MS
PH UR STRIP: 6 [PH] (ref 5–8)
PLATELET # BLD AUTO: 354 K/UL (ref 150–450)
PMV BLD AUTO: 8.7 FL (ref 9.2–12.9)
POTASSIUM SERPL-SCNC: 3.8 MMOL/L (ref 3.5–5.1)
PROT SERPL-MCNC: 7.2 G/DL (ref 6–8.4)
PROT UR QL STRIP: ABNORMAL
RBC # BLD AUTO: 4.13 M/UL (ref 4–5.4)
SODIUM SERPL-SCNC: 139 MMOL/L (ref 136–145)
SP GR UR STRIP: >1.03 (ref 1–1.03)
TROPONIN I SERPL DL<=0.01 NG/ML-MCNC: 0.01 NG/ML (ref 0–0.03)
URN SPEC COLLECT METH UR: ABNORMAL
UROBILINOGEN UR STRIP-ACNC: ABNORMAL EU/DL
WBC # BLD AUTO: 7.81 K/UL (ref 3.9–12.7)

## 2024-07-07 PROCEDURE — 85025 COMPLETE CBC W/AUTO DIFF WBC: CPT | Performed by: NURSE PRACTITIONER

## 2024-07-07 PROCEDURE — 81025 URINE PREGNANCY TEST: CPT | Performed by: NURSE PRACTITIONER

## 2024-07-07 PROCEDURE — 80053 COMPREHEN METABOLIC PANEL: CPT | Performed by: NURSE PRACTITIONER

## 2024-07-07 PROCEDURE — 83880 ASSAY OF NATRIURETIC PEPTIDE: CPT | Performed by: NURSE PRACTITIONER

## 2024-07-07 PROCEDURE — 25500020 PHARM REV CODE 255: Performed by: STUDENT IN AN ORGANIZED HEALTH CARE EDUCATION/TRAINING PROGRAM

## 2024-07-07 PROCEDURE — 63600175 PHARM REV CODE 636 W HCPCS: Performed by: STUDENT IN AN ORGANIZED HEALTH CARE EDUCATION/TRAINING PROGRAM

## 2024-07-07 PROCEDURE — 81003 URINALYSIS AUTO W/O SCOPE: CPT | Performed by: NURSE PRACTITIONER

## 2024-07-07 PROCEDURE — 84484 ASSAY OF TROPONIN QUANT: CPT | Performed by: NURSE PRACTITIONER

## 2024-07-07 RX ORDER — DIPHENHYDRAMINE HYDROCHLORIDE 50 MG/ML
12.5 INJECTION INTRAMUSCULAR; INTRAVENOUS
Status: COMPLETED | OUTPATIENT
Start: 2024-07-07 | End: 2024-07-07

## 2024-07-07 RX ORDER — PROCHLORPERAZINE EDISYLATE 5 MG/ML
5 INJECTION INTRAMUSCULAR; INTRAVENOUS ONCE
Status: COMPLETED | OUTPATIENT
Start: 2024-07-07 | End: 2024-07-07

## 2024-07-07 RX ORDER — KETOROLAC TROMETHAMINE 30 MG/ML
15 INJECTION, SOLUTION INTRAMUSCULAR; INTRAVENOUS
Status: COMPLETED | OUTPATIENT
Start: 2024-07-07 | End: 2024-07-07

## 2024-07-07 RX ORDER — AMLODIPINE BESYLATE 5 MG/1
5 TABLET ORAL DAILY
COMMUNITY
End: 2024-07-17

## 2024-07-07 RX ADMIN — KETOROLAC TROMETHAMINE 15 MG: 30 INJECTION, SOLUTION INTRAMUSCULAR at 05:07

## 2024-07-07 RX ADMIN — SODIUM CHLORIDE, POTASSIUM CHLORIDE, SODIUM LACTATE AND CALCIUM CHLORIDE 1000 ML: 600; 310; 30; 20 INJECTION, SOLUTION INTRAVENOUS at 05:07

## 2024-07-07 RX ADMIN — DIPHENHYDRAMINE HYDROCHLORIDE 12.5 MG: 50 INJECTION, SOLUTION INTRAMUSCULAR; INTRAVENOUS at 05:07

## 2024-07-07 RX ADMIN — PROCHLORPERAZINE EDISYLATE 5 MG: 5 INJECTION INTRAMUSCULAR; INTRAVENOUS at 06:07

## 2024-07-07 RX ADMIN — IOHEXOL 100 ML: 350 INJECTION, SOLUTION INTRAVENOUS at 02:07

## 2024-07-07 NOTE — DISCHARGE INSTRUCTIONS
Please follow-up with your PCP. Please call on the next business day to schedule this appointment.    Please continue to take all prescribed medications. Please understand that all medications have potential side effects. Please read the package insert for all medications that we have prescribed/recommended. Please call your primary care physician or return to the ER with any questions or concerns you may have. Please take only the dosage that is prescribed.    Return to the ER with new/worsening symptoms, fevers/chills, nausea/vomiting, chest pain, shortness of breath, or any other concerns.

## 2024-07-07 NOTE — FIRST PROVIDER EVALUATION
Medical screening examination initiated.  I have conducted a focused provider triage encounter, findings are as follows:    Brief history of present illness:  presents with lower back pain and sob    Vitals:    07/06/24 2237   BP: 130/63   BP Location: Right arm   Patient Position: Sitting   Pulse: 80   Resp: 18   Temp: 98 °F (36.7 °C)   TempSrc: Oral   SpO2: 99%   Weight: 114.8 kg (253 lb 1.4 oz)       Pertinent physical exam:  nad    Brief workup plan:  labs, EKG, imaging, further eval    Preliminary workup initiated; this workup will be continued and followed by the physician or advanced practice provider that is assigned to the patient when roomed.

## 2024-07-07 NOTE — ED PROVIDER NOTES
"SCRIBE #1 NOTE: I, Alissonrubén HartKeira Richie, am scribing for, and in the presence of, Familia Rascon MD. I have scribed the entire note.       History     Chief Complaint   Patient presents with    Shortness of Breath     SOB & left lower back pain x1 week. States she has been bradycardic & hypotensive today.      Review of patient's allergies indicates:  No Known Allergies      History of Present Illness     HPI    7/7/2024, 2:30 AM  History obtained from the patient      History of Present Illness: Johanna Bullock is a 39 y.o. female patient with a PMHx of anemia, anxiety, bradycardia, and arthritis who presents to the Emergency Department for evaluation of multiple complaints which onset 2 weeks ago. Pt states that she has been experiencing HA, blurry vision, fatigue, weakness, SOB, and chest tightness for the past 2 weeks. Pt states that her "whole head feels compressed." Pt also reports that she had to stop taking Cymbalta two weeks ago due to insurance reasons. Pt believes that withdrawals from Cymbalta may be causing her symptoms. Pt states that last night her heart rate was 47, and her O2 Sat was 91. Pt states that she is concerned that she may have a blood clot. Symptoms are constant and moderate in severity. No mitigating or exacerbating factors reported. Patient denies any fever, CP, dysuria, nausea, and all other sxs at this time. No prior Tx reported. No further complaints or concerns at this time.       Arrival mode: Personal vehicle    PCP: Ran Mcgill MD        Past Medical History:  Past Medical History:   Diagnosis Date    Adjustment disorder with mixed anxiety and depressed mood 02/06/2013    Anemia, B12 deficiency     Anxiety     Arthritis     Bradycardia        Past Surgical History:  Past Surgical History:   Procedure Laterality Date    CHOLECYSTECTOMY  10/2012    COLONOSCOPY N/A 02/14/2020    Procedure: COLONOSCOPY;  Surgeon: Jas Moore MD;  Location: [facility] ([address] [place] [address]);  " Service: Endoscopy;  Laterality: N/A;  Pt procedure time changed to 0800 with 0715 - second half prep completed from [id number]- Pt verbalized understanding- ERW2/13/20@1022    COLONOSCOPY N/A 03/10/2023    Procedure: COLONOSCOPY;  Surgeon: HORACE Moore MD;  Location: [facility] ENDO (4TH FLR);  Service: Endoscopy;  Laterality: N/A;  inst emailed-RB    DECOMPRESSION, NERVE, ULNAR Left 2/15/2024    Procedure: DECOMPRESSION, NERVE, ULNAR;  Surgeon: Jas Larose MD;  Location: [facility] OR;  Service: Orthopedics;  Laterality: Left;  ulnar nerve decompression left elbow with cubital tunnel release and possible anterior transposition ulnar nerve    DILATION AND CURETTAGE OF UTERUS      ESOPHAGOGASTRODUODENOSCOPY N/A 07/20/2018    Procedure: ESOPHAGOGASTRODUODENOSCOPY (EGD);  Surgeon: Darwin Hansen MD;  Location: [facility] (4TH FLR);  Service: Endoscopy;  Laterality: N/A;  Please measure pouch and limbs    GASTRIC BYPASS  2008    Revision August 2019    INJECTION OF ANESTHETIC AGENT AROUND LATERAL BRANCH NERVES OF SACROILIAC JOINT      LAPAROSCOPIC REPAIR OF HIATAL HERNIA  2019    Liver scope  10/2012    LYSIS OF ADHESIONS      ULNAR TUNNEL RELEASE Left 2/15/2024    Procedure: RELEASE, CUBITAL TUNNEL;  Surgeon: Jas Larose MD;  Location: [facility] OR;  Service: Orthopedics;  Laterality: Left;  ulnar nerve decompression left elbow with cubital tunnel release and possible anterior transposition ulnar nerve         Family History:  Family History   Problem Relation Name Age of Onset    Hypertension Father      Heart disease Father      Breast cancer Neg Hx      Colon cancer Neg Hx      Ovarian cancer Neg Hx      Diabetes Neg Hx      Stroke Neg Hx         Social History:  Social History     Tobacco Use    Smoking status: Never    Smokeless tobacco: Never   Substance and Sexual Activity    Alcohol use: Never     Comment: Alcohol use rarely    Drug use: No     Comment: Mirena    Sexual activity: Yes      Partners: Male     Birth control/protection: I.U.D.        Review of Systems     Review of Systems   Constitutional:  Positive for fatigue. Negative for fever.   HENT:  Negative for sore throat.    Eyes:  Positive for visual disturbance (blurry vision).   Respiratory:  Positive for chest tightness and shortness of breath.    Cardiovascular:  Negative for chest pain.   Gastrointestinal:  Negative for nausea.   Genitourinary:  Negative for dysuria.   Musculoskeletal:  Negative for back pain.   Skin:  Negative for rash.   Neurological:  Positive for weakness and headaches.   Hematological:  Does not bruise/bleed easily.      Physical Exam     Initial Vitals [07/06/24 2237]   BP Pulse Resp Temp SpO2   130/63 80 18 98 °F (36.7 °C) 99 %      MAP       --          Physical Exam  Nursing Notes and Vital Signs Reviewed.  Constitutional: Patient is in no apparent distress. Well-developed and well-nourished.  Head: Atraumatic. Normocephalic.  Eyes: PERRL. EOM intact. Conjunctivae are not pale. No scleral icterus.  ENT: Mucous membranes are moist. Oropharynx is clear and symmetric.    Neck: Supple. Full ROM. No lymphadenopathy.  Cardiovascular: Regular rate. Regular rhythm. No murmurs, rubs, or gallops. Distal pulses are 2+ and symmetric.  Pulmonary/Chest: No respiratory distress. Clear to auscultation bilaterally. No wheezing or rales.  Abdominal: Soft and non-distended.  There is no tenderness.  No rebound, guarding, or rigidity.   Musculoskeletal: Moves all extremities. No obvious deformities. No edema. No calf tenderness.  Skin: Warm and dry.  Neurological:  Alert, awake, and appropriate.  Normal speech.  No acute focal neurological deficits are appreciated.  Psychiatric: Normal affect. Good eye contact. Appropriate in content.     ED Course   Procedures  ED Vital Signs:  Vitals:    07/06/24 2237 07/07/24 0127 07/07/24 0131 07/07/24 0227   BP: 130/63  130/76    Pulse: 80 71 67 73   Resp: 18  (!) 24    Temp: 98 °F (36.7 °C)       TempSrc: Oral      SpO2: 99%  100% 100%   Weight: 114.8 kg (253 lb 1.4 oz)       07/07/24 0432 07/07/24 0504 07/07/24 0548 07/07/24 0656   BP: 102/66 106/63 (!) 117/59 99/63   Pulse: 64 62 60 67   Resp: (!) 21 14 19 16   Temp:  98.2 °F (36.8 °C)     TempSrc:       SpO2: 98% 99%  99%   Weight:           Abnormal Lab Results:  Labs Reviewed   CBC W/ AUTO DIFFERENTIAL - Abnormal; Notable for the following components:       Result Value    Hemoglobin 11.8 (*)     Hematocrit 35.5 (*)     MPV 8.7 (*)     All other components within normal limits   COMPREHENSIVE METABOLIC PANEL - Abnormal; Notable for the following components:    CO2 22 (*)     All other components within normal limits   URINALYSIS, REFLEX TO URINE CULTURE - Abnormal; Notable for the following components:    Specific Gravity, UA >1.030 (*)     Protein, UA Trace (*)     Urobilinogen, UA 2.0-3.0 (*)     All other components within normal limits    Narrative:     Specimen Source->Urine   B-TYPE NATRIURETIC PEPTIDE   TROPONIN I   PREGNANCY TEST, URINE RAPID    Narrative:     Specimen Source->Urine        All Lab Results:  Results for orders placed or performed during the hospital encounter of 07/07/24   CBC Auto Differential   Result Value Ref Range    WBC 7.81 3.90 - 12.70 K/uL    RBC 4.13 4.00 - 5.40 M/uL    Hemoglobin 11.8 (L) 12.0 - 16.0 g/dL    Hematocrit 35.5 (L) 37.0 - 48.5 %    MCV 86 82 - 98 fL    MCH 28.6 27.0 - 31.0 pg    MCHC 33.2 32.0 - 36.0 g/dL    RDW 13.9 11.5 - 14.5 %    Platelets 354 150 - 450 K/uL    MPV 8.7 (L) 9.2 - 12.9 fL    Immature Granulocytes 0.4 0.0 - 0.5 %    Gran # (ANC) 4.4 1.8 - 7.7 K/uL    Immature Grans (Abs) 0.03 0.00 - 0.04 K/uL    Lymph # 2.5 1.0 - 4.8 K/uL    Mono # 0.7 0.3 - 1.0 K/uL    Eos # 0.1 0.0 - 0.5 K/uL    Baso # 0.07 0.00 - 0.20 K/uL    nRBC 0 0 /100 WBC    Gran % 55.8 38.0 - 73.0 %    Lymph % 32.3 18.0 - 48.0 %    Mono % 9.2 4.0 - 15.0 %    Eosinophil % 1.4 0.0 - 8.0 %    Basophil % 0.9 0.0 - 1.9 %     Differential Method Automated    Comprehensive Metabolic Panel   Result Value Ref Range    Sodium 139 136 - 145 mmol/L    Potassium 3.8 3.5 - 5.1 mmol/L    Chloride 109 95 - 110 mmol/L    CO2 22 (L) 23 - 29 mmol/L    Glucose 81 70 - 110 mg/dL    BUN 13 6 - 20 mg/dL    Creatinine 0.7 0.5 - 1.4 mg/dL    Calcium 9.0 8.7 - 10.5 mg/dL    Total Protein 7.2 6.0 - 8.4 g/dL    Albumin 3.6 3.5 - 5.2 g/dL    Total Bilirubin 0.2 0.1 - 1.0 mg/dL    Alkaline Phosphatase 71 55 - 135 U/L    AST 17 10 - 40 U/L    ALT 28 10 - 44 U/L    eGFR >60 >60 mL/min/1.73 m^2    Anion Gap 8 8 - 16 mmol/L   Brain natriuretic peptide   Result Value Ref Range    BNP 21 0 - 99 pg/mL   Troponin I   Result Value Ref Range    Troponin I 0.009 0.000 - 0.026 ng/mL   Pregnancy, urine rapid (UPT)   Result Value Ref Range    Preg Test, Ur Negative    Urinalysis, Reflex to Urine Culture Urine, Clean Catch    Specimen: Urine   Result Value Ref Range    Specimen UA Urine, Clean Catch     Color, UA Yellow Yellow, Straw, Melissa    Appearance, UA Clear Clear    pH, UA 6.0 5.0 - 8.0    Specific Gravity, UA >1.030 (A) 1.005 - 1.030    Protein, UA Trace (A) Negative    Glucose, UA Negative Negative    Ketones, UA Negative Negative    Bilirubin (UA) Negative Negative    Occult Blood UA Negative Negative    Nitrite, UA Negative Negative    Urobilinogen, UA 2.0-3.0 (A) <2.0 EU/dL    Leukocytes, UA Negative Negative   EKG 12-lead   Result Value Ref Range    QRS Duration 74 ms    [facility] QTC Calculation 411 ms        Imaging Results:  Imaging Results              CT Head Without Contrast (Final result)  Result time 07/07/24 06:56:10      Final result by Sunil Bustamante MD (Timothy) (07/07/24 06:56:10)                   Impression:      Normal study.    All CT scans at this facility use dose modulation, iterative reconstructions, and/or weight base dosing when appropriate to reduce radiation dose to as low as reasonably achievable.      Electronically signed by: Sunil  MD Robby  Date:    07/07/2024  Time:    06:56               Narrative:    EXAMINATION:  CT HEAD WITHOUT CONTRAST    CLINICAL HISTORY:  Headache, chronic, new features or increased frequency;    TECHNIQUE:  Noncontrast images were obtained    COMPARISON:  MRI brain 05/21/2024.    FINDINGS:  No intracranial acute hemorrhage or acute focal brain parenchymal abnormality is identified.  Calvarium is intact.                                       CTA Chest Non-Coronary (PE Studies) (Final result)  Result time 07/07/24 06:54:39      Final result by Sunil BustamanteIvánMD george (07/07/24 06:54:39)                   Impression:      Negative CTA of the chest. No evidence of pulmonary embolism.    Clear lungs.    All CT scans at this facility use dose modulation, iterative reconstruction and/or weight based dosing when appropriate to reduce radiation dose to as low as reasonably achievable.      Electronically signed by: Sunil Bustamante MD  Date:    07/07/2024  Time:    06:54               Narrative:    EXAMINATION:  CTA CHEST NON CORONARY (PE STUDIES)    CLINICAL HISTORY:  hx of thoracic outlet syndrome;;    TECHNIQUE:  Standard CTA of the chest performed with 100 cc Omnipaque 350 utilizing 3-D MIP reformations.    COMPARISON:  CT chest, 05/21/2024    FINDINGS:  Cardiac size is normal. Major pulmonary arteries are normal.  Thoracic aorta is unremarkable.  No coronary artery calcifications.  No evidence of mediastinal adenopathy.    Lung fields are clear..  No pleural effusions.    Postoperative changes seen near the GE junction.    Skeleton is intact.                                       X-Ray Chest 1 View (Final result)  Result time 07/06/24 23:42:04      Final result by Leni Paul MD (07/06/24 23:42:04)                   Impression:      No acute findings.      Electronically signed by: Leni Paul  Date:    07/06/2024  Time:    23:42               Narrative:    EXAMINATION:  XR CHEST 1 VIEW    CLINICAL  HISTORY:  shortness of breath;    TECHNIQUE:  Single frontal view of the chest was performed.    COMPARISON:  06/20/2020    FINDINGS:  Lungs are clear. No focal consolidation. No pleural effusion. No pneumothorax. Normal heart size.                                  CT Angiography Chest With Intravenous Contrast  Radiologist: Demetrius Pool MD  [facility] Impression: No acute findings in the visualized arteries of the chest.     CT Head Without Intravenous Contrast  Radiologist: Demetrius Pool MD  [facility] Impression: Normal head/brain CT.        The EKG was ordered, reviewed, and independently interpreted by the ED provider.  Interpretation time: 22:42  Rate: 82 BPM  Rhythm:  normal sinus rhythm with sinus arrhythmia  Interpretation: Right superior axis deviation. Pulmonary disease pattern. ST and T wave abnormality, consider inferior ischemia. No STEMI.           The Emergency Provider reviewed the vital signs and test results, which are outlined above.     ED Discussion       5:13 AM: Reassessed pt at this time. Discussed with pt all pertinent ED information and results. Discussed pt dx and plan of tx. Gave pt all f/u and return to the ED instructions. All questions and concerns were addressed at this time. Pt expresses understanding of information and instructions, and is comfortable with plan to discharge. Pt is stable for discharge.    I discussed with patient and/or family/caretaker that evaluation in the ED does not suggest any emergent or life threatening medical conditions requiring immediate intervention beyond what was provided in the ED, and I believe patient is safe for discharge.  Regardless, an unremarkable evaluation in the ED does not preclude the development or presence of a serious of life threatening condition. As such, patient was instructed to return immediately for any worsening or change in current symptoms.     ED Course as of 07/12/24 0315   Sun Jul 07, 2024   0103 X-Ray Chest 1 View  My  independent interpretation reveals no acute focal consolidation, effusion, or pneumothorax.   [MC]   0104 BP: 130/63 []   0104 Temp: 98 °F (36.7 °C) []   0104 Pulse: 80 []   0104 SpO2: 99 % []   0151 hCG Qualitative, Urine: Negative []      ED Course User Index  [] Familia Rascon MD     Medical Decision Making  Differential diagnoses:  pneumonia, congestive heart failure, acute myocardial infarction, pleural effusion, pulmonary edema, pulmonary embolism, electrolyte imbalance, infectious etiology, COPD exacerbation, asthma exacerbation, pneumothorax, anemia, among others.    Patient presents to the emergency department with various complaints, including pain to her upper chest, into her left neck, radiation into the back.  She states she has a history of thoracic outlet syndrome, compartment syndrome, and a number of previous conditions.  Her workup here in the emergency department is negative.  She mentioned some dizziness and a headache, I scanned her head this was negative.  To evaluate for thoracic outlet syndrome, blood clots, pneumothorax, pneumonia, a CTA of the patient's chest was obtained, this was negative for acute abnormality.  She has a good pulse in the affected extremity.  She has no focal neurologic deficit on my exam here in the emergency department.  I have no concern for ischemic stroke at this time.  Troponin negative, no need for repeat as patient states pain has been ongoing for 2 days.  She appears anxious on my exam.  We will discharge her home with instructions to follow up with Cardiology.    Amount and/or Complexity of Data Reviewed  External Data Reviewed: notes.     Details: Reviewed an external note from 5/22/2024 with the patient's internal medicine physician documenting their chronic medical problems, outpatient treatment plans, and provider recommendations. This note affected my medical decision making and disposition planning today.     Labs: ordered. Decision-making  details documented in ED Course.  Radiology: ordered and independent interpretation performed. Decision-making details documented in ED Course.  ECG/medicine tests: ordered and independent interpretation performed. Decision-making details documented in ED Course.    Risk  OTC drugs.  Prescription drug management.  Decision regarding hospitalization.  Risk Details: As always, presenting symptom of chest pain did warrant a decision regarding hospitalization, however this patient is deemed low risk for her chest pain has a negative CTA and can follow up outpatient.                ED Medication(s):  Medications   iohexoL (OMNIPAQUE 350) injection 100 mL (100 mLs Intravenous Given 7/7/24 0244)   diphenhydrAMINE injection 12.5 mg (12.5 mg Intravenous Given 7/7/24 0500)   prochlorperazine injection Soln 5 mg (5 mg Intravenous Given 7/7/24 0648)   ketorolac injection 15 mg (15 mg Intravenous Given 7/7/24 0500)   lactated ringers bolus 1,000 mL (0 mLs Intravenous Stopped 7/7/24 0643)       Discharge Medication List as of 7/7/2024  4:55 AM                  Scribe Attestation:   Scribe #1: I performed the above scribed service and the documentation accurately describes the services I performed. I attest to the accuracy of the note.     Attending:   Physician Attestation Statement for Scribe #1: I, Familia Rascon MD, personally performed the services described in this documentation, as scribed by Alisson Quiroz, in my presence, and it is both accurate and complete.       Scribe Attestation:   Scribe #1: I performed the above scribed service and the documentation accurately describes the services I performed. I attest to the accuracy of the note.         Clinical Impression       ICD-10-CM ICD-9-CM   1. Shortness of breath  R06.02 786.05       Disposition:   Disposition: Discharged  Condition: Stable        Familia Rascon MD  07/12/24 9676

## 2024-07-08 ENCOUNTER — HOSPITAL ENCOUNTER (EMERGENCY)
Facility: HOSPITAL | Age: 39
Discharge: HOME | End: 2024-07-08
Attending: EMERGENCY MEDICINE

## 2024-07-08 VITALS
DIASTOLIC BLOOD PRESSURE: 80 MMHG | OXYGEN SATURATION: 100 % | WEIGHT: 258.81 LBS | BODY MASS INDEX: 39.22 KG/M2 | HEART RATE: 71 BPM | RESPIRATION RATE: 18 BRPM | SYSTOLIC BLOOD PRESSURE: 132 MMHG | HEIGHT: 68 IN | TEMPERATURE: 98 F

## 2024-07-08 DIAGNOSIS — T80.1XXA IV INFILTRATE, INITIAL ENCOUNTER: ICD-10-CM

## 2024-07-08 DIAGNOSIS — S50.11XA CONTUSION OF RIGHT FOREARM, INITIAL ENCOUNTER: Primary | ICD-10-CM

## 2024-07-08 PROCEDURE — 99282 EMERGENCY DEPT VISIT SF MDM: CPT

## 2024-07-08 NOTE — ED PROVIDER NOTES
SCRIBE #1 NOTE: IBryant, am scribing for, and in the presence of, Kitty Lowe MD. I have scribed the entire note.       History     Chief Complaint   Patient presents with    Arm Injury     Pt seen here Sat  night for shortness of breath and other complaints. Pt had CT with IV contrast and discharged Sunday morning. Pt reports around 1600 or 1700 and noticed  a bruising line where an Ultrasound IV was attempted. Pt reports site continues to swell and pain increasing. Pt did have IV infiltration injury 6/23 to left AC. Pt concerned as swelling and pain similar to previous injury     Review of patient's allergies indicates:  No Known Allergies      History of Present Illness     HPI    7/8/2024, 6:09 AM  History obtained from the patient      History of Present Illness: Johanna Bullock is a 39 y.o. female patient with a PMHx of anemia, B12 deficiency, anxiety who presents to the Emergency Department for evaluation of right arm pain which was noticed around 12 hours ago. Pt was recently seen 07/07 for SOB sxs where she required IV contrast. Her complaint today is swelling and bruising to IV site on RUE. She has hx of IV infiltration to L side and is mostly anxious it may happen again. Symptoms are constant and mild in severity. Sharp pains with wrist movements. Patient denies any fever, CP, SOB, HA, and all other sxs at this time. No further complaints or concerns at this time.       Arrival mode: Personal vehicle    PCP: Ran Mcgill MD        Past Medical History:  Past Medical History:   Diagnosis Date    Adjustment disorder with mixed anxiety and depressed mood 02/06/2013    Anemia, B12 deficiency     Anxiety     Arthritis     Bradycardia        Past Surgical History:  Past Surgical History:   Procedure Laterality Date    CHOLECYSTECTOMY  10/2012    COLONOSCOPY N/A 02/14/2020    Procedure: COLONOSCOPY;  Surgeon: Jas Moore MD;  Location: [facility] ([address] [place] [address]);  Service: Endoscopy;   Laterality: N/A;  Pt procedure time changed to 0800 with 0715 - second half prep completed from [id number]- Pt verbalized understanding- ERW2/13/20@1022    COLONOSCOPY N/A 03/10/2023    Procedure: COLONOSCOPY;  Surgeon: HORACE Moore MD;  Location: [facility] ENDO (4TH FLR);  Service: Endoscopy;  Laterality: N/A;  inst emailed-RB    DECOMPRESSION, NERVE, ULNAR Left 2/15/2024    Procedure: DECOMPRESSION, NERVE, ULNAR;  Surgeon: Jas Larose MD;  Location: [facility] OR;  Service: Orthopedics;  Laterality: Left;  ulnar nerve decompression left elbow with cubital tunnel release and possible anterior transposition ulnar nerve    DILATION AND CURETTAGE OF UTERUS      ESOPHAGOGASTRODUODENOSCOPY N/A 07/20/2018    Procedure: ESOPHAGOGASTRODUODENOSCOPY (EGD);  Surgeon: Darwin Hansen MD;  [facility]  Service: Endoscopy;  Laterality: N/A;  Please measure pouch and limbs    GASTRIC BYPASS  2008    Revision August 2019    INJECTION OF ANESTHETIC AGENT AROUND LATERAL BRANCH NERVES OF SACROILIAC JOINT      LAPAROSCOPIC REPAIR OF HIATAL HERNIA  2019    Liver scope  10/2012    LYSIS OF ADHESIONS      ULNAR TUNNEL RELEASE Left 2/15/2024    Procedure: RELEASE, CUBITAL TUNNEL;  Surgeon: Jas Larose MD;  Location: [facility] OR;  Service: Orthopedics;  Laterality: Left;  ulnar nerve decompression left elbow with cubital tunnel release and possible anterior transposition ulnar nerve         Family History:  Family History   Problem Relation Name Age of Onset    Hypertension Father      Heart disease Father      Breast cancer Neg Hx      Colon cancer Neg Hx      Ovarian cancer Neg Hx      Diabetes Neg Hx      Stroke Neg Hx         Social History:  Social History     Tobacco Use    Smoking status: Never    Smokeless tobacco: Never   Substance and Sexual Activity    Alcohol use: Never     Comment: Alcohol use rarely    Drug use: No     Comment: Mirena    Sexual activity: Yes     Partners: Male     Birth  control/protection: I.U.D.        Review of Systems     Review of Systems   Constitutional:  Negative for fever.   HENT:  Negative for sore throat.    Respiratory:  Negative for shortness of breath.    Cardiovascular:  Negative for chest pain.   Gastrointestinal:  Negative for nausea.   Genitourinary:  Negative for dysuria.   Musculoskeletal:  Positive for myalgias (R arm). Negative for back pain.   Skin:  Negative for rash.        (+) R upper arm bruising   Neurological:  Negative for weakness.   Hematological:  Does not bruise/bleed easily.        Physical Exam     Initial Vitals [07/08/24 0457]   BP Pulse Resp Temp SpO2   129/71 79 18 98.2 °F (36.8 °C) 100 %      MAP       --          Physical Exam  Nursing Notes and Vital Signs Reviewed.  Constitutional: Patient is in no acute distress. Well-developed and well-nourished.  Head: Atraumatic. Normocephalic.  Eyes: PERRL. EOM intact. Conjunctivae are not pale. No scleral icterus.  ENT: Mucous membranes are moist. Oropharynx is clear and symmetric.    Neck: Supple. Full ROM. No lymphadenopathy.  Cardiovascular: Regular rate. Regular rhythm. No murmurs, rubs, or gallops. Distal pulses are 2+ and symmetric.  Pulmonary/Chest: No respiratory distress. Clear to auscultation bilaterally. No wheezing or rales.  Abdominal: Soft and non-distended.  There is no tenderness.  No rebound, guarding, or rigidity. Good bowel sounds.  Genitourinary: No CVA tenderness  Musculoskeletal: Moves all extremities. No obvious deformities. No edema. Is able to move Right hand, wrist, arm without any difficulty. No concern for compartment syndrome    Skin: Warm and dry. Small area of bruising to R AC with no erythema, no warmth, no signs or concern for infection. Area sensitive to touch . Neurovascular intact.     Neurological:  Alert, awake, and appropriate.  Normal speech.  No acute focal neurological deficits are appreciated.  Psychiatric: Normal affect. Good eye contact. Appropriate in  "content.     ED Course   Procedures  ED Vital Signs:  Vitals:    07/08/24 0457 07/08/24 0515 07/08/24 0658   BP: 129/71 132/80 132/80   Pulse: 79 88 71   Resp: 18 18 18   Temp: 98.2 °F (36.8 °C)  98.2 °F (36.8 °C)   TempSrc: Oral  Oral   SpO2: 100% 100% 100%   Weight: 117.4 kg (258 lb 12.8 oz)     Height: 5' 8" (1.727 m)         Abnormal Lab Results:  Labs Reviewed - No data to display     All Lab Results:  None    Imaging Results:  Imaging Results    None                 The Emergency Provider reviewed the vital signs and test results, which are outlined above.     ED Discussion       6:36 AM: Reassessed pt at this time. Discussed with pt all pertinent ED information and results. Discussed pt dx and plan of tx. Gave pt all f/u and return to the ED instructions. All questions and concerns were addressed at this time. Pt expresses understanding of information and instructions, and is comfortable with plan to discharge. Pt is stable for discharge.    I discussed with patient and/or family/caretaker that evaluation in the ED does not suggest any emergent or life threatening medical conditions requiring immediate intervention beyond what was provided in the ED, and I believe patient is safe for discharge.  Regardless, an unremarkable evaluation in the ED does not preclude the development or presence of a serious of life threatening condition. As such, patient was instructed to return immediately for any worsening or change in current symptoms.         Medical Decision Making  DDX: 1. Complication of IV 2. Arm Contusion 3. Superficial phlebitis    Patient with very small area of superficial bruising to AC of right forearm after recent IV placement less than 12 hours ago, no concerns for infection, no concerns clinically for compartment syndrome, brisk cap refill and neurovascular status is normal, reassurance provided, elevate, ICE, reasons to return given. Lab work and imaging not indicated at this time, no concerns for " clot clinically, no arm swelling only small hematoma noted.                ED Medication(s):  Medications - No data to display    Discharge Medication List as of 7/8/2024  6:32 AM           Follow-up Information       Ran Mcgill MD. Schedule an appointment as soon as possible for a visit in 1 day.    Specialty: Internal Medicine  Contact information:  [address] [facility] [address]  [phone]               [facility]. Schedule an appointment as soon as possible for a visit in 1 day.    Specialty: Orthopedics  Contact information:  [address]  [facility] [address] [phone]                               Scribe Attestation:   Scribe #1: I performed the above scribed service and the documentation accurately describes the services I performed. I attest to the accuracy of the note.     Attending:   Physician Attestation Statement for Scribe #1: I, Kitty Lowe MD, personally performed the services described in this documentation, as scribed by Bryant Phillips, in my presence, and it is both accurate and complete.           Clinical Impression       ICD-10-CM ICD-9-CM   1. Contusion of right forearm, initial encounter  S50.11XA 923.10   2. IV infiltrate, initial encounter  T80.1XXA 999.9       Disposition:   Disposition: Discharged  Condition: Stable         Kitty Lowe MD  07/11/24 9516

## 2024-07-10 ENCOUNTER — PATIENT MESSAGE (OUTPATIENT)
Dept: CARDIOLOGY | Facility: CLINIC | Age: 39
End: 2024-07-10

## 2024-07-16 DIAGNOSIS — I10 PRIMARY HYPERTENSION: Primary | ICD-10-CM

## 2024-07-17 ENCOUNTER — HOSPITAL ENCOUNTER (OUTPATIENT)
Dept: CARDIOLOGY | Facility: HOSPITAL | Age: 39
Discharge: HOME | End: 2024-07-17
Attending: INTERNAL MEDICINE

## 2024-07-17 ENCOUNTER — OFFICE VISIT (OUTPATIENT)
Dept: CARDIOLOGY | Facility: CLINIC | Age: 39
End: 2024-07-17

## 2024-07-17 VITALS
SYSTOLIC BLOOD PRESSURE: 108 MMHG | HEIGHT: 68 IN | HEART RATE: 63 BPM | RESPIRATION RATE: 16 BRPM | BODY MASS INDEX: 38.86 KG/M2 | DIASTOLIC BLOOD PRESSURE: 65 MMHG | OXYGEN SATURATION: 100 % | WEIGHT: 256.38 LBS

## 2024-07-17 DIAGNOSIS — E66.01 MORBID OBESITY: ICD-10-CM

## 2024-07-17 DIAGNOSIS — E61.1 IRON DEFICIENCY: ICD-10-CM

## 2024-07-17 DIAGNOSIS — R06.09 OTHER FORM OF DYSPNEA: ICD-10-CM

## 2024-07-17 DIAGNOSIS — Z87.11 HISTORY OF PEPTIC ULCER: ICD-10-CM

## 2024-07-17 DIAGNOSIS — Z98.84 HISTORY OF GASTRIC BYPASS: ICD-10-CM

## 2024-07-17 DIAGNOSIS — G89.29 CHRONIC LOW BACK PAIN, UNSPECIFIED BACK PAIN LATERALITY, UNSPECIFIED WHETHER SCIATICA PRESENT: ICD-10-CM

## 2024-07-17 DIAGNOSIS — I95.9 HYPOTENSION, UNSPECIFIED HYPOTENSION TYPE: ICD-10-CM

## 2024-07-17 DIAGNOSIS — I10 PRIMARY HYPERTENSION: ICD-10-CM

## 2024-07-17 DIAGNOSIS — M54.50 CHRONIC LOW BACK PAIN, UNSPECIFIED BACK PAIN LATERALITY, UNSPECIFIED WHETHER SCIATICA PRESENT: ICD-10-CM

## 2024-07-17 DIAGNOSIS — R53.1 WEAKNESS: ICD-10-CM

## 2024-07-17 DIAGNOSIS — R07.9 CHEST PAIN, UNSPECIFIED TYPE: Primary | ICD-10-CM

## 2024-07-17 DIAGNOSIS — R06.09 DOE (DYSPNEA ON EXERTION): ICD-10-CM

## 2024-07-17 DIAGNOSIS — E53.8 VITAMIN B12 DEFICIENCY: ICD-10-CM

## 2024-07-17 DIAGNOSIS — R29.3 POSTURAL INSTABILITY: ICD-10-CM

## 2024-07-17 DIAGNOSIS — Z98.84 HISTORY OF BARIATRIC SURGERY: ICD-10-CM

## 2024-07-17 DIAGNOSIS — M62.81 MUSCLE WEAKNESS: ICD-10-CM

## 2024-07-17 LAB
OHS QRS DURATION: 74 MS
OHS QTC CALCULATION: 397 MS

## 2024-07-17 PROCEDURE — 93005 ELECTROCARDIOGRAM TRACING: CPT

## 2024-07-17 PROCEDURE — G2211 COMPLEX E/M VISIT ADD ON: HCPCS | Mod: S$PBB,,, | Performed by: INTERNAL MEDICINE

## 2024-07-17 PROCEDURE — 99214 OFFICE O/P EST MOD 30 MIN: CPT | Mod: PBBFAC,25 | Performed by: INTERNAL MEDICINE

## 2024-07-17 PROCEDURE — 99999 PR PBB SHADOW E&M-EST. PATIENT-LVL IV: CPT | Mod: PBBFAC,,, | Performed by: INTERNAL MEDICINE

## 2024-07-17 PROCEDURE — 93010 ELECTROCARDIOGRAM REPORT: CPT | Mod: ,,, | Performed by: INTERNAL MEDICINE

## 2024-07-17 PROCEDURE — 99214 OFFICE O/P EST MOD 30 MIN: CPT | Mod: S$PBB,,, | Performed by: INTERNAL MEDICINE

## 2024-07-17 RX ORDER — MIDODRINE HYDROCHLORIDE 5 MG/1
5 TABLET ORAL
Qty: 270 TABLET | Refills: 1 | Status: SHIPPED | OUTPATIENT
Start: 2024-07-17 | End: 2025-07-17

## 2024-07-17 RX ORDER — MIDODRINE HYDROCHLORIDE 5 MG/1
5 TABLET ORAL
Qty: 270 TABLET | Refills: 1 | Status: SHIPPED | OUTPATIENT
Start: 2024-07-17 | End: 2024-07-17

## 2024-07-17 NOTE — PROGRESS NOTES
"Subjective:   Patient ID:  Johanna Bullock is a 39 y.o. female who presents for cardiac consult of No chief complaint on file.      Referral by: No referring provider defined for this encounter.     Reason for consult:       HPI  The patient came in today for cardiac consult of No chief complaint on file.      Johanna Bullock is a 39 y.o. female pt with obesity, History of hypotension. Near-syncope. Orthostatic dizziness. Chest tightness and dyspnea on exertion here for CV follow up.       HPI Dr. Ambrose Pt  4.24.2024  States bp high at home and despite being off florinef also does not take modafinil daily as she states.    She had a recent surgery on her left arm and she is dealing with pain with that.  Following with pain management.  Going for sympathetic block.    She states that the Florinef has helped her hypotension initially.  But then she was taking some inhalers and she noticed that her pulse and her blood pressure were high.  She stopped it for sometime.    Lately she has been off that to and her pressure is still elevated she states at home she gets 150 over 100.  The manual check was 130/90.  And her home machine read 135/95.       10.2023  38-year-old female, past medical history below.  Comes in for multiple complaints.  History of hypotension.  Near-syncope.  Orthostatic dizziness.  Chest tightness and dyspnea on exertion.    Multiple stress echo in the past normal.      7/7/2024, 2:30 AM  History obtained from the patient                  History of Present Illness: Johanna Bullock is a 39 y.o. female patient with a PMHx of anemia, anxiety, bradycardia, and arthritis who presents to the Emergency Department for evaluation of multiple complaints which onset 2 weeks ago. Pt states that she has been experiencing HA, blurry vision, fatigue, weakness, SOB, and chest tightness for the past 2 weeks. Pt states that her "whole head feels compressed." Pt also reports that she had to stop " taking Cymbalta two weeks ago due to insurance reasons. Pt believes that withdrawals from Cymbalta may be causing her symptoms. Pt states that last night her heart rate was 47, and her O2 Sat was 91. Pt states that she is concerned that she may have a blood clot. Symptoms are constant and moderate in severity. No mitigating or exacerbating factors reported. Patient denies any fever, CP, dysuria, nausea, and all other sxs at this time. No prior Tx reported. No further complaints or concerns at this time.     Chief Complaint   Patient presents with    Arm Injury       Pt seen here Sat  night for shortness of breath and other complaints. Pt had CT with IV contrast and discharged Sunday morning. Pt reports around 1600 or 1700 and noticed  a bruising line where an Ultrasound IV was attempted. Pt reports site continues to swell and pain increasing. Pt did have IV infiltration injury 6/23 to left AC. Pt concerned as swelling and pain similar to previous injury      7/8/2024, 6:09 AM  History obtained from the patient                  History of Present Illness: Johanna Bullock is a 39 y.o. female patient with a PMHx of anemia, B12 deficiency, anxiety who presents to the Emergency Department for evaluation of right arm pain which was noticed around 12 hours ago. Pt was recently seen 07/07 for SOB sxs where she required IV contrast. Her complaint today is swelling and bruising to IV site on RUE. She has hx of IV infiltration to L side and is mostly anxious it may happen again. Symptoms are constant and mild in severity. Sharp pains with wrist movements. Patient denies any fever, CP, SOB, HA, and all other sxs at this time. No further complaints or concerns at this time.        7/17/24 Dr. Ambrose Pt  - pt was in ER last week for R arm pain, had SOB prior to that and bruising from IV site  She had neg workup for ACS and PE  She had to stop Cymbalta suddenly - had withdrawal from that. She had more pain since then.   She  spoke with PCP/neuro and may start Nortriptylnine.   She has more fatigue - needs more iron but more constipation may need IV iron.   Her father has LVAD - not a transplant candidate.   Used to work at chemical plant, has Medicaid pending now.     ECG - NSR     FH - father - advanced CHF - has LVAD - is in 60s    No cardiac monitor results found for the past 12 months         Past Medical History:   Diagnosis Date    Adjustment disorder with mixed anxiety and depressed mood 02/06/2013    Anemia, B12 deficiency     Anxiety     Arthritis     Bradycardia        Past Surgical History:   Procedure Laterality Date    CHOLECYSTECTOMY  10/2012    COLONOSCOPY N/A 02/14/2020    Procedure: COLONOSCOPY;  Surgeon: Jas Moore MD;  Location: [facility] (4TH FLR);  Service: Endoscopy;  Laterality: N/A;  Pt procedure time changed to 0800 with 0715 - second half prep completed from [id number]- Pt verbalized understanding- ERW2/13/20@1022    COLONOSCOPY N/A 03/10/2023    Procedure: COLONOSCOPY;  Surgeon: HORACE Moore MD;  Location: [facility] (4TH FLR);  Service: Endoscopy;  Laterality: N/A;  inst emailed-RB    DECOMPRESSION, NERVE, ULNAR Left 2/15/2024    Procedure: DECOMPRESSION, NERVE, ULNAR;  Surgeon: Jas Larose MD;  Location: [facility];  Service: Orthopedics;  Laterality: Left;  ulnar nerve decompression left elbow with cubital tunnel release and possible anterior transposition ulnar nerve    DILATION AND CURETTAGE OF UTERUS      ESOPHAGOGASTRODUODENOSCOPY N/A 07/20/2018    Procedure: ESOPHAGOGASTRODUODENOSCOPY (EGD);  Surgeon: Darwin Hansen MD;  Location: [facility] (4TH FLR);  Service: Endoscopy;  Laterality: N/A;  Please measure pouch and limbs    GASTRIC BYPASS  2008    Revision August 2019    INJECTION OF ANESTHETIC AGENT AROUND LATERAL BRANCH NERVES OF SACROILIAC JOINT      LAPAROSCOPIC REPAIR OF HIATAL HERNIA  2019    Liver scope  10/2012    LYSIS OF ADHESIONS      ULNAR TUNNEL RELEASE Left  2/15/2024    Procedure: RELEASE, CUBITAL TUNNEL;  Surgeon: Jas Larose MD;  Location: [facility] OR;  Service: Orthopedics;  Laterality: Left;  ulnar nerve decompression left elbow with cubital tunnel release and possible anterior transposition ulnar nerve       Social History     Tobacco Use    Smoking status: Never    Smokeless tobacco: Never   Substance Use Topics    Alcohol use: Never     Comment: Alcohol use rarely    Drug use: No     Comment: Mirena       Family History   Problem Relation Name Age of Onset    Hypertension Father      Heart disease Father      Breast cancer Neg Hx      Colon cancer Neg Hx      Ovarian cancer Neg Hx      Diabetes Neg Hx      Stroke Neg Hx         Patient's Medications   New Prescriptions    MIDODRINE (PROAMATINE) 5 MG TAB    Take 1 tablet (5 mg total) by mouth 3 (three) times daily with meals. If BP is lower can increase to 10mg three times a day, if elevated can stop   Previous Medications    ACETAMINOPHEN (TYLENOL) 650 MG TBSR    Take 650 mg by mouth every 8 (eight) hours.    ACETYLCYSTEINE (N-ACETYL-L-CYSTEINE MISC)    by Misc.(Non-Drug; Combo Route) route.    ALBUTEROL (PROVENTIL/VENTOLIN HFA) 90 MCG/ACTUATION INHALER    Inhale 1-2 puffs into the lungs every 6 (six) hours as needed for Wheezing.    CYANOCOBALAMIN 1,000 MCG/ML INJECTION    ONE INJECTION AS DIRECTED EVERY 28 DAYS    DULOXETINE (CYMBALTA) 30 MG CAPSULE    Take 1 capsule (30 mg total) by mouth every evening.    FLUTICASONE PROPIONATE (FLONASE) 50 MCG/ACTUATION NASAL SPRAY    1 spray by Each Nostril route once daily.    HYDROCODONE-ACETAMINOPHEN (NORCO) 5-325 MG PER TABLET    Take 1 tablet by mouth every 8 (eight) hours as needed for Pain.    KETOROLAC (TORADOL) 10 MG TABLET    Take by mouth.    LINZESS 290 MCG CAP CAPSULE    TAKE ONE CAPSULE BY MOUTH EVERY DAY    LINZESS 290 MCG CAP CAPSULE    TAKE 1 CAPSULE(290 MCG) BY MOUTH BEFORE BREAKFAST    MAGNESIUM CARB,CITRATE,OXIDE (MAGNESIUM COMPLEX ORAL)    Take  "by mouth.    MODAFINIL (PROVIGIL) 200 MG TAB    Take 200 mg by mouth once daily.    TIZANIDINE 4 MG CAP    Take by mouth.    TRAMADOL (ULTRAM) 50 MG TABLET    Take 50 mg by mouth every 6 (six) hours.    UNABLE TO FIND    medication name: thymoquinone/black seed   Modified Medications    No medications on file   Discontinued Medications    AMLODIPINE (NORVASC) 5 MG TABLET    Take 5 mg by mouth once daily.       Review of Systems   Constitutional:  Positive for malaise/fatigue.   HENT: Negative.     Eyes: Negative.    Respiratory:  Positive for shortness of breath.    Cardiovascular:  Positive for chest pain and palpitations.   Gastrointestinal: Negative.    Genitourinary: Negative.    Musculoskeletal: Negative.    Skin: Negative.    Neurological: Negative.    Endo/Heme/Allergies: Negative.    Psychiatric/Behavioral: Negative.     All 12 systems otherwise negative.      Wt Readings from Last 3 Encounters:   07/17/24 116.3 kg (256 lb 6.3 oz)   07/08/24 117.4 kg (258 lb 12.8 oz)   07/06/24 114.8 kg (253 lb 1.4 oz)     Temp Readings from Last 3 Encounters:   07/08/24 98.2 °F (36.8 °C) (Oral)   07/07/24 98.2 °F (36.8 °C)   02/15/24 98.1 °F (36.7 °C) (Temporal)     BP Readings from Last 3 Encounters:   07/17/24 108/65   07/08/24 132/80   07/07/24 99/63     Pulse Readings from Last 3 Encounters:   07/17/24 63   07/08/24 71   07/07/24 67       /65   Pulse 63   Resp 16   Ht 5' 8" (1.727 m)   Wt 116.3 kg (256 lb 6.3 oz)   LMP 06/20/2024 (Approximate)   SpO2 100%   BMI 38.98 kg/m²     Objective:   Physical Exam  Vitals and nursing note reviewed.   Constitutional:       General: She is not in acute distress.     Appearance: She is well-developed. She is obese. She is not diaphoretic.   HENT:      Head: Normocephalic and atraumatic.      Nose: Nose normal.   Eyes:      General: No scleral icterus.     Conjunctiva/sclera: Conjunctivae normal.   Neck:      Thyroid: No thyromegaly.      Vascular: No JVD. "   Cardiovascular:      Rate and Rhythm: Normal rate and regular rhythm.      Heart sounds: S1 normal and S2 normal. No murmur heard.     No friction rub. No gallop. No S3 or S4 sounds.   Pulmonary:      Effort: Pulmonary effort is normal. No respiratory distress.      Breath sounds: Normal breath sounds. No stridor. No wheezing or rales.   Chest:      Chest wall: No tenderness.   Abdominal:      General: Bowel sounds are normal. There is no distension.      Palpations: Abdomen is soft. There is no mass.      Tenderness: There is no abdominal tenderness. There is no rebound.   Genitourinary:     Comments: Deferred  Musculoskeletal:         General: No tenderness or deformity. Normal range of motion.      Cervical back: Normal range of motion and neck supple.   Lymphadenopathy:      Cervical: No cervical adenopathy.   Skin:     General: Skin is warm and dry.      Coloration: Skin is not pale.      Findings: No erythema or rash.   Neurological:      Mental Status: She is alert and oriented to person, place, and time.      Motor: No abnormal muscle tone.      Coordination: Coordination normal.   Psychiatric:         Behavior: Behavior normal.         Thought Content: Thought content normal.         Judgment: Judgment normal.         Lab Results   Component Value Date     07/07/2024    K 3.8 07/07/2024     07/07/2024    CO2 22 (L) 07/07/2024    BUN 13 07/07/2024    CREATININE 0.7 07/07/2024    GLU 81 07/07/2024    HGBA1C 5.2 10/27/2022    MG 1.9 10/15/2012    AST 17 07/07/2024    ALT 28 07/07/2024    ALBUMIN 3.6 07/07/2024    PROT 7.2 07/07/2024    BILITOT 0.2 07/07/2024    WBC 7.81 07/07/2024    HGB 11.8 (L) 07/07/2024    HCT 35.5 (L) 07/07/2024    MCV 86 07/07/2024     07/07/2024    INR 1.0 10/15/2012    TSH 1.178 05/22/2024    TSH 1.945 10/27/2022    CHOL 181 10/27/2022    HDL 55 10/27/2022    LDLCALC 119.8 10/27/2022    TRIG 31 10/27/2022    BNP 21 07/07/2024         BNP (pg/mL)   Date Value    07/07/2024 21     INR (no units)   Date Value   10/15/2012 1.0   10/14/2012 1.0   10/13/2012 1.1   10/12/2012 1.0          Assessment:      1. Chest pain, unspecified type    2. Morbid obesity    3. History of gastric bypass    4. NOGUERA (dyspnea on exertion)    5. Hypotension, unspecified hypotension type    6. Weakness    7. Chronic low back pain, unspecified back pain laterality, unspecified whether sciatica present    8. History of peptic ulcer    9. Vitamin B12 deficiency    10. Iron deficiency    11. History of bariatric surgery    12. Postural instability    13. Muscle weakness    14. Other form of dyspnea        Plan:     CP, SOB, bradycardia, hypotension; FH advanced CHF - father - LVAD  - recent neg workup in ER   - order 7 day vital monitor and ECHO   - start Midodrine 5mg TID PRN low BP     2. Chronic back pain, weakness, postural instablity - CRPS - initial injury June 16th 2023 caused from IV infiltration   - f/u PCP and/or pain mangement - ?PT/OT eval    3. Obesity s/p bariatric surg - bypass in 2008 and 2019 revision   - cont weight loss  - cont Vit B12, and Iron - may need infusions   - rec PPI     Follow up with Dr Ambrose    Visit today included increased complexity associated with the care of the episodic problem dyspnea addressed and managing the longitudinal care of the patient due to the serious and/or complex managed problem(s) .      Thank you for allowing me to participate in this patient's care. Please do not hesitate to contact me with any questions or concerns. Consult note has been forwarded to the referral physician.

## 2024-08-15 ENCOUNTER — TELEPHONE (OUTPATIENT)
Dept: ORTHOPEDICS | Facility: CLINIC | Age: 39
End: 2024-08-15

## 2024-08-15 NOTE — TELEPHONE ENCOUNTER
Called patient to inform her that Dr Bhatt does not accept second opinions, she had surgery with Dr Larose and potentially will need a revision. I explained to her that if Dr Larose would like to talk to Dr Bhatt then she will consider see her. Patient was rude and hung the phone up on me

## 2024-08-27 ENCOUNTER — TELEPHONE (OUTPATIENT)
Dept: OBSTETRICS AND GYNECOLOGY | Facility: CLINIC | Age: 39
End: 2024-08-27
Payer: COMMERCIAL

## 2024-08-27 NOTE — TELEPHONE ENCOUNTER
----- Message from Emerson Birch LPN sent at 8/27/2024  9:02 AM CDT -----  Contact: patient  Good Morning,     Pt called in regards to appt today she is asking if she is getting mirena replaced today.     Please advise   Karen  ----- Message -----  From: Linh Duarte  Sent: 8/27/2024   8:25 AM CDT  To: Eduin ALVARADO Staff    Johanna Bullock would like a call back at [phone] in regards to verifying if she will be able to receive her mirena replacement on today during her appt.

## 2024-08-27 NOTE — TELEPHONE ENCOUNTER
Her mirena IUD is now good for 8 years and is not due out until 5/2026    If she wants it removed will have to make procedure appointment otherwise it can remain in and she needs her annual in November

## 2024-08-28 NOTE — TELEPHONE ENCOUNTER
Spoke with pt, she states that she would like to have it replace she feel  difference in hormones, Migraines . Pt states she will check with Ins to see if they will cover it sooner.       Karen CERRATO LPN  OB/GYN

## 2024-09-04 ENCOUNTER — PATIENT MESSAGE (OUTPATIENT)
Dept: CARDIOLOGY | Facility: HOSPITAL | Age: 39
End: 2024-09-04
Payer: COMMERCIAL

## 2024-10-16 ENCOUNTER — HOSPITAL ENCOUNTER (OUTPATIENT)
Facility: HOSPITAL | Age: 39
Discharge: HOME | End: 2024-10-17
Attending: EMERGENCY MEDICINE | Admitting: SURGERY
Payer: COMMERCIAL

## 2024-10-16 DIAGNOSIS — K56.1 INTUSSUSCEPTION INTESTINE: ICD-10-CM

## 2024-10-16 LAB
ALBUMIN SERPL BCP-MCNC: 3.8 G/DL (ref 3.5–5.2)
ALP SERPL-CCNC: 75 U/L (ref 55–135)
ALT SERPL W/O P-5'-P-CCNC: 17 U/L (ref 10–44)
ANION GAP SERPL CALC-SCNC: 12 MMOL/L (ref 8–16)
AST SERPL-CCNC: 14 U/L (ref 10–40)
B-HCG UR QL: NEGATIVE
BACTERIA #/AREA URNS HPF: ABNORMAL /HPF
BASOPHILS # BLD AUTO: 0.07 K/UL (ref 0–0.2)
BASOPHILS NFR BLD: 1.3 % (ref 0–1.9)
BILIRUB SERPL-MCNC: 0.4 MG/DL (ref 0.1–1)
BILIRUB UR QL STRIP: NEGATIVE
BUN SERPL-MCNC: 6 MG/DL (ref 6–20)
CALCIUM SERPL-MCNC: 9.2 MG/DL (ref 8.7–10.5)
CHLORIDE SERPL-SCNC: 107 MMOL/L (ref 95–110)
CLARITY UR: ABNORMAL
CO2 SERPL-SCNC: 19 MMOL/L (ref 23–29)
COLOR UR: YELLOW
CREAT SERPL-MCNC: 0.9 MG/DL (ref 0.5–1.4)
DIFFERENTIAL METHOD BLD: ABNORMAL
EOSINOPHIL # BLD AUTO: 0.1 K/UL (ref 0–0.5)
EOSINOPHIL NFR BLD: 1.3 % (ref 0–8)
ERYTHROCYTE [DISTWIDTH] IN BLOOD BY AUTOMATED COUNT: 14 % (ref 11.5–14.5)
EST. GFR  (NO RACE VARIABLE): >60 ML/MIN/1.73 M^2
GLUCOSE SERPL-MCNC: 110 MG/DL (ref 70–110)
GLUCOSE UR QL STRIP: NEGATIVE
HCT VFR BLD AUTO: 36.7 % (ref 37–48.5)
HGB BLD-MCNC: 12.1 G/DL (ref 12–16)
HGB UR QL STRIP: ABNORMAL
HYALINE CASTS #/AREA URNS LPF: 0 /LPF
IMM GRANULOCYTES # BLD AUTO: 0.05 K/UL (ref 0–0.04)
IMM GRANULOCYTES NFR BLD AUTO: 0.9 % (ref 0–0.5)
KETONES UR QL STRIP: ABNORMAL
LACTATE SERPL-SCNC: 2 MMOL/L (ref 0.5–2.2)
LEUKOCYTE ESTERASE UR QL STRIP: NEGATIVE
LIPASE SERPL-CCNC: 35 U/L (ref 4–60)
LYMPHOCYTES # BLD AUTO: 2.7 K/UL (ref 1–4.8)
LYMPHOCYTES NFR BLD: 49.8 % (ref 18–48)
MCH RBC QN AUTO: 28.3 PG (ref 27–31)
MCHC RBC AUTO-ENTMCNC: 33 G/DL (ref 32–36)
MCV RBC AUTO: 86 FL (ref 82–98)
MICROSCOPIC COMMENT: ABNORMAL
MONOCYTES # BLD AUTO: 0.7 K/UL (ref 0.3–1)
MONOCYTES NFR BLD: 12.5 % (ref 4–15)
NEUTROPHILS # BLD AUTO: 1.9 K/UL (ref 1.8–7.7)
NEUTROPHILS NFR BLD: 34.2 % (ref 38–73)
NITRITE UR QL STRIP: NEGATIVE
NRBC BLD-RTO: 0 /100 WBC
PH UR STRIP: 6 [PH] (ref 5–8)
PLATELET # BLD AUTO: 365 K/UL (ref 150–450)
PMV BLD AUTO: 10 FL (ref 9.2–12.9)
POTASSIUM SERPL-SCNC: 3.1 MMOL/L (ref 3.5–5.1)
PROT SERPL-MCNC: 7.2 G/DL (ref 6–8.4)
PROT UR QL STRIP: ABNORMAL
RBC # BLD AUTO: 4.28 M/UL (ref 4–5.4)
RBC #/AREA URNS HPF: >100 /HPF (ref 0–4)
SODIUM SERPL-SCNC: 138 MMOL/L (ref 136–145)
SP GR UR STRIP: >1.03 (ref 1–1.03)
SQUAMOUS #/AREA URNS HPF: 4 /HPF
URN SPEC COLLECT METH UR: ABNORMAL
UROBILINOGEN UR STRIP-ACNC: NEGATIVE EU/DL
WBC # BLD AUTO: 5.44 K/UL (ref 3.9–12.7)
WBC #/AREA URNS HPF: 7 /HPF (ref 0–5)

## 2024-10-16 PROCEDURE — 83605 ASSAY OF LACTIC ACID: CPT | Performed by: EMERGENCY MEDICINE

## 2024-10-16 PROCEDURE — 96375 TX/PRO/DX INJ NEW DRUG ADDON: CPT

## 2024-10-16 PROCEDURE — G0378 HOSPITAL OBSERVATION PER HR: HCPCS

## 2024-10-16 PROCEDURE — 99285 EMERGENCY DEPT VISIT HI MDM: CPT | Mod: 25

## 2024-10-16 PROCEDURE — P9612 CATHETERIZE FOR URINE SPEC: HCPCS

## 2024-10-16 PROCEDURE — 85025 COMPLETE CBC W/AUTO DIFF WBC: CPT | Performed by: EMERGENCY MEDICINE

## 2024-10-16 PROCEDURE — 81000 URINALYSIS NONAUTO W/SCOPE: CPT | Performed by: EMERGENCY MEDICINE

## 2024-10-16 PROCEDURE — 63600175 PHARM REV CODE 636 W HCPCS: Performed by: EMERGENCY MEDICINE

## 2024-10-16 PROCEDURE — 63600175 PHARM REV CODE 636 W HCPCS: Performed by: SURGERY

## 2024-10-16 PROCEDURE — 99223 1ST HOSP IP/OBS HIGH 75: CPT | Mod: ,,, | Performed by: SURGERY

## 2024-10-16 PROCEDURE — 83690 ASSAY OF LIPASE: CPT | Performed by: EMERGENCY MEDICINE

## 2024-10-16 PROCEDURE — 96376 TX/PRO/DX INJ SAME DRUG ADON: CPT

## 2024-10-16 PROCEDURE — A9698 NON-RAD CONTRAST MATERIALNOC: HCPCS | Performed by: EMERGENCY MEDICINE

## 2024-10-16 PROCEDURE — 81025 URINE PREGNANCY TEST: CPT | Performed by: EMERGENCY MEDICINE

## 2024-10-16 PROCEDURE — 96374 THER/PROPH/DIAG INJ IV PUSH: CPT | Mod: 59

## 2024-10-16 PROCEDURE — 80053 COMPREHEN METABOLIC PANEL: CPT | Performed by: EMERGENCY MEDICINE

## 2024-10-16 PROCEDURE — 25500020 PHARM REV CODE 255: Performed by: EMERGENCY MEDICINE

## 2024-10-16 RX ORDER — FLUOXETINE HYDROCHLORIDE 20 MG/1
20 CAPSULE ORAL DAILY
COMMUNITY

## 2024-10-16 RX ORDER — HYDROMORPHONE HYDROCHLORIDE 1 MG/ML
1 INJECTION, SOLUTION INTRAMUSCULAR; INTRAVENOUS; SUBCUTANEOUS EVERY 4 HOURS PRN
Status: DISCONTINUED | OUTPATIENT
Start: 2024-10-16 | End: 2024-10-17 | Stop reason: HOSPADM

## 2024-10-16 RX ORDER — ALBUTEROL SULFATE 0.83 MG/ML
2.5 SOLUTION RESPIRATORY (INHALATION) EVERY 6 HOURS PRN
Status: DISCONTINUED | OUTPATIENT
Start: 2024-10-16 | End: 2024-10-17 | Stop reason: HOSPADM

## 2024-10-16 RX ORDER — LEVOTHYROXINE SODIUM 25 UG/1
1 TABLET ORAL EVERY MORNING
COMMUNITY

## 2024-10-16 RX ORDER — SODIUM CHLORIDE 0.9 % (FLUSH) 0.9 %
10 SYRINGE (ML) INJECTION
Status: DISCONTINUED | OUTPATIENT
Start: 2024-10-16 | End: 2024-10-17 | Stop reason: HOSPADM

## 2024-10-16 RX ORDER — PANTOPRAZOLE SODIUM 40 MG/10ML
40 INJECTION, POWDER, LYOPHILIZED, FOR SOLUTION INTRAVENOUS DAILY
Status: DISCONTINUED | OUTPATIENT
Start: 2024-10-17 | End: 2024-10-17 | Stop reason: HOSPADM

## 2024-10-16 RX ORDER — DULOXETIN HYDROCHLORIDE 30 MG/1
30 CAPSULE, DELAYED RELEASE ORAL NIGHTLY
Status: DISCONTINUED | OUTPATIENT
Start: 2024-10-17 | End: 2024-10-17 | Stop reason: HOSPADM

## 2024-10-16 RX ORDER — HYDROMORPHONE HYDROCHLORIDE 1 MG/ML
1 INJECTION, SOLUTION INTRAMUSCULAR; INTRAVENOUS; SUBCUTANEOUS
Status: COMPLETED | OUTPATIENT
Start: 2024-10-16 | End: 2024-10-16

## 2024-10-16 RX ORDER — HYDROMORPHONE HYDROCHLORIDE 1 MG/ML
1 INJECTION, SOLUTION INTRAMUSCULAR; INTRAVENOUS; SUBCUTANEOUS
Status: DISCONTINUED | OUTPATIENT
Start: 2024-10-16 | End: 2024-10-17 | Stop reason: HOSPADM

## 2024-10-16 RX ORDER — HYDROMORPHONE HYDROCHLORIDE 1 MG/ML
0.5 INJECTION, SOLUTION INTRAMUSCULAR; INTRAVENOUS; SUBCUTANEOUS
Status: COMPLETED | OUTPATIENT
Start: 2024-10-16 | End: 2024-10-16

## 2024-10-16 RX ORDER — ONDANSETRON 8 MG/1
8 TABLET, ORALLY DISINTEGRATING ORAL EVERY 8 HOURS PRN
Status: DISCONTINUED | OUTPATIENT
Start: 2024-10-16 | End: 2024-10-17 | Stop reason: HOSPADM

## 2024-10-16 RX ORDER — LIDOCAINE HYDROCHLORIDE 10 MG/ML
1 INJECTION, SOLUTION EPIDURAL; INFILTRATION; INTRACAUDAL; PERINEURAL ONCE AS NEEDED
Status: DISCONTINUED | OUTPATIENT
Start: 2024-10-16 | End: 2024-10-17 | Stop reason: HOSPADM

## 2024-10-16 RX ORDER — FLUOXETINE HYDROCHLORIDE 20 MG/1
20 CAPSULE ORAL DAILY
Status: DISCONTINUED | OUTPATIENT
Start: 2024-10-17 | End: 2024-10-17 | Stop reason: HOSPADM

## 2024-10-16 RX ORDER — ALBUTEROL SULFATE 90 UG/1
2 INHALANT RESPIRATORY (INHALATION) EVERY 6 HOURS PRN
Status: DISCONTINUED | OUTPATIENT
Start: 2024-10-16 | End: 2024-10-16 | Stop reason: CLARIF

## 2024-10-16 RX ORDER — ONDANSETRON HYDROCHLORIDE 2 MG/ML
4 INJECTION, SOLUTION INTRAVENOUS
Status: COMPLETED | OUTPATIENT
Start: 2024-10-16 | End: 2024-10-16

## 2024-10-16 RX ORDER — PROCHLORPERAZINE EDISYLATE 5 MG/ML
5 INJECTION INTRAMUSCULAR; INTRAVENOUS EVERY 6 HOURS PRN
Status: DISCONTINUED | OUTPATIENT
Start: 2024-10-16 | End: 2024-10-17 | Stop reason: HOSPADM

## 2024-10-16 RX ADMIN — ONDANSETRON 4 MG: 2 INJECTION INTRAMUSCULAR; INTRAVENOUS at 11:10

## 2024-10-16 RX ADMIN — HYDROMORPHONE HYDROCHLORIDE 1 MG: 1 INJECTION, SOLUTION INTRAMUSCULAR; INTRAVENOUS; SUBCUTANEOUS at 06:10

## 2024-10-16 RX ADMIN — IOHEXOL 100 ML: 350 INJECTION, SOLUTION INTRAVENOUS at 03:10

## 2024-10-16 RX ADMIN — HYDROMORPHONE HYDROCHLORIDE 1 MG: 1 INJECTION, SOLUTION INTRAMUSCULAR; INTRAVENOUS; SUBCUTANEOUS at 11:10

## 2024-10-16 RX ADMIN — HYDROMORPHONE HYDROCHLORIDE 0.5 MG: 1 INJECTION, SOLUTION INTRAMUSCULAR; INTRAVENOUS; SUBCUTANEOUS at 12:10

## 2024-10-16 RX ADMIN — IOHEXOL 1000 ML: 12 SOLUTION ORAL at 03:10

## 2024-10-16 NOTE — ED NOTES
Pt appears in moderate distress r/t abdominal pain.  Vital signs stable.  ED physician was notified.

## 2024-10-16 NOTE — SUBJECTIVE & OBJECTIVE
Current Facility-Administered Medications on File Prior to Encounter   Medication    ceFAZolin 2 g in dextrose 5 % in water (D5W) 50 mL IVPB (MB+)    chlorhexidine 0.12 % solution 10 mL     Current Outpatient Medications on File Prior to Encounter   Medication Sig    acetaminophen (TYLENOL) 650 MG TbSR Take 650 mg by mouth every 8 (eight) hours.    acetylcysteine (N-ACETYL-L-CYSTEINE MISC) by Misc.(Non-Drug; Combo Route) route.    albuterol (PROVENTIL/VENTOLIN HFA) 90 mcg/actuation inhaler Inhale 1-2 puffs into the lungs every 6 (six) hours as needed for Wheezing.    cyanocobalamin 1,000 mcg/mL injection ONE INJECTION AS DIRECTED EVERY 28 DAYS    DULoxetine (CYMBALTA) 30 MG capsule Take 1 capsule (30 mg total) by mouth every evening. (Patient not taking: Reported on 7/7/2024)    FLUoxetine 20 MG capsule Take 20 mg by mouth.    fluticasone propionate (FLONASE) 50 mcg/actuation nasal spray 1 spray by Each Nostril route once daily. (Patient not taking: Reported on 7/17/2024)    HYDROcodone-acetaminophen (NORCO) 5-325 mg per tablet Take 1 tablet by mouth every 8 (eight) hours as needed for Pain.    ketorolac (TORADOL) 10 mg tablet Take by mouth.    levothyroxine (SYNTHROID) 25 MCG tablet Take 1 tablet by mouth every morning.    LINZESS 290 mcg Cap capsule TAKE ONE CAPSULE BY MOUTH EVERY DAY    LINZESS 290 mcg Cap capsule TAKE 1 CAPSULE(290 MCG) BY MOUTH BEFORE BREAKFAST    magnesium carb,citrate,oxide (MAGNESIUM COMPLEX ORAL) Take by mouth.    midodrine (PROAMATINE) 5 MG Tab Take 1 tablet (5 mg total) by mouth 3 (three) times daily with meals. If BP is lower can increase to 10mg three times a day, if elevated can stop    modafiniL (PROVIGIL) 200 MG Tab Take 200 mg by mouth once daily. (Patient not taking: Reported on 7/17/2024)    tiZANidine 4 mg Cap Take by mouth.    traMADoL (ULTRAM) 50 mg tablet Take 50 mg by mouth every 6 (six) hours.    UNABLE TO FIND medication name: thymoquinone/black seed (Patient not taking:  Reported on 7/17/2024)       Review of patient's allergies indicates:  No Known Allergies    Past Medical History:   Diagnosis Date    Adjustment disorder with mixed anxiety and depressed mood 02/06/2013    Anemia, B12 deficiency     Anxiety     Arthritis     Bradycardia      Past Surgical History:   Procedure Laterality Date    CHOLECYSTECTOMY  10/2012    COLONOSCOPY N/A 02/14/2020    Procedure: COLONOSCOPY;  Surgeon: Jas Moore MD;  Location: [facility] (4TH FLR);  Service: Endoscopy;  Laterality: N/A;  Pt procedure time changed to 0800 with 0715 - second half prep completed from [id number]- Pt verbalized understanding- ERW2/13/20@1022    COLONOSCOPY N/A 03/10/2023    Procedure: COLONOSCOPY;  Surgeon: HORACE Moore MD;  Location: [facility] (4TH FLR);  Service: Endoscopy;  Laterality: N/A;  inst emailed-RB    DECOMPRESSION, NERVE, ULNAR Left 2/15/2024    Procedure: DECOMPRESSION, NERVE, ULNAR;  Surgeon: Jas Larose MD;  Location: [facility] OR;  Service: Orthopedics;  Laterality: Left;  ulnar nerve decompression left elbow with cubital tunnel release and possible anterior transposition ulnar nerve    DILATION AND CURETTAGE OF UTERUS      ESOPHAGOGASTRODUODENOSCOPY N/A 07/20/2018    Procedure: ESOPHAGOGASTRODUODENOSCOPY (EGD);  Surgeon: Darwin Hansen MD;  [facility]  Service: Endoscopy;  Laterality: N/A;  Please measure pouch and limbs    GASTRIC BYPASS  2008    Revision August 2019    INJECTION OF ANESTHETIC AGENT AROUND LATERAL BRANCH NERVES OF SACROILIAC JOINT      LAPAROSCOPIC REPAIR OF HIATAL HERNIA  2019    Liver scope  10/2012    LYSIS OF ADHESIONS      ULNAR TUNNEL RELEASE Left 2/15/2024    Procedure: RELEASE, CUBITAL TUNNEL;  Surgeon: Jas Larose MD;  Location: [facility] OR;  Service: Orthopedics;  Laterality: Left;  ulnar nerve decompression left elbow with cubital tunnel release and possible anterior transposition ulnar nerve     Family History        Problem Relation (Age of Onset)    Heart disease Father    Hypertension Father          Tobacco Use    Smoking status: Never    Smokeless tobacco: Never   Substance and Sexual Activity    Alcohol use: Never     Comment: Alcohol use rarely    Drug use: No     Comment: Mirena    Sexual activity: Yes     Partners: Male     Birth control/protection: I.U.D.     Review of Systems   Constitutional:  Negative for appetite change, chills, fatigue, fever and unexpected weight change.   HENT:  Negative for hearing loss and rhinorrhea.    Eyes:  Negative for visual disturbance.   Respiratory:  Negative for apnea, cough, shortness of breath and wheezing.    Cardiovascular:  Negative for chest pain and palpitations.   Gastrointestinal:  Positive for abdominal pain and nausea. Negative for abdominal distention, blood in stool, constipation, diarrhea and vomiting.   Genitourinary:  Negative for dysuria, frequency and urgency.   Musculoskeletal:  Negative for arthralgias and neck pain.   Skin:  Negative for rash.   Neurological:  Negative for seizures, weakness, numbness and headaches.   Hematological:  Negative for adenopathy. Does not bruise/bleed easily.   Psychiatric/Behavioral:  Negative for hallucinations. The patient is not nervous/anxious.      Objective:     Vital Signs (Most Recent):  Temp: 98.3 °F (36.8 °C) (10/16/24 1018)  Pulse: 62 (10/16/24 1532)  Resp: 18 (10/16/24 1532)  BP: 114/60 (10/16/24 1532)  SpO2: 99 % (10/16/24 1532) Vital Signs (24h Range):  Temp:  [98.3 °F (36.8 °C)] 98.3 °F (36.8 °C)  Pulse:  [58-82] 62  Resp:  [15-20] 18  SpO2:  [99 %-100 %] 99 %  BP: (110-128)/(60-79) 114/60     Weight: 116.5 kg (256 lb 12.8 oz)  Body mass index is 39.05 kg/m².     Physical Exam  Vitals and nursing note reviewed.   Constitutional:       Appearance: She is well-developed. She is obese.   HENT:      Head: Normocephalic.   Eyes:      Pupils: Pupils are equal, round, and reactive to light.   Neck:      Thyroid: No  "thyromegaly.      Vascular: No JVD.      Trachea: No tracheal deviation.   Cardiovascular:      Rate and Rhythm: Normal rate and regular rhythm.      Heart sounds: Normal heart sounds.   Pulmonary:      Breath sounds: Normal breath sounds. No wheezing.   Abdominal:      General: Bowel sounds are normal. There is no distension.      Palpations: Abdomen is soft. Abdomen is not rigid. There is no mass.      Tenderness: There is no abdominal tenderness. There is no guarding or rebound.      Comments: Obese.  Well-healed incision   Musculoskeletal:         General: Normal range of motion.      Right lower leg: No edema.      Left lower leg: No edema.   Lymphadenopathy:      Cervical: No cervical adenopathy.   Skin:     General: Skin is warm and dry.      Findings: No erythema or rash.   Neurological:      General: No focal deficit present.      Mental Status: She is oriented to person, place, and time.   Psychiatric:         Mood and Affect: Mood normal.         Behavior: Behavior normal.         Thought Content: Thought content normal.         Judgment: Judgment normal.            I have reviewed all pertinent lab results within the past 24 hours.  CBC:   Recent Labs   Lab 10/16/24  1100   WBC 5.44   RBC 4.28   HGB 12.1   HCT 36.7*      MCV 86   MCH 28.3   MCHC 33.0     BMP:   Recent Labs   Lab 10/16/24  1100         K 3.1*      CO2 19*   BUN 6   CREATININE 0.9   CALCIUM 9.2     CMP:   Recent Labs   Lab 10/16/24  1100      CALCIUM 9.2   ALBUMIN 3.8   PROT 7.2      K 3.1*   CO2 19*      BUN 6   CREATININE 0.9   ALKPHOS 75   ALT 17   AST 14   BILITOT 0.4     LFTs:   Recent Labs   Lab 10/16/24  1100   ALT 17   AST 14   ALKPHOS 75   BILITOT 0.4   PROT 7.2   ALBUMIN 3.8     Coagulation: No results for input(s): "LABPROT", "INR", "APTT" in the last 168 hours.  Cardiac markers: No results for input(s): "CKMB", "CPKMB", "TROPONINT", "TROPONINI", "MYOGLOBIN" in the last 168 " hours.    Significant Diagnostics:  I have reviewed all pertinent imaging results/findings within the past 24 hours.  CT: I have reviewed all pertinent results/findings within the past 24 hours and my personal findings are:  Concerning for intussusception       Result Notes  Details    Reading Physician Reading Date Result Priority   Mathew Pinto MD  [phone] 10/16/2024 STAT     Narrative & Impression  EXAMINATION:  CT ABDOMEN PELVIS WITH IV CONTRAST     CLINICAL HISTORY:  LLQ abdominal pain;     TECHNIQUE:  Low dose axial images, sagittal and coronal reformations were obtained from the lung bases to the pubic symphysis following the IV administration of a palpable abnormality of the mL of Omnipaque 350.     COMPARISON:  None     FINDINGS:  Heart: Normal size as far as seen. No effusion as far as seen.     Lung Bases: Clear.     Liver: Mild hepatomegaly hypodense lesion of the hepatic dome measuring up to 15 mm to the right of falciform ligament     Gallbladder: No calcified gallstones.     Bile Ducts: No dilatation.     Pancreas: No mass. No peripancreatic fat stranding.     Spleen: Normal.     Adrenals: Normal.     Kidneys/Ureters: Normal enhancement.  No mass or  hydroureteronephrosis.  Small left peripelvic cyst.     Bladder: No wall thickening.     Reproductive organs: IUD device in place     GI Tract/Mesentery: Status post gastric bypass.  No evidence of bowel obstruction or inflammation.  No evidence of acute appendicitis. Focal intussusception in the left hemiabdomen, side of patient's pain with focal dilatation of the adjacent small bowel to 7.5 cm on coronal images. Recommend clinical correlation and follow-up.  Underlying small-bowel lesion not excluded.  Mild GE junction thickening, status post gastric bypass     Peritoneal Space: No ascites or free air.     Retroperitoneum: No significant adenopathy.     Abdominal wall: Normal.     Vasculature: No aneurysm.     Bones: No acute fracture. No  suspicious lytic or sclerotic lesions.     Impression:     Focal intussusception in the left hemiabdomen, side of patient's pain with focal dilatation of the adjacent small bowel up to 7.5 cm on coronal images. Recommend clinical correlation and follow-up.  Underlying small-bowel lesion not excluded.     IUD device in place     Status post gastric bypass     All CT scans at this facility use dose modulation, iterative reconstruction, and/or weight based dosing when appropriate to reduce radiation dose to as low as reasonably achievable.        Electronically signed by:Mathew Pinto  Date:                                            10/16/2024  Time:                                           16:49

## 2024-10-16 NOTE — H&P
[facility] - Emergency Dept.  General Surgery  History & Physical    Patient Name: Johanna Bullock  MRN: [id number]  Admission Date: 10/16/2024  Attending Physician: Braden Marroquin MD   Primary Care Provider: Ran Mcgill MD    Patient information was obtained from patient, past medical records, and ER records.     Subjective:     Chief Complaint/Reason for Admission:  Abdominal pain and nausea   Small bowel intussusception (maybe transient)    History of Present Illness: 39-year-old female who has past surgical history is significant for a open gastric bypass, laparoscopic revision of the gastric bypass, laparoscopic lysis of adhesions and resection of an intussusception.  The patient was not sure if the intussusception was at the jejunal-jejunal anastomosis are 2 different location.      She presented to the emergency room after having a twinge of left-sided abdominal  .  The pain then became more severe causing her to lie on the floor and ride a bit.  It was associated with some nausea but no vomiting.      Patient presented to the emergency room where a CT scan was oral contrast was performed and it showed an intussusception with some dilated proximal bowel.      The patient states that the pain has improved.      The patient did request IV glutathione for treatment of free radicles however we do not have this medication available according to the pharmacy    Current Facility-Administered Medications on File Prior to Encounter   Medication    ceFAZolin 2 g in dextrose 5 % in water (D5W) 50 mL IVPB (MB+)    chlorhexidine 0.12 % solution 10 mL     Current Outpatient Medications on File Prior to Encounter   Medication Sig    acetaminophen (TYLENOL) 650 MG TbSR Take 650 mg by mouth every 8 (eight) hours.    acetylcysteine (N-ACETYL-L-CYSTEINE MISC) by Misc.(Non-Drug; Combo Route) route.    albuterol (PROVENTIL/VENTOLIN HFA) 90 mcg/actuation inhaler Inhale 1-2 puffs into the lungs every 6 (six) hours as needed  for Wheezing.    cyanocobalamin 1,000 mcg/mL injection ONE INJECTION AS DIRECTED EVERY 28 DAYS    DULoxetine (CYMBALTA) 30 MG capsule Take 1 capsule (30 mg total) by mouth every evening. (Patient not taking: Reported on 7/7/2024)    FLUoxetine 20 MG capsule Take 20 mg by mouth.    fluticasone propionate (FLONASE) 50 mcg/actuation nasal spray 1 spray by Each Nostril route once daily. (Patient not taking: Reported on 7/17/2024)    HYDROcodone-acetaminophen (NORCO) 5-325 mg per tablet Take 1 tablet by mouth every 8 (eight) hours as needed for Pain.    ketorolac (TORADOL) 10 mg tablet Take by mouth.    levothyroxine (SYNTHROID) 25 MCG tablet Take 1 tablet by mouth every morning.    LINZESS 290 mcg Cap capsule TAKE ONE CAPSULE BY MOUTH EVERY DAY    LINZESS 290 mcg Cap capsule TAKE 1 CAPSULE(290 MCG) BY MOUTH BEFORE BREAKFAST    magnesium carb,citrate,oxide (MAGNESIUM COMPLEX ORAL) Take by mouth.    midodrine (PROAMATINE) 5 MG Tab Take 1 tablet (5 mg total) by mouth 3 (three) times daily with meals. If BP is lower can increase to 10mg three times a day, if elevated can stop    modafiniL (PROVIGIL) 200 MG Tab Take 200 mg by mouth once daily. (Patient not taking: Reported on 7/17/2024)    tiZANidine 4 mg Cap Take by mouth.    traMADoL (ULTRAM) 50 mg tablet Take 50 mg by mouth every 6 (six) hours.    UNABLE TO FIND medication name: thymoquinone/black seed (Patient not taking: Reported on 7/17/2024)       Review of patient's allergies indicates:  No Known Allergies    Past Medical History:   Diagnosis Date    Adjustment disorder with mixed anxiety and depressed mood 02/06/2013    Anemia, B12 deficiency     Anxiety     Arthritis     Bradycardia      Past Surgical History:   Procedure Laterality Date    CHOLECYSTECTOMY  10/2012    COLONOSCOPY N/A 02/14/2020    Procedure: COLONOSCOPY;  Surgeon: Jas Moore MD;  Location: [address];  Service: Endoscopy;  Laterality: N/A;  Pt procedure time changed to 0800 with  0715 - second half prep completed from [id number]- Pt verbalized understanding- ERW2/13/20@1022    COLONOSCOPY N/A 03/10/2023    Procedure: COLONOSCOPY;  Surgeon: HORACE Moore MD;  [facility]  Service: Endoscopy;  Laterality: N/A;  inst emailed-RB    DECOMPRESSION, NERVE, ULNAR Left 2/15/2024    Procedure: DECOMPRESSION, NERVE, ULNAR;  Surgeon: Jas Larose MD;  Location: [facility] OR;  Service: Orthopedics;  Laterality: Left;  ulnar nerve decompression left elbow with cubital tunnel release and possible anterior transposition ulnar nerve    DILATION AND CURETTAGE OF UTERUS      ESOPHAGOGASTRODUODENOSCOPY N/A 07/20/2018    Procedure: ESOPHAGOGASTRODUODENOSCOPY (EGD);  Surgeon: Darwin Hansen MD;  Location: [facility] (4TH FLR);  Service: Endoscopy;  Laterality: N/A;  Please measure pouch and limbs    GASTRIC BYPASS  2008    Revision August 2019    INJECTION OF ANESTHETIC AGENT AROUND LATERAL BRANCH NERVES OF SACROILIAC JOINT      LAPAROSCOPIC REPAIR OF HIATAL HERNIA  2019    Liver scope  10/2012    LYSIS OF ADHESIONS      ULNAR TUNNEL RELEASE Left 2/15/2024    Procedure: RELEASE, CUBITAL TUNNEL;  Surgeon: Jas Larose MD;  Location: [facility] OR;  Service: Orthopedics;  Laterality: Left;  ulnar nerve decompression left elbow with cubital tunnel release and possible anterior transposition ulnar nerve     Family History       Problem Relation (Age of Onset)    Heart disease Father    Hypertension Father          Tobacco Use    Smoking status: Never    Smokeless tobacco: Never   Substance and Sexual Activity    Alcohol use: Never     Comment: Alcohol use rarely    Drug use: No     Comment: Mirena    Sexual activity: Yes     Partners: Male     Birth control/protection: I.U.D.     Review of Systems   Constitutional:  Negative for appetite change, chills, fatigue, fever and unexpected weight change.   HENT:  Negative for hearing loss and rhinorrhea.    Eyes:  Negative for visual  disturbance.   Respiratory:  Negative for apnea, cough, shortness of breath and wheezing.    Cardiovascular:  Negative for chest pain and palpitations.   Gastrointestinal:  Positive for abdominal pain and nausea. Negative for abdominal distention, blood in stool, constipation, diarrhea and vomiting.   Genitourinary:  Negative for dysuria, frequency and urgency.   Musculoskeletal:  Negative for arthralgias and neck pain.   Skin:  Negative for rash.   Neurological:  Negative for seizures, weakness, numbness and headaches.   Hematological:  Negative for adenopathy. Does not bruise/bleed easily.   Psychiatric/Behavioral:  Negative for hallucinations. The patient is not nervous/anxious.      Objective:     Vital Signs (Most Recent):  Temp: 98.3 °F (36.8 °C) (10/16/24 1018)  Pulse: 62 (10/16/24 1532)  Resp: 18 (10/16/24 1532)  BP: 114/60 (10/16/24 1532)  SpO2: 99 % (10/16/24 1532) Vital Signs (24h Range):  Temp:  [98.3 °F (36.8 °C)] 98.3 °F (36.8 °C)  Pulse:  [58-82] 62  Resp:  [15-20] 18  SpO2:  [99 %-100 %] 99 %  BP: (110-128)/(60-79) 114/60     Weight: 116.5 kg (256 lb 12.8 oz)  Body mass index is 39.05 kg/m².     Physical Exam  Vitals and nursing note reviewed.   Constitutional:       Appearance: She is well-developed. She is obese.   HENT:      Head: Normocephalic.   Eyes:      Pupils: Pupils are equal, round, and reactive to light.   Neck:      Thyroid: No thyromegaly.      Vascular: No JVD.      Trachea: No tracheal deviation.   Cardiovascular:      Rate and Rhythm: Normal rate and regular rhythm.      Heart sounds: Normal heart sounds.   Pulmonary:      Breath sounds: Normal breath sounds. No wheezing.   Abdominal:      General: Bowel sounds are normal. There is no distension.      Palpations: Abdomen is soft. Abdomen is not rigid. There is no mass.      Tenderness: There is no abdominal tenderness. There is no guarding or rebound.      Comments: Obese.  Well-healed incision   Musculoskeletal:         General:  "Normal range of motion.      Right lower leg: No edema.      Left lower leg: No edema.   Lymphadenopathy:      Cervical: No cervical adenopathy.   Skin:     General: Skin is warm and dry.      Findings: No erythema or rash.   Neurological:      General: No focal deficit present.      Mental Status: She is oriented to person, place, and time.   Psychiatric:         Mood and Affect: Mood normal.         Behavior: Behavior normal.         Thought Content: Thought content normal.         Judgment: Judgment normal.            I have reviewed all pertinent lab results within the past 24 hours.  CBC:   Recent Labs   Lab 10/16/24  1100   WBC 5.44   RBC 4.28   HGB 12.1   HCT 36.7*      MCV 86   MCH 28.3   MCHC 33.0     BMP:   Recent Labs   Lab 10/16/24  1100         K 3.1*      CO2 19*   BUN 6   CREATININE 0.9   CALCIUM 9.2     CMP:   Recent Labs   Lab 10/16/24  1100      CALCIUM 9.2   ALBUMIN 3.8   PROT 7.2      K 3.1*   CO2 19*      BUN 6   CREATININE 0.9   ALKPHOS 75   ALT 17   AST 14   BILITOT 0.4     LFTs:   Recent Labs   Lab 10/16/24  1100   ALT 17   AST 14   ALKPHOS 75   BILITOT 0.4   PROT 7.2   ALBUMIN 3.8     Coagulation: No results for input(s): "LABPROT", "INR", "APTT" in the last 168 hours.  Cardiac markers: No results for input(s): "CKMB", "CPKMB", "TROPONINT", "TROPONINI", "MYOGLOBIN" in the last 168 hours.    Significant Diagnostics:  I have reviewed all pertinent imaging results/findings within the past 24 hours.  CT: I have reviewed all pertinent results/findings within the past 24 hours and my personal findings are:  Concerning for intussusception       Result Notes  Details    Reading Physician Reading Date Result Priority   Mathew Pinto MD  [phone] 10/16/2024 STAT     Narrative & Impression  EXAMINATION:  CT ABDOMEN PELVIS WITH IV CONTRAST     CLINICAL HISTORY:  LLQ abdominal pain;     TECHNIQUE:  Low dose axial images, sagittal and coronal reformations " were obtained from the lung bases to the pubic symphysis following the IV administration of a palpable abnormality of the mL of Omnipaque 350.     COMPARISON:  None     FINDINGS:  Heart: Normal size as far as seen. No effusion as far as seen.     Lung Bases: Clear.     Liver: Mild hepatomegaly hypodense lesion of the hepatic dome measuring up to 15 mm to the right of falciform ligament     Gallbladder: No calcified gallstones.     Bile Ducts: No dilatation.     Pancreas: No mass. No peripancreatic fat stranding.     Spleen: Normal.     Adrenals: Normal.     Kidneys/Ureters: Normal enhancement.  No mass or  hydroureteronephrosis.  Small left peripelvic cyst.     Bladder: No wall thickening.     Reproductive organs: IUD device in place     GI Tract/Mesentery: Status post gastric bypass.  No evidence of bowel obstruction or inflammation.  No evidence of acute appendicitis. Focal intussusception in the left hemiabdomen, side of patient's pain with focal dilatation of the adjacent small bowel to 7.5 cm on coronal images. Recommend clinical correlation and follow-up.  Underlying small-bowel lesion not excluded.  Mild GE junction thickening, status post gastric bypass     Peritoneal Space: No ascites or free air.     Retroperitoneum: No significant adenopathy.     Abdominal wall: Normal.     Vasculature: No aneurysm.     Bones: No acute fracture. No suspicious lytic or sclerotic lesions.     Impression:     Focal intussusception in the left hemiabdomen, side of patient's pain with focal dilatation of the adjacent small bowel up to 7.5 cm on coronal images. Recommend clinical correlation and follow-up.  Underlying small-bowel lesion not excluded.     IUD device in place     Status post gastric bypass     All CT scans at this facility use dose modulation, iterative reconstruction, and/or weight based dosing when appropriate to reduce radiation dose to as low as reasonably achievable.        Electronically signed by:Mathew  Blair  Date:                                            10/16/2024  Time:                                           16:49     Assessment/Plan:     Body mass index (BMI) of 39.0 to 39.9 in adult  This increases the risk of wound complications if surgical intervention is required    Intussusception intestine  CT scan shows an intussusception.  This could be a transient intussusception at the jejunal jejunal anastomosis after her gastric bypass.  It could also be a unrelated intussusception.      The patient will be admitted to observation   IV fluids  Ice chips   Pain medicine   Nausea medicine   SCDs.  We will repeat her CT scan in the morning and if the intussusception is still present surgical intervention may be required.      I explained that if she does require surgery this would be done by 1 of my partners.      Questions were answered    History of bariatric surgery  Intussusception maybe secondary to a bowel size disparity at the jejunojejunal anastomosis.  This may be transient and resolve or may require surgical intervention.      This was discussed with her    History of peptic ulcer  PPIs      VTE Risk Mitigation (From admission, onward)      None          The patient requested IV glue to thigh on for treatment of potential free radicles.  According to the pharmacist we do not have IV glue to thigh on available    Braden Marroquin MD  General Surgery  [facility] - Emergency Dept.
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
## 2024-10-16 NOTE — ASSESSMENT & PLAN NOTE
CT scan shows an intussusception.  This could be a transient intussusception at the jejunal jejunal anastomosis after her gastric bypass.  It could also be a unrelated intussusception.      The patient will be admitted to observation   IV fluids  Ice chips   Pain medicine   Nausea medicine   SCDs.  We will repeat her CT scan in the morning and if the intussusception is still present surgical intervention may be required.      I explained that if she does require surgery this would be done by 1 of my partners.      Questions were answered

## 2024-10-16 NOTE — ASSESSMENT & PLAN NOTE
Intussusception maybe secondary to a bowel size disparity at the jejunojejunal anastomosis.  This may be transient and resolve or may require surgical intervention.      This was discussed with her

## 2024-10-16 NOTE — Clinical Note
Diagnosis: Intussusception intestine [233376]   Future Attending Provider: LUIS GONG [[id number]]   Special Needs:: No Special Needs [1]

## 2024-10-16 NOTE — ED NOTES
Pt resting in ER stretcher, aaox4, rr e/u, moderate distress noted. Pt remains on cardiac monitor with vss noted. Bed low and locked, call light in reach, side rails up x2. Pt verbalized understanding of status and POC including need for urine specimen; denies further needs. Will continue to monitor.

## 2024-10-16 NOTE — HPI
39-year-old female who has past surgical history is significant for a open gastric bypass, laparoscopic revision of the gastric bypass, laparoscopic lysis of adhesions and resection of an intussusception.  The patient was not sure if the intussusception was at the jejunal-jejunal anastomosis are 2 different location.      She presented to the emergency room after having a twinge of left-sided abdominal  .  The pain then became more severe causing her to lie on the floor and ride a bit.  It was associated with some nausea but no vomiting.      Patient presented to the emergency room where a CT scan was oral contrast was performed and it showed an intussusception with some dilated proximal bowel.      The patient states that the pain has improved.      The patient did request IV glutathione for treatment of free radicles however we do not have this medication available according to the pharmacy

## 2024-10-16 NOTE — ED NOTES
Pt states she is interested in having a lumbar puncture performed to look for possible infection.  ED physician was notified.

## 2024-10-17 VITALS
RESPIRATION RATE: 18 BRPM | BODY MASS INDEX: 39.05 KG/M2 | TEMPERATURE: 98 F | WEIGHT: 256.81 LBS | OXYGEN SATURATION: 99 % | HEART RATE: 56 BPM | DIASTOLIC BLOOD PRESSURE: 57 MMHG | SYSTOLIC BLOOD PRESSURE: 100 MMHG

## 2024-10-17 PROBLEM — K56.1 INTUSSUSCEPTION INTESTINE: Status: RESOLVED | Noted: 2024-10-16 | Resolved: 2024-10-17

## 2024-10-17 LAB
ANION GAP SERPL CALC-SCNC: 9 MMOL/L (ref 8–16)
BASOPHILS # BLD AUTO: 0.04 K/UL (ref 0–0.2)
BASOPHILS NFR BLD: 0.8 % (ref 0–1.9)
BUN SERPL-MCNC: 4 MG/DL (ref 6–20)
CALCIUM SERPL-MCNC: 8.3 MG/DL (ref 8.7–10.5)
CHLORIDE SERPL-SCNC: 108 MMOL/L (ref 95–110)
CO2 SERPL-SCNC: 20 MMOL/L (ref 23–29)
CREAT SERPL-MCNC: 0.8 MG/DL (ref 0.5–1.4)
DIFFERENTIAL METHOD BLD: ABNORMAL
EOSINOPHIL # BLD AUTO: 0.1 K/UL (ref 0–0.5)
EOSINOPHIL NFR BLD: 1.9 % (ref 0–8)
ERYTHROCYTE [DISTWIDTH] IN BLOOD BY AUTOMATED COUNT: 14.1 % (ref 11.5–14.5)
EST. GFR  (NO RACE VARIABLE): >60 ML/MIN/1.73 M^2
GLUCOSE SERPL-MCNC: 77 MG/DL (ref 70–110)
HCT VFR BLD AUTO: 36 % (ref 37–48.5)
HGB BLD-MCNC: 11.7 G/DL (ref 12–16)
IMM GRANULOCYTES # BLD AUTO: 0.02 K/UL (ref 0–0.04)
IMM GRANULOCYTES NFR BLD AUTO: 0.4 % (ref 0–0.5)
LYMPHOCYTES # BLD AUTO: 1.6 K/UL (ref 1–4.8)
LYMPHOCYTES NFR BLD: 33.1 % (ref 18–48)
MCH RBC QN AUTO: 28.1 PG (ref 27–31)
MCHC RBC AUTO-ENTMCNC: 32.5 G/DL (ref 32–36)
MCV RBC AUTO: 87 FL (ref 82–98)
MONOCYTES # BLD AUTO: 0.6 K/UL (ref 0.3–1)
MONOCYTES NFR BLD: 11.6 % (ref 4–15)
NEUTROPHILS # BLD AUTO: 2.5 K/UL (ref 1.8–7.7)
NEUTROPHILS NFR BLD: 52.2 % (ref 38–73)
NRBC BLD-RTO: 0 /100 WBC
PLATELET # BLD AUTO: 325 K/UL (ref 150–450)
PMV BLD AUTO: 9.3 FL (ref 9.2–12.9)
POTASSIUM SERPL-SCNC: 3.6 MMOL/L (ref 3.5–5.1)
RBC # BLD AUTO: 4.16 M/UL (ref 4–5.4)
SODIUM SERPL-SCNC: 137 MMOL/L (ref 136–145)
WBC # BLD AUTO: 4.74 K/UL (ref 3.9–12.7)

## 2024-10-17 PROCEDURE — 96374 THER/PROPH/DIAG INJ IV PUSH: CPT

## 2024-10-17 PROCEDURE — 25000003 PHARM REV CODE 250: Performed by: SURGERY

## 2024-10-17 PROCEDURE — 36415 COLL VENOUS BLD VENIPUNCTURE: CPT | Performed by: SURGERY

## 2024-10-17 PROCEDURE — G0378 HOSPITAL OBSERVATION PER HR: HCPCS

## 2024-10-17 PROCEDURE — 99239 HOSP IP/OBS DSCHRG MGMT >30: CPT | Mod: ,,,

## 2024-10-17 PROCEDURE — 96375 TX/PRO/DX INJ NEW DRUG ADDON: CPT

## 2024-10-17 PROCEDURE — 85025 COMPLETE CBC W/AUTO DIFF WBC: CPT | Performed by: SURGERY

## 2024-10-17 PROCEDURE — 96376 TX/PRO/DX INJ SAME DRUG ADON: CPT

## 2024-10-17 PROCEDURE — 80048 BASIC METABOLIC PNL TOTAL CA: CPT | Performed by: SURGERY

## 2024-10-17 PROCEDURE — 99284 EMERGENCY DEPT VISIT MOD MDM: CPT | Mod: 25

## 2024-10-17 PROCEDURE — 63600175 PHARM REV CODE 636 W HCPCS: Performed by: SURGERY

## 2024-10-17 RX ORDER — SODIUM CHLORIDE 9 MG/ML
INJECTION, SOLUTION INTRAVENOUS CONTINUOUS
Status: DISCONTINUED | OUTPATIENT
Start: 2024-10-17 | End: 2024-10-17 | Stop reason: HOSPADM

## 2024-10-17 RX ORDER — ACETAMINOPHEN 325 MG/1
650 TABLET ORAL ONCE
Status: COMPLETED | OUTPATIENT
Start: 2024-10-17 | End: 2024-10-17

## 2024-10-17 RX ADMIN — HYDROMORPHONE HYDROCHLORIDE 1 MG: 1 INJECTION, SOLUTION INTRAMUSCULAR; INTRAVENOUS; SUBCUTANEOUS at 07:10

## 2024-10-17 RX ADMIN — FLUOXETINE HYDROCHLORIDE 20 MG: 20 CAPSULE ORAL at 08:10

## 2024-10-17 RX ADMIN — SODIUM CHLORIDE: 9 INJECTION, SOLUTION INTRAVENOUS at 09:10

## 2024-10-17 RX ADMIN — PANTOPRAZOLE SODIUM 40 MG: 40 INJECTION, POWDER, FOR SOLUTION INTRAVENOUS at 08:10

## 2024-10-17 RX ADMIN — ACETAMINOPHEN 650 MG: 325 TABLET ORAL at 02:10

## 2024-10-17 NOTE — PLAN OF CARE
Problem: Adult Inpatient Plan of Care  Goal: Plan of Care Review  Outcome: Progressing     Problem: Adult Inpatient Plan of Care  Goal: Plan of Care Review  10/17/2024 0607 by Camilo Ramirez RN  Outcome: Progressing  10/17/2024 0606 by Camilo Ramirez RN  Outcome: Progressing  Goal: Patient-Specific Goal (Individualized)  10/17/2024 0607 by Camilo Ramirez RN  Outcome: Progressing  10/17/2024 0606 by Camilo Ramirez RN  Outcome: Not Progressing  Flowsheets (Taken 10/17/2024 0606)  Individualized Care Needs: none  Anxieties, Fears or Concerns: hydration  Patient/Family-Specific Goals (Include Timeframe): none  Goal: Absence of Hospital-Acquired Illness or Injury  10/17/2024 0607 by Camilo Ramirez RN  Outcome: Progressing  10/17/2024 0606 by Camilo Ramirez RN  Outcome: Not Progressing  Goal: Optimal Comfort and Wellbeing  10/17/2024 0607 by Camilo Ramirez RN  Outcome: Progressing  10/17/2024 0606 by Camilo Ramirez RN  Outcome: Not Progressing  Goal: Readiness for Transition of Care  10/17/2024 0607 by Camilo Ramirez RN  Outcome: Progressing  10/17/2024 0606 by Camilo Ramirez RN  Outcome: Not Progressing

## 2024-10-17 NOTE — HOSPITAL COURSE
10/17/2024:  Morning CT scan with resolution of intussusception.  Advance diet as tolerated.  Can discharge home if tolerates regular diet.  Would recommend she follows up with her surgeon as an outpatient for possible EGD.    Tolerated diet. Stable for discharge to home.

## 2024-10-17 NOTE — PLAN OF CARE
[facility] - Med Surg  Discharge Final Note    Primary Care Provider: Ran Mcgill MD    Expected Discharge Date: 10/17/2024    Final Discharge Note (most recent)       Final Note - 10/17/24 1129          Final Note    Assessment Type Final Discharge Note     Anticipated Discharge Disposition Home or Self Care        Post-Acute Status    Discharge Delays None known at this time                     Pt has no d/c needs.

## 2024-10-17 NOTE — PLAN OF CARE
[facility] - Med Surg  Initial Discharge Assessment       Primary Care Provider: Ran Mcgill MD    Admission Diagnosis: Intussusception intestine [K56.1]    Admission Date: 10/16/2024  Expected Discharge Date: per attending    Transition of Care Barriers: None    Payor: BLUE CROSS BLUE SHIELD / Plan: [place]BS [place] PPO / Product Type: PPO /     Extended Emergency Contact Information  Primary Emergency Contact: Myra Bullock  Address: [address] [place] [address] [facility]  Home Phone: [phone]  Relation: Mother    Discharge Plan A: Home         [id number] - [place], [place] - [address] AT [facility] [address]  [address]  [place] [address]  Phone: [phone] Fax: [phone]    [facility] - [place], [place] - [address]  [address]  [place] [address]  Phone: [phone] Fax: [phone]      Initial Assessment (most recent)       Adult Discharge Assessment - 10/17/24 1104          Discharge Assessment    Assessment Type Discharge Planning Assessment     Confirmed/corrected address, phone number and insurance Yes     Confirmed Demographics Correct on Facesheet     Source of Information patient     When was your last doctors appointment? --   October 2024    Does patient/caregiver understand observation status Yes     Communicated ROSARIO with patient/caregiver Date not available/Unable to determine     Reason For Admission Intussusception Intestine     People in Home alone     Do you expect to return to your current living situation? Yes     Do you have help at home or someone to help you manage your care at home? No     Prior to hospitilization cognitive status: Alert/Oriented     Current cognitive status: Alert/Oriented     Walking or Climbing Stairs Difficulty no     Dressing/Bathing Difficulty no     Equipment Currently Used at Home none     Readmission within 30 days? No     Patient currently being followed by  outpatient case management? Unable to determine (comments)     Do you currently have service(s) that help you manage your care at home? No     Do you take prescription medications? Yes     Do you have prescription coverage? Yes     Coverage BCBS     Do you have any problems affording any of your prescribed medications? Yes     If yes, what medications? ALL     Is the patient taking medications as prescribed? yes     Who is going to help you get home at discharge? Self     How do you get to doctors appointments? car, drives self     Are you on dialysis? No     Do you take coumadin? No     Discharge Plan A Home     DME Needed Upon Discharge  none     Discharge Plan discussed with: Patient     Transition of Care Barriers None                      SW Intern met with pt at bedside to discuss treatment and d/c plan. Pt will return home upon d/c. Pt mentioned having issues affording medications. Pt seeking medication assistance.

## 2024-10-17 NOTE — SUBJECTIVE & OBJECTIVE
Interval History: Morning CT scan with resolution of intussusception.  Advance diet as tolerated.  Can discharge home if tolerates regular diet.  Would recommend she follows up with her surgeon as an outpatient for possible EGD.    Medications:  Continuous Infusions:   0.9% NaCl   Intravenous Continuous 100 mL/hr at 10/17/24 0900 New Bag at 10/17/24 0900     Scheduled Meds:   DULoxetine  30 mg Oral QHS    FLUoxetine  20 mg Oral Daily    pantoprazole  40 mg Intravenous Daily     PRN Meds:  Current Facility-Administered Medications:     albuterol sulfate, 2.5 mg, Nebulization, Q6H PRN    HYDROmorphone, 1 mg, Intravenous, Q4H PRN    HYDROmorphone, 1 mg, Intravenous, Q3H PRN    LIDOcaine (PF) 10 mg/ml (1%), 1 mL, Intradermal, Once PRN    ondansetron, 8 mg, Oral, Q8H PRN    prochlorperazine, 5 mg, Intravenous, Q6H PRN    sodium chloride 0.9%, 10 mL, Intravenous, PRN     Review of patient's allergies indicates:  No Known Allergies  Objective:     Vital Signs (Most Recent):  Temp: 98 °F (36.7 °C) (10/17/24 0751)  Pulse: (!) 59 (10/17/24 0751)  Resp: 18 (10/17/24 0754)  BP: 97/69 (10/17/24 0751)  SpO2: 98 % (10/17/24 0751) Vital Signs (24h Range):  Temp:  [97.9 °F (36.6 °C)-98.4 °F (36.9 °C)] 98 °F (36.7 °C)  Pulse:  [55-74] 59  Resp:  [15-20] 18  SpO2:  [92 %-100 %] 98 %  BP: ()/(51-71) 97/69     Weight: 116.5 kg (256 lb 12.8 oz)  Body mass index is 39.05 kg/m².    Intake/Output - Last 3 Shifts         10/15 0700  10/16 0659 10/16 0700  10/17 0659 10/17 0700  10/18 0659           Urine Occurrence  1 x     Stool Occurrence  0 x              Physical Exam  Vitals reviewed.   Constitutional:       General: She is not in acute distress.     Appearance: She is well-developed. She is not toxic-appearing.   HENT:      Head: Normocephalic and atraumatic.      Right Ear: External ear normal.      Left Ear: External ear normal.      Nose: Nose normal.      Mouth/Throat:      Mouth: Mucous membranes are moist.   Eyes:       Extraocular Movements: Extraocular movements intact.   Cardiovascular:      Rate and Rhythm: Normal rate.   Pulmonary:      Effort: Pulmonary effort is normal. No respiratory distress.   Abdominal:      Comments: Abdomen is soft and nondistended with minimal tenderness to palpation in the left upper quadrant   Musculoskeletal:      Cervical back: Neck supple.   Skin:     General: Skin is warm and dry.   Neurological:      Mental Status: She is alert and oriented to person, place, and time.   Psychiatric:         Behavior: Behavior normal.          Significant Labs:  I have reviewed all pertinent lab results within the past 24 hours.  CBC:   Recent Labs   Lab 10/17/24  0558   WBC 4.74   RBC 4.16   HGB 11.7*   HCT 36.0*      MCV 87   MCH 28.1   MCHC 32.5     CMP:   Recent Labs   Lab 10/16/24  1100 10/17/24  0558    77   CALCIUM 9.2 8.3*   ALBUMIN 3.8  --    PROT 7.2  --     137   K 3.1* 3.6   CO2 19* 20*    108   BUN 6 4*   CREATININE 0.9 0.8   ALKPHOS 75  --    ALT 17  --    AST 14  --    BILITOT 0.4  --        Significant Diagnostics:  I have reviewed all pertinent imaging results/findings within the past 24 hours.  CT abdomen and pelvis morning with resolution of intussusception

## 2024-10-17 NOTE — ASSESSMENT & PLAN NOTE
CT scan this morning with resolution of intussusception.      -advance diet as tolerated.  -okay for discharge if tolerates regular diet.  -she should follow up with a bariatric surgeon as outpatient to discuss recurrent intussusception and possible need for EGD to evaluate anastomoses   Impaired

## 2024-10-17 NOTE — DISCHARGE SUMMARY
[facility]  General Surgery  Discharge Summary      Patient Name: Johanna Bullock  MRN: [id number]  Admission Date: 10/16/2024  Hospital Length of Stay: 0 days  Discharge Date and Time:  10/17/2024 4:00 PM  Attending Physician: No att. providers found   Discharging Provider: Padmini Landa PA-C  Primary Care Provider: Ran Mcgill MD    HPI:   39-year-old female who has past surgical history is significant for a open gastric bypass, laparoscopic revision of the gastric bypass, laparoscopic lysis of adhesions and resection of an intussusception.  The patient was not sure if the intussusception was at the jejunal-jejunal anastomosis are 2 different location.      She presented to the emergency room after having a twinge of left-sided abdominal  .  The pain then became more severe causing her to lie on the floor and ride a bit.  It was associated with some nausea but no vomiting.      Patient presented to the emergency room where a CT scan was oral contrast was performed and it showed an intussusception with some dilated proximal bowel.      The patient states that the pain has improved.      The patient did request IV glutathione for treatment of free radicles however we do not have this medication available according to the pharmacy    * No surgery found *      Indwelling Lines/Drains at time of discharge:   Lines/Drains/Airways       None                 Hospital Course: 10/17/2024:  Morning CT scan with resolution of intussusception.  Advance diet as tolerated.  Can discharge home if tolerates regular diet.  Would recommend she follows up with her surgeon as an outpatient for possible EGD.    Tolerated diet. Stable for discharge to home.    Goals of Care Treatment Preferences:  Code Status: Full Code      Consults:     Significant Diagnostic Studies: Labs: CMP   Recent Labs   Lab 10/16/24  1100 10/17/24  0558    137   K 3.1* 3.6    108   CO2 19* 20*    77   BUN 6 4*   CREATININE  0.9 0.8   CALCIUM 9.2 8.3*   PROT 7.2  --    ALBUMIN 3.8  --    BILITOT 0.4  --    ALKPHOS 75  --    AST 14  --    ALT 17  --    ANIONGAP 12 9    and CBC   Recent Labs   Lab 10/16/24  1100 10/17/24  0558   WBC 5.44 4.74   HGB 12.1 11.7*   HCT 36.7* 36.0*    325       Pending Diagnostic Studies:       None          Final Active Diagnoses:    Diagnosis Date Noted POA    Body mass index (BMI) of 39.0 to 39.9 in adult [Z68.39] 10/16/2024 Not Applicable    History of bariatric surgery [Z98.84] 09/26/2019 Not Applicable    History of peptic ulcer [Z87.11] 07/20/2018 Not Applicable      Problems Resolved During this Admission:    Diagnosis Date Noted Date Resolved POA    PRINCIPAL PROBLEM:  Intussusception intestine [K56.1] 10/16/2024 10/17/2024 Yes      Discharged Condition: good    Disposition: Home or Self Care    Follow Up:    Patient Instructions:      Notify your health care provider if you experience any of the following:  increased confusion or weakness     Notify your health care provider if you experience any of the following:  persistent dizziness, light-headedness, or visual disturbances     Notify your health care provider if you experience any of the following:  worsening rash     Notify your health care provider if you experience any of the following:  severe persistent headache     Notify your health care provider if you experience any of the following:  difficulty breathing or increased cough     Notify your health care provider if you experience any of the following:  redness, tenderness, or signs of infection (pain, swelling, redness, odor or green/yellow discharge around incision site)     Notify your health care provider if you experience any of the following:  severe uncontrolled pain     Notify your health care provider if you experience any of the following:  persistent nausea and vomiting or diarrhea     Notify your health care provider if you experience any of the following:  temperature  >100.4     Medications:  Reconciled Home Medications:      Medication List        CONTINUE taking these medications      acetaminophen 650 MG Tbsr  Commonly known as: TYLENOL  Take 650 mg by mouth every 8 (eight) hours.     albuterol 90 mcg/actuation inhaler  Commonly known as: PROVENTIL/VENTOLIN HFA  Inhale 1-2 puffs into the lungs every 6 (six) hours as needed for Wheezing.     cyanocobalamin 1,000 mcg/mL injection  ONE INJECTION AS DIRECTED EVERY 28 DAYS     DULoxetine 30 MG capsule  Commonly known as: CYMBALTA  Take 1 capsule (30 mg total) by mouth every evening.     FLUoxetine 20 MG capsule  Take 20 mg by mouth.     fluticasone propionate 50 mcg/actuation nasal spray  Commonly known as: FLONASE  1 spray by Each Nostril route once daily.     HYDROcodone-acetaminophen 5-325 mg per tablet  Commonly known as: NORCO  Take 1 tablet by mouth every 8 (eight) hours as needed for Pain.     ketorolac 10 mg tablet  Commonly known as: TORADOL  Take by mouth.     levothyroxine 25 MCG tablet  Commonly known as: SYNTHROID  Take 1 tablet by mouth every morning.     * LINZESS 290 mcg Cap capsule  Generic drug: linaCLOtide  TAKE ONE CAPSULE BY MOUTH EVERY DAY     * LINZESS 290 mcg Cap capsule  Generic drug: linaCLOtide  TAKE 1 CAPSULE(290 MCG) BY MOUTH BEFORE BREAKFAST     MAGNESIUM COMPLEX ORAL  Take by mouth.     midodrine 5 MG Tab  Commonly known as: PROAMATINE  Take 1 tablet (5 mg total) by mouth 3 (three) times daily with meals. If BP is lower can increase to 10mg three times a day, if elevated can stop     modafiniL 200 MG Tab  Commonly known as: PROVIGIL  Take 200 mg by mouth once daily.     N-ACETYL-L-CYSTEINE MISC  by Misc.(Non-Drug; Combo Route) route.     tiZANidine 4 mg Cap  Take by mouth.     traMADoL 50 mg tablet  Commonly known as: ULTRAM  Take 50 mg by mouth every 6 (six) hours.           * This list has 2 medication(s) that are the same as other medications prescribed for you. Read the directions carefully, and  ask your doctor or other care provider to review them with you.                Time spent on the discharge of patient: 20 minutes    Padmini Landa PA-C  General Surgery  [facility]

## 2024-10-17 NOTE — PROGRESS NOTES
[facility] Surg  General Surgery  Progress Note    Subjective:     History of Present Illness:  39-year-old female who has past surgical history is significant for a open gastric bypass, laparoscopic revision of the gastric bypass, laparoscopic lysis of adhesions and resection of an intussusception.  The patient was not sure if the intussusception was at the jejunal-jejunal anastomosis are 2 different location.      She presented to the emergency room after having a twinge of left-sided abdominal  .  The pain then became more severe causing her to lie on the floor and ride a bit.  It was associated with some nausea but no vomiting.      Patient presented to the emergency room where a CT scan was oral contrast was performed and it showed an intussusception with some dilated proximal bowel.      The patient states that the pain has improved.      The patient did request IV glutathione for treatment of free radicles however we do not have this medication available according to the pharmacy    Post-Op Info:  * No surgery found *         Interval History: Morning CT scan with resolution of intussusception.  Advance diet as tolerated.  Can discharge home if tolerates regular diet.  Would recommend she follows up with her surgeon as an outpatient for possible EGD.    Medications:  Continuous Infusions:   0.9% NaCl   Intravenous Continuous 100 mL/hr at 10/17/24 0900 New Bag at 10/17/24 0900     Scheduled Meds:   DULoxetine  30 mg Oral QHS    FLUoxetine  20 mg Oral Daily    pantoprazole  40 mg Intravenous Daily     PRN Meds:  Current Facility-Administered Medications:     albuterol sulfate, 2.5 mg, Nebulization, Q6H PRN    HYDROmorphone, 1 mg, Intravenous, Q4H PRN    HYDROmorphone, 1 mg, Intravenous, Q3H PRN    LIDOcaine (PF) 10 mg/ml (1%), 1 mL, Intradermal, Once PRN    ondansetron, 8 mg, Oral, Q8H PRN    prochlorperazine, 5 mg, Intravenous, Q6H PRN    sodium chloride 0.9%, 10 mL, Intravenous, PRN     Review of patient's  allergies indicates:  No Known Allergies  Objective:     Vital Signs (Most Recent):  Temp: 98 °F (36.7 °C) (10/17/24 0751)  Pulse: (!) 59 (10/17/24 0751)  Resp: 18 (10/17/24 0754)  BP: 97/69 (10/17/24 0751)  SpO2: 98 % (10/17/24 0751) Vital Signs (24h Range):  Temp:  [97.9 °F (36.6 °C)-98.4 °F (36.9 °C)] 98 °F (36.7 °C)  Pulse:  [55-74] 59  Resp:  [15-20] 18  SpO2:  [92 %-100 %] 98 %  BP: ()/(51-71) 97/69     Weight: 116.5 kg (256 lb 12.8 oz)  Body mass index is 39.05 kg/m².    Intake/Output - Last 3 Shifts         10/15 0700  10/16 0659 10/16 0700  10/17 0659 10/17 0700  10/18 0659           Urine Occurrence  1 x     Stool Occurrence  0 x              Physical Exam  Vitals reviewed.   Constitutional:       General: She is not in acute distress.     Appearance: She is well-developed. She is not toxic-appearing.   HENT:      Head: Normocephalic and atraumatic.      Right Ear: External ear normal.      Left Ear: External ear normal.      Nose: Nose normal.      Mouth/Throat:      Mouth: Mucous membranes are moist.   Eyes:      Extraocular Movements: Extraocular movements intact.   Cardiovascular:      Rate and Rhythm: Normal rate.   Pulmonary:      Effort: Pulmonary effort is normal. No respiratory distress.   Abdominal:      Comments: Abdomen is soft and nondistended with minimal tenderness to palpation in the left upper quadrant   Musculoskeletal:      Cervical back: Neck supple.   Skin:     General: Skin is warm and dry.   Neurological:      Mental Status: She is alert and oriented to person, place, and time.   Psychiatric:         Behavior: Behavior normal.          Significant Labs:  I have reviewed all pertinent lab results within the past 24 hours.  CBC:   Recent Labs   Lab 10/17/24  0558   WBC 4.74   RBC 4.16   HGB 11.7*   HCT 36.0*      MCV 87   MCH 28.1   MCHC 32.5     CMP:   Recent Labs   Lab 10/16/24  1100 10/17/24  0558    77   CALCIUM 9.2 8.3*   ALBUMIN 3.8  --    PROT 7.2  --    NA  138 137   K 3.1* 3.6   CO2 19* 20*    108   BUN 6 4*   CREATININE 0.9 0.8   ALKPHOS 75  --    ALT 17  --    AST 14  --    BILITOT 0.4  --        Significant Diagnostics:  I have reviewed all pertinent imaging results/findings within the past 24 hours.  CT abdomen and pelvis morning with resolution of intussusception  Assessment/Plan:     * Intussusception intestine  CT scan this morning with resolution of intussusception.      -advance diet as tolerated.  -okay for discharge if tolerates regular diet.  -she should follow up with a bariatric surgeon as outpatient to discuss recurrent intussusception and possible need for EGD to evaluate anastomoses    Body mass index (BMI) of 39.0 to 39.9 in adult  This increases the risk of wound complications if surgical intervention is required    History of bariatric surgery  Intussusception maybe secondary to a bowel size disparity at the jejunojejunal anastomosis.  She can follow up with her bariatric surgeon as an outpatient for discussion as this seems to be recurrent issue for her.    History of peptic ulcer  PPIs        Padmini Landa PA-C  General Surgery  [facility]

## 2024-10-17 NOTE — ASSESSMENT & PLAN NOTE
Intussusception maybe secondary to a bowel size disparity at the jejunojejunal anastomosis.  She can follow up with her bariatric surgeon as an outpatient for discussion as this seems to be recurrent issue for her.

## 2024-10-18 ENCOUNTER — HOSPITAL ENCOUNTER (EMERGENCY)
Facility: HOSPITAL | Age: 39
Discharge: HOME | End: 2024-10-18
Attending: EMERGENCY MEDICINE
Payer: COMMERCIAL

## 2024-10-18 VITALS
DIASTOLIC BLOOD PRESSURE: 61 MMHG | SYSTOLIC BLOOD PRESSURE: 107 MMHG | WEIGHT: 256 LBS | RESPIRATION RATE: 17 BRPM | OXYGEN SATURATION: 97 % | TEMPERATURE: 98 F | HEART RATE: 50 BPM | HEIGHT: 68 IN | BODY MASS INDEX: 38.8 KG/M2

## 2024-10-18 DIAGNOSIS — R10.32 LEFT LOWER QUADRANT ABDOMINAL PAIN: Primary | ICD-10-CM

## 2024-10-18 LAB
ALBUMIN SERPL BCP-MCNC: 3.5 G/DL (ref 3.5–5.2)
ALP SERPL-CCNC: 72 U/L (ref 40–150)
ALT SERPL W/O P-5'-P-CCNC: 13 U/L (ref 10–44)
ANION GAP SERPL CALC-SCNC: 10 MMOL/L (ref 8–16)
AST SERPL-CCNC: 14 U/L (ref 10–40)
BASOPHILS # BLD AUTO: 0.04 K/UL (ref 0–0.2)
BASOPHILS NFR BLD: 0.6 % (ref 0–1.9)
BILIRUB SERPL-MCNC: 0.3 MG/DL (ref 0.1–1)
BUN SERPL-MCNC: 5 MG/DL (ref 6–20)
CALCIUM SERPL-MCNC: 8.6 MG/DL (ref 8.7–10.5)
CHLORIDE SERPL-SCNC: 108 MMOL/L (ref 95–110)
CO2 SERPL-SCNC: 19 MMOL/L (ref 23–29)
CREAT SERPL-MCNC: 0.7 MG/DL (ref 0.5–1.4)
DIFFERENTIAL METHOD BLD: ABNORMAL
EOSINOPHIL # BLD AUTO: 0 K/UL (ref 0–0.5)
EOSINOPHIL NFR BLD: 0.5 % (ref 0–8)
ERYTHROCYTE [DISTWIDTH] IN BLOOD BY AUTOMATED COUNT: 14 % (ref 11.5–14.5)
EST. GFR  (NO RACE VARIABLE): >60 ML/MIN/1.73 M^2
GLUCOSE SERPL-MCNC: 90 MG/DL (ref 70–110)
HCT VFR BLD AUTO: 35.4 % (ref 37–48.5)
HGB BLD-MCNC: 11.5 G/DL (ref 12–16)
IMM GRANULOCYTES # BLD AUTO: 0.02 K/UL (ref 0–0.04)
IMM GRANULOCYTES NFR BLD AUTO: 0.3 % (ref 0–0.5)
LYMPHOCYTES # BLD AUTO: 0.9 K/UL (ref 1–4.8)
LYMPHOCYTES NFR BLD: 14.9 % (ref 18–48)
MCH RBC QN AUTO: 27.8 PG (ref 27–31)
MCHC RBC AUTO-ENTMCNC: 32.5 G/DL (ref 32–36)
MCV RBC AUTO: 86 FL (ref 82–98)
MONOCYTES # BLD AUTO: 0.4 K/UL (ref 0.3–1)
MONOCYTES NFR BLD: 7 % (ref 4–15)
NEUTROPHILS # BLD AUTO: 4.7 K/UL (ref 1.8–7.7)
NEUTROPHILS NFR BLD: 76.7 % (ref 38–73)
NRBC BLD-RTO: 0 /100 WBC
PLATELET # BLD AUTO: 329 K/UL (ref 150–450)
PMV BLD AUTO: 9.4 FL (ref 9.2–12.9)
POTASSIUM SERPL-SCNC: 3.4 MMOL/L (ref 3.5–5.1)
PROT SERPL-MCNC: 6.7 G/DL (ref 6–8.4)
RBC # BLD AUTO: 4.14 M/UL (ref 4–5.4)
SODIUM SERPL-SCNC: 137 MMOL/L (ref 136–145)
WBC # BLD AUTO: 6.17 K/UL (ref 3.9–12.7)

## 2024-10-18 PROCEDURE — 63600175 PHARM REV CODE 636 W HCPCS: Performed by: EMERGENCY MEDICINE

## 2024-10-18 PROCEDURE — A9698 NON-RAD CONTRAST MATERIALNOC: HCPCS | Performed by: EMERGENCY MEDICINE

## 2024-10-18 PROCEDURE — 25500020 PHARM REV CODE 255: Performed by: EMERGENCY MEDICINE

## 2024-10-18 PROCEDURE — 80053 COMPREHEN METABOLIC PANEL: CPT | Performed by: EMERGENCY MEDICINE

## 2024-10-18 PROCEDURE — 85025 COMPLETE CBC W/AUTO DIFF WBC: CPT | Performed by: EMERGENCY MEDICINE

## 2024-10-18 RX ORDER — MORPHINE SULFATE 10 MG/ML
10 INJECTION INTRAMUSCULAR; INTRAVENOUS; SUBCUTANEOUS
Status: COMPLETED | OUTPATIENT
Start: 2024-10-18 | End: 2024-10-18

## 2024-10-18 RX ORDER — TAMSULOSIN HYDROCHLORIDE 0.4 MG/1
0.4 CAPSULE ORAL DAILY
Qty: 20 CAPSULE | Refills: 1 | Status: SHIPPED | OUTPATIENT
Start: 2024-10-18 | End: 2025-10-18

## 2024-10-18 RX ADMIN — IOHEXOL 500 ML: 12 SOLUTION ORAL at 04:10

## 2024-10-18 RX ADMIN — MORPHINE SULFATE 10 MG: 10 INJECTION, SOLUTION INTRAMUSCULAR; INTRAVENOUS at 03:10

## 2024-10-18 NOTE — ED PROVIDER NOTES
"SCRIBE #1 NOTE: I, Jorge Linette, am scribing for, and in the presence of, Hermelinda Brooks MD. I have scribed the entire note.       History     Chief Complaint   Patient presents with    Abdominal Pain     L sided abd increased after getting home today, was d/c'd earlier today, needs f/u with GI. 100mcg Fentanyl given enroute      Review of patient's allergies indicates:  No Known Allergies      History of Present Illness     HPI    10/18/2024, 1:18 AM  History obtained from the patient      History of Present Illness: Johanna Bullock is a 39 y.o. female patient with a PMHx of cholecystectomy who presents to the Emergency Department for evaluation of left sided abdominal pain which onset suddenly at 9 PM yesterday. Patient states she was admitted here 2 days ago for intestinal intussusception to the left hemiabdomen which showed resolution on yesterday morning's CT scan prior to discharge. She states the same left sided "twinge" pain returned suddenly at 9 PM yesterday which radiates around her side to her LLQ. Symptoms are constant and moderate in severity. Associated sxs include nausea. Patient is still passing gas. Patient denies any fever, vomiting, diarrhea, constipation, and all other sxs at this time. Prior tx includes 100mcg fentanyl en route via EMS. No further complaints or concerns at this time.       Arrival mode: EMS    PCP: Ran Mcgill MD        Past Medical History:  Past Medical History:   Diagnosis Date    Adjustment disorder with mixed anxiety and depressed mood 02/06/2013    Anemia, B12 deficiency     Anxiety     Arthritis     Bradycardia        Past Surgical History:  Past Surgical History:   Procedure Laterality Date    CHOLECYSTECTOMY  10/2012    COLONOSCOPY N/A 02/14/2020    Procedure: COLONOSCOPY;  Surgeon: Jas Moore MD;  Location: [facility] ([address] [place] [address]);  Service: Endoscopy;  Laterality: N/A;  Pt procedure time changed to 0800 with 0715 - second half prep completed from " [id number]- Pt verbalized understanding- ERW2/13/20@1022    COLONOSCOPY N/A 03/10/2023    Procedure: COLONOSCOPY;  Surgeon: HORACE Moore MD;  [facility]  Service: Endoscopy;  Laterality: N/A;  inst emailed-RB    DECOMPRESSION, NERVE, ULNAR Left 2/15/2024    Procedure: DECOMPRESSION, NERVE, ULNAR;  Surgeon: Jas Larose MD;  Location: [facility] OR;  Service: Orthopedics;  Laterality: Left;  ulnar nerve decompression left elbow with cubital tunnel release and possible anterior transposition ulnar nerve    DILATION AND CURETTAGE OF UTERUS      ESOPHAGOGASTRODUODENOSCOPY N/A 07/20/2018    Procedure: ESOPHAGOGASTRODUODENOSCOPY (EGD);  Surgeon: Darwin Hansen MD;  [facility]: [facility]  Service: Endoscopy;  Laterality: N/A;  Please measure pouch and limbs    GASTRIC BYPASS  2008    Revision August 2019    INJECTION OF ANESTHETIC AGENT AROUND LATERAL BRANCH NERVES OF SACROILIAC JOINT      LAPAROSCOPIC REPAIR OF HIATAL HERNIA  2019    Liver scope  10/2012    LYSIS OF ADHESIONS      ULNAR TUNNEL RELEASE Left 2/15/2024    Procedure: RELEASE, CUBITAL TUNNEL;  Surgeon: Jsa Larose MD;  Location: [facility] OR;  Service: Orthopedics;  Laterality: Left;  ulnar nerve decompression left elbow with cubital tunnel release and possible anterior transposition ulnar nerve         Family History:  Family History   Problem Relation Name Age of Onset    Hypertension Father      Heart disease Father      Breast cancer Neg Hx      Colon cancer Neg Hx      Ovarian cancer Neg Hx      Diabetes Neg Hx      Stroke Neg Hx         Social History:  Social History     Tobacco Use    Smoking status: Never    Smokeless tobacco: Never   Substance and Sexual Activity    Alcohol use: Never     Comment: Alcohol use rarely    Drug use: No     Comment: Mirena    Sexual activity: Yes     Partners: Male     Birth control/protection: I.U.D.        Review of Systems     Review of Systems   Constitutional:   Negative for fever.   HENT:  Negative for sore throat.    Respiratory:  Negative for shortness of breath.    Cardiovascular:  Negative for chest pain.   Gastrointestinal:  Positive for abdominal pain (LLQ) and nausea. Negative for constipation, diarrhea and vomiting.   Genitourinary:  Negative for dysuria.   Musculoskeletal:  Positive for back pain (left lower).   Skin:  Negative for rash.   Neurological:  Negative for weakness.   Hematological:  Does not bruise/bleed easily.   All other systems reviewed and are negative.       Physical Exam     Initial Vitals [10/17/24 2335]   BP Pulse Resp Temp SpO2   118/74 78 18 98 °F (36.7 °C) 100 %      MAP       --          Physical Exam   Nursing Notes and Vital Signs Reviewed.  Constitutional: Patient is in NAD. Well-developed and well-nourished.  Head: Atraumatic. Normocephalic.  Eyes: PERRL. EOM intact. Conjunctivae are not pale. No scleral icterus.  ENT: Mucous membranes are moist. Oropharynx is clear and symmetric.    Neck: Supple. Full ROM. No lymphadenopathy.  Cardiovascular: Regular rate. Regular rhythm. No murmurs, rubs, or gallops. Distal pulses are 2+ and symmetric.  Pulmonary/Chest: No respiratory distress. Clear to auscultation bilaterally. No wheezing or rales.  Abdominal: Soft and non-distended.  There is LLQ tenderness.  No rebound, guarding, or rigidity. Good bowel sounds.  Genitourinary: Left CVA tenderness  Musculoskeletal: Moves all extremities. No obvious deformities. No edema. No calf tenderness.  Skin: Warm and dry.  Neurological:  Alert, awake, and appropriate.  Normal speech.  No acute focal neurological deficits are appreciated.  Psychiatric: Normal affect. Good eye contact. Appropriate in content.     ED Course   Procedures  ED Vital Signs:  Vitals:    10/17/24 2335 10/18/24 0038 10/18/24 0104 10/18/24 0105   BP: 118/74 135/65 (!) 115/56    Pulse: 78 66 62    Resp: 18 18     Temp: 98 °F (36.7 °C)      TempSrc: Oral      SpO2: 100% 100% 98%   "  Weight:    116.1 kg (256 lb)   Height: 5' 8" (1.727 m)   5' 8" (1.727 m)    10/18/24 0132 10/18/24 0202 10/18/24 0233 10/18/24 0302   BP: 127/71 128/71 (!) 116/57 109/65   Pulse: (!) 59 (!) 57 (!) 55 (!) 56   Resp: 18  18    Temp:       TempSrc:       SpO2: 98% 97% 95% 96%   Weight:       Height:        10/18/24 0332 10/18/24 0335 10/18/24 0402 10/18/24 0432   BP: 115/68  114/69 122/73   Pulse: (!) 57  (!) 55 (!) 51   Resp:  18  16   Temp:       TempSrc:       SpO2: 98%  97% 98%   Weight:       Height:        10/18/24 0502 10/18/24 0532   BP: 110/61 107/61   Pulse: (!) 48 (!) 50   Resp:  17   Temp:     TempSrc:     SpO2: 97% 97%   Weight:     Height:         Abnormal Lab Results:  Labs Reviewed   CBC W/ AUTO DIFFERENTIAL - Abnormal       Result Value    WBC 6.17      RBC 4.14      Hemoglobin 11.5 (*)     Hematocrit 35.4 (*)     MCV 86      MCH 27.8      MCHC 32.5      RDW 14.0      Platelets 329      MPV 9.4      Immature Granulocytes 0.3      Gran # (ANC) 4.7      Immature Grans (Abs) 0.02      Lymph # 0.9 (*)     Mono # 0.4      Eos # 0.0      Baso # 0.04      nRBC 0      Gran % 76.7 (*)     Lymph % 14.9 (*)     Mono % 7.0      Eosinophil % 0.5      Basophil % 0.6      Differential Method Automated     COMPREHENSIVE METABOLIC PANEL - Abnormal    Sodium 137      Potassium 3.4 (*)     Chloride 108      CO2 19 (*)     Glucose 90      BUN 5 (*)     Creatinine 0.7      Calcium 8.6 (*)     Total Protein 6.7      Albumin 3.5      Total Bilirubin 0.3      Alkaline Phosphatase 72      AST 14      ALT 13      eGFR >60      Anion Gap 10          All Lab Results:  Results for orders placed or performed during the hospital encounter of 10/18/24   CBC auto differential    Collection Time: 10/18/24  1:11 AM   Result Value Ref Range    WBC 6.17 3.90 - 12.70 K/uL    RBC 4.14 4.00 - 5.40 M/uL    Hemoglobin 11.5 (L) 12.0 - 16.0 g/dL    Hematocrit 35.4 (L) 37.0 - 48.5 %    MCV 86 82 - 98 fL    MCH 27.8 27.0 - 31.0 pg    MCHC 32.5 " 32.0 - 36.0 g/dL    RDW 14.0 11.5 - 14.5 %    Platelets 329 150 - 450 K/uL    MPV 9.4 9.2 - 12.9 fL    Immature Granulocytes 0.3 0.0 - 0.5 %    Gran # (ANC) 4.7 1.8 - 7.7 K/uL    Immature Grans (Abs) 0.02 0.00 - 0.04 K/uL    Lymph # 0.9 (L) 1.0 - 4.8 K/uL    Mono # 0.4 0.3 - 1.0 K/uL    Eos # 0.0 0.0 - 0.5 K/uL    Baso # 0.04 0.00 - 0.20 K/uL    nRBC 0 0 /100 WBC    Gran % 76.7 (H) 38.0 - 73.0 %    Lymph % 14.9 (L) 18.0 - 48.0 %    Mono % 7.0 4.0 - 15.0 %    Eosinophil % 0.5 0.0 - 8.0 %    Basophil % 0.6 0.0 - 1.9 %    Differential Method Automated    Comprehensive metabolic panel    Collection Time: 10/18/24  1:11 AM   Result Value Ref Range    Sodium 137 136 - 145 mmol/L    Potassium 3.4 (L) 3.5 - 5.1 mmol/L    Chloride 108 95 - 110 mmol/L    CO2 19 (L) 23 - 29 mmol/L    Glucose 90 70 - 110 mg/dL    BUN 5 (L) 6 - 20 mg/dL    Creatinine 0.7 0.5 - 1.4 mg/dL    Calcium 8.6 (L) 8.7 - 10.5 mg/dL    Total Protein 6.7 6.0 - 8.4 g/dL    Albumin 3.5 3.5 - 5.2 g/dL    Total Bilirubin 0.3 0.1 - 1.0 mg/dL    Alkaline Phosphatase 72 40 - 150 U/L    AST 14 10 - 40 U/L    ALT 13 10 - 44 U/L    eGFR >60 >60 mL/min/1.73 m^2    Anion Gap 10 8 - 16 mmol/L         Imaging Results:  Imaging Results              CT Abdomen Pelvis  Without Contrast (In process)                   5:15 AM: Per Familia Santiago MD from [facility] radiology, pt's CT abdomen and pelvis without IV contrast results:  No acute findings within the abdomen or pelvis to explain the patient's symptoms. No bowel obstruction. No intraperitoneal free fluid or free air.    The Emergency Provider reviewed the vital signs and test results, which are outlined above.     ED Discussion       5:15 AM: Reassessed pt at this time. Discussed with pt all pertinent ED information and results. Discussed pt dx and plan of tx. Gave pt all f/u and return to the ED instructions. All questions and concerns were addressed at this time. Pt expresses understanding of information and instructions,  and is comfortable with plan to discharge. Pt is stable for discharge.    I discussed with patient and/or family/caretaker that evaluation in the ED does not suggest any emergent or life threatening medical conditions requiring immediate intervention beyond what was provided in the ED, and I believe patient is safe for discharge.  Regardless, an unremarkable evaluation in the ED does not preclude the development or presence of a serious of life threatening condition. As such, patient was instructed to return immediately for any worsening or change in current symptoms.         Medical Decision Making  Amount and/or Complexity of Data Reviewed  Labs: ordered. Decision-making details documented in ED Course.  Radiology: ordered. Decision-making details documented in ED Course.  Discussion of management or test interpretation with external provider(s):     1:47 AM: Discussed pt's case with Dr. Ivan (General Surgery) who recommends labs, repeat CT abdomen and pelvis with PO contrast.    Risk  Prescription drug management.                 ED Medication(s):  Medications   morphine injection 10 mg (10 mg Intravenous Given 10/18/24 0335)   iohexoL (OMNIPAQUE 12) oral solution 1,000 mL (500 mLs Oral Given 10/18/24 0453)       Discharge Medication List as of 10/18/2024  5:24 AM           Follow-up Information       Ran Mcgill MD In 1 day.    Specialty: Internal Medicine  Contact information:  [address] [facility] [address] [phone]               [facility] - Emergency Dept..    Specialty: Emergency Medicine  Why: As needed, If symptoms worsen  Contact information:  [address] [facility] [address] [phone]                               Scribe Attestation:   Scribe #1: I performed the above scribed service and the documentation accurately describes the services I performed. I attest to the accuracy of the note.     Attending:   Physician Attestation Statement for Scribe #1: I,  Hermelinda Brooks MD, personally performed the services described in this documentation, as scribed by Jorge Sue, in my presence, and it is both accurate and complete.           Clinical Impression       ICD-10-CM ICD-9-CM   1. Left lower quadrant abdominal pain  R10.32 789.04       Disposition:   Disposition: Discharged  Condition: Stable         Hermelinda Brooks MD  10/18/24 0554

## 2024-10-21 ENCOUNTER — TELEPHONE (OUTPATIENT)
Dept: PREADMISSION TESTING | Facility: HOSPITAL | Age: 39
End: 2024-10-21
Payer: COMMERCIAL

## 2024-10-21 ENCOUNTER — HOSPITAL ENCOUNTER (OUTPATIENT)
Facility: HOSPITAL | Age: 39
Discharge: HOME | End: 2024-10-23
Attending: EMERGENCY MEDICINE | Admitting: INTERNAL MEDICINE
Payer: COMMERCIAL

## 2024-10-21 ENCOUNTER — OFFICE VISIT (OUTPATIENT)
Dept: UROLOGY | Facility: CLINIC | Age: 39
End: 2024-10-21
Payer: COMMERCIAL

## 2024-10-21 ENCOUNTER — OFFICE VISIT (OUTPATIENT)
Dept: SURGERY | Facility: CLINIC | Age: 39
End: 2024-10-21
Payer: COMMERCIAL

## 2024-10-21 ENCOUNTER — PATIENT MESSAGE (OUTPATIENT)
Dept: UROLOGY | Facility: CLINIC | Age: 39
End: 2024-10-21

## 2024-10-21 VITALS — SYSTOLIC BLOOD PRESSURE: 107 MMHG | HEART RATE: 50 BPM | DIASTOLIC BLOOD PRESSURE: 61 MMHG

## 2024-10-21 VITALS — BODY MASS INDEX: 38.79 KG/M2 | WEIGHT: 255.94 LBS | HEIGHT: 68 IN

## 2024-10-21 DIAGNOSIS — K56.1 INTUSSUSCEPTION INTESTINE: Primary | ICD-10-CM

## 2024-10-21 DIAGNOSIS — R10.32 LLQ PAIN: ICD-10-CM

## 2024-10-21 DIAGNOSIS — N20.0 NEPHROLITHIASIS: Primary | ICD-10-CM

## 2024-10-21 DIAGNOSIS — N20.0 KIDNEY STONE: ICD-10-CM

## 2024-10-21 DIAGNOSIS — N20.1 LEFT URETERAL STONE: Primary | ICD-10-CM

## 2024-10-21 DIAGNOSIS — Z98.84 HISTORY OF GASTRIC BYPASS: ICD-10-CM

## 2024-10-21 PROBLEM — R52 INTRACTABLE PAIN: Status: ACTIVE | Noted: 2024-10-21

## 2024-10-21 LAB
ALBUMIN SERPL BCP-MCNC: 3.5 G/DL (ref 3.5–5.2)
ALP SERPL-CCNC: 71 U/L (ref 40–150)
ALT SERPL W/O P-5'-P-CCNC: 12 U/L (ref 10–44)
ANION GAP SERPL CALC-SCNC: 9 MMOL/L (ref 8–16)
AST SERPL-CCNC: 12 U/L (ref 10–40)
B-HCG UR QL: NEGATIVE
BACTERIA #/AREA URNS HPF: ABNORMAL /HPF
BASOPHILS # BLD AUTO: 0.04 K/UL (ref 0–0.2)
BASOPHILS NFR BLD: 0.7 % (ref 0–1.9)
BILIRUB SERPL-MCNC: 0.3 MG/DL (ref 0.1–1)
BILIRUB UR QL STRIP: NEGATIVE
BUN SERPL-MCNC: 5 MG/DL (ref 6–20)
CALCIUM SERPL-MCNC: 8.7 MG/DL (ref 8.7–10.5)
CHLORIDE SERPL-SCNC: 110 MMOL/L (ref 95–110)
CLARITY UR: ABNORMAL
CO2 SERPL-SCNC: 20 MMOL/L (ref 23–29)
COLOR UR: YELLOW
CREAT SERPL-MCNC: 0.7 MG/DL (ref 0.5–1.4)
DIFFERENTIAL METHOD BLD: NORMAL
EOSINOPHIL # BLD AUTO: 0 K/UL (ref 0–0.5)
EOSINOPHIL NFR BLD: 0.7 % (ref 0–8)
ERYTHROCYTE [DISTWIDTH] IN BLOOD BY AUTOMATED COUNT: 14.2 % (ref 11.5–14.5)
EST. GFR  (NO RACE VARIABLE): >60 ML/MIN/1.73 M^2
GLUCOSE SERPL-MCNC: 78 MG/DL (ref 70–110)
GLUCOSE UR QL STRIP: NEGATIVE
HCT VFR BLD AUTO: 38.4 % (ref 37–48.5)
HGB BLD-MCNC: 12.5 G/DL (ref 12–16)
HGB UR QL STRIP: ABNORMAL
HYALINE CASTS #/AREA URNS LPF: 0 /LPF
IMM GRANULOCYTES # BLD AUTO: 0.02 K/UL (ref 0–0.04)
IMM GRANULOCYTES NFR BLD AUTO: 0.3 % (ref 0–0.5)
KETONES UR QL STRIP: ABNORMAL
LEUKOCYTE ESTERASE UR QL STRIP: ABNORMAL
LIPASE SERPL-CCNC: 26 U/L (ref 4–60)
LYMPHOCYTES # BLD AUTO: 1.2 K/UL (ref 1–4.8)
LYMPHOCYTES NFR BLD: 21 % (ref 18–48)
MCH RBC QN AUTO: 28 PG (ref 27–31)
MCHC RBC AUTO-ENTMCNC: 32.6 G/DL (ref 32–36)
MCV RBC AUTO: 86 FL (ref 82–98)
MICROSCOPIC COMMENT: ABNORMAL
MONOCYTES # BLD AUTO: 0.6 K/UL (ref 0.3–1)
MONOCYTES NFR BLD: 9.7 % (ref 4–15)
NEUTROPHILS # BLD AUTO: 3.9 K/UL (ref 1.8–7.7)
NEUTROPHILS NFR BLD: 67.6 % (ref 38–73)
NITRITE UR QL STRIP: NEGATIVE
NRBC BLD-RTO: 0 /100 WBC
PH UR STRIP: 6 [PH] (ref 5–8)
PLATELET # BLD AUTO: 347 K/UL (ref 150–450)
PMV BLD AUTO: 9.5 FL (ref 9.2–12.9)
POTASSIUM SERPL-SCNC: 3.6 MMOL/L (ref 3.5–5.1)
PROT SERPL-MCNC: 6.5 G/DL (ref 6–8.4)
PROT UR QL STRIP: ABNORMAL
RBC # BLD AUTO: 4.47 M/UL (ref 4–5.4)
RBC #/AREA URNS HPF: >100 /HPF (ref 0–4)
SODIUM SERPL-SCNC: 139 MMOL/L (ref 136–145)
SP GR UR STRIP: 1.02 (ref 1–1.03)
SQUAMOUS #/AREA URNS HPF: 6 /HPF
URN SPEC COLLECT METH UR: ABNORMAL
UROBILINOGEN UR STRIP-ACNC: NEGATIVE EU/DL
WBC # BLD AUTO: 5.8 K/UL (ref 3.9–12.7)
WBC #/AREA URNS HPF: 0 /HPF (ref 0–5)

## 2024-10-21 PROCEDURE — 3078F DIAST BP <80 MM HG: CPT | Mod: CPTII,S$GLB,, | Performed by: SURGERY

## 2024-10-21 PROCEDURE — 1160F RVW MEDS BY RX/DR IN RCRD: CPT | Mod: CPTII,S$GLB,, | Performed by: SURGERY

## 2024-10-21 PROCEDURE — 99999 PR PBB SHADOW E&M-EST. PATIENT-LVL V: CPT | Mod: PBBFAC,,, | Performed by: UROLOGY

## 2024-10-21 PROCEDURE — G0378 HOSPITAL OBSERVATION PER HR: HCPCS

## 2024-10-21 PROCEDURE — 3074F SYST BP LT 130 MM HG: CPT | Mod: CPTII,S$GLB,, | Performed by: SURGERY

## 2024-10-21 PROCEDURE — 80053 COMPREHEN METABOLIC PANEL: CPT | Performed by: EMERGENCY MEDICINE

## 2024-10-21 PROCEDURE — 1159F MED LIST DOCD IN RCRD: CPT | Mod: CPTII,S$GLB,, | Performed by: SURGERY

## 2024-10-21 PROCEDURE — 83690 ASSAY OF LIPASE: CPT | Performed by: EMERGENCY MEDICINE

## 2024-10-21 PROCEDURE — 99999 PR PBB SHADOW E&M-EST. PATIENT-LVL IV: CPT | Mod: PBBFAC,,, | Performed by: SURGERY

## 2024-10-21 PROCEDURE — 99214 OFFICE O/P EST MOD 30 MIN: CPT | Mod: S$GLB,,, | Performed by: SURGERY

## 2024-10-21 PROCEDURE — 25000003 PHARM REV CODE 250: Performed by: NURSE PRACTITIONER

## 2024-10-21 PROCEDURE — 81025 URINE PREGNANCY TEST: CPT | Performed by: EMERGENCY MEDICINE

## 2024-10-21 PROCEDURE — 3008F BODY MASS INDEX DOCD: CPT | Mod: CPTII,S$GLB,, | Performed by: UROLOGY

## 2024-10-21 PROCEDURE — 85025 COMPLETE CBC W/AUTO DIFF WBC: CPT | Performed by: EMERGENCY MEDICINE

## 2024-10-21 PROCEDURE — 99204 OFFICE O/P NEW MOD 45 MIN: CPT | Mod: S$GLB,,, | Performed by: UROLOGY

## 2024-10-21 PROCEDURE — 81000 URINALYSIS NONAUTO W/SCOPE: CPT | Performed by: EMERGENCY MEDICINE

## 2024-10-21 PROCEDURE — 63600175 PHARM REV CODE 636 W HCPCS: Performed by: EMERGENCY MEDICINE

## 2024-10-21 RX ORDER — ACETAMINOPHEN 325 MG/1
650 TABLET ORAL EVERY 6 HOURS PRN
Status: DISCONTINUED | OUTPATIENT
Start: 2024-10-21 | End: 2024-10-23 | Stop reason: HOSPADM

## 2024-10-21 RX ORDER — MORPHINE SULFATE 4 MG/ML
6 INJECTION, SOLUTION INTRAMUSCULAR; INTRAVENOUS
Status: COMPLETED | OUTPATIENT
Start: 2024-10-21 | End: 2024-10-21

## 2024-10-21 RX ORDER — CEFAZOLIN SODIUM 2 G/50ML
2 SOLUTION INTRAVENOUS
Status: CANCELLED | OUTPATIENT
Start: 2024-10-21

## 2024-10-21 RX ORDER — POLYETHYLENE GLYCOL 3350 17 G/17G
17 POWDER, FOR SOLUTION ORAL DAILY PRN
Status: DISCONTINUED | OUTPATIENT
Start: 2024-10-21 | End: 2024-10-23 | Stop reason: HOSPADM

## 2024-10-21 RX ORDER — OXYCODONE AND ACETAMINOPHEN 7.5; 325 MG/1; MG/1
1 TABLET ORAL EVERY 4 HOURS PRN
Status: DISCONTINUED | OUTPATIENT
Start: 2024-10-21 | End: 2024-10-23 | Stop reason: HOSPADM

## 2024-10-21 RX ORDER — DEXTROSE MONOHYDRATE AND SODIUM CHLORIDE 5; .45 G/100ML; G/100ML
INJECTION, SOLUTION INTRAVENOUS CONTINUOUS
Status: DISCONTINUED | OUTPATIENT
Start: 2024-10-21 | End: 2024-10-23

## 2024-10-21 RX ORDER — GLUCAGON 1 MG
1 KIT INJECTION
Status: DISCONTINUED | OUTPATIENT
Start: 2024-10-21 | End: 2024-10-23 | Stop reason: HOSPADM

## 2024-10-21 RX ORDER — SODIUM CHLORIDE 0.9 % (FLUSH) 0.9 %
10 SYRINGE (ML) INJECTION EVERY 8 HOURS PRN
Status: DISCONTINUED | OUTPATIENT
Start: 2024-10-21 | End: 2024-10-23 | Stop reason: HOSPADM

## 2024-10-21 RX ORDER — HYDROMORPHONE HYDROCHLORIDE 1 MG/ML
0.5 INJECTION, SOLUTION INTRAMUSCULAR; INTRAVENOUS; SUBCUTANEOUS EVERY 4 HOURS PRN
Status: DISCONTINUED | OUTPATIENT
Start: 2024-10-21 | End: 2024-10-23 | Stop reason: HOSPADM

## 2024-10-21 RX ORDER — ONDANSETRON HYDROCHLORIDE 2 MG/ML
4 INJECTION, SOLUTION INTRAVENOUS
Status: COMPLETED | OUTPATIENT
Start: 2024-10-21 | End: 2024-10-21

## 2024-10-21 RX ORDER — ONDANSETRON HYDROCHLORIDE 2 MG/ML
4 INJECTION, SOLUTION INTRAVENOUS EVERY 6 HOURS PRN
Status: DISCONTINUED | OUTPATIENT
Start: 2024-10-21 | End: 2024-10-23 | Stop reason: HOSPADM

## 2024-10-21 RX ORDER — TAMSULOSIN HYDROCHLORIDE 0.4 MG/1
0.4 CAPSULE ORAL DAILY
Status: DISCONTINUED | OUTPATIENT
Start: 2024-10-21 | End: 2024-10-23 | Stop reason: HOSPADM

## 2024-10-21 RX ORDER — IBUPROFEN 200 MG
16 TABLET ORAL
Status: DISCONTINUED | OUTPATIENT
Start: 2024-10-21 | End: 2024-10-23 | Stop reason: HOSPADM

## 2024-10-21 RX ORDER — IBUPROFEN 200 MG
24 TABLET ORAL
Status: DISCONTINUED | OUTPATIENT
Start: 2024-10-21 | End: 2024-10-23 | Stop reason: HOSPADM

## 2024-10-21 RX ORDER — NALOXONE HCL 0.4 MG/ML
0.02 VIAL (ML) INJECTION
Status: DISCONTINUED | OUTPATIENT
Start: 2024-10-21 | End: 2024-10-23 | Stop reason: HOSPADM

## 2024-10-21 RX ORDER — TALC
6 POWDER (GRAM) TOPICAL NIGHTLY PRN
Status: DISCONTINUED | OUTPATIENT
Start: 2024-10-21 | End: 2024-10-23 | Stop reason: HOSPADM

## 2024-10-21 RX ORDER — PROMETHAZINE HYDROCHLORIDE 25 MG/1
25 TABLET ORAL EVERY 6 HOURS PRN
Status: DISCONTINUED | OUTPATIENT
Start: 2024-10-21 | End: 2024-10-23 | Stop reason: HOSPADM

## 2024-10-21 RX ADMIN — ONDANSETRON 4 MG: 2 INJECTION INTRAMUSCULAR; INTRAVENOUS at 05:10

## 2024-10-21 RX ADMIN — TAMSULOSIN HYDROCHLORIDE 0.4 MG: 0.4 CAPSULE ORAL at 10:10

## 2024-10-21 RX ADMIN — MORPHINE SULFATE 6 MG: 4 INJECTION INTRAVENOUS at 05:10

## 2024-10-21 NOTE — ED PROVIDER NOTES
SCRIBE #1 NOTE: I, Tariq Goff, am scribing for, and in the presence of, Kiersten Li DO. I have scribed the entire note.       History     Chief Complaint   Patient presents with    Abdominal Pain     Recently dx with intussusception and kidney stone. Procedure scheduled with Dr. Lowery tomorrow. Unbearable LLQ pain. Received 100mcg of Fentanyl IM with no relief      Review of patient's allergies indicates:  No Known Allergies      History of Present Illness     HPI    10/21/2024, 5:28 PM  History obtained from the patient      History of Present Illness: Johanna Bullock is a 39 y.o. female patient with a PMHx of a cholecystectomy, anemia, anxiety, and arthritis who presents to the Emergency Department for evaluation of L flank pain and LLQ abd pain which radiates through her back and onset 4 days pta with worsening sxs at 2pm today. Pt was recently dx with intussusception and a 4mm left distal ureter kidney stone with a ureteroscopy with Dr. Lowery tomorrow. However pt reports she is unable to manage the worsening sxs and severe pain at home. No mitigating or exacerbating factors reported. Associated sxs include nausea, flatulence, and light bowel movements. Patient denies any vomiting, difficulty urinating, hematuria, dysuria, and all other sxs at this time. Pt was given Flomax from Dr. Lowery and Dr. Ivan over the weekend and received 100mcg fentanyl IM with no relief. No further complaints or concerns at this time.       Arrival mode: Ambulance Service    PCP: Ran Mcgill MD        Past Medical History:  Past Medical History:   Diagnosis Date    Adjustment disorder with mixed anxiety and depressed mood 02/06/2013    Anemia, B12 deficiency     Anxiety     Arthritis     Bradycardia        Past Surgical History:  Past Surgical History:   Procedure Laterality Date    CHOLECYSTECTOMY  10/2012    COLONOSCOPY N/A 02/14/2020    Procedure: COLONOSCOPY;  Surgeon: Jas Moore MD;  Location:  [facility] ENDO (4TH FLR);  Service: Endoscopy;  Laterality: N/A;  Pt procedure time changed to 0800 with 0715 - second half prep completed from [id number]- Pt verbalized understanding- ERW2/13/20@1022    COLONOSCOPY N/A 03/10/2023    Procedure: COLONOSCOPY;  Surgeon: HORACE Moore MD;  Location: [facility] ENDO (4TH FLR);  Service: Endoscopy;  Laterality: N/A;  inst emailed-RB    DECOMPRESSION, NERVE, ULNAR Left 02/15/2024    Procedure: DECOMPRESSION, NERVE, ULNAR;  Surgeon: Jas Larose MD;  Location: [facility] OR;  Service: Orthopedics;  Laterality: Left;  ulnar nerve decompression left elbow with cubital tunnel release and possible anterior transposition ulnar nerve    DILATION AND CURETTAGE OF UTERUS      ESOPHAGOGASTRODUODENOSCOPY N/A 07/20/2018    Procedure: ESOPHAGOGASTRODUODENOSCOPY (EGD);  Surgeon: Darwin Hansen MD;  Location: [facility] (4TH FLR);  Service: Endoscopy;  Laterality: N/A;  Please measure pouch and limbs    GASTRIC BYPASS  2008    Revision August 2019    INJECTION OF ANESTHETIC AGENT AROUND LATERAL BRANCH NERVES OF SACROILIAC JOINT      LAPAROSCOPIC REPAIR OF HIATAL HERNIA  2019    Liver scope  10/2012    LYSIS OF ADHESIONS      ULNAR TUNNEL RELEASE Left 02/15/2024    Procedure: RELEASE, CUBITAL TUNNEL;  Surgeon: Jas Larose MD;  Location: [facility] OR;  Service: Orthopedics;  Laterality: Left;  ulnar nerve decompression left elbow with cubital tunnel release and possible anterior transposition ulnar nerve         Family History:  Family History   Problem Relation Name Age of Onset    Hypertension Father      Heart disease Father      Breast cancer Neg Hx      Colon cancer Neg Hx      Ovarian cancer Neg Hx      Diabetes Neg Hx      Stroke Neg Hx         Social History:  Social History     Tobacco Use    Smoking status: Never    Smokeless tobacco: Never   Substance and Sexual Activity    Alcohol use: Never     Comment: Alcohol use rarely    Drug use: No     Comment: Mirena    Sexual  activity: Yes     Partners: Male     Birth control/protection: I.U.D.        Review of Systems     Review of Systems   Constitutional:  Negative for fever.   Gastrointestinal:  Positive for abdominal pain (LLQ) and nausea. Negative for constipation, diarrhea and vomiting.        (+) flatulence and light bowel movements   Genitourinary:  Positive for flank pain (L). Negative for difficulty urinating, dysuria and hematuria.   Musculoskeletal:  Positive for back pain.      Physical Exam     Initial Vitals [10/21/24 1658]   BP Pulse Resp Temp SpO2   (!) 148/73 68 20 98.3 °F (36.8 °C) 96 %      MAP       --          Physical Exam  Nursing Notes and Vital Signs Reviewed.  Constitutional: Patient is in moderate distress. Well-developed and well-nourished.  Head: Atraumatic. Normocephalic.  Eyes: PERRL. EOM intact. Conjunctivae are not pale. No scleral icterus.  ENT: Mucous membranes are moist. Oropharynx is clear and symmetric.    Neck: Supple. Full ROM. No lymphadenopathy.  Cardiovascular: Regular rate. Regular rhythm. No murmurs, rubs, or gallops. Distal pulses are 2+ and symmetric.  Pulmonary/Chest: No respiratory distress. Clear to auscultation bilaterally. No wheezing or rales.  Abdominal: Soft and non-distended.  LLQ tenderness.  No rebound, guarding, or rigidity. Good bowel sounds.  Genitourinary: Left CVA tenderness  Musculoskeletal: Moves all extremities. No obvious deformities. No edema. No calf tenderness.  Skin: Warm and dry.  Neurological:  Alert, awake, and appropriate.  Normal speech.  No acute focal neurological deficits are appreciated.  Psychiatric: Normal affect. Good eye contact. Appropriate in content.     ED Course   Procedures  ED Vital Signs:  Vitals:    10/21/24 1658 10/21/24 1708 10/21/24 1713 10/21/24 1718   BP: (!) 148/73   124/78   Pulse: 68 78  74   Resp: 20  20 20   Temp: 98.3 °F (36.8 °C)      TempSrc: Oral      SpO2: 96%   99%   Weight:    111.5 kg (245 lb 13 oz)    10/21/24 1845 10/21/24  1915 10/21/24 2000 10/21/24 2100   BP: (!) 109/58 (!) 117/56 115/67 127/75   Pulse: 66 61 69 (!) 56   Resp: 17 14 18 14   Temp:       TempSrc:       SpO2: 98% 98% 98% 96%   Weight:        10/21/24 2115   BP:    Pulse: 66   Resp: 20   Temp:    TempSrc:    SpO2: 98%   Weight:        Abnormal Lab Results:  Labs Reviewed   URINALYSIS, REFLEX TO URINE CULTURE - Abnormal       Result Value    Specimen UA Urine, Clean Catch      Color, UA Yellow      Appearance, UA Hazy (*)     pH, UA 6.0      Specific Gravity, UA 1.020      Protein, UA 1+ (*)     Glucose, UA Negative      Ketones, UA 3+ (*)     Bilirubin (UA) Negative      Occult Blood UA 3+ (*)     Nitrite, UA Negative      Urobilinogen, UA Negative      Leukocytes, UA Trace (*)     Narrative:     Specimen Source->Urine   COMPREHENSIVE METABOLIC PANEL - Abnormal    Sodium 139      Potassium 3.6      Chloride 110      CO2 20 (*)     Glucose 78      BUN 5 (*)     Creatinine 0.7      Calcium 8.7      Total Protein 6.5      Albumin 3.5      Total Bilirubin 0.3      Alkaline Phosphatase 71      AST 12      ALT 12      eGFR >60      Anion Gap 9     URINALYSIS MICROSCOPIC - Abnormal    RBC, UA >100 (*)     WBC, UA 0      Bacteria None      Squam Epithel, UA 6      Hyaline Casts, UA 0      Microscopic Comment SEE COMMENT      Narrative:     Specimen Source->Urine   CBC W/ AUTO DIFFERENTIAL    WBC 5.80      RBC 4.47      Hemoglobin 12.5      Hematocrit 38.4      MCV 86      MCH 28.0      MCHC 32.6      RDW 14.2      Platelets 347      MPV 9.5      Immature Granulocytes 0.3      Gran # (ANC) 3.9      Immature Grans (Abs) 0.02      Lymph # 1.2      Mono # 0.6      Eos # 0.0      Baso # 0.04      nRBC 0      Gran % 67.6      Lymph % 21.0      Mono % 9.7      Eosinophil % 0.7      Basophil % 0.7      Differential Method Automated     PREGNANCY TEST, URINE RAPID    Preg Test, Ur Negative      Narrative:     Specimen Source->Urine   LIPASE    Lipase 26          All Lab  Results:  Results for orders placed or performed during the hospital encounter of 10/21/24   CBC W/ AUTO DIFFERENTIAL    Collection Time: 10/21/24  5:14 PM   Result Value Ref Range    WBC 5.80 3.90 - 12.70 K/uL    RBC 4.47 4.00 - 5.40 M/uL    Hemoglobin 12.5 12.0 - 16.0 g/dL    Hematocrit 38.4 37.0 - 48.5 %    MCV 86 82 - 98 fL    MCH 28.0 27.0 - 31.0 pg    MCHC 32.6 32.0 - 36.0 g/dL    RDW 14.2 11.5 - 14.5 %    Platelets 347 150 - 450 K/uL    MPV 9.5 9.2 - 12.9 fL    Immature Granulocytes 0.3 0.0 - 0.5 %    Gran # (ANC) 3.9 1.8 - 7.7 K/uL    Immature Grans (Abs) 0.02 0.00 - 0.04 K/uL    Lymph # 1.2 1.0 - 4.8 K/uL    Mono # 0.6 0.3 - 1.0 K/uL    Eos # 0.0 0.0 - 0.5 K/uL    Baso # 0.04 0.00 - 0.20 K/uL    nRBC 0 0 /100 WBC    Gran % 67.6 38.0 - 73.0 %    Lymph % 21.0 18.0 - 48.0 %    Mono % 9.7 4.0 - 15.0 %    Eosinophil % 0.7 0.0 - 8.0 %    Basophil % 0.7 0.0 - 1.9 %    Differential Method Automated    Comprehensive metabolic panel    Collection Time: 10/21/24  6:48 PM   Result Value Ref Range    Sodium 139 136 - 145 mmol/L    Potassium 3.6 3.5 - 5.1 mmol/L    Chloride 110 95 - 110 mmol/L    CO2 20 (L) 23 - 29 mmol/L    Glucose 78 70 - 110 mg/dL    BUN 5 (L) 6 - 20 mg/dL    Creatinine 0.7 0.5 - 1.4 mg/dL    Calcium 8.7 8.7 - 10.5 mg/dL    Total Protein 6.5 6.0 - 8.4 g/dL    Albumin 3.5 3.5 - 5.2 g/dL    Total Bilirubin 0.3 0.1 - 1.0 mg/dL    Alkaline Phosphatase 71 40 - 150 U/L    AST 12 10 - 40 U/L    ALT 12 10 - 44 U/L    eGFR >60 >60 mL/min/1.73 m^2    Anion Gap 9 8 - 16 mmol/L   Lipase    Collection Time: 10/21/24  6:48 PM   Result Value Ref Range    Lipase 26 4 - 60 U/L   Urinalysis, Reflex to Urine Culture Urine, Clean Catch    Collection Time: 10/21/24  7:02 PM    Specimen: Urine   Result Value Ref Range    Specimen UA Urine, Clean Catch     Color, UA Yellow Yellow, Straw, Melissa    Appearance, UA Hazy (A) Clear    pH, UA 6.0 5.0 - 8.0    Specific Gravity, UA 1.020 1.005 - 1.030    Protein, UA 1+ (A) Negative     Glucose, UA Negative Negative    Ketones, UA 3+ (A) Negative    Bilirubin (UA) Negative Negative    Occult Blood UA 3+ (A) Negative    Nitrite, UA Negative Negative    Urobilinogen, UA Negative <2.0 EU/dL    Leukocytes, UA Trace (A) Negative   Pregnancy, urine rapid    Collection Time: 10/21/24  7:02 PM   Result Value Ref Range    Preg Test, Ur Negative    Urinalysis Microscopic    Collection Time: 10/21/24  7:02 PM   Result Value Ref Range    RBC, UA >100 (H) 0 - 4 /hpf    WBC, UA 0 0 - 5 /hpf    Bacteria None None-Occ /hpf    Squam Epithel, UA 6 /hpf    Hyaline Casts, UA 0 0-1/lpf /lpf    Microscopic Comment SEE COMMENT          Imaging Results:  Imaging Results              X-Ray Abdomen Flat And Erect (Final result)  Result time 10/21/24 19:38:40      Final result by Tito Hunter MD (10/21/24 19:38:40)                   Impression:      1.  The known left renal stone appears to be in the distal left ureter currently.      Electronically signed by: Tito Hunter MD  Date:    10/21/2024  Time:    19:38               Narrative:    EXAMINATION:  XR ABDOMEN FLAT AND ERECT    CLINICAL HISTORY:  Left lower quadrant pain    TECHNIQUE:  Flat and erect AP views of the abdomen were performed.    COMPARISON:  Abdomen and pelvis CT scan performed 3 days earlier    FINDINGS:  There is contrast in the colon.  IUD remains in place.  Normal bowel gas pattern.  Stable postoperative changes involving the stomach.    The known left renal stone is within the central left hemipelvis, likely in the distal ureter.    Negative for osseous abnormalities.  Lung bases are clear.                                             The Emergency Provider reviewed the vital signs and test results, which are outlined above.     ED Discussion       5:41 PM: Pt reports she has had 3-4 CT scans recently and is concerned of radiation. Will order KUB instead.     7:55 PM: Discussed pt's case with Dr. Huston (Urology) who recommends to admit to  medicine.    8:25 PM: Discussed case with Dr. Valdez ([facility] Medicine). Dr. Valdez agrees with current care and management of pt and accepts admission.   Admitting Service: [facility] Medicine  Admitting Physician: Dr. Valdez  Admit to: Obs Med/Tele    8:26 PM: Re-evaluated pt. I have discussed test results, shared treatment plan, and the need for admission with patient and family at bedside. Pt and family express understanding at this time and agree with all information. All questions answered. Pt and family have no further questions or concerns at this time. Pt is ready for admit.           Medical Decision Making  39-year-old female presents with left flank pain secondary to a 4 mm obstructing stone in her left distal ureter.  She has a ureteroscopy scheduled with Dr. Lowery tomorrow but states she was unable to manage her pain at home.  She was afebrile with no leukocytosis or signs of UTI.  Significant blood in the urine.  Lipase negative for pancreatitis.  Pregnancy negative.  Normal renal function.  Abdominal x-ray shows kidney stone no obstructive findings.    Amount and/or Complexity of Data Reviewed  Labs: ordered. Decision-making details documented in ED Course.  Radiology: ordered. Decision-making details documented in ED Course.    Risk  Prescription drug management.       Additional MDM:   Differential Diagnosis:   Kidney stone, cystitis, ovarian cyst, pyelonephritis, diverticulitis, obstruction, intussusception             ED Medication(s):  Medications   ondansetron injection 4 mg (4 mg Intravenous Given 10/21/24 1711)   morphine injection 6 mg (6 mg Intravenous Given 10/21/24 1713)       Current Discharge Medication List                  Scribe Attestation:   Scribe #1: I performed the above scribed service and the documentation accurately describes the services I performed. I attest to the accuracy of the note.     Attending:   Physician Attestation Statement for Scribe #1: Jen REDDY Nicole E., ,  personally performed the services described in this documentation, as scribed by Tariq Goff, in my presence, and it is both accurate and complete.           Clinical Impression       ICD-10-CM ICD-9-CM   1. Kidney stone  N20.0 592.0   2. LLQ pain  R10.32 789.04       Disposition:   Disposition: Placed in Observation  Condition: Stable         Kiersten Li, DO  10/21/24 7230

## 2024-10-21 NOTE — PROGRESS NOTES
Chief Complaint:   Encounter Diagnosis   Name Primary?    Nephrolithiasis Yes       HPI:  39-year-old female who reported to the emergency department persistent left renal colic pain.  Had previously been to the hospital with intussusception.  CT scan demonstrates a left obstructing ureteral stone, no previous stone passage.  No previous urological or gynecological history.  Patient did have pyelonephritis in 2008, no history of UTIs though.  No family history of urological cancers or stones.    Allergies:  Patient has no known allergies.    Medications:  See MAR    Review of Systems:  General: No fever, chills, fatigability, or weight loss.  Skin: No rashes, itching, or changes in color or texture of skin.  Chest: Denies NOGUERA, cyanosis, wheezing, cough, and sputum production.  Abdomen: Appetite fine. No weight loss. Denies diarrhea, abdominal pain, hematemesis, or blood in stool.  Musculoskeletal: No joint stiffness or swelling. Denies back pain.  : As above.  All other review of systems negative.    PMH:   has a past medical history of Adjustment disorder with mixed anxiety and depressed mood (02/06/2013), Anemia, B12 deficiency, Anxiety, Arthritis, and Bradycardia.    PSH:   has a past surgical history that includes Dilation and curettage of uterus; Gastric bypass (2008); Cholecystectomy (10/2012); Liver scope (10/2012); Esophagogastroduodenoscopy (N/A, 07/20/2018); Lysis of adhesions; Colonoscopy (N/A, 02/14/2020); Laparoscopic repair of hiatal hernia (2019); Colonoscopy (N/A, 03/10/2023); Injection of anesthetic agent around lateral branch nerves of sacroiliac joint; Ulnar tunnel release (Left, 2/15/2024); and decompression, nerve, ulnar (Left, 2/15/2024).    FamHx: family history includes Heart disease in her father; Hypertension in her father.    SocHx:  reports that she has never smoked. She has never used smokeless tobacco. She reports that she does not drink alcohol and does not use drugs.      Physical  Exam:  There were no vitals filed for this visit.  General: A&Ox3, no apparent distress, no deformities  Neck: No masses, normal ROM  Lungs: normal inspiration, no use of accessory muscles  Heart: normal pulse, no arrhythmias  Abdomen: Soft, NT, ND, no masses, no hernias, no hepatosplenomegaly  Skin: The skin is warm and dry. No jaundice.  Ext: No c/c/e.    Labs/Studies:   CT stone protocol 4 mm left proximal ureter stone 10/24  Creatinine 0.7 10/24    Impression/Plan:     Nephrolithiasis- patient with a 4 mm left proximal ureteral stone, she has failed conservative passage.  Will pursue cystoscopy, left ureteroscopy with laser lithotripsy, retrograde and stent.  Please see below in regards to our discussion today, call with any issues prior to the next appointment.    Patient understands the risks, benefits and alternatives to the above-stated procedure.  These include but are not limited to damage to the surrounding structures including the urethra, ureter, bladder and kidney.  Risk of perforation requiring open procedure.  Risk of recurrent stones, need for stents, need for secondary or more definitive procedures for these stones.  Risk of infection, hematuria, persistent pain or stent discomfort.  Risk of heart attack, stroke, death, DVT and PE.  Patient understanding of all the above has elected to proceed.

## 2024-10-22 ENCOUNTER — ANESTHESIA EVENT (OUTPATIENT)
Dept: SURGERY | Facility: HOSPITAL | Age: 39
End: 2024-10-22
Payer: COMMERCIAL

## 2024-10-22 ENCOUNTER — ANESTHESIA (OUTPATIENT)
Dept: SURGERY | Facility: HOSPITAL | Age: 39
End: 2024-10-22
Payer: COMMERCIAL

## 2024-10-22 ENCOUNTER — TELEPHONE (OUTPATIENT)
Dept: UROLOGY | Facility: CLINIC | Age: 39
End: 2024-10-22
Payer: COMMERCIAL

## 2024-10-22 LAB
FINAL PATHOLOGIC DIAGNOSIS: NORMAL
GROSS: NORMAL
Lab: NORMAL
SPECIMEN SOURCE: NORMAL

## 2024-10-22 PROCEDURE — 36410 VNPNXR 3YR/> PHY/QHP DX/THER: CPT

## 2024-10-22 PROCEDURE — G0378 HOSPITAL OBSERVATION PER HR: HCPCS

## 2024-10-22 PROCEDURE — 25000003 PHARM REV CODE 250: Performed by: INTERNAL MEDICINE

## 2024-10-22 PROCEDURE — 88300 SURGICAL PATH GROSS: CPT | Performed by: PATHOLOGY

## 2024-10-22 PROCEDURE — 25000003 PHARM REV CODE 250

## 2024-10-22 PROCEDURE — 63600175 PHARM REV CODE 636 W HCPCS: Performed by: UROLOGY

## 2024-10-22 PROCEDURE — C1769 GUIDE WIRE: HCPCS | Performed by: UROLOGY

## 2024-10-22 PROCEDURE — 25500020 PHARM REV CODE 255: Performed by: UROLOGY

## 2024-10-22 PROCEDURE — 37000009 HC ANESTHESIA EA ADD 15 MINS: Performed by: UROLOGY

## 2024-10-22 PROCEDURE — C1751 CATH, INF, PER/CENT/MIDLINE: HCPCS

## 2024-10-22 PROCEDURE — 63600175 PHARM REV CODE 636 W HCPCS: Performed by: STUDENT IN AN ORGANIZED HEALTH CARE EDUCATION/TRAINING PROGRAM

## 2024-10-22 PROCEDURE — 63600175 PHARM REV CODE 636 W HCPCS

## 2024-10-22 PROCEDURE — 27201423 OPTIME MED/SURG SUP & DEVICES STERILE SUPPLY: Performed by: UROLOGY

## 2024-10-22 PROCEDURE — 37000008 HC ANESTHESIA 1ST 15 MINUTES: Performed by: UROLOGY

## 2024-10-22 PROCEDURE — 25000003 PHARM REV CODE 250: Performed by: EMERGENCY MEDICINE

## 2024-10-22 PROCEDURE — 25000003 PHARM REV CODE 250: Performed by: NURSE PRACTITIONER

## 2024-10-22 PROCEDURE — C1758 CATHETER, URETERAL: HCPCS | Performed by: UROLOGY

## 2024-10-22 PROCEDURE — 71000033 HC RECOVERY, INTIAL HOUR: Performed by: UROLOGY

## 2024-10-22 PROCEDURE — C2617 STENT, NON-COR, TEM W/O DEL: HCPCS | Performed by: UROLOGY

## 2024-10-22 PROCEDURE — 36000707: Performed by: UROLOGY

## 2024-10-22 PROCEDURE — 99900035 HC TECH TIME PER 15 MIN (STAT)

## 2024-10-22 PROCEDURE — 63600175 PHARM REV CODE 636 W HCPCS: Performed by: INTERNAL MEDICINE

## 2024-10-22 PROCEDURE — 82365 CALCULUS SPECTROSCOPY: CPT | Performed by: UROLOGY

## 2024-10-22 PROCEDURE — 36000706: Performed by: UROLOGY

## 2024-10-22 PROCEDURE — S5010 5% DEXTROSE AND 0.45% SALINE: HCPCS | Performed by: NURSE PRACTITIONER

## 2024-10-22 PROCEDURE — 94799 UNLISTED PULMONARY SVC/PX: CPT

## 2024-10-22 DEVICE — STENT URETERAL UNIV 7FR 26CM: Type: IMPLANTABLE DEVICE | Site: URETER | Status: FUNCTIONAL

## 2024-10-22 RX ORDER — OXYCODONE AND ACETAMINOPHEN 5; 325 MG/1; MG/1
1 TABLET ORAL EVERY 4 HOURS PRN
Qty: 10 TABLET | Refills: 0 | Status: SHIPPED | OUTPATIENT
Start: 2024-10-22

## 2024-10-22 RX ORDER — GLUCAGON 1 MG
1 KIT INJECTION
Status: DISCONTINUED | OUTPATIENT
Start: 2024-10-22 | End: 2024-10-22 | Stop reason: HOSPADM

## 2024-10-22 RX ORDER — DEXMEDETOMIDINE HYDROCHLORIDE 100 UG/ML
INJECTION, SOLUTION INTRAVENOUS
Status: DISCONTINUED | OUTPATIENT
Start: 2024-10-22 | End: 2024-10-22

## 2024-10-22 RX ORDER — PROPOFOL 10 MG/ML
VIAL (ML) INTRAVENOUS
Status: DISCONTINUED | OUTPATIENT
Start: 2024-10-22 | End: 2024-10-22

## 2024-10-22 RX ORDER — MIDAZOLAM HYDROCHLORIDE 1 MG/ML
INJECTION INTRAMUSCULAR; INTRAVENOUS
Status: DISCONTINUED | OUTPATIENT
Start: 2024-10-22 | End: 2024-10-22

## 2024-10-22 RX ORDER — CEFAZOLIN 2 G/1
2 INJECTION, POWDER, FOR SOLUTION INTRAMUSCULAR; INTRAVENOUS
Status: COMPLETED | OUTPATIENT
Start: 2024-10-22 | End: 2024-10-22

## 2024-10-22 RX ORDER — PHENAZOPYRIDINE HYDROCHLORIDE 200 MG/1
200 TABLET, FILM COATED ORAL 3 TIMES DAILY PRN
Qty: 15 TABLET | Refills: 0 | Status: SHIPPED | OUTPATIENT
Start: 2024-10-22

## 2024-10-22 RX ORDER — DIPHENHYDRAMINE HYDROCHLORIDE 50 MG/ML
12.5 INJECTION INTRAMUSCULAR; INTRAVENOUS
Status: DISCONTINUED | OUTPATIENT
Start: 2024-10-22 | End: 2024-10-22 | Stop reason: HOSPADM

## 2024-10-22 RX ORDER — PROPOFOL 10 MG/ML
VIAL (ML) INTRAVENOUS CONTINUOUS PRN
Status: DISCONTINUED | OUTPATIENT
Start: 2024-10-22 | End: 2024-10-22

## 2024-10-22 RX ORDER — KETOROLAC TROMETHAMINE 30 MG/ML
15 INJECTION, SOLUTION INTRAMUSCULAR; INTRAVENOUS EVERY 8 HOURS PRN
Status: DISCONTINUED | OUTPATIENT
Start: 2024-10-22 | End: 2024-10-22 | Stop reason: HOSPADM

## 2024-10-22 RX ORDER — OXYCODONE AND ACETAMINOPHEN 5; 325 MG/1; MG/1
1 TABLET ORAL
Status: DISCONTINUED | OUTPATIENT
Start: 2024-10-22 | End: 2024-10-22 | Stop reason: HOSPADM

## 2024-10-22 RX ORDER — HYDROMORPHONE HYDROCHLORIDE 1 MG/ML
0.2 INJECTION, SOLUTION INTRAMUSCULAR; INTRAVENOUS; SUBCUTANEOUS EVERY 5 MIN PRN
Status: DISCONTINUED | OUTPATIENT
Start: 2024-10-22 | End: 2024-10-22 | Stop reason: HOSPADM

## 2024-10-22 RX ORDER — LIDOCAINE HYDROCHLORIDE 20 MG/ML
INJECTION, SOLUTION EPIDURAL; INFILTRATION; INTRACAUDAL; PERINEURAL
Status: DISCONTINUED | OUTPATIENT
Start: 2024-10-22 | End: 2024-10-22

## 2024-10-22 RX ORDER — PHENYLEPHRINE HYDROCHLORIDE 10 MG/ML
INJECTION INTRAVENOUS
Status: DISCONTINUED | OUTPATIENT
Start: 2024-10-22 | End: 2024-10-22

## 2024-10-22 RX ORDER — ONDANSETRON HYDROCHLORIDE 2 MG/ML
4 INJECTION, SOLUTION INTRAVENOUS DAILY PRN
Status: DISCONTINUED | OUTPATIENT
Start: 2024-10-22 | End: 2024-10-22 | Stop reason: HOSPADM

## 2024-10-22 RX ORDER — CEFDINIR 300 MG/1
300 CAPSULE ORAL 2 TIMES DAILY
Qty: 14 CAPSULE | Refills: 0 | Status: SHIPPED | OUTPATIENT
Start: 2024-10-22 | End: 2024-10-29

## 2024-10-22 RX ORDER — FENTANYL CITRATE 50 UG/ML
INJECTION, SOLUTION INTRAMUSCULAR; INTRAVENOUS
Status: DISCONTINUED | OUTPATIENT
Start: 2024-10-22 | End: 2024-10-22

## 2024-10-22 RX ORDER — HYOSCYAMINE SULFATE 0.12 MG/1
0.25 TABLET SUBLINGUAL EVERY 4 HOURS PRN
Qty: 40 TABLET | Refills: 0 | Status: SHIPPED | OUTPATIENT
Start: 2024-10-22

## 2024-10-22 RX ORDER — ONDANSETRON HYDROCHLORIDE 2 MG/ML
INJECTION, SOLUTION INTRAVENOUS
Status: DISCONTINUED | OUTPATIENT
Start: 2024-10-22 | End: 2024-10-22

## 2024-10-22 RX ORDER — KETAMINE HCL IN 0.9 % NACL 50 MG/5 ML
SYRINGE (ML) INTRAVENOUS
Status: DISCONTINUED | OUTPATIENT
Start: 2024-10-22 | End: 2024-10-22

## 2024-10-22 RX ADMIN — DEXTROSE AND SODIUM CHLORIDE: 5; 450 INJECTION, SOLUTION INTRAVENOUS at 05:10

## 2024-10-22 RX ADMIN — FENTANYL CITRATE 25 MCG: 50 INJECTION, SOLUTION INTRAMUSCULAR; INTRAVENOUS at 12:10

## 2024-10-22 RX ADMIN — ONDANSETRON 4 MG: 2 INJECTION INTRAMUSCULAR; INTRAVENOUS at 12:10

## 2024-10-22 RX ADMIN — DEXTROSE AND SODIUM CHLORIDE: 5; 450 INJECTION, SOLUTION INTRAVENOUS at 08:10

## 2024-10-22 RX ADMIN — TAMSULOSIN HYDROCHLORIDE 0.4 MG: 0.4 CAPSULE ORAL at 08:10

## 2024-10-22 RX ADMIN — DEXTROSE AND SODIUM CHLORIDE: 5; 450 INJECTION, SOLUTION INTRAVENOUS at 12:10

## 2024-10-22 RX ADMIN — HYDROMORPHONE HYDROCHLORIDE 0.5 MG: 1 INJECTION, SOLUTION INTRAMUSCULAR; INTRAVENOUS; SUBCUTANEOUS at 12:10

## 2024-10-22 RX ADMIN — HYDROMORPHONE HYDROCHLORIDE 0.2 MG: 1 INJECTION, SOLUTION INTRAMUSCULAR; INTRAVENOUS; SUBCUTANEOUS at 01:10

## 2024-10-22 RX ADMIN — CEFAZOLIN 2 G: 2 INJECTION, POWDER, FOR SOLUTION INTRAMUSCULAR; INTRAVENOUS at 12:10

## 2024-10-22 RX ADMIN — GLYCOPYRROLATE 0.2 MG: 0.2 INJECTION, SOLUTION INTRAMUSCULAR; INTRAVITREAL at 12:10

## 2024-10-22 RX ADMIN — OXYCODONE HYDROCHLORIDE AND ACETAMINOPHEN 1 TABLET: 7.5; 325 TABLET ORAL at 05:10

## 2024-10-22 RX ADMIN — KETOROLAC TROMETHAMINE 15 MG: 30 INJECTION, SOLUTION INTRAMUSCULAR at 01:10

## 2024-10-22 RX ADMIN — DEXTROSE AND SODIUM CHLORIDE: 5; 450 INJECTION, SOLUTION INTRAVENOUS at 02:10

## 2024-10-22 RX ADMIN — PROPOFOL 30 MG: 10 INJECTION, EMULSION INTRAVENOUS at 12:10

## 2024-10-22 RX ADMIN — HYDROMORPHONE HYDROCHLORIDE 0.5 MG: 1 INJECTION, SOLUTION INTRAMUSCULAR; INTRAVENOUS; SUBCUTANEOUS at 08:10

## 2024-10-22 RX ADMIN — FENTANYL CITRATE 50 MCG: 50 INJECTION, SOLUTION INTRAMUSCULAR; INTRAVENOUS at 12:10

## 2024-10-22 RX ADMIN — LIDOCAINE HYDROCHLORIDE 100 MG: 20 INJECTION, SOLUTION EPIDURAL; INFILTRATION; INTRACAUDAL; PERINEURAL at 12:10

## 2024-10-22 RX ADMIN — PHENYLEPHRINE HYDROCHLORIDE 100 MCG: 10 INJECTION INTRAVENOUS at 01:10

## 2024-10-22 RX ADMIN — SODIUM CHLORIDE 250 ML: 9 INJECTION, SOLUTION INTRAVENOUS at 03:10

## 2024-10-22 RX ADMIN — DEXMEDETOMIDINE 6 MCG: 200 INJECTION, SOLUTION INTRAVENOUS at 12:10

## 2024-10-22 RX ADMIN — Medication 20 MG: at 12:10

## 2024-10-22 RX ADMIN — MIDAZOLAM HYDROCHLORIDE 2 MG: 1 INJECTION, SOLUTION INTRAMUSCULAR; INTRAVENOUS at 12:10

## 2024-10-22 RX ADMIN — PROPOFOL 100 MCG/KG/MIN: 10 INJECTION, EMULSION INTRAVENOUS at 12:10

## 2024-10-22 RX ADMIN — DEXMEDETOMIDINE 6 MCG: 200 INJECTION, SOLUTION INTRAVENOUS at 01:10

## 2024-10-22 RX ADMIN — PROPOFOL 80 MG: 10 INJECTION, EMULSION INTRAVENOUS at 12:10

## 2024-10-22 NOTE — ASSESSMENT & PLAN NOTE
Procedure planned per Dr. Lowery tomorrow, cystoscopy with left ureteroscopy lithotripsy and stent, patient presented with intractable pain  Stone in distal left ureter, 4 mm left ureteral stone, she has failed conservative measures  NPO after midnight  Hydrate with IV fluids, patient unable to keep fluids down at this time  PRN pain medication and antiemetics ordered    S/p Cysto/ureteroscopy- s/p Stone extraction and double J stent placement

## 2024-10-22 NOTE — OP NOTE
Date of Procedure: 10/22/2024    PREOPERATIVE DIAGNOSIS:  left ureteral stone.                                                                                                           POSTOPERATIVE DIAGNOSIS:  left ureteral stone.                               PROCEDURES:                                                                  1.  left ureteroscopy with stone basket extraction.                          2.  Cystoscopy with placement of left double-J ureteral stent.              3.  left retrograde pyelogram.                                               4.  Fluoro less than 1 hour.                                                 SURGEON:  Paul Lowery M.D.                                          Assistants: None    Specimen: stone for stone analysis    ANESTHESIA:  General endotracheal.                                           FINDINGS:  Stone extracted, retrograde unremarkable, stent in good position.                                    BLOOD LOSS:  None.                                                         DRAINS:  Left 7 Slovak by 26 cm double-J ureteral stent.                      PROCEDURE IN DETAIL:  Patient is brought to the operative suite and placed under anesthesia in the dorsal lithotomy position.  After being sterilely prepped and draped a 21 Slovak sheath cystoscope was placed into a normal urethra.  Bladder was otherwise unremarkable, no evidence of intravesical lesions or other abnormalities.  Bilateral ureteral orifices normal size, shape, caliber and location.  The left ureteral orifice was cannulated with a double floppy guidewire passed into the renal pelvis using fluoroscopy.  We were then able to insert a rigid ureteroscope into the urethra and bladder and into the left ureter.  Stone was identified and basketed out for analysis.  Scope was reinserted demonstrating no residual stones within the ureter, over the wire we placed a [place] catheter.  Wire was removed and a  retrograde was done demonstrating no filling defects or other abnormalities, otherwise unremarkable retrograde nephrogram.  The wire was reinserted, and over the wire using Seldinger technique we placed a 7 Vincentian by 26 cm double-J ureteral stent, with a good curl in the renal pelvis and bladder.  Bladder was drained with the cystoscope, tether was tucked vaginally.  The patient was taken to the PACU in stable condition.  She will follow up in 1 week as a nurse visit for stent removal and then see me in 6 weeks with a KUB and renal ultrasound.    COMPLICATIONS:  None.

## 2024-10-22 NOTE — TRANSFER OF CARE
Anesthesia Transfer of Care Note    Patient: Johanna Bullock    Procedure(s) Performed: Procedure(s) (LRB):  CYSTOURETEROSCOPY, WITH RETROGRADE PYELOGRAM AND URETERAL STENT INSERTION (Left)  EGD (ESOPHAGOGASTRODUODENOSCOPY) (N/A)  REMOVAL, CALCULUS, URETER, URETEROSCOPIC    Patient location: PACU    Anesthesia Type: MAC    Transport from OR: Transported from OR on room air with adequate spontaneous ventilation    Post pain: adequate analgesia    Post assessment: no apparent anesthetic complications    Post vital signs: stable    Level of consciousness: awake    Complications: none    Transfer of care protocol was followed      Last vitals: Visit Vitals  BP (!) 109/57 (BP Location: Right arm, Patient Position: Lying)   Pulse 68   Temp 36.7 °C (98 °F) (Oral)   Resp 18   Wt 111.5 kg (245 lb 13 oz)   LMP 10/05/2024 (Approximate)   SpO2 98%   Breastfeeding No   BMI 37.38 kg/m²

## 2024-10-22 NOTE — TELEPHONE ENCOUNTER
----- Message from Paul Lowery MD sent at 10/22/2024 12:54 PM CDT -----  One week nurse visit for stent pull, 6 weeks with me with nba aragon and alka

## 2024-10-22 NOTE — NURSING TRANSFER
Nursing Transfer Note      10/22/2024   2:29 PM    Nurse giving handoff:BRANDEN Gambino  Nurse receiving handoff:BRANDEN Best    Reason patient is being transferred: Transfer back to inpatient room     Transfer From: PACU    Transfer via stretcher    Transported by BRANDEN Gambino    Transfer Vital Signs:  Blood Pressure:153/65  Heart Rate:74  O2:99   Temperature:98  Respirations:16    Order for Tele Monitor? No    Patient belongings transferred with patient: Yes - patient's wig    Chart send with patient: Yes    Notified: Mother at bedside  
Never smoker

## 2024-10-22 NOTE — CONSULTS
Chief Complaint:  Left ureteral stone    HPI:   10/22/24- patient was scheduled for an elective outpatient procedure, began to experience worse pain.  Patient reported to the emergency department and was admitted for IV pain control.      10/21/24- 39-year-old female who reported to the emergency department persistent left renal colic pain. Had previously been to the hospital with intussusception. CT scan demonstrates a left obstructing ureteral stone, no previous stone passage. No previous urological or gynecological history. Patient did have pyelonephritis in 2008, no history of UTIs though. No family history of urological cancers or stones.     Allergies:  Patient has no known allergies.    Medications:  See MAR    Review of Systems:  General: No fever, chills, fatigability, or weight loss.  Skin: No rashes, itching, or changes in color or texture of skin.  Chest: Denies NOGUERA, cyanosis, wheezing, cough, and sputum production.  Abdomen: Appetite fine. No weight loss. Denies diarrhea, abdominal pain, hematemesis, or blood in stool.  Musculoskeletal: No joint stiffness or swelling. Denies back pain.  : As above.  All other review of systems negative.    PMH:   has a past medical history of Adjustment disorder with mixed anxiety and depressed mood (02/06/2013), Anemia, B12 deficiency, Anxiety, Arthritis, and Bradycardia.    PSH:   has a past surgical history that includes Dilation and curettage of uterus; Gastric bypass (2008); Cholecystectomy (10/2012); Liver scope (10/2012); Esophagogastroduodenoscopy (N/A, 07/20/2018); Lysis of adhesions; Colonoscopy (N/A, 02/14/2020); Laparoscopic repair of hiatal hernia (2019); Colonoscopy (N/A, 03/10/2023); Injection of anesthetic agent around lateral branch nerves of sacroiliac joint; Ulnar tunnel release (Left, 02/15/2024); and decompression, nerve, ulnar (Left, 02/15/2024).    FamHx: family history includes Heart disease in her father; Hypertension in her father.    SocHx:   reports that she has never smoked. She has never used smokeless tobacco. She reports that she does not drink alcohol and does not use drugs.      Physical Exam:  Vitals:    10/22/24 0822   BP: (!) 109/57   Pulse: 68   Resp: 18   Temp:      General: A&Ox3, no apparent distress, no deformities  Neck: No masses, normal ROM  Lungs: normal inspiration, no use of accessory muscles  Heart: normal pulse, no arrhythmias  Abdomen: Soft, NT, ND, left costovertebral angle tenderness  Skin: The skin is warm and dry. No jaundice.  Ext: No c/c/e..    Labs/Studies:   CBC demonstrates white count 5.8, please see the chart.    BMP demonstrates a BUN and creatinine 5 and 0.7 respectively.  UA 1+ protein, 3+ ketones, 3+ blood, trace LE.  KUB left distal ureteral stone 10/24  CT stone protocol 4 mm left proximal ureter stone 10/24     Impression/Plan:     Left ureteral stone-  patient with a 4 mm left proximal ureteral stone, she has failed conservative passage.  Will pursue cystoscopy, left ureteroscopy with laser lithotripsy, retrograde and stent.  Please see below in regards to our discussion today, call with any issues prior to the next appointment.     Patient understands the risks, benefits and alternatives to the above-stated procedure.  These include but are not limited to damage to the surrounding structures including the urethra, ureter, bladder and kidney.  Risk of perforation requiring open procedure.  Risk of recurrent stones, need for stents, need for secondary or more definitive procedures for these stones.  Risk of infection, hematuria, persistent pain or stent discomfort.  Risk of heart attack, stroke, death, DVT and PE.  Patient understanding of all the above has elected to proceed.

## 2024-10-22 NOTE — [employer] PATIENT INSTRUCTIONS
Discharge Summary/Instructions after an Endoscopic Procedure  Patient Name: Johanna Bullock  Patient MRN: [id number]  Patient YOB: 1985 Tuesday, October 22, 2024 Manuel Ivan MD  Dear patient,  As a result of recent federal legislation (The Federal Cures Act), you may   receive lab or pathology results from your procedure in your [facility]   account before your physician is able to contact you. Your physician or   their representative will relay the results to you with their   recommendations at their soonest availability.  Thank you,  RESTRICTIONS:  During your procedure today, you received medications for sedation.  These   medications may affect your judgment, balance and coordination.  Therefore,   for 24 hours, you have the following restrictions:   - DO NOT drive a car, operate machinery, make legal/financial decisions,   sign important papers or drink alcohol.    ACTIVITY:  Today: no heavy lifting, straining or running due to procedural   sedation/anesthesia.  The following day: return to full activity including work.  DIET:  Eat and drink normally unless instructed otherwise.     TREATMENT FOR COMMON SIDE EFFECTS:  - Mild abdominal pain, nausea, belching, bloating or excessive gas:  rest,   eat lightly and use a heating pad.  - Sore Throat: treat with throat lozenges and/or gargle with warm salt   water.  - Because air was used during the procedure, expelling large amounts of air   from your rectum or belching is normal.  - If a bowel prep was taken, you may not have a bowel movement for 1-3 days.    This is normal.  SYMPTOMS TO WATCH FOR AND REPORT TO YOUR PHYSICIAN:  1. Abdominal pain or bloating, other than gas cramps.  2. Chest pain.  3. Back pain.  4. Signs of infection such as: chills or fever occurring within 24 hours   after the procedure.  5. Rectal bleeding, which would show as bright red, maroon, or black stools.   (A tablespoon of blood from the rectum is not serious,  especially if   hemorrhoids are present.)  6. Vomiting.  7. Weakness or dizziness.  GO DIRECTLY TO THE NEAREST EMERGENCY ROOM IF YOU HAVE ANY OF THE FOLLOWING:      Difficulty breathing              Chills and/or fever over 101 F   Persistent vomiting and/or vomiting blood   Severe abdominal pain   Severe chest pain   Black, tarry stools   Bleeding- more than one tablespoon   Any other symptom or condition that you feel may need urgent attention  Your doctor recommends these additional instructions:  If any biopsies were taken, your doctors clinic will contact you in 1 to 2   weeks with any results.  - Patient has a contact number available for emergencies.  The signs and   symptoms of potential delayed complications were discussed with the   patient.  Return to normal activities tomorrow.  Written discharge   instructions were provided to the patient.   - Resume previous diet.   - Continue present medications.   - Return to referring physician as previously scheduled.   - Discharge patient to home.  For questions, problems or results please call your physician Manuel Ivan MD at Work:  [phone]  If you have any questions about the above instructions, call the GI   department at [phone] or call the endoscopy unit at [phone]   from 7am until 3 pm.  [facility] [place], EMERGENCY ROOM PHONE NUMBER:   [phone]  IF A COMPLICATION OR EMERGENCY SITUATION ARISES AND YOU ARE UNABLE TO REACH   YOUR PHYSICIAN - GO DIRECTLY TO THE EMERGENCY ROOM.  I have read or have had read to me these discharge instructions for my   procedure and have received a written copy.  I understand these   instructions and will follow-up with my physician if I have any questions.     __________________________________       _____________________________________  Nurse Signature                                          Patient/Designated   Responsible Party Signature  Manuel Ivan MD  10/22/2024  2:57:31 PM  This report has been verified and signed electronically.  Dear patient,  As a result of recent federal legislation (The Federal Cures Act), you may   receive lab or pathology results from your procedure in your [facility]   account before your physician is able to contact you. Your physician or   their representative will relay the results to you with their   recommendations at their soonest availability.  Thank you,  PROVATION

## 2024-10-22 NOTE — PLAN OF CARE
[facility] (Hospital)  Discharge Assessment    Primary Care Provider: Ran Mcgill MD     Discharge Assessment (most recent)       BRIEF DISCHARGE ASSESSMENT - 10/22/24 8991          Discharge Planning    Assessment Type Discharge Planning Brief Assessment     Resource/Environmental Concerns none     Support Systems Family members     Equipment Currently Used at Home none     Current Living Arrangements home     Patient/Family Anticipates Transition to home with family     Patient/Family Anticipated Services at Transition none     DME Needed Upon Discharge  none     Discharge Plan A Home with family                     No identified CM needs at this time, will continue to follow.

## 2024-10-22 NOTE — SUBJECTIVE & OBJECTIVE
Past Medical History:   Diagnosis Date    Adjustment disorder with mixed anxiety and depressed mood 02/06/2013    Anemia, B12 deficiency     Anxiety     Arthritis     Bradycardia        Past Surgical History:   Procedure Laterality Date    CHOLECYSTECTOMY  10/2012    COLONOSCOPY N/A 02/14/2020    Procedure: COLONOSCOPY;  Surgeon: Jas Moore MD;  [facility]  Service: Endoscopy;  Laterality: N/A;  Pt procedure time changed to 0800 with 0715 - second half prep completed from [id number]- Pt verbalized understanding- ERW2/13/20@1022    COLONOSCOPY N/A 03/10/2023    Procedure: COLONOSCOPY;  Surgeon: HORACE Moore MD;  Location: [facility] (4TH FLR);  Service: Endoscopy;  Laterality: N/A;  inst emailed-RB    DECOMPRESSION, NERVE, ULNAR Left 02/15/2024    Procedure: DECOMPRESSION, NERVE, ULNAR;  Surgeon: Jas Larose MD;  Location: [facility] OR;  Service: Orthopedics;  Laterality: Left;  ulnar nerve decompression left elbow with cubital tunnel release and possible anterior transposition ulnar nerve    DILATION AND CURETTAGE OF UTERUS      ESOPHAGOGASTRODUODENOSCOPY N/A 07/20/2018    Procedure: ESOPHAGOGASTRODUODENOSCOPY (EGD);  Surgeon: Darwin Hansen MD;  [facility]  Service: Endoscopy;  Laterality: N/A;  Please measure pouch and limbs    GASTRIC BYPASS  2008    Revision August 2019    INJECTION OF ANESTHETIC AGENT AROUND LATERAL BRANCH NERVES OF SACROILIAC JOINT      LAPAROSCOPIC REPAIR OF HIATAL HERNIA  2019    Liver scope  10/2012    LYSIS OF ADHESIONS      ULNAR TUNNEL RELEASE Left 02/15/2024    Procedure: RELEASE, CUBITAL TUNNEL;  Surgeon: Jas Larose MD;  Location: [facility] OR;  Service: Orthopedics;  Laterality: Left;  ulnar nerve decompression left elbow with cubital tunnel release and possible anterior transposition ulnar nerve       Review of patient's allergies indicates:  No Known Allergies    Current Facility-Administered Medications on  File Prior to Encounter   Medication    ceFAZolin 2 g in dextrose 5 % in water (D5W) 50 mL IVPB (MB+)    chlorhexidine 0.12 % solution 10 mL     Current Outpatient Medications on File Prior to Encounter   Medication Sig    acetaminophen (TYLENOL) 650 MG TbSR Take 650 mg by mouth every 8 (eight) hours.    acetylcysteine (N-ACETYL-L-CYSTEINE MISC) by Misc.(Non-Drug; Combo Route) route.    albuterol (PROVENTIL/VENTOLIN HFA) 90 mcg/actuation inhaler Inhale 1-2 puffs into the lungs every 6 (six) hours as needed for Wheezing.    cyanocobalamin 1,000 mcg/mL injection ONE INJECTION AS DIRECTED EVERY 28 DAYS    DULoxetine (CYMBALTA) 30 MG capsule Take 1 capsule (30 mg total) by mouth every evening.    FLUoxetine 20 MG capsule Take 20 mg by mouth once daily.    fluticasone propionate (FLONASE) 50 mcg/actuation nasal spray 1 spray by Each Nostril route once daily.    HYDROcodone-acetaminophen (NORCO) 5-325 mg per tablet Take 1 tablet by mouth every 8 (eight) hours as needed for Pain.    ketorolac (TORADOL) 10 mg tablet Take by mouth.    levothyroxine (SYNTHROID) 25 MCG tablet Take 1 tablet by mouth every morning.    LINZESS 290 mcg Cap capsule TAKE ONE CAPSULE BY MOUTH EVERY DAY (Patient taking differently: Take 290 mcg by mouth before breakfast.)    LINZESS 290 mcg Cap capsule TAKE 1 CAPSULE(290 MCG) BY MOUTH BEFORE BREAKFAST    magnesium carb,citrate,oxide (MAGNESIUM COMPLEX ORAL) Take by mouth.    midodrine (PROAMATINE) 5 MG Tab Take 1 tablet (5 mg total) by mouth 3 (three) times daily with meals. If BP is lower can increase to 10mg three times a day, if elevated can stop    modafiniL (PROVIGIL) 200 MG Tab Take 200 mg by mouth once daily.    tamsulosin (FLOMAX) 0.4 mg Cap Take 1 capsule (0.4 mg total) by mouth once daily.    tiZANidine 4 mg Cap Take by mouth.    traMADoL (ULTRAM) 50 mg tablet Take 50 mg by mouth every 6 (six) hours.     Family History       Problem Relation (Age of Onset)    Heart disease Father     Hypertension Father          Tobacco Use    Smoking status: Never    Smokeless tobacco: Never   Substance and Sexual Activity    Alcohol use: Never     Comment: Alcohol use rarely    Drug use: No     Comment: Mirena    Sexual activity: Yes     Partners: Male     Birth control/protection: I.U.D.     Review of Systems   Constitutional:  Positive for activity change and appetite change. Negative for chills and fever.   HENT: Negative.     Eyes: Negative.    Respiratory: Negative.  Negative for cough, shortness of breath and wheezing.    Cardiovascular: Negative.  Negative for chest pain, palpitations and leg swelling.   Gastrointestinal:  Positive for abdominal pain and nausea. Negative for abdominal distention and vomiting.        Left sided abdominal pain-- radiating down into left groin   Endocrine: Negative.    Genitourinary:  Positive for decreased urine volume, flank pain, frequency, hematuria, pelvic pain and urgency. Negative for difficulty urinating and dysuria.        Brown colored urine   Musculoskeletal:  Positive for back pain.   Skin: Negative.    Allergic/Immunologic: Negative.    Neurological: Negative.  Negative for dizziness, syncope, weakness, light-headedness and headaches.   Hematological: Negative.    Psychiatric/Behavioral: Negative.       Objective:     Vital Signs (Most Recent):  Temp: 98.3 °F (36.8 °C) (10/21/24 1658)  Pulse: 66 (10/21/24 2115)  Resp: 20 (10/21/24 2115)  BP: 127/75 (10/21/24 2100)  SpO2: 98 % (10/21/24 2115) Vital Signs (24h Range):  Temp:  [98.3 °F (36.8 °C)] 98.3 °F (36.8 °C)  Pulse:  [50-78] 66  Resp:  [14-20] 20  SpO2:  [96 %-99 %] 98 %  BP: (107-148)/(56-78) 127/75     Weight: 111.5 kg (245 lb 13 oz)  Body mass index is 37.38 kg/m².     Physical Exam  Vitals reviewed.   Constitutional:       General: She is not in acute distress.     Appearance: She is not toxic-appearing or diaphoretic.   HENT:      Head: Normocephalic and atraumatic.      Nose: Nose normal.       Mouth/Throat:      Mouth: Mucous membranes are dry.   Eyes:      Extraocular Movements: Extraocular movements intact.      Pupils: Pupils are equal, round, and reactive to light.   Cardiovascular:      Rate and Rhythm: Normal rate and regular rhythm.      Pulses: Normal pulses.      Heart sounds: Normal heart sounds. No murmur heard.     No friction rub. No gallop.   Pulmonary:      Effort: Pulmonary effort is normal. No respiratory distress.      Breath sounds: Normal breath sounds. No stridor. No wheezing, rhonchi or rales.   Chest:      Chest wall: No tenderness.   Abdominal:      Palpations: Abdomen is soft.      Tenderness: There is abdominal tenderness. There is left CVA tenderness. There is no right CVA tenderness, guarding or rebound.      Comments: Obese, scars  Hypoactive bowel sounds   Musculoskeletal:         General: Normal range of motion.      Cervical back: Neck supple.      Right lower leg: No edema.      Left lower leg: No edema.   Skin:     General: Skin is warm and dry.   Neurological:      General: No focal deficit present.      Mental Status: She is alert and oriented to person, place, and time.   Psychiatric:         Mood and Affect: Mood normal.         Behavior: Behavior normal.         Thought Content: Thought content normal.              CRANIAL NERVES     CN III, IV, VI   Pupils are equal, round, and reactive to light.       Significant Labs: All pertinent labs within the past 24 hours have been reviewed.  CBC:   Recent Labs   Lab 10/21/24  1714   WBC 5.80   HGB 12.5   HCT 38.4        CMP:   Recent Labs   Lab 10/21/24  1848      K 3.6      CO2 20*   GLU 78   BUN 5*   CREATININE 0.7   CALCIUM 8.7   PROT 6.5   ALBUMIN 3.5   BILITOT 0.3   ALKPHOS 71   AST 12   ALT 12   ANIONGAP 9     Urine Studies:   Recent Labs   Lab 10/21/24  1902   COLORU Yellow   APPEARANCEUA Hazy*   PHUR 6.0   SPECGRAV 1.020   PROTEINUA 1+*   GLUCUA Negative   KETONESU 3+*   BILIRUBINUA Negative    OCCULTUA 3+*   NITRITE Negative   UROBILINOGEN Negative   LEUKOCYTESUR Trace*   RBCUA >100*   WBCUA 0   BACTERIA None   SQUAMEPITHEL 6   HYALINECASTS 0     NEGATIVE hCG urine    Significant Imaging: I have reviewed all pertinent imaging results/findings within the past 24 hours.    X-ray Abdomen Flat and Erect:  Known left renal stone appears to be in distal left ureter, IUD remains in place, normal bowel gas pattern, stable postoperative changes involving stomach, lung bases are clear

## 2024-10-22 NOTE — HOSPITAL COURSE
39-year-old female with hx significant for anemia, B12 deficiency, anxiety, depression, adjustment disorder, arthritis, bradycardia, coccidiomycosis, gastric bypass surgery and multiple abdominal surgeries with recent admission overnight 10/16 to 10/17 due to intussusception which resolved without intervention, and then seen in ED on 10/18 and found to have 4 mm left distal ureter stone who now presented to ED with complaint of worsening left flank pain, states that pain has become intolerable, with associated nausea.  She reports pain 9/10, left lower abdomen radiating into left groin and around to left back.  She states she has been unable to eat or or drink fluids due to severe pain and nausea.  Denies vomiting.  She states her urine is brown colored, states that she does have urgency and frequency.  Vital signs while in ED stable, heart rate low at times in the upper 50s, temp 98.3°, blood pressure 148/73, saturating normally on room air.  Lab workup insignificant.  Urinalysis does show greater than 100 RBCs, no signs of infection. Xray demonstraters known left renal stone appears to be in distal left ureter currently. Dr. Huston is on call and was consulted per ED, recommended admission due to intractable pain.    10/22- Appreciate Noel Lowery/Moncho- s/p L Ureteroscopy with stone basket extraction.                   2.  Cystoscopy with placement of left double-J ureteral stent.              3.  left retrograde pyelogram.                                               4.  Fluoro less than 1 hour.                                            FINDINGS:  Stone extracted, retrograde unremarkable, stent in good position.                                                                      DRAINS:  Left 7 French by 26 cm double-J ureteral stent.         S/p EGD by Dr. Ivan-- Normal esophagus.                          - Normal examined duodenum.                          - Z-line regular, 30 cm from the incisors.                           - Small hiatal hernia.                          - Ramez-en-Y gastrojejunostomy with gastrojejunal                          anastomosis characterized by healthy appearing                          mucosa.              Pt seen post op, AAOx4, c/o Abd discomfort marnie at the L flank at the stents. Just got IV dilaudid. Later BP dropped 80/50- feet elevated, pt given NS bolus.     10/23- looks and feels much better, rested well overnite, BP improved and VSS Afeb. Dr. Barnett evaluated her this am and cleared. She is up and about, eating drinking well, moving around well and feels ready to go home. She was seen and examined and deemed stable for discharge home today.

## 2024-10-22 NOTE — PLAN OF CARE
NPO, to OR this am for Lithotripsy. No events/complaints throughout shift. BP soft, per pt this is her baseline. Bed in low & locked position with call light in reach. Bed alarm refused. Will continue to monitor. 24h cc complete.    BP (!) 93/56 (BP Location: Right arm, Patient Position: Lying)   Pulse 62   Temp 98.2 °F (36.8 °C) (Oral)   Resp 18   Wt 111.5 kg (245 lb 13 oz)   LMP 10/05/2024 (Approximate)   SpO2 (!) 94%   Breastfeeding No   BMI 37.38 kg/m²

## 2024-10-22 NOTE — SUBJECTIVE & OBJECTIVE
Interval History: Pt seen post op, AAOx4, c/o Abd discomfort marnie at the L flank at the stents. Just got IV dilaudid. Later BP dropped 80/50- feet elevated, pt given NS bolus.     Review of Systems   Constitutional:  Positive for activity change and appetite change. Negative for chills and fever.   HENT: Negative.     Eyes: Negative.    Respiratory: Negative.  Negative for cough, shortness of breath and wheezing.    Cardiovascular: Negative.  Negative for chest pain, palpitations and leg swelling.   Gastrointestinal:  Positive for abdominal pain. Negative for abdominal distention, nausea and vomiting.        Left sided abdominal pain-- radiating down into left groin   Endocrine: Negative.    Genitourinary:  Positive for flank pain, frequency and urgency. Negative for decreased urine volume, difficulty urinating, dysuria, hematuria and pelvic pain.        Brown colored urine   Musculoskeletal:  Positive for back pain.   Skin: Negative.    Allergic/Immunologic: Negative.    Neurological: Negative.  Negative for dizziness, syncope, weakness, light-headedness and headaches.   Hematological: Negative.    Psychiatric/Behavioral: Negative.       Objective:     Vital Signs (Most Recent):  Temp: 98.1 °F (36.7 °C) (10/22/24 1543)  Pulse: 60 (10/22/24 1543)  Resp: 18 (10/22/24 1543)  BP: (!) 88/50 (10/22/24 1543)  SpO2: 100 % (10/22/24 1543) Vital Signs (24h Range):  Temp:  [97.2 °F (36.2 °C)-98.4 °F (36.9 °C)] 98.1 °F (36.7 °C)  Pulse:  [55-91] 60  Resp:  [13-20] 18  SpO2:  [94 %-100 %] 100 %  BP: ()/(50-78) 88/50     Weight: 111.5 kg (245 lb 13 oz)  Body mass index is 37.38 kg/m².    Intake/Output Summary (Last 24 hours) at 10/22/2024 1547  Last data filed at 10/22/2024 0514  Gross per 24 hour   Intake 677.7 ml   Output --   Net 677.7 ml         Physical Exam  Vitals and nursing note reviewed.   Constitutional:       General: She is not in acute distress.     Appearance: She is not toxic-appearing or diaphoretic.    HENT:      Head: Normocephalic and atraumatic.      Nose: Nose normal.      Mouth/Throat:      Mouth: Mucous membranes are dry.   Eyes:      Extraocular Movements: Extraocular movements intact.      Pupils: Pupils are equal, round, and reactive to light.   Cardiovascular:      Rate and Rhythm: Normal rate and regular rhythm.      Pulses: Normal pulses.      Heart sounds: Normal heart sounds. No murmur heard.     No friction rub. No gallop.   Pulmonary:      Effort: Pulmonary effort is normal. No respiratory distress.      Breath sounds: Normal breath sounds. No stridor. No wheezing, rhonchi or rales.   Chest:      Chest wall: No tenderness.   Abdominal:      Palpations: Abdomen is soft.      Tenderness: There is abdominal tenderness. There is left CVA tenderness. There is no right CVA tenderness, guarding or rebound.      Comments: Obese, scars  Hypoactive bowel sounds   Musculoskeletal:         General: Normal range of motion.      Cervical back: Neck supple.      Right lower leg: No edema.      Left lower leg: No edema.   Skin:     General: Skin is warm and dry.   Neurological:      General: No focal deficit present.      Mental Status: She is alert and oriented to person, place, and time.   Psychiatric:         Mood and Affect: Mood normal.         Behavior: Behavior normal.         Thought Content: Thought content normal.             Significant Labs: All pertinent labs within the past 24 hours have been reviewed.    BMP:   Recent Labs   Lab 10/21/24  1848   GLU 78      K 3.6      CO2 20*   BUN 5*   CREATININE 0.7   CALCIUM 8.7     CBC:   Recent Labs   Lab 10/21/24  1714   WBC 5.80   HGB 12.5   HCT 38.4        CMP:   Recent Labs   Lab 10/21/24  1848      K 3.6      CO2 20*   GLU 78   BUN 5*   CREATININE 0.7   CALCIUM 8.7   PROT 6.5   ALBUMIN 3.5   BILITOT 0.3   ALKPHOS 71   AST 12   ALT 12   ANIONGAP 9     Significant Imaging: I have reviewed all pertinent imaging  results/findings within the past 24 hours.

## 2024-10-22 NOTE — ASSESSMENT & PLAN NOTE
Multimodal pain regimen --PRN dilaudid and Percocet ordered    Improved after the stone extraction and stent placement    Cont PO and IV analgesics prn

## 2024-10-22 NOTE — ANESTHESIA PREPROCEDURE EVALUATION
10/22/2024  Johanna Bullock is a 39 y.o., female    Patient Active Problem List   Diagnosis    History of gastric bypass    Vitamin B12 deficiency    Iron deficiency    Severe obesity (BMI 35.0-35.9 with comorbidity)    History of peptic ulcer    History of bariatric surgery    Screening for malignant neoplasm of colon    Irregular intermenstrual bleeding    IUD check up    Pelvic pain    Left wrist pain    Weakness of left hand    Left arm weakness    Paresthesia of left arm    Cubital tunnel syndrome on left    Left elbow pain    Stiffness of left elbow joint    Muscle weakness    Right shoulder pain    Weakness of right shoulder    Postural instability    Mold exposure    Allergies    Body mass index (BMI) of 39.0 to 39.9 in adult    Nephrolithiasis    Left ureteral stone    Intractable pain     Past Medical History:   Diagnosis Date    Adjustment disorder with mixed anxiety and depressed mood 02/06/2013    Anemia, B12 deficiency     Anxiety     Arthritis     Bradycardia      Past Surgical History:   Procedure Laterality Date    CHOLECYSTECTOMY  10/2012    COLONOSCOPY N/A 02/14/2020    Procedure: COLONOSCOPY;  Surgeon: Jas Moore MD;  Location: [address]);  Service: Endoscopy;  Laterality: N/A;  Pt procedure time changed to 0800 with 0715 - second half prep completed from [id number]- Pt verbalized understanding- ERW2/13/20@1022    COLONOSCOPY N/A 03/10/2023    Procedure: COLONOSCOPY;  Surgeon: HORACE Moore MD;  Location: [facility] ([address] [place] [address]);  Service: Endoscopy;  Laterality: N/A;  inst emailed-RB    DECOMPRESSION, NERVE, ULNAR Left 02/15/2024    Procedure: DECOMPRESSION, NERVE, ULNAR;  Surgeon: Jas Larose MD;  Location: [facility];  Service: Orthopedics;  Laterality: Left;  ulnar nerve decompression left elbow with cubital tunnel release and possible anterior  transposition ulnar nerve    DILATION AND CURETTAGE OF UTERUS      ESOPHAGOGASTRODUODENOSCOPY N/A 07/20/2018    Procedure: ESOPHAGOGASTRODUODENOSCOPY (EGD);  Surgeon: Darwin Hansen MD;  Location: [facility] (4TH FLR);  Service: Endoscopy;  Laterality: N/A;  Please measure pouch and limbs    GASTRIC BYPASS  2008    Revision August 2019    INJECTION OF ANESTHETIC AGENT AROUND LATERAL BRANCH NERVES OF SACROILIAC JOINT      LAPAROSCOPIC REPAIR OF HIATAL HERNIA  2019    Liver scope  10/2012    LYSIS OF ADHESIONS      ULNAR TUNNEL RELEASE Left 02/15/2024    Procedure: RELEASE, CUBITAL TUNNEL;  Surgeon: Jas Larose MD;  Location: [facility];  Service: Orthopedics;  Laterality: Left;  ulnar nerve decompression left elbow with cubital tunnel release and possible anterior transposition ulnar nerve       ECHO (2022):  CONCLUSIONS:   1. Normal left ventricular cavity size. Normal left ventricular systolic function. LVEF   55 - 65%. Normal wall motion.   2. Mild tricuspid valve regurgitation.     Pre-op Assessment    I have reviewed the Patient Summary Reports.    I have reviewed the NPO Status.   I have reviewed the Medications.     Review of Systems  Anesthesia Hx:  No problems with previous Anesthesia   History of prior surgery of interest to airway management or planning:  Previous anesthesia: General, MAC          Denies Personal Hx of Anesthesia complications.                    Social:  Non-Smoker       Cardiovascular:  Cardiovascular Normal                  ECG has been reviewed. Normal sinus rhythm   Normal ECG   When compared with ECG of 06-JUL-2024 22:42,   Questionable change in The axis   T wave inversion no longer evident in Inferior leads   Nonspecific T wave abnormality now evident in Anterior leads   T wave inversion no longer evident in Lateral leads   Confirmed by YARED LINDSEY, FERNANDO ([id number]) on 7/17/2024 5:19:41 PM                              Pulmonary:  Pulmonary Normal                       Renal/:    renal calculi               Hepatic/GI:     GERD   S/p gastric bypass              Neurological:    Neuromuscular Disease,       Patient reports failed stellate-ganglion block that let her with hypersensitized to left face, neck                             Endocrine:     BMI 37      Obesity / BMI > 30  Psych:   anxiety depression                Chemistry        Component Value Date/Time     10/21/2024 1848    K 3.6 10/21/2024 1848     10/21/2024 1848    CO2 20 (L) 10/21/2024 1848    BUN 5 (L) 10/21/2024 1848    CREATININE 0.7 10/21/2024 1848    GLU 78 10/21/2024 1848        Component Value Date/Time    CALCIUM 8.7 10/21/2024 1848    ALKPHOS 71 10/21/2024 1848    AST 12 10/21/2024 1848    ALT 12 10/21/2024 1848    BILITOT 0.3 10/21/2024 1848    ESTGFRAFRICA >60 05/26/2021 1637    EGFRNONAA >60 05/26/2021 1637        Lab Results   Component Value Date    WBC 5.80 10/21/2024    HGB 12.5 10/21/2024    HCT 38.4 10/21/2024    MCV 86 10/21/2024     10/21/2024         Physical Exam  General: Well nourished, Cooperative, Alert and Oriented    Airway:  Mallampati: I   Mouth Opening: Normal  TM Distance: Normal  Tongue: Normal  Neck ROM: Normal ROM    Dental:  Intact  Patient denies any currently loose or chipped teeth; Patient denies any removable dental appliances        Anesthesia Plan  Type of Anesthesia, risks & benefits discussed:    Anesthesia Type: MAC, Gen Supraglottic Airway  Intra-op Monitoring Plan: Standard ASA Monitors  Post Op Pain Control Plan: multimodal analgesia and IV/PO Opioids PRN  Induction:  IV  Informed Consent: Informed consent signed with the Patient and all parties understand the risks and agree with anesthesia plan.  All questions answered. Patient consented to blood products? No  ASA Score: 2  Day of Surgery Review of History & Physical: H&P Update referred to the surgeon/provider.  Anesthesia Plan Notes: Intubation     Date/Time: 2/15/2024 8:45 AM     Performed by: Wilberto Aguilar  ABDOUL JASON  Authorized by: Wilberto Aguilar, ABDOUL    Intubation:     Induction:  Intravenous    Intubated:  Postinduction    Mask Ventilation:  Not attempted    Attempts:  1    Difficult Airway Encountered?: No      Complications:  None    Airway Device:  Supraglottic airway/LMA    Secured at:  The lips    Placement Verified By:  Capnometry    Complicating Factors:  None    Findings Post-Intubation:  BS equal bilateral and atraumatic/condition of teeth unchanged  Notes:      #4 LMA inserted without difficulty      Ready For Surgery From Anesthesia Perspective.     .

## 2024-10-22 NOTE — ASSESSMENT & PLAN NOTE
Multiple previous abdominal surgeries, recent admission per general surgery due to intussusception which resolved without intervention    Hx of recurrent intessuptions sec to Adhesions, s/p EGD today. Appears fine

## 2024-10-22 NOTE — ASSESSMENT & PLAN NOTE
Multiple previous abdominal surgeries, recent admission per general surgery due to intussusception which resolved without intervention

## 2024-10-22 NOTE — HPI
Patient is 39-year-old female with past medical history significant for anemia, B12 deficiency, anxiety, depression, adjustment disorder, arthritis, bradycardia,  coccidiomycosis, gastric bypass surgery and multiple abdominal surgeries with recent admission overnight 10/16 to 10/17 due to intussusception which resolved without intervention, and then seen in ED on 10/18 and found to have 4 mm left distal ureter stone who now presented to ED with complaint of worsening left flank pain, states that pain has become intolerable, with associated nausea.  She reports pain 9/10, left lower abdomen radiating into left groin and around to left back.  She states she has been unable to eat or or drink fluids due to severe pain and nausea.  Denies vomiting.  She states her urine is brown colored, states that she does have urgency and frequency.  Vital signs while in ED stable, heart rate low at times in the upper 50s, temp 98.3°, blood pressure 148/73, saturating normally on room air.  Lab workup insignificant.  Urinalysis does show greater than 100 RBCs, no signs of infection. Xray demonstraters known left renal stone appears to be in distal left ureter currently. Dr. Huston is on call and was consulted per ED, recommended admission due to intractable pain.

## 2024-10-22 NOTE — H&P
[facility] Medicine  History & Physical    Patient Name: Johanna Bullock  MRN: [id number]  Patient Class: OP- Observation  Admission Date: 10/21/2024  Attending Physician: Holley Valdez MD   Primary Care Provider: Ran Mcgill MD         Patient information was obtained from patient, past medical records, and ER records.     Subjective:     Principal Problem:Left ureteral stone    Chief Complaint:   Chief Complaint   Patient presents with    Abdominal Pain     Recently dx with intussusception and kidney stone. Procedure scheduled with Dr. Lowery tomorrow. Unbearable LLQ pain. Received 100mcg of Fentanyl IM with no relief         HPI:   Patient is 39-year-old female with past medical history significant for anemia, B12 deficiency, anxiety, depression, adjustment disorder, arthritis, bradycardia,  coccidiomycosis, gastric bypass surgery and multiple abdominal surgeries with recent admission overnight 10/16 to 10/17 due to intussusception which resolved without intervention, and then seen in ED on 10/18 and found to have 4 mm left distal ureter stone who now presented to ED with complaint of worsening left flank pain, states that pain has become intolerable, with associated nausea.  She reports pain 9/10, left lower abdomen radiating into left groin and around to left back.  She states she has been unable to eat or or drink fluids due to severe pain and nausea.  Denies vomiting.  She states her urine is brown colored, states that she does have urgency and frequency.  Vital signs while in ED stable, heart rate low at times in the upper 50s, temp 98.3°, blood pressure 148/73, saturating normally on room air.  Lab workup insignificant.  Urinalysis does show greater than 100 RBCs, no signs of infection. Xray demonstraters known left renal stone appears to be in distal left ureter currently. Dr. Huston is on call and was consulted per ED, recommended admission due to intractable  pain.    Past Medical History:   Diagnosis Date    Adjustment disorder with mixed anxiety and depressed mood 02/06/2013    Anemia, B12 deficiency     Anxiety     Arthritis     Bradycardia        Past Surgical History:   Procedure Laterality Date    CHOLECYSTECTOMY  10/2012    COLONOSCOPY N/A 02/14/2020    Procedure: COLONOSCOPY;  Surgeon: Jas Moore MD;  [facility]  Service: Endoscopy;  Laterality: N/A;  Pt procedure time changed to 0800 with 0715 - second half prep completed from [id number]- Pt verbalized understanding- ERW2/13/20@1022    COLONOSCOPY N/A 03/10/2023    Procedure: COLONOSCOPY;  Surgeon: HORACE Moore MD;  Location: [facility] (4TH FLR);  Service: Endoscopy;  Laterality: N/A;  inst emailed-RB    DECOMPRESSION, NERVE, ULNAR Left 02/15/2024    Procedure: DECOMPRESSION, NERVE, ULNAR;  Surgeon: Jas Larose MD;  Location: [facility] OR;  Service: Orthopedics;  Laterality: Left;  ulnar nerve decompression left elbow with cubital tunnel release and possible anterior transposition ulnar nerve    DILATION AND CURETTAGE OF UTERUS      ESOPHAGOGASTRODUODENOSCOPY N/A 07/20/2018    Procedure: ESOPHAGOGASTRODUODENOSCOPY (EGD);  Surgeon: Darwin Hansen MD;  [facility]  Service: Endoscopy;  Laterality: N/A;  Please measure pouch and limbs    GASTRIC BYPASS  2008    Revision August 2019    INJECTION OF ANESTHETIC AGENT AROUND LATERAL BRANCH NERVES OF SACROILIAC JOINT      LAPAROSCOPIC REPAIR OF HIATAL HERNIA  2019    Liver scope  10/2012    LYSIS OF ADHESIONS      ULNAR TUNNEL RELEASE Left 02/15/2024    Procedure: RELEASE, CUBITAL TUNNEL;  Surgeon: Jas Larose MD;  Location: [facility] OR;  Service: Orthopedics;  Laterality: Left;  ulnar nerve decompression left elbow with cubital tunnel release and possible anterior transposition ulnar nerve       Review of patient's allergies indicates:  No Known Allergies    Current Facility-Administered  Medications on File Prior to Encounter   Medication    ceFAZolin 2 g in dextrose 5 % in water (D5W) 50 mL IVPB (MB+)    chlorhexidine 0.12 % solution 10 mL     Current Outpatient Medications on File Prior to Encounter   Medication Sig    acetaminophen (TYLENOL) 650 MG TbSR Take 650 mg by mouth every 8 (eight) hours.    acetylcysteine (N-ACETYL-L-CYSTEINE MISC) by Misc.(Non-Drug; Combo Route) route.    albuterol (PROVENTIL/VENTOLIN HFA) 90 mcg/actuation inhaler Inhale 1-2 puffs into the lungs every 6 (six) hours as needed for Wheezing.    cyanocobalamin 1,000 mcg/mL injection ONE INJECTION AS DIRECTED EVERY 28 DAYS    DULoxetine (CYMBALTA) 30 MG capsule Take 1 capsule (30 mg total) by mouth every evening.    FLUoxetine 20 MG capsule Take 20 mg by mouth once daily.    fluticasone propionate (FLONASE) 50 mcg/actuation nasal spray 1 spray by Each Nostril route once daily.    HYDROcodone-acetaminophen (NORCO) 5-325 mg per tablet Take 1 tablet by mouth every 8 (eight) hours as needed for Pain.    ketorolac (TORADOL) 10 mg tablet Take by mouth.    levothyroxine (SYNTHROID) 25 MCG tablet Take 1 tablet by mouth every morning.    LINZESS 290 mcg Cap capsule TAKE ONE CAPSULE BY MOUTH EVERY DAY (Patient taking differently: Take 290 mcg by mouth before breakfast.)    LINZESS 290 mcg Cap capsule TAKE 1 CAPSULE(290 MCG) BY MOUTH BEFORE BREAKFAST    magnesium carb,citrate,oxide (MAGNESIUM COMPLEX ORAL) Take by mouth.    midodrine (PROAMATINE) 5 MG Tab Take 1 tablet (5 mg total) by mouth 3 (three) times daily with meals. If BP is lower can increase to 10mg three times a day, if elevated can stop    modafiniL (PROVIGIL) 200 MG Tab Take 200 mg by mouth once daily.    tamsulosin (FLOMAX) 0.4 mg Cap Take 1 capsule (0.4 mg total) by mouth once daily.    tiZANidine 4 mg Cap Take by mouth.    traMADoL (ULTRAM) 50 mg tablet Take 50 mg by mouth every 6 (six) hours.     Family History       Problem Relation (Age of Onset)    Heart disease  Father    Hypertension Father          Tobacco Use    Smoking status: Never    Smokeless tobacco: Never   Substance and Sexual Activity    Alcohol use: Never     Comment: Alcohol use rarely    Drug use: No     Comment: Mirena    Sexual activity: Yes     Partners: Male     Birth control/protection: I.U.D.     Review of Systems   Constitutional:  Positive for activity change and appetite change. Negative for chills and fever.   HENT: Negative.     Eyes: Negative.    Respiratory: Negative.  Negative for cough, shortness of breath and wheezing.    Cardiovascular: Negative.  Negative for chest pain, palpitations and leg swelling.   Gastrointestinal:  Positive for abdominal pain and nausea. Negative for abdominal distention and vomiting.        Left sided abdominal pain-- radiating down into left groin   Endocrine: Negative.    Genitourinary:  Positive for decreased urine volume, flank pain, frequency, hematuria, pelvic pain and urgency. Negative for difficulty urinating and dysuria.        Brown colored urine   Musculoskeletal:  Positive for back pain.   Skin: Negative.    Allergic/Immunologic: Negative.    Neurological: Negative.  Negative for dizziness, syncope, weakness, light-headedness and headaches.   Hematological: Negative.    Psychiatric/Behavioral: Negative.       Objective:     Vital Signs (Most Recent):  Temp: 98.3 °F (36.8 °C) (10/21/24 1658)  Pulse: 66 (10/21/24 2115)  Resp: 20 (10/21/24 2115)  BP: 127/75 (10/21/24 2100)  SpO2: 98 % (10/21/24 2115) Vital Signs (24h Range):  Temp:  [98.3 °F (36.8 °C)] 98.3 °F (36.8 °C)  Pulse:  [50-78] 66  Resp:  [14-20] 20  SpO2:  [96 %-99 %] 98 %  BP: (107-148)/(56-78) 127/75     Weight: 111.5 kg (245 lb 13 oz)  Body mass index is 37.38 kg/m².     Physical Exam  Vitals reviewed.   Constitutional:       General: She is not in acute distress.     Appearance: She is not toxic-appearing or diaphoretic.   HENT:      Head: Normocephalic and atraumatic.      Nose: Nose normal.       Mouth/Throat:      Mouth: Mucous membranes are dry.   Eyes:      Extraocular Movements: Extraocular movements intact.      Pupils: Pupils are equal, round, and reactive to light.   Cardiovascular:      Rate and Rhythm: Normal rate and regular rhythm.      Pulses: Normal pulses.      Heart sounds: Normal heart sounds. No murmur heard.     No friction rub. No gallop.   Pulmonary:      Effort: Pulmonary effort is normal. No respiratory distress.      Breath sounds: Normal breath sounds. No stridor. No wheezing, rhonchi or rales.   Chest:      Chest wall: No tenderness.   Abdominal:      Palpations: Abdomen is soft.      Tenderness: There is abdominal tenderness. There is left CVA tenderness. There is no right CVA tenderness, guarding or rebound.      Comments: Obese, scars  Hypoactive bowel sounds   Musculoskeletal:         General: Normal range of motion.      Cervical back: Neck supple.      Right lower leg: No edema.      Left lower leg: No edema.   Skin:     General: Skin is warm and dry.   Neurological:      General: No focal deficit present.      Mental Status: She is alert and oriented to person, place, and time.   Psychiatric:         Mood and Affect: Mood normal.         Behavior: Behavior normal.         Thought Content: Thought content normal.              CRANIAL NERVES     CN III, IV, VI   Pupils are equal, round, and reactive to light.       Significant Labs: All pertinent labs within the past 24 hours have been reviewed.  CBC:   Recent Labs   Lab 10/21/24  1714   WBC 5.80   HGB 12.5   HCT 38.4        CMP:   Recent Labs   Lab 10/21/24  1848      K 3.6      CO2 20*   GLU 78   BUN 5*   CREATININE 0.7   CALCIUM 8.7   PROT 6.5   ALBUMIN 3.5   BILITOT 0.3   ALKPHOS 71   AST 12   ALT 12   ANIONGAP 9     Urine Studies:   Recent Labs   Lab 10/21/24  1902   COLORU Yellow   APPEARANCEUA Hazy*   PHUR 6.0   SPECGRAV 1.020   PROTEINUA 1+*   GLUCUA Negative   KETONESU 3+*   BILIRUBINUA Negative    OCCULTUA 3+*   NITRITE Negative   UROBILINOGEN Negative   LEUKOCYTESUR Trace*   RBCUA >100*   WBCUA 0   BACTERIA None   SQUAMEPITHEL 6   HYALINECASTS 0     NEGATIVE hCG urine    Significant Imaging: I have reviewed all pertinent imaging results/findings within the past 24 hours.    X-ray Abdomen Flat and Erect:  Known left renal stone appears to be in distal left ureter, IUD remains in place, normal bowel gas pattern, stable postoperative changes involving stomach, lung bases are clear    CT abdomen/pelvis without contrast done on 10/18/24:   Obstructing 4 mm proximal left ureteral calculus.  Mild hydronephrosis.    Assessment/Plan:     * Left ureteral stone  Procedure planned per Dr. Lowery tomorrow, cystoscopy with left ureteroscopy lithotripsy and stent, patient presented with intractable pain  Stone in distal left ureter, 4 mm left ureteral stone, she has failed conservative measures  NPO after midnight  Hydrate with IV fluids, patient unable to keep fluids down at this time  PRN pain medication and antiemetics ordered        Intractable pain  Multimodal pain regimen --PRN dilaudid and Percocet ordered      Nephrolithiasis  Stone now in distal left ureter -- urology with plans for procedure tomorrow      History of bariatric surgery  Multiple previous abdominal surgeries, recent admission per general surgery due to intussusception which resolved without intervention        VTE Risk Mitigation (From admission, onward)           Ordered     Reason for No Pharmacological VTE Prophylaxis  Once        Comments: Procedure planned   Question:  Reasons:  Answer:  Physician Provided (leave comment)    10/21/24 2208     IP VTE HIGH RISK PATIENT  Once         10/21/24 2208     Place sequential compression device  Until discontinued         10/21/24 2208                 This patient's case has been reviewed by my supervising physician, Dr. Holley Valdez.  Supervising physician will provide additional orders and  recommendations at his or her discretion.    On 10/21/2024, patient should be placed in hospital observation services under my care in collaboration with Dr. Holley Valdez.       Kaya Mehta NP  Department of Hospital Medicine  [facility] - Telemetry ([facility])

## 2024-10-22 NOTE — PROGRESS NOTES
[facility] Medicine  Progress Note    Patient Name: Johanna Bullock  MRN: [id number]  Patient Class: OP- Observation   Admission Date: 10/21/2024  Length of Stay: 0 days  Attending Physician: Jf Kyle MD  Primary Care Provider: Ran Mcgill MD        Subjective:     Principal Problem:Left ureteral stone        HPI:    Patient is 39-year-old female with past medical history significant for anemia, B12 deficiency, anxiety, depression, adjustment disorder, arthritis, bradycardia,  coccidiomycosis, gastric bypass surgery and multiple abdominal surgeries with recent admission overnight 10/16 to 10/17 due to intussusception which resolved without intervention, and then seen in ED on 10/18 and found to have 4 mm left distal ureter stone who now presented to ED with complaint of worsening left flank pain, states that pain has become intolerable, with associated nausea.  She reports pain 9/10, left lower abdomen radiating into left groin and around to left back.  She states she has been unable to eat or or drink fluids due to severe pain and nausea.  Denies vomiting.  She states her urine is brown colored, states that she does have urgency and frequency.  Vital signs while in ED stable, heart rate low at times in the upper 50s, temp 98.3°, blood pressure 148/73, saturating normally on room air.  Lab workup insignificant.  Urinalysis does show greater than 100 RBCs, no signs of infection. Xray demonstraters known left renal stone appears to be in distal left ureter currently. Dr. Huston is on call and was consulted per ED, recommended admission due to intractable pain.    Overview/Hospital Course:  39-year-old female with hx significant for anemia, B12 deficiency, anxiety, depression, adjustment disorder, arthritis, bradycardia, coccidiomycosis, gastric bypass surgery and multiple abdominal surgeries with recent admission overnight 10/16 to 10/17 due to intussusception which resolved  without intervention, and then seen in ED on 10/18 and found to have 4 mm left distal ureter stone who now presented to ED with complaint of worsening left flank pain, states that pain has become intolerable, with associated nausea.  She reports pain 9/10, left lower abdomen radiating into left groin and around to left back.  She states she has been unable to eat or or drink fluids due to severe pain and nausea.  Denies vomiting.  She states her urine is brown colored, states that she does have urgency and frequency.  Vital signs while in ED stable, heart rate low at times in the upper 50s, temp 98.3°, blood pressure 148/73, saturating normally on room air.  Lab workup insignificant.  Urinalysis does show greater than 100 RBCs, no signs of infection. Xray demonstraters known left renal stone appears to be in distal left ureter currently. Dr. Huston is on call and was consulted per ED, recommended admission due to intractable pain.    10/22- Appreciate Noel Lowery/Moncho- s/p L Ureteroscopy with stone basket extraction.                   2.  Cystoscopy with placement of left double-J ureteral stent.              3.  left retrograde pyelogram.                                               4.  Fluoro less than 1 hour.                                            FINDINGS:  Stone extracted, retrograde unremarkable, stent in good position.                                                                      DRAINS:  Left 7 French by 26 cm double-J ureteral stent.         S/p EGD by Dr. Ivan-- Normal esophagus.                          - Normal examined duodenum.                          - Z-line regular, 30 cm from the incisors.                          - Small hiatal hernia.                          - Ramez-en-Y gastrojejunostomy with gastrojejunal                          anastomosis characterized by healthy appearing                          mucosa.              Pt seen post op, AAOx4, c/o Abd discomfort marnie at the L  flank at the stents. Just got IV dilaudid. Later BP dropped 80/50- feet elevated, pt given NS bolus.        Interval History: Pt seen post op, AAOx4, c/o Abd discomfort marnie at the L flank at the stents. Just got IV dilaudid. Later BP dropped 80/50- feet elevated, pt given NS bolus.     Review of Systems   Constitutional:  Positive for activity change and appetite change. Negative for chills and fever.   HENT: Negative.     Eyes: Negative.    Respiratory: Negative.  Negative for cough, shortness of breath and wheezing.    Cardiovascular: Negative.  Negative for chest pain, palpitations and leg swelling.   Gastrointestinal:  Positive for abdominal pain. Negative for abdominal distention, nausea and vomiting.        Left sided abdominal pain-- radiating down into left groin   Endocrine: Negative.    Genitourinary:  Positive for flank pain, frequency and urgency. Negative for decreased urine volume, difficulty urinating, dysuria, hematuria and pelvic pain.        Brown colored urine   Musculoskeletal:  Positive for back pain.   Skin: Negative.    Allergic/Immunologic: Negative.    Neurological: Negative.  Negative for dizziness, syncope, weakness, light-headedness and headaches.   Hematological: Negative.    Psychiatric/Behavioral: Negative.       Objective:     Vital Signs (Most Recent):  Temp: 98.1 °F (36.7 °C) (10/22/24 1543)  Pulse: 60 (10/22/24 1543)  Resp: 18 (10/22/24 1543)  BP: (!) 88/50 (10/22/24 1543)  SpO2: 100 % (10/22/24 1543) Vital Signs (24h Range):  Temp:  [97.2 °F (36.2 °C)-98.4 °F (36.9 °C)] 98.1 °F (36.7 °C)  Pulse:  [55-91] 60  Resp:  [13-20] 18  SpO2:  [94 %-100 %] 100 %  BP: ()/(50-78) 88/50     Weight: 111.5 kg (245 lb 13 oz)  Body mass index is 37.38 kg/m².    Intake/Output Summary (Last 24 hours) at 10/22/2024 1547  Last data filed at 10/22/2024 0514  Gross per 24 hour   Intake 677.7 ml   Output --   Net 677.7 ml         Physical Exam  Vitals and nursing note reviewed.   Constitutional:        General: She is not in acute distress.     Appearance: She is not toxic-appearing or diaphoretic.   HENT:      Head: Normocephalic and atraumatic.      Nose: Nose normal.      Mouth/Throat:      Mouth: Mucous membranes are dry.   Eyes:      Extraocular Movements: Extraocular movements intact.      Pupils: Pupils are equal, round, and reactive to light.   Cardiovascular:      Rate and Rhythm: Normal rate and regular rhythm.      Pulses: Normal pulses.      Heart sounds: Normal heart sounds. No murmur heard.     No friction rub. No gallop.   Pulmonary:      Effort: Pulmonary effort is normal. No respiratory distress.      Breath sounds: Normal breath sounds. No stridor. No wheezing, rhonchi or rales.   Chest:      Chest wall: No tenderness.   Abdominal:      Palpations: Abdomen is soft.      Tenderness: There is abdominal tenderness. There is left CVA tenderness. There is no right CVA tenderness, guarding or rebound.      Comments: Obese, scars  Hypoactive bowel sounds   Musculoskeletal:         General: Normal range of motion.      Cervical back: Neck supple.      Right lower leg: No edema.      Left lower leg: No edema.   Skin:     General: Skin is warm and dry.   Neurological:      General: No focal deficit present.      Mental Status: She is alert and oriented to person, place, and time.   Psychiatric:         Mood and Affect: Mood normal.         Behavior: Behavior normal.         Thought Content: Thought content normal.             Significant Labs: All pertinent labs within the past 24 hours have been reviewed.    BMP:   Recent Labs   Lab 10/21/24  1848   GLU 78      K 3.6      CO2 20*   BUN 5*   CREATININE 0.7   CALCIUM 8.7     CBC:   Recent Labs   Lab 10/21/24  1714   WBC 5.80   HGB 12.5   HCT 38.4        CMP:   Recent Labs   Lab 10/21/24  1848      K 3.6      CO2 20*   GLU 78   BUN 5*   CREATININE 0.7   CALCIUM 8.7   PROT 6.5   ALBUMIN 3.5   BILITOT 0.3   ALKPHOS 71   AST  12   ALT 12   ANIONGAP 9     Significant Imaging: I have reviewed all pertinent imaging results/findings within the past 24 hours.    Assessment/Plan:      * Left ureteral stone  Procedure planned per Dr. Lowery tomorrow, cystoscopy with left ureteroscopy lithotripsy and stent, patient presented with intractable pain  Stone in distal left ureter, 4 mm left ureteral stone, she has failed conservative measures  NPO after midnight  Hydrate with IV fluids, patient unable to keep fluids down at this time  PRN pain medication and antiemetics ordered    S/p Cysto/ureteroscopy- s/p Stone extraction and double J stent placement          Intractable pain  Multimodal pain regimen --PRN dilaudid and Percocet ordered    Improved after the stone extraction and stent placement    Cont PO and IV analgesics prn      Nephrolithiasis  Stone now in distal left ureter -- urology with plans for procedure tomorrow    See above      History of bariatric surgery  Multiple previous abdominal surgeries, recent admission per general surgery due to intussusception which resolved without intervention    Hx of recurrent intessuptions sec to Adhesions, s/p EGD today. Appears fine          VTE Risk Mitigation (From admission, onward)           Ordered     Reason for No Pharmacological VTE Prophylaxis  Once        Comments: Procedure planned   Question:  Reasons:  Answer:  Physician Provided (leave comment)    10/21/24 2208     IP VTE HIGH RISK PATIENT  Once         10/21/24 2208     Place sequential compression device  Until discontinued         10/21/24 2208                    Discharge Planning   ROSARIO:      Code Status: Full Code   Is the patient medically ready for discharge?:     Reason for patient still in hospital (select all that apply): Patient trending condition, Laboratory test, Treatment, Imaging, and Consult recommendations  Discharge Plan A: Home with family          Jf Kyle MD  Department of Hospital Medicine   [facility] access hospital -  Telemetry ([facility])

## 2024-10-22 NOTE — ASSESSMENT & PLAN NOTE
Procedure planned per Dr. Lowery tomorrow, cystoscopy with left ureteroscopy lithotripsy and stent, patient presented with intractable pain  Stone in distal left ureter, 4 mm left ureteral stone, she has failed conservative measures  NPO after midnight  Hydrate with IV fluids, patient unable to keep fluids down at this time  PRN pain medication and antiemetics ordered

## 2024-10-23 VITALS
TEMPERATURE: 98 F | BODY MASS INDEX: 40.36 KG/M2 | DIASTOLIC BLOOD PRESSURE: 59 MMHG | SYSTOLIC BLOOD PRESSURE: 114 MMHG | WEIGHT: 265.44 LBS | HEART RATE: 60 BPM | OXYGEN SATURATION: 97 % | RESPIRATION RATE: 18 BRPM

## 2024-10-23 PROBLEM — N20.0 NEPHROLITHIASIS: Status: RESOLVED | Noted: 2024-10-21 | Resolved: 2024-10-23

## 2024-10-23 PROBLEM — N20.1 LEFT URETERAL STONE: Status: RESOLVED | Noted: 2024-10-21 | Resolved: 2024-10-23

## 2024-10-23 PROBLEM — R52 INTRACTABLE PAIN: Status: RESOLVED | Noted: 2024-10-21 | Resolved: 2024-10-23

## 2024-10-23 LAB
ALBUMIN SERPL BCP-MCNC: 2.9 G/DL (ref 3.5–5.2)
ALP SERPL-CCNC: 64 U/L (ref 40–150)
ALT SERPL W/O P-5'-P-CCNC: 8 U/L (ref 10–44)
ANION GAP SERPL CALC-SCNC: 7 MMOL/L (ref 8–16)
AST SERPL-CCNC: 10 U/L (ref 10–40)
BASOPHILS # BLD AUTO: 0.03 K/UL (ref 0–0.2)
BASOPHILS NFR BLD: 0.7 % (ref 0–1.9)
BILIRUB SERPL-MCNC: 0.2 MG/DL (ref 0.1–1)
BUN SERPL-MCNC: 3 MG/DL (ref 6–20)
CALCIUM SERPL-MCNC: 8.1 MG/DL (ref 8.7–10.5)
CHLORIDE SERPL-SCNC: 109 MMOL/L (ref 95–110)
CO2 SERPL-SCNC: 22 MMOL/L (ref 23–29)
CREAT SERPL-MCNC: 0.7 MG/DL (ref 0.5–1.4)
DIFFERENTIAL METHOD BLD: ABNORMAL
EOSINOPHIL # BLD AUTO: 0.1 K/UL (ref 0–0.5)
EOSINOPHIL NFR BLD: 1.5 % (ref 0–8)
ERYTHROCYTE [DISTWIDTH] IN BLOOD BY AUTOMATED COUNT: 14.2 % (ref 11.5–14.5)
EST. GFR  (NO RACE VARIABLE): >60 ML/MIN/1.73 M^2
GLUCOSE SERPL-MCNC: 83 MG/DL (ref 70–110)
HCT VFR BLD AUTO: 32.7 % (ref 37–48.5)
HGB BLD-MCNC: 10.8 G/DL (ref 12–16)
IMM GRANULOCYTES # BLD AUTO: 0.02 K/UL (ref 0–0.04)
IMM GRANULOCYTES NFR BLD AUTO: 0.4 % (ref 0–0.5)
LYMPHOCYTES # BLD AUTO: 1.4 K/UL (ref 1–4.8)
LYMPHOCYTES NFR BLD: 31.1 % (ref 18–48)
MAGNESIUM SERPL-MCNC: 1.6 MG/DL (ref 1.6–2.6)
MCH RBC QN AUTO: 28.1 PG (ref 27–31)
MCHC RBC AUTO-ENTMCNC: 33 G/DL (ref 32–36)
MCV RBC AUTO: 85 FL (ref 82–98)
MONOCYTES # BLD AUTO: 0.5 K/UL (ref 0.3–1)
MONOCYTES NFR BLD: 10.8 % (ref 4–15)
NEUTROPHILS # BLD AUTO: 2.5 K/UL (ref 1.8–7.7)
NEUTROPHILS NFR BLD: 55.5 % (ref 38–73)
NRBC BLD-RTO: 0 /100 WBC
PLATELET # BLD AUTO: 301 K/UL (ref 150–450)
PMV BLD AUTO: 10 FL (ref 9.2–12.9)
POTASSIUM SERPL-SCNC: 3.4 MMOL/L (ref 3.5–5.1)
PROT SERPL-MCNC: 5.6 G/DL (ref 6–8.4)
RBC # BLD AUTO: 3.85 M/UL (ref 4–5.4)
SODIUM SERPL-SCNC: 138 MMOL/L (ref 136–145)
WBC # BLD AUTO: 4.54 K/UL (ref 3.9–12.7)

## 2024-10-23 PROCEDURE — 63600175 PHARM REV CODE 636 W HCPCS: Performed by: INTERNAL MEDICINE

## 2024-10-23 PROCEDURE — 80053 COMPREHEN METABOLIC PANEL: CPT | Performed by: NURSE PRACTITIONER

## 2024-10-23 PROCEDURE — 25000003 PHARM REV CODE 250: Performed by: EMERGENCY MEDICINE

## 2024-10-23 PROCEDURE — 83735 ASSAY OF MAGNESIUM: CPT | Performed by: NURSE PRACTITIONER

## 2024-10-23 PROCEDURE — 94761 N-INVAS EAR/PLS OXIMETRY MLT: CPT

## 2024-10-23 PROCEDURE — 99900035 HC TECH TIME PER 15 MIN (STAT)

## 2024-10-23 PROCEDURE — G0378 HOSPITAL OBSERVATION PER HR: HCPCS

## 2024-10-23 PROCEDURE — 85025 COMPLETE CBC W/AUTO DIFF WBC: CPT | Performed by: NURSE PRACTITIONER

## 2024-10-23 PROCEDURE — 25000003 PHARM REV CODE 250: Performed by: NURSE PRACTITIONER

## 2024-10-23 RX ADMIN — POTASSIUM BICARBONATE 25 MEQ: 978 TABLET, EFFERVESCENT ORAL at 09:10

## 2024-10-23 RX ADMIN — HYDROMORPHONE HYDROCHLORIDE 0.5 MG: 1 INJECTION, SOLUTION INTRAMUSCULAR; INTRAVENOUS; SUBCUTANEOUS at 07:10

## 2024-10-23 RX ADMIN — TAMSULOSIN HYDROCHLORIDE 0.4 MG: 0.4 CAPSULE ORAL at 09:10

## 2024-10-23 RX ADMIN — HYDROMORPHONE HYDROCHLORIDE 0.5 MG: 1 INJECTION, SOLUTION INTRAMUSCULAR; INTRAVENOUS; SUBCUTANEOUS at 02:10

## 2024-10-23 NOTE — NURSING
Discharge instructions received and reviewed with pt and family at bedside.  Pt voiced understanding and all questions answered to satisfaction.  Stressed importance to making and keeping all follow up appointments.  Medications sent to pt pharmacy and reviewed with pt.  Tele monitor removed and brought to monitor tech.  IV d/c'd with tip intact, pressure dressing applied.  Pt walked down to lobby to be discharged home.

## 2024-10-23 NOTE — ANESTHESIA POSTPROCEDURE EVALUATION
Anesthesia Post Evaluation    Patient: Johanna Bullock    Procedure(s) Performed: Procedure(s) (LRB):  CYSTOURETEROSCOPY, WITH RETROGRADE PYELOGRAM AND URETERAL STENT INSERTION (Left)  EGD (ESOPHAGOGASTRODUODENOSCOPY) (N/A)  REMOVAL, CALCULUS, URETER, URETEROSCOPIC    Final Anesthesia Type: MAC      Patient location during evaluation: PACU  Patient participation: Yes- Able to Participate  Level of consciousness: awake and alert and oriented  Post-procedure vital signs: reviewed and stable  Pain management: adequate  Airway patency: patent  FINESSE mitigation strategies: Multimodal analgesia  PONV status at discharge: No PONV  Anesthetic complications: no      Cardiovascular status: blood pressure returned to baseline and hemodynamically stable  Respiratory status: unassisted  Hydration status: euvolemic  Follow-up not needed.              Vitals Value Taken Time   /54 10/22/24 1745   Temp 36.7 °C (98.1 °F) 10/22/24 1543   Pulse 83 10/22/24 1700   Resp 16 10/22/24 1757   SpO2 100 % 10/22/24 1543         Event Time   Out of Recovery 10/22/2024 14:00:00         Pain/Vickie Score: Pain Rating Prior to Med Admin: 6 (10/22/2024  5:57 PM)  Pain Rating Post Med Admin: 5 (10/22/2024  5:45 PM)  Vickie Score: 10 (10/22/2024  5:00 PM)

## 2024-10-23 NOTE — NURSING
.AVS virtually reviewed with patient in its entirety with emphasis on diet, medications, follow-up appointments and reasons to return to the ED or contact the [facility]. Patient also encouraged to utilize their patient portal. Ease and convenience of use reiterated. Education complete and patient voiced understanding. All questions answered. Discharge teaching complete.

## 2024-10-23 NOTE — PROGRESS NOTES
Chief Complaint:  Left ureteral stone    HPI:   10/23/24- patient is doing well status post stone removal, mild stent pain.  10/22/24- patient was scheduled for an elective outpatient procedure, began to experience worse pain.  Patient reported to the emergency department and was admitted for IV pain control.      10/21/24- 39-year-old female who reported to the emergency department persistent left renal colic pain. Had previously been to the hospital with intussusception. CT scan demonstrates a left obstructing ureteral stone, no previous stone passage. No previous urological or gynecological history. Patient did have pyelonephritis in 2008, no history of UTIs though. No family history of urological cancers or stones.     Allergies:  Patient has no known allergies.    Medications:  See MAR    Review of Systems:  General: No fever, chills, fatigability, or weight loss.  Skin: No rashes, itching, or changes in color or texture of skin.  Chest: Denies NOGUERA, cyanosis, wheezing, cough, and sputum production.  Abdomen: Appetite fine. No weight loss. Denies diarrhea, abdominal pain, hematemesis, or blood in stool.  Musculoskeletal: No joint stiffness or swelling. Denies back pain.  : As above.  All other review of systems negative.    PMH:   has a past medical history of Adjustment disorder with mixed anxiety and depressed mood (02/06/2013), Anemia, B12 deficiency, Anxiety, Arthritis, and Bradycardia.    PSH:   has a past surgical history that includes Dilation and curettage of uterus; Gastric bypass (2008); Cholecystectomy (10/2012); Liver scope (10/2012); Esophagogastroduodenoscopy (N/A, 07/20/2018); Lysis of adhesions; Colonoscopy (N/A, 02/14/2020); Laparoscopic repair of hiatal hernia (2019); Colonoscopy (N/A, 03/10/2023); Injection of anesthetic agent around lateral branch nerves of sacroiliac joint; Ulnar tunnel release (Left, 02/15/2024); and decompression, nerve, ulnar (Left, 02/15/2024).    FamHx: family history  includes Heart disease in her father; Hypertension in her father.    SocHx:  reports that she has never smoked. She has never used smokeless tobacco. She reports that she does not drink alcohol and does not use drugs.      Physical Exam:  Vitals:    10/23/24 0429   BP: 120/60   Pulse: (!) 54   Resp: 16   Temp: 97.4 °F (36.3 °C)     General: A&Ox3, no apparent distress, no deformities  Neck: No masses, normal ROM  Lungs: normal inspiration, no use of accessory muscles  Heart: normal pulse, no arrhythmias  Abdomen: Soft, NT, ND, left costovertebral angle tenderness  Skin: The skin is warm and dry. No jaundice.  Ext: No c/c/e..    Labs/Studies:   CBC and BMP are stable, please see the chart note.  KUB left distal ureteral stone 10/24  CT stone protocol 4 mm left proximal ureter stone 10/24     Impression/Plan:     Left ureteral stone-  s/p left ureteroscopy  POD #1    Patient is stable today and clear for discharge to home, follow-ups have been arranged.  Will sign off.

## 2024-10-23 NOTE — NURSING
Pt oriented x4.   Patient to procedure today, when she returned BP dropped to 88/50. MD notified, bolus ordered and administered with relief.   Midline inserted to right upper arm.   Patient voided per external catheter, encouraged to ambulate to bathroom. Voiding dark red urine with pain. PRN pain meds administered, see MAR.   Pt remained afebrile throughout this shift.   All meds administered per order.   Pt remained free of falls this shift.   Plan of care reviewed. Patient verbalizes understanding.   Pt moving/turning independently.   Bed low, side rails up x 2, wheels locked, call light in reach.   Patient instructed to call for assistance.  Patient education provided.

## 2024-10-23 NOTE — PLAN OF CARE
[facility] - Tele[facility] (Hospital)  Discharge Final Note    Primary Care Provider: Ran Mcgill MD    Expected Discharge Date: 10/23/2024    Final Discharge Note (most recent)       Final Note - 10/23/24 1106          Final Note    Assessment Type Final Discharge Note     Anticipated Discharge Disposition Home or Self Care     Hospital Resources/Appts/Education Provided Post-Acute resouces added to AVS;Appointments scheduled and added to AVS        Post-Acute Status    Discharge Delays None known at this time                   Pt to discharge home today. [facility]. Pt has urology follow ups scheduled on AVS.    Important Message from Medicare             Contact Info       Ran Mcgill MD   Specialty: Internal Medicine   Relationship: PCP - General    [address] [facility] [address]   Phone: [phone]       Next Steps: Follow up in 3 day(s)    Instructions: Hospital follow up, As needed

## 2024-10-25 NOTE — DISCHARGE SUMMARY
[facility] Medicine  Discharge Summary      Patient Name: Johanna Bullock  MRN: [id number]  CARLITO: [id number]  Patient Class: OP- Observation  Admission Date: 10/21/2024  Hospital Length of Stay: 0 days  Discharge Date and Time: 10/23/2024 12:42 PM  Attending Physician: No att. providers found   Discharging Provider: Jf Kyle MD  Primary Care Provider: Ran Mcgill MD    Primary Care Team: Networked reference to record PCT     HPI:     Patient is 39-year-old female with past medical history significant for anemia, B12 deficiency, anxiety, depression, adjustment disorder, arthritis, bradycardia,  coccidiomycosis, gastric bypass surgery and multiple abdominal surgeries with recent admission overnight 10/16 to 10/17 due to intussusception which resolved without intervention, and then seen in ED on 10/18 and found to have 4 mm left distal ureter stone who now presented to ED with complaint of worsening left flank pain, states that pain has become intolerable, with associated nausea.  She reports pain 9/10, left lower abdomen radiating into left groin and around to left back.  She states she has been unable to eat or or drink fluids due to severe pain and nausea.  Denies vomiting.  She states her urine is brown colored, states that she does have urgency and frequency.  Vital signs while in ED stable, heart rate low at times in the upper 50s, temp 98.3°, blood pressure 148/73, saturating normally on room air.  Lab workup insignificant.  Urinalysis does show greater than 100 RBCs, no signs of infection. Xray demonstraters known left renal stone appears to be in distal left ureter currently. Dr. Huston is on call and was consulted per ED, recommended admission due to intractable pain.    Procedure(s) (LRB):  CYSTOURETEROSCOPY, WITH RETROGRADE PYELOGRAM AND URETERAL STENT INSERTION (Left)  EGD (ESOPHAGOGASTRODUODENOSCOPY) (N/A)  REMOVAL, CALCULUS, URETER, URETEROSCOPIC (Left)       Hospital Course:   39-year-old female with hx significant for anemia, B12 deficiency, anxiety, depression, adjustment disorder, arthritis, bradycardia, coccidiomycosis, gastric bypass surgery and multiple abdominal surgeries with recent admission overnight 10/16 to 10/17 due to intussusception which resolved without intervention, and then seen in ED on 10/18 and found to have 4 mm left distal ureter stone who now presented to ED with complaint of worsening left flank pain, states that pain has become intolerable, with associated nausea.  She reports pain 9/10, left lower abdomen radiating into left groin and around to left back.  She states she has been unable to eat or or drink fluids due to severe pain and nausea.  Denies vomiting.  She states her urine is brown colored, states that she does have urgency and frequency.  Vital signs while in ED stable, heart rate low at times in the upper 50s, temp 98.3°, blood pressure 148/73, saturating normally on room air.  Lab workup insignificant.  Urinalysis does show greater than 100 RBCs, no signs of infection. Xray demonstraters known left renal stone appears to be in distal left ureter currently. Dr. Huston is on call and was consulted per ED, recommended admission due to intractable pain.    10/22- Appreciate Noel Lowery/[facility]cho- s/p L Ureteroscopy with stone basket extraction.                   2.  Cystoscopy with placement of left double-J ureteral stent.              3.  left retrograde pyelogram.                                               4.  Fluoro less than 1 hour.                                            FINDINGS:  Stone extracted, retrograde unremarkable, stent in good position.                                                                      DRAINS:  Left 7 French by 26 cm double-J ureteral stent.         S/p EGD by Dr. Ivan-- Normal esophagus.                          - Normal examined duodenum.                          - Z-line regular, 30 cm  from the incisors.                          - Small hiatal hernia.                          - Ramez-en-Y gastrojejunostomy with gastrojejunal                          anastomosis characterized by healthy appearing                          mucosa.              Pt seen post op, AAOx4, c/o Abd discomfort marnie at the L flank at the stents. Just got IV dilaudid. Later BP dropped 80/50- feet elevated, pt given NS bolus.     10/23- looks and feels much better, rested well overnite, BP improved and VSS Afeb. Dr. Barnett evaluated her this am and cleared. She is up and about, eating drinking well, moving around well and feels ready to go home. She was seen and examined and deemed stable for discharge home today.         Goals of Care Treatment Preferences:  Code Status: Full Code         Consults:   Consults (From admission, onward)          Status Ordering Provider     Inpatient consult to Urology  Once        Provider:  Cayden Huston MD    Completed HAWK MEDINA            No new Assessment & Plan notes have been filed under this hospital service since the last note was generated.  Service: Hospital Medicine    Final Active Diagnoses:    Diagnosis Date Noted POA    History of bariatric surgery [Z98.84] 09/26/2019 Not Applicable      Problems Resolved During this Admission:    Diagnosis Date Noted Date Resolved POA    PRINCIPAL PROBLEM:  Left ureteral stone [N20.1] 10/21/2024 10/23/2024 Yes    Intractable pain [R52] 10/21/2024 10/23/2024 Yes    Nephrolithiasis [N20.0] 10/21/2024 10/23/2024 Yes       Discharged Condition: stable    Disposition: Home or Self Care    Follow Up:   Follow-up Information       Ran Mcgill MD Follow up in 3 day(s).    Specialty: Internal Medicine  Why: Hospital follow up, As needed  Contact information:  [address] [facility] [address] [phone]                           Patient Instructions:      X-Ray KUB   Standing Status: Future Standing Exp. Date: 10/22/25      Order Specific Question Answer Comments   May the Radiologist modify the order per protocol to meet the clinical needs of the patient? Yes    Release to patient Immediate      US Retroperitoneal Complete   Standing Status: Future Standing Exp. Date: 10/22/25     Order Specific Question Answer Comments   May the Radiologist modify the order per protocol to meet the clinical needs of the patient? Yes    Release to patient Immediate      Diet Adult Regular     Activity as tolerated       Significant Diagnostic Studies: Labs: BMP:   Recent Labs   Lab 10/23/24  0435   GLU 83      K 3.4*      CO2 22*   BUN 3*   CREATININE 0.7   CALCIUM 8.1*   MG 1.6   , CMP   Recent Labs   Lab 10/23/24  0435      K 3.4*      CO2 22*   GLU 83   BUN 3*   CREATININE 0.7   CALCIUM 8.1*   PROT 5.6*   ALBUMIN 2.9*   BILITOT 0.2   ALKPHOS 64   AST 10   ALT 8*   ANIONGAP 7*   , and CBC   Recent Labs   Lab 10/23/24  0435   WBC 4.54   HGB 10.8*   HCT 32.7*        Microbiology: Blood Culture   Lab Results   Component Value Date    LABBLOO NO GROWTH AFTER 5 DAYS. FINAL REPORT 10/12/2012    and Urine Culture    Lab Results   Component Value Date    LABURIN ESCHERICHIA COLI  >100,000 cfu/ml   (A) 11/28/2023     Radiology:   Imaging Results              X-Ray Abdomen Flat And Erect (Final result)  Result time 10/21/24 19:38:40      Final result by Tito Hunter MD (10/21/24 19:38:40)                   Impression:      1.  The known left renal stone appears to be in the distal left ureter currently.      Electronically signed by: Tito Hunter MD  Date:    10/21/2024  Time:    19:38               Narrative:    EXAMINATION:  XR ABDOMEN FLAT AND ERECT    CLINICAL HISTORY:  Left lower quadrant pain    TECHNIQUE:  Flat and erect AP views of the abdomen were performed.    COMPARISON:  Abdomen and pelvis CT scan performed 3 days earlier    FINDINGS:  There is contrast in the colon.  IUD remains in place.  Normal bowel gas  pattern.  Stable postoperative changes involving the stomach.    The known left renal stone is within the central left hemipelvis, likely in the distal ureter.    Negative for osseous abnormalities.  Lung bases are clear.                                       Pending Diagnostic Studies:       None           Medications:  Reconciled Home Medications:      Medication List        START taking these medications      cefdinir 300 MG capsule  Commonly known as: OMNICEF  Take 1 capsule (300 mg total) by mouth 2 (two) times daily. for 7 days     hyoscyamine 0.125 mg Subl  Place 2 tablets (0.25 mg total) under the tongue every 4 (four) hours as needed (spasms).     oxyCODONE-acetaminophen 5-325 mg per tablet  Commonly known as: PERCOCET  Take 1 tablet by mouth every 4 (four) hours as needed for Pain.     phenazopyridine 200 MG tablet  Commonly known as: PYRIDIUM  Take 1 tablet (200 mg total) by mouth 3 (three) times daily as needed (burning).            CHANGE how you take these medications      LINZESS 290 mcg Cap capsule  Generic drug: linaCLOtide  TAKE 1 CAPSULE(290 MCG) BY MOUTH BEFORE BREAKFAST  What changed: Another medication with the same name was removed. Continue taking this medication, and follow the directions you see here.            CONTINUE taking these medications      acetaminophen 650 MG Tbsr  Commonly known as: TYLENOL  Take 650 mg by mouth every 8 (eight) hours.     cyanocobalamin 1,000 mcg/mL injection  ONE INJECTION AS DIRECTED EVERY 28 DAYS     DULoxetine 30 MG capsule  Commonly known as: CYMBALTA  Take 1 capsule (30 mg total) by mouth every evening.     FLUoxetine 20 MG capsule  Take 20 mg by mouth once daily.     fluticasone propionate 50 mcg/actuation nasal spray  Commonly known as: FLONASE  1 spray by Each Nostril route once daily.     ketorolac 10 mg tablet  Commonly known as: TORADOL  Take by mouth.     levothyroxine 25 MCG tablet  Commonly known as: SYNTHROID  Take 1 tablet by mouth every  morning.     MAGNESIUM COMPLEX ORAL  Take by mouth.     modafiniL 200 MG Tab  Commonly known as: PROVIGIL  Take 200 mg by mouth once daily.     tamsulosin 0.4 mg Cap  Commonly known as: FLOMAX  Take 1 capsule (0.4 mg total) by mouth once daily.     tiZANidine 4 mg Cap  Take by mouth.            STOP taking these medications      albuterol 90 mcg/actuation inhaler  Commonly known as: PROVENTIL/VENTOLIN HFA     HYDROcodone-acetaminophen 5-325 mg per tablet  Commonly known as: NORCO     midodrine 5 MG Tab  Commonly known as: PROAMATINE     N-ACETYL-L-CYSTEINE MISC     traMADoL 50 mg tablet  Commonly known as: ULTRAM              Indwelling Lines/Drains at time of discharge:   Lines/Drains/Airways       None                   Time spent on the discharge of patient: 41 minutes         fJ Kyle MD  Department of Hospital Medicine  [facility] - Telemetry ([facility])

## 2024-10-28 ENCOUNTER — TELEPHONE (OUTPATIENT)
Dept: UROLOGY | Facility: CLINIC | Age: 39
End: 2024-10-28

## 2024-10-28 ENCOUNTER — CLINICAL SUPPORT (OUTPATIENT)
Dept: UROLOGY | Facility: CLINIC | Age: 39
End: 2024-10-28
Payer: COMMERCIAL

## 2024-10-28 DIAGNOSIS — N20.0 NEPHROLITHIASIS: Primary | ICD-10-CM

## 2024-10-28 PROCEDURE — 99999 PR PBB SHADOW E&M-EST. PATIENT-LVL III: CPT | Mod: PBBFAC,,,

## 2024-11-17 ENCOUNTER — PATIENT MESSAGE (OUTPATIENT)
Dept: SURGERY | Facility: CLINIC | Age: 39
End: 2024-11-17
Payer: COMMERCIAL

## 2024-11-18 ENCOUNTER — TELEPHONE (OUTPATIENT)
Dept: SURGERY | Facility: CLINIC | Age: 39
End: 2024-11-18
Payer: COMMERCIAL

## 2024-11-18 DIAGNOSIS — K56.1 INTUSSUSCEPTION INTESTINE: Primary | ICD-10-CM

## 2024-11-18 NOTE — TELEPHONE ENCOUNTER
Returned call regarding myChart message. Reports bloody stools are improving and tolerating liquid diet. Denies any severe abdominal pain, very mild and intermittent. Discussed alarming signs and symptoms and gave ED precautions. Discussed pill endoscopy versus/with follow-up with a bariatric surgeon who could do things in a minimally invasive manner. She says her original surgeon is no longer in practice, and she is not opposed to open surgery if she needs it. Will order pill endoscopy. Gave ED precautions again.    Padmini Landa PA-C  [facility]

## 2024-11-19 ENCOUNTER — TELEPHONE (OUTPATIENT)
Dept: SURGERY | Facility: CLINIC | Age: 39
End: 2024-11-19
Payer: COMMERCIAL

## 2024-11-19 DIAGNOSIS — K56.1 INTUSSUSCEPTION INTESTINE: Primary | ICD-10-CM

## 2024-11-19 NOTE — TELEPHONE ENCOUNTER
----- Message from Padmini Landa PA-C sent at 11/18/2024 11:28 AM CST -----  Please get scheduled CBC scheduled soon as well as pill endoscopy.    Padmini Landa PA-C  [facility]

## 2024-11-20 ENCOUNTER — TELEPHONE (OUTPATIENT)
Dept: EMERGENCY MEDICINE | Facility: HOSPITAL | Age: 39
End: 2024-11-20
Payer: COMMERCIAL

## 2024-11-20 ENCOUNTER — PATIENT MESSAGE (OUTPATIENT)
Dept: INFECTIOUS DISEASES | Facility: CLINIC | Age: 39
End: 2024-11-20
Payer: COMMERCIAL

## 2024-11-20 ENCOUNTER — LAB VISIT (OUTPATIENT)
Dept: LAB | Facility: HOSPITAL | Age: 39
End: 2024-11-20
Attending: STUDENT IN AN ORGANIZED HEALTH CARE EDUCATION/TRAINING PROGRAM
Payer: COMMERCIAL

## 2024-11-20 ENCOUNTER — PATIENT MESSAGE (OUTPATIENT)
Dept: NEUROLOGY | Facility: CLINIC | Age: 39
End: 2024-11-20
Payer: COMMERCIAL

## 2024-11-20 ENCOUNTER — PATIENT MESSAGE (OUTPATIENT)
Dept: SURGERY | Facility: CLINIC | Age: 39
End: 2024-11-20
Payer: COMMERCIAL

## 2024-11-20 DIAGNOSIS — E55.9 AVITAMINOSIS D: ICD-10-CM

## 2024-11-20 DIAGNOSIS — Z13.29 SCREENING FOR THYROID DISORDER: ICD-10-CM

## 2024-11-20 DIAGNOSIS — E61.1 IRON DEFICIENCY: ICD-10-CM

## 2024-11-20 DIAGNOSIS — R50.9 FUO (FEVER OF UNKNOWN ORIGIN): ICD-10-CM

## 2024-11-20 DIAGNOSIS — E53.8 VITAMIN B12 DEFICIENCY (NON ANEMIC): ICD-10-CM

## 2024-11-20 DIAGNOSIS — K56.1 INTUSSUSCEPTION INTESTINE: ICD-10-CM

## 2024-11-20 DIAGNOSIS — E11.9 DIABETES MELLITUS WITHOUT COMPLICATION: ICD-10-CM

## 2024-11-20 DIAGNOSIS — I10 ESSENTIAL HYPERTENSION, MALIGNANT: ICD-10-CM

## 2024-11-20 DIAGNOSIS — E78.00 PURE HYPERCHOLESTEROLEMIA: ICD-10-CM

## 2024-11-20 DIAGNOSIS — I95.1 ORTHOSTATIC HYPOTENSION: ICD-10-CM

## 2024-11-20 DIAGNOSIS — Z13.21 SCREENING FOR MALNUTRITION: ICD-10-CM

## 2024-11-20 DIAGNOSIS — R53.83 FATIGUE: ICD-10-CM

## 2024-11-20 DIAGNOSIS — R20.2 PARESTHESIA OF LEFT ARM: Primary | ICD-10-CM

## 2024-11-20 DIAGNOSIS — M79.10 MYALGIA: ICD-10-CM

## 2024-11-20 DIAGNOSIS — Z77.120 MOLD EXPOSURE: Primary | ICD-10-CM

## 2024-11-20 PROCEDURE — 0152U NFCT DS DNA UNTRGT NGNRJ SEQ: CPT | Performed by: STUDENT IN AN ORGANIZED HEALTH CARE EDUCATION/TRAINING PROGRAM

## 2024-11-21 NOTE — TELEPHONE ENCOUNTER
Received call from lab.  CMP was performed today.  Lab was unable to contact the ordering physician.  Critical value of glucose of 47.  Contacted the patient.  States that she has eaten a pretzel, some popcorn, and an icee since she had her labs drawn. She feels weak. Advised her something with with more sustenance  tonight.  Recommend that she touch base with the ordering physician tomorrow.  All questions answered.

## 2024-11-25 LAB
MISCELLANEOUS TEST NAME: NORMAL
REFERENCE LAB: NORMAL
SPECIMEN TYPE: NORMAL
TEST RESULT: NORMAL

## 2024-12-03 ENCOUNTER — TELEPHONE (OUTPATIENT)
Dept: SURGERY | Facility: CLINIC | Age: 39
End: 2024-12-03
Payer: COMMERCIAL

## 2024-12-03 NOTE — TELEPHONE ENCOUNTER
----- Message from Padmini Landa PA-C sent at 12/3/2024  9:47 AM CST -----  Contact: self  Please let her know we need special equipment that is currently on back order and will reach out once we have an update  ----- Message -----  From: Harshad Dickson RN  Sent: 12/3/2024   9:43 AM CST  To: Maki So NP; Padmini Landa PA-C    Unfortunately, not yet. I sent another e-mail on Wednesday, 11/27 and spoke to our materials  yesterday. Per our , the patency capsules are on backorder from the supplier. My apologies. I will continue to check every day. Thank you.  ----- Message -----  From: Padmini Landa PA-C  Sent: 12/3/2024   9:35 AM CST  To: Harshad Dickson RN    Did you guys ever get the special equipment needed for the patency study?  ----- Message -----  From: Елена Davenport  Sent: 12/3/2024   8:08 AM CST  To: [facility] Staff    .Type:  Needs Medical Advice    Who Called:  Johanna Bullock  Would the patient rather a call back or a response via [facility]? Call back  Best Call Back Number: .[phone]  Additional Information: pt is calling in regards to endoscopy procedure that was never scheduled.

## 2024-12-05 ENCOUNTER — OFFICE VISIT (OUTPATIENT)
Dept: PHYSICAL MEDICINE AND REHAB | Facility: CLINIC | Age: 39
End: 2024-12-05
Payer: COMMERCIAL

## 2024-12-05 VITALS — BODY MASS INDEX: 40.23 KG/M2 | WEIGHT: 265.44 LBS | HEIGHT: 68 IN

## 2024-12-05 DIAGNOSIS — G56.03 BILATERAL CARPAL TUNNEL SYNDROME: ICD-10-CM

## 2024-12-05 PROCEDURE — 95912 NRV CNDJ TEST 11-12 STUDIES: CPT | Mod: S$GLB,,, | Performed by: PHYSICAL MEDICINE & REHABILITATION

## 2024-12-05 PROCEDURE — 99999 PR PBB SHADOW E&M-EST. PATIENT-LVL III: CPT | Mod: PBBFAC,,, | Performed by: PHYSICAL MEDICINE & REHABILITATION

## 2024-12-05 PROCEDURE — 99499 UNLISTED E&M SERVICE: CPT | Mod: S$GLB,,, | Performed by: PHYSICAL MEDICINE & REHABILITATION

## 2024-12-05 NOTE — PROGRESS NOTES
[facility]   [address]  [place] [address]  Phone: [phone]        Full Name: Johanna Bullock YOB: 1983  Patient ID: [id number]      Visit Date: 12/5/2024 11:15  Age: 41 Years  Examining Physician:   Referring Physician:   Conclusion: upper extr    Chief Complaint   Patient presents with    Arm Pain       HPI: This is a 39 y.o.  female being seen in clinic today for re-evaluation of chronic left arm pain and numbness/hypersensitivity. She was last seen in 2023 and was diagnosed with Cubital tunnel.  She has been treated for left upper ext CRPS with stellate ganglion block x2 and OT with desensitization. She still complains of symptoms in her arm, forearm and 4th and 5th digits. She reports limitations in fine motor activity of the left hand.     History obtained from patient    Past family, medical, social, and surgical history reviewed in chart    Review of Systems:     General- denies lethargy, weight change, fever, chills  Head/neck- denies swallowing difficulties  ENT- denies hearing changes  Cardiovascular-denies chest pain  Pulmonary- denies shortness of breath  GI- denies constipation or bowel incontinence  - denies bladder incontinence  Skin- denies wounds or rashes  Musculoskeletal- + weakness, + pain  Neurologic- + numbness and tingling  Psychiatric- denies depressive or psychotic features, denies anxiety  Lymphatic-denies swelling  Endocrine- denies hypoglycemic symptoms/DM history  All other pertinent systems negative     Physical Examination:  General: Well developed, well nourished female, NAD  HEENT:NCAT EOMI bilaterally   Pulmonary:Normal respirations    Spinal Examination: CERVICAL  Active ROM is within normal limits.  Inspection: No deformity of spinal alignment.    Musculoskeletal Tests:  Phalen: neg  Elbow compression (ulnar): +on left  Tinels at wrist: neg    Bilateral Upper and Lower Extremities:  Pulses are 2+ at radial  bilaterally.  Shoulder/Elbow/Wrist/Hand ROM wnl except guarding at left arm/forearm  Hip/Knee/Ankle ROM   Bilateral Extremities show normal capillary refill.  No signs of cyanosis, rubor, edema, skin changes, or dysvascular changes of appendages.  Nails appear intact.    Neurological Exam:  Cranial Nerves:  II-XII grossly intact    Manual Muscle Testing: (Motor 5=normal)  5/5 strength bilateral upper extremities except 4+/5 interossei on left-     No focal atrophy is noted of either upper extremity.    Bilateral Reflexes:  Momin's response is absent bilaterally.      Sensation: tested to light touch  - intact in arms-except hypersensitive at left ant arm, forearm, into wrist and fingers    Gait: Narrow base and good arm swing.      Entire procedure explained to patient prior to proceeding.  Verbal consent obtained    Sensory NCS      Nerve / Sites Rec. Site Onset Lat Peak Lat NP Amp PP Amp Segments Distance Velocity     ms ms µV µV  mm m/s   L Median - Digit II (Antidromic)      Wrist Dig II 3.08 3.83 31.8 29.2 Wrist - Dig  45   R Median - Digit II (Antidromic)      Wrist Dig II 2.58 3.23 17.3 28.9 Wrist - Dig  54   L Ulnar - Digit V (Antidromic)      Wrist Dig V 2.38 3.19 20.0 17.1 Wrist - Dig V 140 59   R Ulnar - Digit V (Antidromic)      Wrist Dig V 2.13 2.67 15.1 25.6 Wrist - Dig V 140 66   L Radial - Anatomical snuff box (Forearm)      Forearm Wrist 1.73 2.31 12.5 14.5 Forearm - Wrist 100 58   R Radial - Anatomical snuff box (Forearm)      Forearm Wrist 1.15 2.10 19.2 19.5 Forearm - Wrist 100 87                   Combined Sensory Index      Nerve / Sites Rec. Site Peak Lat NP Amp PP Amp Segments Peak Diff     ms µV µV  ms   L Median - CSI      Median Thumb 2.81 20.5 33.4 Median - Radial 0.35      Radial Thumb 2.46 17.0 29.0 Median - Ulnar 0.17      Median Ring 3.44 38.8 75.7 Median palm - Ulnar palm 0.50      Ulnar Ring 3.27 28.4 57.9        Median palm Wrist 2.04 117.0 129.2        Ulnar palm  Wrist 1.54 182.7 32.3        CSI     CSI 1.02   R Median - CSI      Median Thumb 2.77 6.8 3.5 Median - Radial 0.31      Radial Thumb 2.46 13.1 8.2 Median - Ulnar 0.29      Median Ring 3.42 11.8 29.7 Median palm - Ulnar palm 0.52      Ulnar Ring 3.13 7.9 25.6        Median palm Wrist 2.02 21.3 34.2        Ulnar palm Wrist 1.50 21.1 43.3        CSI     CSI 1.13           Motor NCS      Nerve / Sites Muscle Latency Amplitude Amp % Duration Segments Distance Lat Diff Velocity     ms mV % ms  mm ms m/s   L Median - APB      Wrist APB 2.92 11.8 100 4.94 Wrist - APB 80        Elbow APB 6.79 11.4 96.9 5.29 Elbow - Wrist 230 3.88 59   R Median - APB      Wrist APB 2.81 12.4 100 3.83 Wrist - APB 80        Elbow APB 6.85 10.7 86.5 4.04 Elbow - Wrist 240 4.04 59   L Ulnar - ADM      Wrist ADM 2.38 9.2 100 6.56 Wrist - ADM 80        B.Elbow ADM 6.06 9.1 98.3 7.29 B.Elbow - Wrist 220 3.69 60      A.Elbow ADM 8.13 9.5 103 6.65 A.Elbow - B.Elbow 130 2.06 63   R Ulnar - ADM      Wrist ADM 2.08 9.1 100 5.60 Wrist - ADM 80        B.Elbow ADM 5.25 9.1 99.9 6.06 B.Elbow - Wrist 220 3.17 69      A.Elbow ADM 7.00 8.0 88.5 5.94 A.Elbow - B.Elbow 120 1.75 69                 INTERPRETATION *hands required warming  -Bilateral median motor nerve conduction study showed normal latency, amplitude, and conduction velocity  -Bilateral median sensory nerve conduction study showed normal peak latency and amplitude  -Bilateral ulnar motor nerve conduction study showed normal latency, amplitude, and normal conduction velocity   -Bilateral ulnar sensory nerve conduction study showed normal peak latency and amplitude  -Bilateral radial sensory nerve conduction study showed normal peak latency and amplitude  -Bilateral dorsal ulnar cutaneous sensory nerve showed normal peak latency and amplitude  -Bilateral combined sensory index was significant      IMPRESSION  ABNORMAL study  2. 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 There is still clinical evidence consistent with left CRPS    PLAN  Discussed in detail for greater than 30 minutes about diagnosis and treatment plan    1. Follow up with referring provider: Dr. Sin Schwarz-Cam*  2. Handouts on CTS provided. Rec fu with pain management for further treatment options for CRPS.  Consider acupunture as an adjuvant therapy  3. This study is good for one year. If symptoms worsen or do not improve, please re-consult.    Christel Matthew M.D.  Physical Medicine and Rehab

## 2024-12-12 ENCOUNTER — OFFICE VISIT (OUTPATIENT)
Dept: UROLOGY | Facility: CLINIC | Age: 39
End: 2024-12-12
Attending: UROLOGY
Payer: COMMERCIAL

## 2024-12-12 ENCOUNTER — HOSPITAL ENCOUNTER (OUTPATIENT)
Dept: RADIOLOGY | Facility: HOSPITAL | Age: 39
Discharge: HOME | End: 2024-12-12
Attending: UROLOGY
Payer: COMMERCIAL

## 2024-12-12 VITALS
SYSTOLIC BLOOD PRESSURE: 121 MMHG | BODY MASS INDEX: 40.23 KG/M2 | HEART RATE: 61 BPM | WEIGHT: 265.44 LBS | DIASTOLIC BLOOD PRESSURE: 79 MMHG | HEIGHT: 68 IN

## 2024-12-12 DIAGNOSIS — N20.1 LEFT URETERAL STONE: ICD-10-CM

## 2024-12-12 DIAGNOSIS — N20.0 NEPHROLITHIASIS: Primary | ICD-10-CM

## 2024-12-12 PROCEDURE — 99999 PR PBB SHADOW E&M-EST. PATIENT-LVL V: CPT | Mod: PBBFAC,,, | Performed by: UROLOGY

## 2024-12-12 PROCEDURE — 99024 POSTOP FOLLOW-UP VISIT: CPT | Mod: S$GLB,,, | Performed by: UROLOGY

## 2024-12-12 PROCEDURE — 3074F SYST BP LT 130 MM HG: CPT | Mod: CPTII,S$GLB,, | Performed by: UROLOGY

## 2024-12-12 PROCEDURE — 76770 US EXAM ABDO BACK WALL COMP: CPT | Mod: 26,,, | Performed by: RADIOLOGY

## 2024-12-12 PROCEDURE — 76770 US EXAM ABDO BACK WALL COMP: CPT | Mod: TC

## 2024-12-12 PROCEDURE — 74018 RADEX ABDOMEN 1 VIEW: CPT | Mod: TC

## 2024-12-12 PROCEDURE — 74018 RADEX ABDOMEN 1 VIEW: CPT | Mod: 26,,, | Performed by: STUDENT IN AN ORGANIZED HEALTH CARE EDUCATION/TRAINING PROGRAM

## 2024-12-12 PROCEDURE — 1160F RVW MEDS BY RX/DR IN RCRD: CPT | Mod: CPTII,S$GLB,, | Performed by: UROLOGY

## 2024-12-12 PROCEDURE — 1159F MED LIST DOCD IN RCRD: CPT | Mod: CPTII,S$GLB,, | Performed by: UROLOGY

## 2024-12-12 PROCEDURE — 3044F HG A1C LEVEL LT 7.0%: CPT | Mod: CPTII,S$GLB,, | Performed by: UROLOGY

## 2024-12-12 PROCEDURE — 3078F DIAST BP <80 MM HG: CPT | Mod: CPTII,S$GLB,, | Performed by: UROLOGY

## 2024-12-12 NOTE — PROGRESS NOTES
Chief Complaint:   Encounter Diagnosis   Name Primary?    Nephrolithiasis Yes       HPI:    12/12/24- doing well with no stone pain.    39-year-old female who reported to the emergency department persistent left renal colic pain.  Had previously been to the hospital with intussusception.  CT scan demonstrates a left obstructing ureteral stone, no previous stone passage.  No previous urological or gynecological history.  Patient did have pyelonephritis in 2008, no history of UTIs though.  No family history of urological cancers or stones.    Allergies:  Patient has no known allergies.    Medications:  See MAR    Review of Systems:  General: No fever, chills, fatigability, or weight loss.  Skin: No rashes, itching, or changes in color or texture of skin.  Chest: Denies NOGUERA, cyanosis, wheezing, cough, and sputum production.  Abdomen: Appetite fine. No weight loss. Denies diarrhea, abdominal pain, hematemesis, or blood in stool.  Musculoskeletal: No joint stiffness or swelling. Denies back pain.  : As above.  All other review of systems negative.    PMH:   has a past medical history of Adjustment disorder with mixed anxiety and depressed mood (02/06/2013), Anemia, B12 deficiency, Anxiety, Arthritis, and Bradycardia.    PSH:   has a past surgical history that includes Dilation and curettage of uterus; Gastric bypass (2008); Cholecystectomy (10/2012); Liver scope (10/2012); Esophagogastroduodenoscopy (N/A, 07/20/2018); Lysis of adhesions; Colonoscopy (N/A, 02/14/2020); Laparoscopic repair of hiatal hernia (2019); Colonoscopy (N/A, 03/10/2023); Injection of anesthetic agent around lateral branch nerves of sacroiliac joint; Ulnar tunnel release (Left, 02/15/2024); decompression, nerve, ulnar (Left, 02/15/2024); Cystoureteroscopy with retrograde pyelography and insertion of stent into ureter (Left, 10/22/2024); Ureteroscopic removal of ureteric calculus (Left, 10/22/2024); and Esophagogastroduodenoscopy (N/A,  10/22/2024).    FamHx: family history includes Heart disease in her father; Hypertension in her father.    SocHx:  reports that she has never smoked. She has never used smokeless tobacco. She reports that she does not drink alcohol and does not use drugs.      Physical Exam:  Vitals:    12/12/24 1050   BP: 121/79   Pulse: 61     General: A&Ox3, no apparent distress, no deformities  Neck: No masses, normal ROM  Lungs: normal inspiration, no use of accessory muscles  Heart: normal pulse, no arrhythmias  Abdomen: Soft, NT, ND, no masses, no hernias, no hepatosplenomegaly  Skin: The skin is warm and dry. No jaundice.  Ext: No c/c/e.    Labs/Studies:   Stone protocol 100% Ca oxalate monohydrate 10/24  KUB/KANE mild left hydro 12/24  CT stone protocol 4 mm left proximal ureter stone 10/24  Creatinine 0.7 11/24    Impression/Plan:       Nephrolithiasis-  left ureteroscopy  10/22/24    Currently doing well with no stone pain, call with any issues prior to the next appointment.  We have discussed dietary management today, which includes but is not limited to increased hydration, lemonade, decreased sodium and animal proteins.  See me in 6 months with a KUB and renal ultrasound, at which point we will be able to reassess the left kidney but this appears to just be a mild split.  If stable yearly from there.

## 2024-12-27 ENCOUNTER — PATIENT MESSAGE (OUTPATIENT)
Dept: GASTROENTEROLOGY | Facility: CLINIC | Age: 39
End: 2024-12-27
Payer: COMMERCIAL

## 2024-12-27 DIAGNOSIS — Z01.818 PRE-OP EVALUATION: Primary | ICD-10-CM

## 2025-03-13 ENCOUNTER — TELEPHONE (OUTPATIENT)
Dept: NEUROLOGY | Facility: CLINIC | Age: 40
End: 2025-03-13
Payer: COMMERCIAL

## 2025-03-13 NOTE — TELEPHONE ENCOUNTER
----- Message from Kate sent at 3/13/2025 12:31 PM CDT -----  [facility] of [facility]er[facility]:[phone] and fax is [phone] Information: She is requesting results from both EMG test. Thx. EL

## 2025-08-12 ENCOUNTER — OFFICE VISIT (OUTPATIENT)
Dept: OBSTETRICS AND GYNECOLOGY | Facility: CLINIC | Age: 40
End: 2025-08-12

## 2025-08-12 ENCOUNTER — LAB VISIT (OUTPATIENT)
Dept: LAB | Facility: HOSPITAL | Age: 40
End: 2025-08-12
Attending: NURSE PRACTITIONER
Payer: MEDICAID

## 2025-08-12 VITALS
HEIGHT: 68 IN | DIASTOLIC BLOOD PRESSURE: 82 MMHG | BODY MASS INDEX: 36.66 KG/M2 | WEIGHT: 241.88 LBS | SYSTOLIC BLOOD PRESSURE: 118 MMHG

## 2025-08-12 DIAGNOSIS — N92.1 BREAKTHROUGH BLEEDING ASSOCIATED WITH INTRAUTERINE DEVICE (IUD): ICD-10-CM

## 2025-08-12 DIAGNOSIS — Z97.5 BREAKTHROUGH BLEEDING ASSOCIATED WITH INTRAUTERINE DEVICE (IUD): ICD-10-CM

## 2025-08-12 DIAGNOSIS — R10.2 PELVIC PAIN IN FEMALE: ICD-10-CM

## 2025-08-12 DIAGNOSIS — N92.1 BREAKTHROUGH BLEEDING ASSOCIATED WITH INTRAUTERINE DEVICE (IUD): Primary | ICD-10-CM

## 2025-08-12 DIAGNOSIS — Z97.5 BREAKTHROUGH BLEEDING ASSOCIATED WITH INTRAUTERINE DEVICE (IUD): Primary | ICD-10-CM

## 2025-08-12 LAB
ABSOLUTE EOSINOPHIL (OHS): 0.07 K/UL
ABSOLUTE MONOCYTE (OHS): 0.49 K/UL (ref 0.3–1)
ABSOLUTE NEUTROPHIL COUNT (OHS): 2.18 K/UL (ref 1.8–7.7)
BASOPHILS # BLD AUTO: 0.05 K/UL
BASOPHILS NFR BLD AUTO: 1.1 %
ERYTHROCYTE [DISTWIDTH] IN BLOOD BY AUTOMATED COUNT: 14.2 % (ref 11.5–14.5)
HCG INTACT+B SERPL-ACNC: <2.42 MIU/ML
HCT VFR BLD AUTO: 36.8 % (ref 37–48.5)
HGB BLD-MCNC: 11.9 GM/DL (ref 12–16)
IMM GRANULOCYTES # BLD AUTO: 0.01 K/UL (ref 0–0.04)
IMM GRANULOCYTES NFR BLD AUTO: 0.2 % (ref 0–0.5)
LYMPHOCYTES # BLD AUTO: 1.61 K/UL (ref 1–4.8)
MCH RBC QN AUTO: 28.2 PG (ref 27–31)
MCHC RBC AUTO-ENTMCNC: 32.3 G/DL (ref 32–36)
MCV RBC AUTO: 87 FL (ref 82–98)
NUCLEATED RBC (/100WBC) (OHS): 0 /100 WBC
PLATELET # BLD AUTO: 376 K/UL (ref 150–450)
PMV BLD AUTO: 9.8 FL (ref 9.2–12.9)
RBC # BLD AUTO: 4.22 M/UL (ref 4–5.4)
RELATIVE EOSINOPHIL (OHS): 1.6 %
RELATIVE LYMPHOCYTE (OHS): 36.5 % (ref 18–48)
RELATIVE MONOCYTE (OHS): 11.1 % (ref 4–15)
RELATIVE NEUTROPHIL (OHS): 49.5 % (ref 38–73)
TSH SERPL-ACNC: 1.9 UIU/ML (ref 0.4–4)
WBC # BLD AUTO: 4.41 K/UL (ref 3.9–12.7)

## 2025-08-12 PROCEDURE — 99213 OFFICE O/P EST LOW 20 MIN: CPT | Mod: S$PBB,,, | Performed by: NURSE PRACTITIONER

## 2025-08-12 PROCEDURE — 99213 OFFICE O/P EST LOW 20 MIN: CPT | Mod: PBBFAC | Performed by: NURSE PRACTITIONER

## 2025-08-12 PROCEDURE — 99999 PR PBB SHADOW E&M-EST. PATIENT-LVL III: CPT | Mod: PBBFAC,,, | Performed by: NURSE PRACTITIONER

## 2025-08-12 PROCEDURE — 36415 COLL VENOUS BLD VENIPUNCTURE: CPT

## 2025-08-12 PROCEDURE — 84702 CHORIONIC GONADOTROPIN TEST: CPT

## 2025-08-12 PROCEDURE — 85025 COMPLETE CBC W/AUTO DIFF WBC: CPT

## 2025-08-12 PROCEDURE — 84443 ASSAY THYROID STIM HORMONE: CPT

## 2025-08-13 ENCOUNTER — HOSPITAL ENCOUNTER (OUTPATIENT)
Dept: RADIOLOGY | Facility: HOSPITAL | Age: 40
Discharge: HOME | End: 2025-08-13
Attending: NURSE PRACTITIONER
Payer: MEDICAID

## 2025-08-13 DIAGNOSIS — N92.1 BREAKTHROUGH BLEEDING ASSOCIATED WITH INTRAUTERINE DEVICE (IUD): ICD-10-CM

## 2025-08-13 DIAGNOSIS — R10.2 PELVIC PAIN IN FEMALE: ICD-10-CM

## 2025-08-13 DIAGNOSIS — Z97.5 BREAKTHROUGH BLEEDING ASSOCIATED WITH INTRAUTERINE DEVICE (IUD): ICD-10-CM

## 2025-08-13 PROCEDURE — 76856 US EXAM PELVIC COMPLETE: CPT | Mod: 26,,, | Performed by: RADIOLOGY

## 2025-08-13 PROCEDURE — 76830 TRANSVAGINAL US NON-OB: CPT | Mod: TC

## 2025-08-13 PROCEDURE — 76830 TRANSVAGINAL US NON-OB: CPT | Mod: 26,,, | Performed by: RADIOLOGY

## 2025-08-19 ENCOUNTER — OFFICE VISIT (OUTPATIENT)
Dept: OBSTETRICS AND GYNECOLOGY | Facility: CLINIC | Age: 40
End: 2025-08-19
Payer: MEDICAID

## 2025-08-19 VITALS
WEIGHT: 241.63 LBS | DIASTOLIC BLOOD PRESSURE: 74 MMHG | SYSTOLIC BLOOD PRESSURE: 112 MMHG | HEIGHT: 68 IN | BODY MASS INDEX: 36.62 KG/M2

## 2025-08-19 DIAGNOSIS — Z97.5 BREAKTHROUGH BLEEDING ASSOCIATED WITH INTRAUTERINE DEVICE (IUD): Primary | ICD-10-CM

## 2025-08-19 DIAGNOSIS — D21.9 FIBROIDS: ICD-10-CM

## 2025-08-19 DIAGNOSIS — Z30.8 ENCOUNTER FOR OTHER CONTRACEPTIVE MANAGEMENT: ICD-10-CM

## 2025-08-19 DIAGNOSIS — N92.1 BREAKTHROUGH BLEEDING ASSOCIATED WITH INTRAUTERINE DEVICE (IUD): Primary | ICD-10-CM

## 2025-08-19 PROBLEM — Z30.431 IUD CHECK UP: Status: RESOLVED | Noted: 2022-09-12 | Resolved: 2025-08-19

## 2025-08-19 PROCEDURE — 3044F HG A1C LEVEL LT 7.0%: CPT | Mod: CPTII,,, | Performed by: OBSTETRICS & GYNECOLOGY

## 2025-08-19 PROCEDURE — 3078F DIAST BP <80 MM HG: CPT | Mod: CPTII,,, | Performed by: OBSTETRICS & GYNECOLOGY

## 2025-08-19 PROCEDURE — 99999 PR PBB SHADOW E&M-EST. PATIENT-LVL III: CPT | Mod: PBBFAC,,, | Performed by: OBSTETRICS & GYNECOLOGY

## 2025-08-19 PROCEDURE — 1159F MED LIST DOCD IN RCRD: CPT | Mod: CPTII,,, | Performed by: OBSTETRICS & GYNECOLOGY

## 2025-08-19 PROCEDURE — 1160F RVW MEDS BY RX/DR IN RCRD: CPT | Mod: CPTII,,, | Performed by: OBSTETRICS & GYNECOLOGY

## 2025-08-19 PROCEDURE — 99213 OFFICE O/P EST LOW 20 MIN: CPT | Mod: PBBFAC | Performed by: OBSTETRICS & GYNECOLOGY

## 2025-08-19 PROCEDURE — 99213 OFFICE O/P EST LOW 20 MIN: CPT | Mod: S$PBB,,, | Performed by: OBSTETRICS & GYNECOLOGY

## 2025-08-19 PROCEDURE — 3008F BODY MASS INDEX DOCD: CPT | Mod: CPTII,,, | Performed by: OBSTETRICS & GYNECOLOGY

## 2025-08-19 PROCEDURE — 3074F SYST BP LT 130 MM HG: CPT | Mod: CPTII,,, | Performed by: OBSTETRICS & GYNECOLOGY

## 2025-08-22 ENCOUNTER — HOSPITAL ENCOUNTER (EMERGENCY)
Facility: HOSPITAL | Age: 40
Discharge: HOME | End: 2025-08-23
Attending: EMERGENCY MEDICINE
Payer: MEDICAID

## 2025-08-22 ENCOUNTER — PROCEDURE VISIT (OUTPATIENT)
Dept: OBSTETRICS AND GYNECOLOGY | Facility: CLINIC | Age: 40
End: 2025-08-22
Payer: MEDICAID

## 2025-08-22 VITALS
HEIGHT: 68 IN | SYSTOLIC BLOOD PRESSURE: 101 MMHG | BODY MASS INDEX: 36.55 KG/M2 | DIASTOLIC BLOOD PRESSURE: 78 MMHG | WEIGHT: 241.19 LBS

## 2025-08-22 DIAGNOSIS — R00.2 PALPITATIONS: ICD-10-CM

## 2025-08-22 DIAGNOSIS — M54.2 NECK PAIN ON LEFT SIDE: Primary | ICD-10-CM

## 2025-08-22 DIAGNOSIS — Z30.433 ENCOUNTER FOR IUD REMOVAL AND REINSERTION: Primary | ICD-10-CM

## 2025-08-22 PROCEDURE — 99285 EMERGENCY DEPT VISIT HI MDM: CPT | Mod: 25

## 2025-08-22 PROCEDURE — 93005 ELECTROCARDIOGRAM TRACING: CPT

## 2025-08-22 PROCEDURE — 93010 ELECTROCARDIOGRAM REPORT: CPT | Mod: ,,, | Performed by: INTERNAL MEDICINE

## 2025-08-23 VITALS
SYSTOLIC BLOOD PRESSURE: 130 MMHG | RESPIRATION RATE: 19 BRPM | WEIGHT: 242 LBS | HEART RATE: 58 BPM | BODY MASS INDEX: 36.68 KG/M2 | DIASTOLIC BLOOD PRESSURE: 81 MMHG | OXYGEN SATURATION: 97 % | TEMPERATURE: 98 F | HEIGHT: 68 IN

## 2025-08-23 LAB
ABSOLUTE EOSINOPHIL (OHS): 0.13 K/UL
ABSOLUTE MONOCYTE (OHS): 0.8 K/UL (ref 0.3–1)
ABSOLUTE NEUTROPHIL COUNT (OHS): 4.18 K/UL (ref 1.8–7.7)
ALBUMIN SERPL BCP-MCNC: 3.4 G/DL (ref 3.5–5.2)
ALP SERPL-CCNC: 61 UNIT/L (ref 40–150)
ALT SERPL W/O P-5'-P-CCNC: <8 UNIT/L (ref 10–44)
ANION GAP (OHS): 9 MMOL/L (ref 8–16)
AST SERPL-CCNC: 16 UNIT/L (ref 11–45)
BASOPHILS # BLD AUTO: 0.07 K/UL
BASOPHILS NFR BLD AUTO: 0.9 %
BILIRUB SERPL-MCNC: 0.3 MG/DL (ref 0.1–1)
BILIRUB UR QL STRIP.AUTO: NEGATIVE
BUN SERPL-MCNC: 10 MG/DL (ref 6–20)
CALCIUM SERPL-MCNC: 8.5 MG/DL (ref 8.7–10.5)
CHLORIDE SERPL-SCNC: 109 MMOL/L (ref 95–110)
CLARITY UR: CLEAR
CO2 SERPL-SCNC: 19 MMOL/L (ref 23–29)
COLOR UR AUTO: YELLOW
CREAT SERPL-MCNC: 0.6 MG/DL (ref 0.5–1.4)
ERYTHROCYTE [DISTWIDTH] IN BLOOD BY AUTOMATED COUNT: 14.1 % (ref 11.5–14.5)
GFR SERPLBLD CREATININE-BSD FMLA CKD-EPI: >60 ML/MIN/1.73/M2
GLUCOSE SERPL-MCNC: 76 MG/DL (ref 70–110)
GLUCOSE UR QL STRIP: NEGATIVE
HCT VFR BLD AUTO: 34.2 % (ref 37–48.5)
HGB BLD-MCNC: 10.9 GM/DL (ref 12–16)
HGB UR QL STRIP: ABNORMAL
IMM GRANULOCYTES # BLD AUTO: 0.02 K/UL (ref 0–0.04)
IMM GRANULOCYTES NFR BLD AUTO: 0.3 % (ref 0–0.5)
KETONES UR QL STRIP: ABNORMAL
LEUKOCYTE ESTERASE UR QL STRIP: ABNORMAL
LYMPHOCYTES # BLD AUTO: 2.63 K/UL (ref 1–4.8)
MAGNESIUM SERPL-MCNC: 1.7 MG/DL (ref 1.6–2.6)
MCH RBC QN AUTO: 28.7 PG (ref 27–31)
MCHC RBC AUTO-ENTMCNC: 31.9 G/DL (ref 32–36)
MCV RBC AUTO: 90 FL (ref 82–98)
MICROSCOPIC COMMENT: NORMAL
NITRITE UR QL STRIP: NEGATIVE
NUCLEATED RBC (/100WBC) (OHS): 0 /100 WBC
OHS QRS DURATION: 84 MS
OHS QTC CALCULATION: 426 MS
PH UR STRIP: 7 [PH]
PLATELET # BLD AUTO: 337 K/UL (ref 150–450)
PMV BLD AUTO: 8.9 FL (ref 9.2–12.9)
POTASSIUM SERPL-SCNC: 3.7 MMOL/L (ref 3.5–5.1)
PROT SERPL-MCNC: 6.5 GM/DL (ref 6–8.4)
PROT UR QL STRIP: NEGATIVE
RBC # BLD AUTO: 3.8 M/UL (ref 4–5.4)
RBC #/AREA URNS AUTO: 3 /HPF (ref 0–4)
RELATIVE EOSINOPHIL (OHS): 1.7 %
RELATIVE LYMPHOCYTE (OHS): 33.6 % (ref 18–48)
RELATIVE MONOCYTE (OHS): 10.2 % (ref 4–15)
RELATIVE NEUTROPHIL (OHS): 53.3 % (ref 38–73)
SODIUM SERPL-SCNC: 137 MMOL/L (ref 136–145)
SP GR UR STRIP: 1.02
SQUAMOUS #/AREA URNS AUTO: 1 /HPF
TROPONIN I SERPL HS-MCNC: <3 NG/L
UROBILINOGEN UR STRIP-ACNC: NEGATIVE EU/DL
WBC # BLD AUTO: 7.83 K/UL (ref 3.9–12.7)

## 2025-08-23 PROCEDURE — 84484 ASSAY OF TROPONIN QUANT: CPT | Performed by: NURSE PRACTITIONER

## 2025-08-23 PROCEDURE — 83735 ASSAY OF MAGNESIUM: CPT | Performed by: NURSE PRACTITIONER

## 2025-08-23 PROCEDURE — 85025 COMPLETE CBC W/AUTO DIFF WBC: CPT | Performed by: NURSE PRACTITIONER

## 2025-08-23 PROCEDURE — 81001 URINALYSIS AUTO W/SCOPE: CPT | Performed by: NURSE PRACTITIONER

## 2025-08-23 PROCEDURE — 80053 COMPREHEN METABOLIC PANEL: CPT | Performed by: NURSE PRACTITIONER

## 2025-08-24 LAB — HOLD SPECIMEN: NORMAL

## (undated) DEVICE — SYR ONLY LUER LOCK 20CC

## (undated) DEVICE — SET CYSTO IRR DRP CHMBR 84IN

## (undated) DEVICE — EXTRACTOR STNE 1.7FRX115CM

## (undated) DEVICE — POSITIONER HEAD DONUT 9IN FOAM

## (undated) DEVICE — PAD CAST SPECIALIST STRL 4

## (undated) DEVICE — SUPPORT ULNA NERVE PROTECTOR

## (undated) DEVICE — SOLIDIFIER BOTTLE 3000CC

## (undated) DEVICE — UNDERGLOVES BIOGEL PI SIZE 7.5

## (undated) DEVICE — DEV-O-LOOPS MAXI RED

## (undated) DEVICE — GUIDE WIRE MOTION .035 X 150CM

## (undated) DEVICE — DRAPE U SPLIT SHEET 54X76IN

## (undated) DEVICE — DRAPE HAND STERILE

## (undated) DEVICE — SUT 4-0 ETHILON 18 PS-2

## (undated) DEVICE — APPLICATOR CHLORAPREP ORN 26ML

## (undated) DEVICE — GLOVE SIGNATURE ESSNTL LTX 7.5

## (undated) DEVICE — SCRUB HIBICLENS 4% CHG 4OZ

## (undated) DEVICE — SUT VICRYL 3-0 OB 36 CT-1

## (undated) DEVICE — TOURNIQUET SB QC DP 18X4IN

## (undated) DEVICE — SYR 10CC LUER LOCK

## (undated) DEVICE — UNDERGLOVES BIOGEL PI SZ 7 LF

## (undated) DEVICE — COVER CAMERA OPERATING ROOM

## (undated) DEVICE — SUT VICRYL 3-0 27 SH

## (undated) DEVICE — CATH POLLACK OPEN-END FLEXI-TI

## (undated) DEVICE — CONTAINER SPECIMEN OR STER 4OZ

## (undated) DEVICE — DRAPE T CYSTOSCOPY STERILE

## (undated) DEVICE — UNDERGLOVES BIOGEL PI SIZE 8.5

## (undated) DEVICE — CANISTER SUCTION JUMBO 12L

## (undated) DEVICE — TRAY SKIN SCRUB WET 4 COMPART

## (undated) DEVICE — CANISTER OMNI-JUG SUCTION 16LT

## (undated) DEVICE — GLOVE BIOGEL SZ 8 1/2

## (undated) DEVICE — SOL NACL IRR 3000ML

## (undated) DEVICE — GLOVE SURGICAL LATEX SZ 7

## (undated) DEVICE — IRRIGATION SET Y-TYPE TUR/BLAD

## (undated) DEVICE — JELLY LUBRICANT STERILE 4 OZ

## (undated) DEVICE — SPONGE COTTON TRAY 4X4IN

## (undated) DEVICE — SOL 9P NACL IRR PIC IL

## (undated) DEVICE — SOL IRRI STRL WATER 1000ML

## (undated) DEVICE — GOWN NONREINF SET-IN SLV 2XL

## (undated) DEVICE — TOWEL OR DISP STRL BLUE 4/PK

## (undated) DEVICE — BANDAGE ESMARK ELASTIC ST 4X9

## (undated) DEVICE — DRAPE THREE-QTR REINF 53X77IN

## (undated) DEVICE — ALCOHOL 70% ISOP RUBBING 4OZ

## (undated) DEVICE — IMMOBILIZER SHOULDER RAINBOW M

## (undated) DEVICE — GOWN POLY REINF BRTH SLV XL

## (undated) DEVICE — UNDERGLOVE BIOGEL PI SZ 6.5 LF

## (undated) DEVICE — PAD ABD 8X10 STERILE

## (undated) DEVICE — GLOVE SURGICAL LATEX SZ 6.5

## (undated) DEVICE — COVER LIGHT HANDLE 80/CA

## (undated) DEVICE — KIT TURNOVER

## (undated) DEVICE — PACK BASIC SETUP SC BR

## (undated) DEVICE — DRESSING XEROFORM FOIL PK 1X8

## (undated) DEVICE — BANDAGE MATRIX HK LOOP 4IN 5YD

## (undated) DEVICE — GOWN POLY REINF X-LONG XL

## (undated) DEVICE — BOWL STERILE LARGE 32OZ

## (undated) DEVICE — NDL SAFETY 25G X 1.5 ECLIPSE

## (undated) DEVICE — UROVIEW 2600/2800